# Patient Record
Sex: MALE | Race: WHITE | NOT HISPANIC OR LATINO | Employment: OTHER | URBAN - METROPOLITAN AREA
[De-identification: names, ages, dates, MRNs, and addresses within clinical notes are randomized per-mention and may not be internally consistent; named-entity substitution may affect disease eponyms.]

---

## 2021-08-30 ENCOUNTER — APPOINTMENT (EMERGENCY)
Dept: RADIOLOGY | Facility: HOSPITAL | Age: 86
DRG: 072 | End: 2021-08-30
Payer: COMMERCIAL

## 2021-08-30 ENCOUNTER — APPOINTMENT (EMERGENCY)
Dept: CT IMAGING | Facility: HOSPITAL | Age: 86
DRG: 072 | End: 2021-08-30
Payer: COMMERCIAL

## 2021-08-30 ENCOUNTER — HOSPITAL ENCOUNTER (INPATIENT)
Facility: HOSPITAL | Age: 86
LOS: 2 days | Discharge: HOME | DRG: 072 | End: 2021-09-01
Attending: EMERGENCY MEDICINE | Admitting: INTERNAL MEDICINE
Payer: COMMERCIAL

## 2021-08-30 DIAGNOSIS — J98.8 RESPIRATORY INFECTION: ICD-10-CM

## 2021-08-30 DIAGNOSIS — A41.9 SEPSIS (HCC): Primary | ICD-10-CM

## 2021-08-30 PROBLEM — Z85.46 HISTORY OF PROSTATE CANCER: Status: ACTIVE | Noted: 2021-08-30

## 2021-08-30 PROBLEM — N18.30 CKD (CHRONIC KIDNEY DISEASE) STAGE 3, GFR 30-59 ML/MIN (HCC): Status: ACTIVE | Noted: 2021-08-30

## 2021-08-30 PROBLEM — I10 ESSENTIAL HYPERTENSION: Status: ACTIVE | Noted: 2021-08-30

## 2021-08-30 LAB
ALBUMIN SERPL BCP-MCNC: 3.9 G/DL (ref 3.5–5)
ALP SERPL-CCNC: 83 U/L (ref 46–116)
ALT SERPL W P-5'-P-CCNC: 40 U/L (ref 12–78)
ANION GAP SERPL CALCULATED.3IONS-SCNC: 9 MMOL/L (ref 4–13)
APTT PPP: 33 SECONDS (ref 23–37)
AST SERPL W P-5'-P-CCNC: 28 U/L (ref 5–45)
BACTERIA UR QL AUTO: NORMAL /HPF
BASOPHILS # BLD AUTO: 0.02 THOUSANDS/ΜL (ref 0–0.1)
BASOPHILS NFR BLD AUTO: 0 % (ref 0–1)
BILIRUB SERPL-MCNC: 0.6 MG/DL (ref 0.2–1)
BILIRUB UR QL STRIP: NEGATIVE
BUN SERPL-MCNC: 21 MG/DL (ref 5–25)
CALCIUM SERPL-MCNC: 9.1 MG/DL (ref 8.3–10.1)
CHLORIDE SERPL-SCNC: 97 MMOL/L (ref 100–108)
CLARITY UR: CLEAR
CO2 SERPL-SCNC: 26 MMOL/L (ref 21–32)
COLOR UR: YELLOW
CREAT SERPL-MCNC: 1.64 MG/DL (ref 0.6–1.3)
EOSINOPHIL # BLD AUTO: 0.01 THOUSAND/ΜL (ref 0–0.61)
EOSINOPHIL NFR BLD AUTO: 0 % (ref 0–6)
ERYTHROCYTE [DISTWIDTH] IN BLOOD BY AUTOMATED COUNT: 13.9 % (ref 11.6–15.1)
GFR SERPL CREATININE-BSD FRML MDRD: 37 ML/MIN/1.73SQ M
GLUCOSE SERPL-MCNC: 114 MG/DL (ref 65–140)
GLUCOSE UR STRIP-MCNC: NEGATIVE MG/DL
HCT VFR BLD AUTO: 45.4 % (ref 36.5–49.3)
HGB BLD-MCNC: 15 G/DL (ref 12–17)
HGB UR QL STRIP.AUTO: ABNORMAL
IMM GRANULOCYTES # BLD AUTO: 0.08 THOUSAND/UL (ref 0–0.2)
IMM GRANULOCYTES NFR BLD AUTO: 1 % (ref 0–2)
INR PPP: 1.08 (ref 0.84–1.19)
KETONES UR STRIP-MCNC: NEGATIVE MG/DL
LACTATE SERPL-SCNC: 1.3 MMOL/L (ref 0.5–2)
LEUKOCYTE ESTERASE UR QL STRIP: NEGATIVE
LYMPHOCYTES # BLD AUTO: 1.06 THOUSANDS/ΜL (ref 0.6–4.47)
LYMPHOCYTES NFR BLD AUTO: 8 % (ref 14–44)
MCH RBC QN AUTO: 29.7 PG (ref 26.8–34.3)
MCHC RBC AUTO-ENTMCNC: 33 G/DL (ref 31.4–37.4)
MCV RBC AUTO: 90 FL (ref 82–98)
MONOCYTES # BLD AUTO: 1.11 THOUSAND/ΜL (ref 0.17–1.22)
MONOCYTES NFR BLD AUTO: 8 % (ref 4–12)
NEUTROPHILS # BLD AUTO: 11.29 THOUSANDS/ΜL (ref 1.85–7.62)
NEUTS SEG NFR BLD AUTO: 83 % (ref 43–75)
NITRITE UR QL STRIP: NEGATIVE
NON-SQ EPI CELLS URNS QL MICRO: NORMAL /HPF
NRBC BLD AUTO-RTO: 0 /100 WBCS
PH UR STRIP.AUTO: 6 [PH] (ref 4.5–8)
PLATELET # BLD AUTO: 226 THOUSANDS/UL (ref 149–390)
PLATELET # BLD AUTO: 285 THOUSANDS/UL (ref 149–390)
PMV BLD AUTO: 10.5 FL (ref 8.9–12.7)
PMV BLD AUTO: 9.7 FL (ref 8.9–12.7)
POTASSIUM SERPL-SCNC: 4.8 MMOL/L (ref 3.5–5.3)
PROCALCITONIN SERPL-MCNC: 0.15 NG/ML
PROT SERPL-MCNC: 8.1 G/DL (ref 6.4–8.2)
PROT UR STRIP-MCNC: ABNORMAL MG/DL
PROTHROMBIN TIME: 14.1 SECONDS (ref 11.6–14.5)
RBC # BLD AUTO: 5.05 MILLION/UL (ref 3.88–5.62)
RBC #/AREA URNS AUTO: NORMAL /HPF
SARS-COV-2 RNA RESP QL NAA+PROBE: NEGATIVE
SODIUM SERPL-SCNC: 132 MMOL/L (ref 136–145)
SP GR UR STRIP.AUTO: 1.02 (ref 1–1.03)
UROBILINOGEN UR QL STRIP.AUTO: 0.2 E.U./DL
WBC # BLD AUTO: 13.57 THOUSAND/UL (ref 4.31–10.16)
WBC #/AREA URNS AUTO: NORMAL /HPF

## 2021-08-30 PROCEDURE — U0003 INFECTIOUS AGENT DETECTION BY NUCLEIC ACID (DNA OR RNA); SEVERE ACUTE RESPIRATORY SYNDROME CORONAVIRUS 2 (SARS-COV-2) (CORONAVIRUS DISEASE [COVID-19]), AMPLIFIED PROBE TECHNIQUE, MAKING USE OF HIGH THROUGHPUT TECHNOLOGIES AS DESCRIBED BY CMS-2020-01-R: HCPCS | Performed by: EMERGENCY MEDICINE

## 2021-08-30 PROCEDURE — G1004 CDSM NDSC: HCPCS

## 2021-08-30 PROCEDURE — 85610 PROTHROMBIN TIME: CPT | Performed by: EMERGENCY MEDICINE

## 2021-08-30 PROCEDURE — 85730 THROMBOPLASTIN TIME PARTIAL: CPT | Performed by: EMERGENCY MEDICINE

## 2021-08-30 PROCEDURE — 87040 BLOOD CULTURE FOR BACTERIA: CPT | Performed by: EMERGENCY MEDICINE

## 2021-08-30 PROCEDURE — 99285 EMERGENCY DEPT VISIT HI MDM: CPT | Performed by: EMERGENCY MEDICINE

## 2021-08-30 PROCEDURE — 99223 1ST HOSP IP/OBS HIGH 75: CPT | Performed by: PHYSICIAN ASSISTANT

## 2021-08-30 PROCEDURE — 81001 URINALYSIS AUTO W/SCOPE: CPT

## 2021-08-30 PROCEDURE — 80053 COMPREHEN METABOLIC PANEL: CPT | Performed by: EMERGENCY MEDICINE

## 2021-08-30 PROCEDURE — 93005 ELECTROCARDIOGRAM TRACING: CPT

## 2021-08-30 PROCEDURE — 71045 X-RAY EXAM CHEST 1 VIEW: CPT

## 2021-08-30 PROCEDURE — 84145 PROCALCITONIN (PCT): CPT | Performed by: EMERGENCY MEDICINE

## 2021-08-30 PROCEDURE — 85049 AUTOMATED PLATELET COUNT: CPT | Performed by: PHYSICIAN ASSISTANT

## 2021-08-30 PROCEDURE — U0005 INFEC AGEN DETEC AMPLI PROBE: HCPCS | Performed by: EMERGENCY MEDICINE

## 2021-08-30 PROCEDURE — 36415 COLL VENOUS BLD VENIPUNCTURE: CPT | Performed by: EMERGENCY MEDICINE

## 2021-08-30 PROCEDURE — 85025 COMPLETE CBC W/AUTO DIFF WBC: CPT | Performed by: EMERGENCY MEDICINE

## 2021-08-30 PROCEDURE — 99285 EMERGENCY DEPT VISIT HI MDM: CPT

## 2021-08-30 PROCEDURE — 70450 CT HEAD/BRAIN W/O DYE: CPT

## 2021-08-30 PROCEDURE — 83605 ASSAY OF LACTIC ACID: CPT | Performed by: EMERGENCY MEDICINE

## 2021-08-30 RX ORDER — ACETAMINOPHEN 325 MG/1
650 TABLET ORAL ONCE
Status: COMPLETED | OUTPATIENT
Start: 2021-08-30 | End: 2021-08-30

## 2021-08-30 RX ORDER — DOCUSATE SODIUM 100 MG/1
100 CAPSULE, LIQUID FILLED ORAL 2 TIMES DAILY
Status: DISCONTINUED | OUTPATIENT
Start: 2021-08-31 | End: 2021-09-01 | Stop reason: HOSPADM

## 2021-08-30 RX ORDER — CARVEDILOL 6.25 MG/1
6.25 TABLET ORAL 2 TIMES DAILY WITH MEALS
Status: DISCONTINUED | OUTPATIENT
Start: 2021-08-31 | End: 2021-09-01 | Stop reason: HOSPADM

## 2021-08-30 RX ORDER — HEPARIN SODIUM 5000 [USP'U]/ML
5000 INJECTION, SOLUTION INTRAVENOUS; SUBCUTANEOUS EVERY 8 HOURS SCHEDULED
Status: DISCONTINUED | OUTPATIENT
Start: 2021-08-30 | End: 2021-09-01 | Stop reason: HOSPADM

## 2021-08-30 RX ORDER — LISINOPRIL 10 MG/1
10 TABLET ORAL DAILY
COMMUNITY

## 2021-08-30 RX ORDER — ACETAMINOPHEN 325 MG/1
650 TABLET ORAL EVERY 6 HOURS PRN
Status: DISCONTINUED | OUTPATIENT
Start: 2021-08-30 | End: 2021-09-01 | Stop reason: HOSPADM

## 2021-08-30 RX ORDER — ACETAMINOPHEN 325 MG/1
650 TABLET ORAL EVERY 6 HOURS PRN
COMMUNITY

## 2021-08-30 RX ORDER — SODIUM CHLORIDE 9 MG/ML
3 INJECTION INTRAVENOUS
Status: DISCONTINUED | OUTPATIENT
Start: 2021-08-30 | End: 2021-09-01 | Stop reason: HOSPADM

## 2021-08-30 RX ORDER — ONDANSETRON 2 MG/ML
4 INJECTION INTRAMUSCULAR; INTRAVENOUS EVERY 6 HOURS PRN
Status: DISCONTINUED | OUTPATIENT
Start: 2021-08-30 | End: 2021-09-01 | Stop reason: HOSPADM

## 2021-08-30 RX ORDER — SODIUM CHLORIDE, SODIUM LACTATE, POTASSIUM CHLORIDE, CALCIUM CHLORIDE 600; 310; 30; 20 MG/100ML; MG/100ML; MG/100ML; MG/100ML
75 INJECTION, SOLUTION INTRAVENOUS CONTINUOUS
Status: DISCONTINUED | OUTPATIENT
Start: 2021-08-30 | End: 2021-08-31

## 2021-08-30 RX ORDER — CARVEDILOL 6.25 MG/1
6.25 TABLET ORAL 2 TIMES DAILY WITH MEALS
COMMUNITY

## 2021-08-30 RX ORDER — SENNOSIDES 8.6 MG
1 TABLET ORAL DAILY
Status: DISCONTINUED | OUTPATIENT
Start: 2021-08-31 | End: 2021-09-01 | Stop reason: HOSPADM

## 2021-08-30 RX ORDER — CALCIUM CARBONATE 200(500)MG
1000 TABLET,CHEWABLE ORAL DAILY PRN
Status: DISCONTINUED | OUTPATIENT
Start: 2021-08-30 | End: 2021-09-01 | Stop reason: HOSPADM

## 2021-08-30 RX ORDER — LISINOPRIL 10 MG/1
10 TABLET ORAL DAILY
Status: DISCONTINUED | OUTPATIENT
Start: 2021-08-31 | End: 2021-09-01 | Stop reason: HOSPADM

## 2021-08-30 RX ADMIN — HEPARIN SODIUM 5000 UNITS: 5000 INJECTION INTRAVENOUS; SUBCUTANEOUS at 23:50

## 2021-08-30 RX ADMIN — CEFTRIAXONE 1000 MG: 10 INJECTION, POWDER, FOR SOLUTION INTRAVENOUS at 18:11

## 2021-08-30 RX ADMIN — ACETAMINOPHEN 650 MG: 325 TABLET, FILM COATED ORAL at 15:32

## 2021-08-30 RX ADMIN — SODIUM CHLORIDE, SODIUM LACTATE, POTASSIUM CHLORIDE, AND CALCIUM CHLORIDE 75 ML/HR: .6; .31; .03; .02 INJECTION, SOLUTION INTRAVENOUS at 23:51

## 2021-08-30 RX ADMIN — SODIUM CHLORIDE 1000 ML: 0.9 INJECTION, SOLUTION INTRAVENOUS at 18:11

## 2021-08-30 NOTE — SEPSIS NOTE
Sepsis Note   Severo Coach 80 y o  male MRN: [id number]  Unit/Bed#: ED 18 Encounter: [id number]      qSOFA     Row Name 08/30/21 1645 08/30/21 1600 08/30/21 1530 08/30/21 1506 08/30/21 1423    Altered mental status GCS < 15  --  --  --  1  --    Respiratory Rate > / =22  --  0  0  0  0    Systolic BP < / =258  0  0  0  0  0    Q Sofa Score  1  1  1  1  0        Initial Sepsis Screening     Row Name 08/30/21 1702                Is the patient's history suggestive of a new or worsening infection? (!) Yes (Proceed)  -SZ        Suspected source of infection  pneumonia  -SZ        Are two or more of the following signs & symptoms of infection both present and new to the patient? (!) Yes (Proceed)  -SZ        Indicate SIRS criteria  Hyperthemia > 38 3C (100 9F); Tachycardia > 90 bpm;Leukocytosis (WBC > 24534 IJL)  -SZ        If the answer is yes to both questions, suspicion of sepsis is present  --        If severe sepsis is present AND tissue hypoperfusion perists in the hour after fluid resuscitation or lactate > 4, the patient meets criteria for SEPTIC SHOCK  --        Are any of the following organ dysfunction criteria present within 6 hours of suspected infection and SIRS criteria that are NOT considered to be chronic conditions?   No  -SZ        Organ dysfunction  --        Date of presentation of severe sepsis  --        Time of presentation of severe sepsis  --        Tissue hypoperfusion persists in the hour after crystalloid fluid administration, evidenced, by either:  --        Was hypotension present within one hour of the conclusion of crystalloid fluid administration?  --        Date of presentation of septic shock  --        Time of presentation of septic shock  --          User Key  (r) = Recorded By, (t) = Taken By, (c) = Cosigned By    [address] Name Provider Type    SZ Mc Richardson MD Physician

## 2021-08-30 NOTE — H&P
[facility]  H&P- Helen Chan 2/27/1932, 80 y o  male MRN: [id number]  Unit/Bed#: ED 18 Encounter: [id number]  Primary Care Provider: Deangelo Batista MD   Date and time admitted to hospital: 8/30/2021  2:19 PM    * Sepsis [facility])  Assessment & Plan  · POA w/ fever, AMS, tachycardia, WBC >12  · Source: pulmonary bacterial vs  Pulmonary viral; abdomen is somewhat distended but non-tender and no peritoneal signs; UA unremarkable; COVID 19 negative  · S/P Rocephin in th ED-- continue at this time  · Continue IVF  · Follow blood cultures  · Trend labs/vitals-- check PCT again tomorrow AM  · Q4 neuro checks    CKD (chronic kidney disease) stage 3, GFR 30-59 ml/min ([facility])  Assessment & Plan  Lab Results   Component Value Date    EGFR 37 08/30/2021    CREATININE 1 64 (H) 08/30/2021   · Cr is at baseline  · Continue to monitor w/ IVF    History of prostate cancer  Assessment & Plan  · Continue w/ routine OP f/u w/ Urology    Essential hypertension  Assessment & Plan  · BP acceptable at this time  · Continue carvedilol, lisinopril      VTE Pharmacologic Prophylaxis: VTE Score: 5 High Risk (Score >/= 5) - Pharmacological DVT Prophylaxis Ordered: heparin  Sequential Compression Devices Ordered  Code Status: level 1  Discussion with family: family aware of admission and negative COVID 19 status  Anticipated Length of Stay: Patient will be admitted on an inpatient basis with an anticipated length of stay of greater than 2 midnights secondary to tx and evaluation of sepsis  Total Time for Visit, including Counseling / Coordination of Care: 30 minutes Greater than 50% of this total time spent on direct patient counseling and coordination of care  Chief Complaint: altered mental status    History of Present Illness:  Helen Chan is a 80 y o  male with a PMH of HTN, CKD, cognitive dysfunction, prostate CA w/ prior treatment who presents with altered mental status  Hx obtained from ED records   Per records, patient was last known well yesterday  Today, he appear to not be acting himself-- he is usually alert and oriented and able to perform activities of daily living  There were no reported sx at [facility]  Per patient, he was not sure why he was brought to the ED  His only complaints are cough x1 year w/ intermittent 'airplane glue' colored sputum and difficulty w/ urination  He denies any abdominal pain/nausea/vomiting/chest pain/SOB/diarrhea  Review of Systems:  Review of Systems   Constitutional: Negative  HENT: Negative  Eyes: Negative  Respiratory: Positive for cough  Cardiovascular: Negative  Gastrointestinal: Negative  Genitourinary: Positive for difficulty urinating  Musculoskeletal: Negative  Skin: Negative  Neurological: Negative  Hematological: Negative  Psychiatric/Behavioral: Negative  Past Medical and Surgical History:   History reviewed  No pertinent past medical history  History reviewed  No pertinent surgical history  Meds/Allergies:  Prior to Admission medications    Medication Sig Start Date End Date Taking? Authorizing Provider   acetaminophen (TYLENOL) 325 mg tablet Take 650 mg by mouth every 6 (six) hours as needed for mild pain   Yes Historical Provider, MD   carvedilol (COREG) 6 25 mg tablet Take 6 25 mg by mouth 2 (two) times a day with meals   Yes Historical Provider, MD   lisinopril (ZESTRIL) 10 mg tablet Take 10 mg by mouth daily   Yes Historical Provider, MD     I have reveiwed home medications using records provided by [facility]      Allergies: No Known Allergies    Social History:  Marital Status: Unknown   Occupation: retired  Patient Pre-hospital Living Situation: Assisted Living  Patient Pre-hospital Level of Mobility: walks  Patient Pre-hospital Diet Restrictions: none  Substance Use History:   Social History     Substance and Sexual Activity   Alcohol Use None     Social History     Tobacco Use   Smoking Status Never Smoker Smokeless Tobacco Never Used     Social History     Substance and Sexual Activity   Drug Use Not on file       Family History:  History reviewed  No pertinent family history  Physical Exam:     Vitals:   Blood Pressure: 164/89 (08/30/21 1806)  Pulse: 90 (08/30/21 1806)  Temperature: 98 3 °F (36 8 °C) (08/30/21 1806)  Temp Source: Oral (08/30/21 1806)  Respirations: 18 (08/30/21 1806)  Height: 5' 11" (180 3 cm) (08/30/21 1806)  Weight - Scale: 84 kg (185 lb 3 oz) (08/30/21 1806)  SpO2: 93 % (08/30/21 1806)    Physical Exam  Constitutional:       General: He is not in acute distress  Appearance: Normal appearance  He is not ill-appearing  HENT:      Head: Normocephalic  Eyes:      Pupils: Pupils are equal, round, and reactive to light  Cardiovascular:      Rate and Rhythm: Normal rate and regular rhythm  Heart sounds: No murmur heard  No friction rub  No gallop  Pulmonary:      Effort: Pulmonary effort is normal       Breath sounds: Normal breath sounds  No wheezing  Abdominal:      General: Bowel sounds are normal       Palpations: Abdomen is soft  There is no mass  Tenderness: There is no abdominal tenderness  There is no guarding  Musculoskeletal:         General: Normal range of motion  Skin:     General: Skin is warm and dry  Neurological:      General: No focal deficit present  Mental Status: He is alert        Comments: Oriented to self, knows it is 2021, reports being 89   Psychiatric:         Mood and Affect: Mood normal          Additional Data:     Lab Results:  Results from last 7 days   Lab Units 08/30/21  1503   WBC Thousand/uL 13 57*   HEMOGLOBIN g/dL 15 0   HEMATOCRIT % 45 4   PLATELETS Thousands/uL 285   NEUTROS PCT % 83*   LYMPHS PCT % 8*   MONOS PCT % 8   EOS PCT % 0     Results from last 7 days   Lab Units 08/30/21  1503   SODIUM mmol/L 132*   POTASSIUM mmol/L 4 8   CHLORIDE mmol/L 97*   CO2 mmol/L 26   BUN mg/dL 21   CREATININE mg/dL 1 64*   ANION GAP mmol/L 9   CALCIUM mg/dL 9 1   ALBUMIN g/dL 3 9   TOTAL BILIRUBIN mg/dL 0 60   ALK PHOS U/L 83   ALT U/L 40   AST U/L 28   GLUCOSE RANDOM mg/dL 114     Results from last 7 days   Lab Units 08/30/21  1503   INR  1 08             Results from last 7 days   Lab Units 08/30/21  1503   LACTIC ACID mmol/L 1 3   PROCALCITONIN ng/ml 0 15       Imaging: Reviewed radiology reports from this admission including: chest xray and CT head  CT head without contrast   Final Result by Hunter Dawkins MD (08/30 1642)      No acute intracranial hemorrhage, mass effect or edema  Microangiopathic changes  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

## 2021-08-30 NOTE — ED PROVIDER NOTES
History  Chief Complaint   Patient presents with    Altered Mental Status     Per EMS patient sent from SNF due to 'not acting himself "     Patient is an 51-year-old male who presents to the emergency department from his nursing facility  EMS report indicated that patient was sent in for altered mental status  Today he was "not his normal self "  He is usually alert, oriented and able to perform his daily activities of living  Reportedly was okay yesterday  Patient initially minimally vocal though more responsive/conversant with time  He denies having felt poorly  He denies having pain anywhere  He indicates that he is 89  He is aware that it is 2021 and suggests that the month is September  He indicates that he did have sausage & biscuits today for breakfast   He denies having felt nauseous or having experienced any abdominal pain  He endorses having a cough which is productive "airplane glue" colored sputum  This has been ongoing for 2 to 3 years  He denies any chest pain or shortness of breath  Reviewed chart  Patient established care with Dr Ochoa Escobar last year after having moved up from [place]  Family noted some cognitive impairment  He additionally has hypertension, CKD and remote history of treated prostate CA  Pt  Resides at [facility]  Prior to Admission Medications   Prescriptions Last Dose Informant Patient Reported? Taking?   acetaminophen (TYLENOL) 325 mg tablet  Outside Facility (Specify) Yes Yes   Sig: Take 650 mg by mouth every 6 (six) hours as needed for mild pain   carvedilol (COREG) 6 25 mg tablet  Outside Facility (Specify) Yes Yes   Sig: Take 6 25 mg by mouth 2 (two) times a day with meals   lisinopril (ZESTRIL) 10 mg tablet  Outside Facility (Specify) Yes Yes   Sig: Take 10 mg by mouth daily      Facility-Administered Medications: None       History reviewed  No pertinent past medical history  History reviewed  No pertinent surgical history      History reviewed  No pertinent family history  I have reviewed and agree with the history as documented  E-Cigarette/Vaping     E-Cigarette/Vaping Substances     Social History     Tobacco Use    Smoking status: Never Smoker    Smokeless tobacco: Never Used   Substance Use Topics    Alcohol use: Not on file    Drug use: Not on file       Review of Systems   Constitutional: Positive for fever  Cardiovascular: Negative for chest pain  Gastrointestinal: Negative for abdominal pain  All other systems reviewed and are negative  Physical Exam  Physical Exam  Vitals and nursing note reviewed  Constitutional:       Appearance: He is well-developed  He is obese  Comments: Patient is a bit slow to respond  Does not appear distressed  HENT:      Head: Normocephalic  Mouth/Throat:      Mouth: Mucous membranes are moist    Eyes:      Extraocular Movements: Extraocular movements intact  Pupils: Pupils are equal, round, and reactive to light  Pulmonary:      Effort: Pulmonary effort is normal       Comments: Breath sounds diminished diffusely  Abdominal:      General: Bowel sounds are normal       Palpations: Abdomen is soft  Tenderness: There is no abdominal tenderness  Musculoskeletal:         General: Normal range of motion  Skin:     General: Skin is warm and dry  Neurological:      Mental Status: He is alert  GCS: GCS eye subscore is 4  GCS verbal subscore is 4  GCS motor subscore is 6  Comments: Patient aware that it is 2021 and that he is at the hospital   Informed me that he was 89 (as opposed to 89)  Patient does have cognitive impairment-uncertain whether this is baseline  Clear speech  No facial asymmetry/ deficit appreciated  Pupils briskly reactive to light  EOMI  No nystagmus  Strong eye closure & symmetric brow raise  Ability to insufflate cheeks, protrude tongue midline & range this laterally   Symmetric/ intact sensation in the upper, mid & lower portions of the face  5/5 strength on head turn,bicep& tricep movement against resistance b/l   5/5 strength on b/l hand , hip flexion, dorsi & plantar flexion  Symmetric grossly intact sensation in the UE & LE  Steady gait  No dysmetria       Psychiatric:         Mood and Affect: Mood normal          Vital Signs  ED Triage Vitals [08/30/21 1423]   Temperature Pulse Respirations Blood Pressure SpO2   (!) 100 8 °F (38 2 °C) 96 18 (!) 185/89 92 %      Temp Source Heart Rate Source Patient Position - Orthostatic VS BP Location FiO2 (%)   Oral Monitor Lying Left arm --      Pain Score       No Pain           Vitals:    08/30/21 1506 08/30/21 1530 08/30/21 1600 08/30/21 1645   BP: 169/88 157/86 138/89 159/87   Pulse: 96 100 98 98   Patient Position - Orthostatic VS:  Lying Lying          Visual Acuity  Visual Acuity      Most Recent Value   L Pupil Size (mm)  3   R Pupil Size (mm)  3          ED Medications  Medications   sodium chloride (PF) 0 9 % injection 3 mL (has no administration in time range)   sodium chloride 0 9 % bolus 1,000 mL (has no administration in time range)   ceftriaxone (ROCEPHIN) 1 g/50 mL in dextrose IVPB (has no administration in time range)   acetaminophen (TYLENOL) tablet 650 mg (650 mg Oral Given 8/30/21 1532)       Diagnostic Studies  Results Reviewed     Procedure Component Value Units Date/Time    Urine Microscopic [[id number]]  (Normal) Collected: 08/30/21 1655    Lab Status: Final result Specimen: Urine Updated: 08/30/21 1744     RBC, UA None Seen /hpf      WBC, UA 0-1 /hpf      Epithelial Cells None Seen /hpf      Bacteria, UA None Seen /hpf     Procalcitonin with AM Reflex [[id number]]  (Normal) Collected: 08/30/21 1503    Lab Status: Final result Specimen: Blood from Arm, Right Updated: 08/30/21 1743     Procalcitonin 0 15 ng/ml     Procalcitonin Reflex [[id number]]     Lab Status: No result Specimen: Blood     Urine Macroscopic, POC [[id number]]  (Abnormal) Collected: 08/30/21 1655    Lab Status: Final result Specimen: Urine Updated: 08/30/21 1657     Color, UA Yellow     Clarity, UA Clear     pH, UA 6 0     Leukocytes, UA Negative     Nitrite, UA Negative     Protein,  (2+) mg/dl      Glucose, UA Negative mg/dl      Ketones, UA Negative mg/dl      Urobilinogen, UA 0 2 E U /dl      Bilirubin, UA Negative     Blood, UA Trace     Specific [place], UA 1 025    Narrative:      CLINITEK RESULT    Novel Coronavirus (Covid-19),PCR [facility] - 2 Hour Stat [[id number]]  (Normal) Collected: 08/30/21 1502    Lab Status: Final result Specimen: Nares from Nose Updated: 08/30/21 1644     SARS-CoV-2 Negative    Narrative: The specimen collection materials, transport medium, and/or testing methodology utilized in the production of these test results have been proven to be reliable in a limited validation with an abbreviated program under the Emergency Utilization Authorization provided by the FDA  Testing reported as "Presumptive positive" will be confirmed with secondary testing to ensure result accuracy  Clinical caution and judgement should be used with the interpretation of these results with consideration of the clinical impression and other laboratory testing  Testing reported as "Positive" or "Negative" has been proven to be accurate according to standard laboratory validation requirements  All testing is performed with control materials showing appropriate reactivity at standard intervals        Comprehensive metabolic panel [[id number]]  (Abnormal) Collected: 08/30/21 1503    Lab Status: Final result Specimen: Blood from Arm, Right Updated: 08/30/21 1547     Sodium 132 mmol/L      Potassium 4 8 mmol/L      Chloride 97 mmol/L      CO2 26 mmol/L      ANION GAP 9 mmol/L      BUN 21 mg/dL      Creatinine 1 64 mg/dL      Glucose 114 mg/dL      Calcium 9 1 mg/dL      AST 28 U/L      ALT 40 U/L      Alkaline Phosphatase 83 U/L      Total Protein 8 1 g/dL      Albumin 3 9 g/dL      Total Bilirubin 0 60 mg/dL eGFR 37 ml/min/1 73sq m     Narrative:      [place] guidelines for Chronic Kidney Disease (CKD):     Stage 1 with normal or high GFR (GFR > 90 mL/min/1 73 square meters)    Stage 2 Mild CKD (GFR = 60-89 mL/min/1 73 square meters)    Stage 3A Moderate CKD (GFR = 45-59 mL/min/1 73 square meters)    Stage 3B Moderate CKD (GFR = 30-44 mL/min/1 73 square meters)    Stage 4 Severe CKD (GFR = 15-29 mL/min/1 73 square meters)    Stage 5 End Stage CKD (GFR <15 mL/min/1 73 square meters)  Note: GFR calculation is accurate only with a steady state creatinine    Lactic Acid [[id number]]  (Normal) Collected: 08/30/21 1503    Lab Status: Final result Specimen: Blood from Arm, Right Updated: 08/30/21 1540     LACTIC ACID 1 3 mmol/L     Narrative:      Result may be elevated if tourniquet was used during collection      CBC and differential [[id number]]  (Abnormal) Collected: 08/30/21 1503    Lab Status: Final result Specimen: Blood from Arm, Right Updated: 08/30/21 1535     WBC 13 57 Thousand/uL      RBC 5 05 Million/uL      Hemoglobin 15 0 g/dL      Hematocrit 45 4 %      MCV 90 fL      MCH 29 7 pg      MCHC 33 0 g/dL      RDW 13 9 %      MPV 10 5 fL      Platelets 493 Thousands/uL      nRBC 0 /100 WBCs      Neutrophils Relative 83 %      Immat GRANS % 1 %      Lymphocytes Relative 8 %      Monocytes Relative 8 %      Eosinophils Relative 0 %      Basophils Relative 0 %      Neutrophils Absolute 11 29 Thousands/µL      Immature Grans Absolute 0 08 Thousand/uL      Lymphocytes Absolute 1 06 Thousands/µL      Monocytes Absolute 1 11 Thousand/µL      Eosinophils Absolute 0 01 Thousand/µL      Basophils Absolute 0 02 Thousands/µL     Protime-INR [[id number]]  (Normal) Collected: 08/30/21 1503    Lab Status: Final result Specimen: Blood from Arm, Right Updated: 08/30/21 1532     Protime 14 1 seconds      INR 1 08    APTT [[id number]]  (Normal) Collected: 08/30/21 1503    Lab Status: Final result Specimen: Blood from Arm, Right Updated: 08/30/21 1532     PTT 33 seconds     Blood culture #1 [[id number]] Collected: 08/30/21 1504    Lab Status: In process Specimen: Blood from Hand, Left Updated: 08/30/21 1511    Blood culture #2 [[id number]] Collected: 08/30/21 1503    Lab Status: In process Specimen: Blood from Arm, Right Updated: 08/30/21 1511                 CT head without contrast   Final Result by Jeni Phoenix MD (08/30 1642)      No acute intracranial hemorrhage, mass effect or edema  Microangiopathic changes  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                  Workstation performed: [id number]         XR chest 1 view portable   ED Interpretation by Henna Acosta MD (08/30 1643)   Poor inspiration  Slightly prominent lung markings  No appreciated effusion or infiltrate                 Procedures  ECG 12 Lead Documentation Only    Date/Time: 8/30/2021 2:51 PM  Performed by: Henna Acosta MD  Authorized by: Henna Acosta MD     ECG reviewed by me, the ED Provider: yes    Patient location:  ED  Previous ECG:     Previous ECG:  Compared to current  Rate:     ECG rate:  95    ECG rate assessment: normal    Rhythm:     Rhythm: sinus rhythm    Ectopy:     Ectopy: none    QRS:     QRS axis:  Left    QRS intervals:  Normal  Conduction:     Conduction: normal    ST segments:     ST segments:  Normal  T waves:     T waves: normal               ED Course  ED Course as of Aug 30 1801   Mon Aug 30, 2021   1658 CT head is unremarkable  Patient does not have any focal neurological deficits  Uncertain that patient's mentation my initial evaluation is different from his baseline  He does endorse having cough and was febrile on arrival   Suspicion initially for possible COVID infection  His test result was negative  He does have mild leukocytosis, mild renal insufficiency/evidence of dehydration  Creatinine 1 64 today    This was 1 58 on prior testing although with normal sodium and chloride (in contrast today's lower values)  Lung x-ray is limited in breath sounds decreased diffusely  Must consider bronchitis/pneumonia  Sats are borderline 90 to 92% on room air  These were 95 to 96% on PCP evaluations  At this time covering with antibiotic      1709 Patient states "it won't come out" in reference to his "water  "He does describe an urge to void although denies any tenderness upon my palpation of the suprapubic area  He shares that he has had difficulty with voiding at times in the past though denies having required catheter  He relates that usually within of time he is able to go  Uncertain whether he is speaking about this as he had recently been asked to provide a urine specimen  Will perform bladder scan  Reviewed paper-work sent w/ him  He is [facility]  Will review w/ [facility] for admission  Initial Sepsis Screening     Row Name 08/30/21 1702                Is the patient's history suggestive of a new or worsening infection? (!) Yes (Proceed)  -SZ        Suspected source of infection  pneumonia  -SZ        Are two or more of the following signs & symptoms of infection both present and new to the patient? (!) Yes (Proceed)  -SZ        Indicate SIRS criteria  Hyperthemia > 38 3C (100 9F); Tachycardia > 90 bpm;Leukocytosis (WBC > 48[address] IJL)  -SZ        If the answer is yes to both questions, suspicion of sepsis is present  --        If severe sepsis is present AND tissue hypoperfusion perists in the hour after fluid resuscitation or lactate > 4, the patient meets criteria for SEPTIC SHOCK  --        Are any of the following organ dysfunction criteria present within 6 hours of suspected infection and SIRS criteria that are NOT considered to be chronic conditions?   No  -SZ        Organ dysfunction  --        Date of presentation of severe sepsis  --        Time of presentation of severe sepsis  --        Tissue hypoperfusion persists in the hour after crystalloid fluid administration, evidenced, by either:  --        Was hypotension present within one hour of the conclusion of crystalloid fluid administration?  --        Date of presentation of septic shock  --        Time of presentation of septic shock  --          User Key  (r) = Recorded By, (t) = Taken By, (c) = Cosigned By    [address] Name Provider Type    JOSÉ MIGUEL Abel MD Physician          SBIRT 20yo+      Most Recent Value   SBIRT (23 yo +)   In order to provide better care to our patients, we are screening all of our patients for alcohol and drug use  Would it be okay to ask you these screening questions? Yes Filed at: 08/30/2021 1429   Initial Alcohol Screen: US AUDIT-C    1  How often do you have a drink containing alcohol?  0 Filed at: 08/30/2021 1429   2  How many drinks containing alcohol do you have on a typical day you are drinking? 0 Filed at: 08/30/2021 1429   3b  FEMALE Any Age, or MALE 65+: How often do you have 4 or more drinks on one occassion? 0 Filed at: 08/30/2021 1429   Audit-C Score  0 Filed at: 08/30/2021 1429   ENEDINA: How many times in the past year have you    Used an illegal drug or used a prescription medication for non-medical reasons?   Never Filed at: 08/30/2021 1429                    MDM    Disposition  Final diagnoses:   Sepsis ([address] )   Respiratory infection     Time reflects when diagnosis was documented in both MDM as applicable and the Disposition within this note     Time User Action Codes Description Comment    8/30/2021  5:33 PM Ephriam Madhavi A Add [A41 9] Sepsis ([address] )     8/30/2021  5:34 PM Ephriam Madhavi A Add [J98 8] Respiratory infection       ED Disposition     ED Disposition Condition Date/Time Comment    Admit Stable Mon Aug 30, 2021  5:33 PM Case was discussed with Dr Luda Mauro and the patient's admission status was agreed to be Admission Status: inpatient status to the service of Dr Joel Raines   Follow-up Information    None         Patient's Medications   Discharge Prescriptions    No medications on file     No discharge procedures on file      PDMP Review     None          ED Provider  Electronically Signed by           Lisa Dobbs MD  08/30/21 0063

## 2021-08-30 NOTE — ASSESSMENT & PLAN NOTE
· POA w/ fever, AMS, tachycardia, WBC >12  · Source: pulmonary bacterial vs  Pulmonary viral; abdomen is somewhat distended but non-tender and no peritoneal signs; UA unremarkable; COVID 19 negative  · S/P Rocephin in th ED-- continue at this time  · Continue IVF  · Follow blood cultures  · Trend labs/vitals-- check PCT again tomorrow AM  · Q4 neuro checks

## 2021-08-31 LAB
ALBUMIN SERPL BCP-MCNC: 2.9 G/DL (ref 3.5–5)
ALP SERPL-CCNC: 63 U/L (ref 46–116)
ALT SERPL W P-5'-P-CCNC: 31 U/L (ref 12–78)
ANION GAP SERPL CALCULATED.3IONS-SCNC: 9 MMOL/L (ref 4–13)
AST SERPL W P-5'-P-CCNC: 24 U/L (ref 5–45)
ATRIAL RATE: 95 BPM
BILIRUB SERPL-MCNC: 0.33 MG/DL (ref 0.2–1)
BUN SERPL-MCNC: 18 MG/DL (ref 5–25)
CALCIUM ALBUM COR SERPL-MCNC: 9.3 MG/DL (ref 8.3–10.1)
CALCIUM SERPL-MCNC: 8.4 MG/DL (ref 8.3–10.1)
CHLORIDE SERPL-SCNC: 103 MMOL/L (ref 100–108)
CO2 SERPL-SCNC: 25 MMOL/L (ref 21–32)
CREAT SERPL-MCNC: 1.41 MG/DL (ref 0.6–1.3)
ERYTHROCYTE [DISTWIDTH] IN BLOOD BY AUTOMATED COUNT: 14 % (ref 11.6–15.1)
GFR SERPL CREATININE-BSD FRML MDRD: 44 ML/MIN/1.73SQ M
GLUCOSE SERPL-MCNC: 107 MG/DL (ref 65–140)
HCT VFR BLD AUTO: 40.8 % (ref 36.5–49.3)
HGB BLD-MCNC: 13.5 G/DL (ref 12–17)
MAGNESIUM SERPL-MCNC: 1.7 MG/DL (ref 1.6–2.6)
MCH RBC QN AUTO: 29.9 PG (ref 26.8–34.3)
MCHC RBC AUTO-ENTMCNC: 33.1 G/DL (ref 31.4–37.4)
MCV RBC AUTO: 90 FL (ref 82–98)
P AXIS: 54 DEGREES
PLATELET # BLD AUTO: 222 THOUSANDS/UL (ref 149–390)
PMV BLD AUTO: 10.4 FL (ref 8.9–12.7)
POTASSIUM SERPL-SCNC: 4.5 MMOL/L (ref 3.5–5.3)
PR INTERVAL: 166 MS
PROCALCITONIN SERPL-MCNC: 0.11 NG/ML
PROT SERPL-MCNC: 6.1 G/DL (ref 6.4–8.2)
QRS AXIS: -26 DEGREES
QRSD INTERVAL: 88 MS
QT INTERVAL: 320 MS
QTC INTERVAL: 402 MS
RBC # BLD AUTO: 4.52 MILLION/UL (ref 3.88–5.62)
SODIUM SERPL-SCNC: 137 MMOL/L (ref 136–145)
T WAVE AXIS: 37 DEGREES
VENTRICULAR RATE: 95 BPM
WBC # BLD AUTO: 8.92 THOUSAND/UL (ref 4.31–10.16)

## 2021-08-31 PROCEDURE — 84145 PROCALCITONIN (PCT): CPT | Performed by: EMERGENCY MEDICINE

## 2021-08-31 PROCEDURE — 85027 COMPLETE CBC AUTOMATED: CPT | Performed by: PHYSICIAN ASSISTANT

## 2021-08-31 PROCEDURE — 80053 COMPREHEN METABOLIC PANEL: CPT | Performed by: PHYSICIAN ASSISTANT

## 2021-08-31 PROCEDURE — 93010 ELECTROCARDIOGRAM REPORT: CPT | Performed by: INTERNAL MEDICINE

## 2021-08-31 PROCEDURE — 83735 ASSAY OF MAGNESIUM: CPT | Performed by: PHYSICIAN ASSISTANT

## 2021-08-31 PROCEDURE — 99232 SBSQ HOSP IP/OBS MODERATE 35: CPT | Performed by: PHYSICIAN ASSISTANT

## 2021-08-31 RX ORDER — ALBUTEROL SULFATE 90 UG/1
2 AEROSOL, METERED RESPIRATORY (INHALATION) EVERY 4 HOURS PRN
Status: DISCONTINUED | OUTPATIENT
Start: 2021-08-31 | End: 2021-09-01 | Stop reason: HOSPADM

## 2021-08-31 RX ADMIN — HEPARIN SODIUM 5000 UNITS: 5000 INJECTION INTRAVENOUS; SUBCUTANEOUS at 06:34

## 2021-08-31 RX ADMIN — DOCUSATE SODIUM 100 MG: 100 CAPSULE ORAL at 17:11

## 2021-08-31 RX ADMIN — LISINOPRIL 10 MG: 10 TABLET ORAL at 08:42

## 2021-08-31 RX ADMIN — HEPARIN SODIUM 5000 UNITS: 5000 INJECTION INTRAVENOUS; SUBCUTANEOUS at 13:03

## 2021-08-31 RX ADMIN — CARVEDILOL 6.25 MG: 6.25 TABLET, FILM COATED ORAL at 08:41

## 2021-08-31 RX ADMIN — HEPARIN SODIUM 5000 UNITS: 5000 INJECTION INTRAVENOUS; SUBCUTANEOUS at 22:05

## 2021-08-31 RX ADMIN — CARVEDILOL 6.25 MG: 6.25 TABLET, FILM COATED ORAL at 17:11

## 2021-08-31 NOTE — ASSESSMENT & PLAN NOTE
· POA w/ fever, AMS, tachycardia, WBC >12  · Now resolving  · Source: pulmonary bacterial vs  Pulmonary viral; abdomen is somewhat distended but non-tender and no peritoneal signs; UA unremarkable; COVID 19 negative  · Suspicious for viral bronchitis  · D/C rocephin due to negative PCT x2  · Discontinue IVF  · Follow blood cultures  · Q4 neuro checks

## 2021-08-31 NOTE — PLAN OF CARE
Problem: Prexisting or High Potential for Compromised Skin Integrity  Goal: Skin integrity is maintained or improved  Description: INTERVENTIONS:  - Identify patients at risk for skin breakdown  - Assess and monitor skin integrity  - Assess and monitor nutrition and hydration status  - Monitor labs   - Assess for incontinence   - Turn and reposition patient  - Assist with mobility/ambulation  - Relieve pressure over bony prominences  - Avoid friction and shearing  - Provide appropriate hygiene as needed including keeping skin clean and dry  - Evaluate need for skin moisturizer/barrier cream  - Collaborate with interdisciplinary team   - Patient/family teaching  - Consider wound care consult   Outcome: Progressing     Problem: Potential for Falls  Goal: Patient will remain free of falls  Description: INTERVENTIONS:  - Educate patient/family on patient safety including physical limitations  - Instruct patient to call for assistance with activity   - Consult OT/PT to assist with strengthening/mobility   - Keep Call bell within reach  - Keep bed low and locked with side rails adjusted as appropriate  - Keep care items and personal belongings within reach  - Initiate and maintain comfort rounds  - Make Fall Risk Sign visible to staff  - Offer Toileting every  Hours, in advance of need  - Initiate/Maintain alarm  - Obtain necessary fall risk management equipment:   - Apply yellow socks and bracelet for high fall risk patients  - Consider moving patient to room near nurses station  Outcome: Progressing     Problem: NEUROSENSORY - ADULT  Goal: Achieves stable or improved neurological status  Description: INTERVENTIONS  - Monitor and report changes in neurological status  - Monitor vital signs such as temperature, blood pressure, glucose, and any other labs ordered   - Initiate measures to prevent increased intracranial pressure  - Monitor for seizure activity and implement precautions if appropriate      Outcome: Progressing  Goal: Achieves maximal functionality and self care  Description: INTERVENTIONS  - Monitor swallowing and airway patency with patient fatigue and changes in neurological status  - Encourage and assist patient to increase activity and self care     - Encourage visually impaired, hearing impaired and aphasic patients to use assistive/communication devices  Outcome: Progressing     Problem: PAIN - ADULT  Goal: Verbalizes/displays adequate comfort level or baseline comfort level  Description: Interventions:  - Encourage patient to monitor pain and request assistance  - Assess pain using appropriate pain scale  - Administer analgesics based on type and severity of pain and evaluate response  - Implement non-pharmacological measures as appropriate and evaluate response  - Consider cultural and social influences on pain and pain management  - Notify physician/advanced practitioner if interventions unsuccessful or patient reports new pain  Outcome: Progressing     Problem: INFECTION - ADULT  Goal: Absence or prevention of progression during hospitalization  Description: INTERVENTIONS:  - Assess and monitor for signs and symptoms of infection  - Monitor lab/diagnostic results  - Monitor all insertion sites, i e  indwelling lines, tubes, and drains  - Monitor endotracheal if appropriate and nasal secretions for changes in amount and color  - [place] appropriate cooling/warming therapies per order  - Administer medications as ordered  - Instruct and encourage patient and family to use good hand hygiene technique  - Identify and instruct in appropriate isolation precautions for identified infection/condition  Outcome: Progressing     Problem: SAFETY ADULT  Goal: Patient will remain free of falls  Description: INTERVENTIONS:  - Educate patient/family on patient safety including physical limitations  - Instruct patient to call for assistance with activity   - Consult OT/PT to assist with strengthening/mobility   - Keep Call bell within reach  - Keep bed low and locked with side rails adjusted as appropriate  - Keep care items and personal belongings within reach  - Initiate and maintain comfort rounds  - Make Fall Risk Sign visible to staff  - Offer Toileting every  Hours, in advance of need  - Initiate/Maintain alarm  - Obtain necessary fall risk management equipment:   - Apply yellow socks and bracelet for high fall risk patients  - Consider moving patient to room near nurses station  Outcome: Progressing  Goal: Maintain or return to baseline ADL function  Description: INTERVENTIONS:  -  Assess patient's ability to carry out ADLs; assess patient's baseline for ADL function and identify physical deficits which impact ability to perform ADLs (bathing, care of mouth/teeth, toileting, grooming, dressing, etc )  - Assess/evaluate cause of self-care deficits   - Assess range of motion  - Assess patient's mobility; develop plan if impaired  - Assess patient's need for assistive devices and provide as appropriate  - Encourage maximum independence but intervene and supervise when necessary  - Involve family in performance of ADLs  - Assess for home care needs following discharge   - Consider OT consult to assist with ADL evaluation and planning for discharge  - Provide patient education as appropriate  Outcome: Progressing  Goal: Maintains/Returns to pre admission functional level  Description: INTERVENTIONS:  - Perform BMAT or MOVE assessment daily    - Set and communicate daily mobility goal to care team and patient/family/caregiver  - Collaborate with rehabilitation services on mobility goals if consulted  - Perform Range of Motion  times a day  - Reposition patient every  hours    - Dangle patient  times a day  - Stand patient  times a day  - Ambulate patient  times a day  - Out of bed to chair  times a day   - Out of bed for meal times a day  - Out of bed for toileting  - Record patient progress and toleration of activity level   Outcome: Progressing     Problem: DISCHARGE PLANNING  Goal: Discharge to home or other facility with appropriate resources  Description: INTERVENTIONS:  - Identify barriers to discharge w/patient and caregiver  - Arrange for needed discharge resources and transportation as appropriate  - Identify discharge learning needs (meds, wound care, etc )  - Arrange for interpretive services to assist at discharge as needed  - Refer to Case Management Department for coordinating discharge planning if the patient needs post-hospital services based on physician/advanced practitioner order or complex needs related to functional status, cognitive ability, or social support system  Outcome: Progressing     Problem: Knowledge Deficit  Goal: Patient/family/caregiver demonstrates understanding of disease process, treatment plan, medications, and discharge instructions  Description: Complete learning assessment and assess knowledge base    Interventions:  - Provide teaching at level of understanding  - Provide teaching via preferred learning methods  Outcome: Progressing     Problem: MOBILITY - ADULT  Goal: Maintain or return to baseline ADL function  Description: INTERVENTIONS:  -  Assess patient's ability to carry out ADLs; assess patient's baseline for ADL function and identify physical deficits which impact ability to perform ADLs (bathing, care of mouth/teeth, toileting, grooming, dressing, etc )  - Assess/evaluate cause of self-care deficits   - Assess range of motion  - Assess patient's mobility; develop plan if impaired  - Assess patient's need for assistive devices and provide as appropriate  - Encourage maximum independence but intervene and supervise when necessary  - Involve family in performance of ADLs  - Assess for home care needs following discharge   - Consider OT consult to assist with ADL evaluation and planning for discharge  - Provide patient education as appropriate  Outcome: Progressing  Goal: Maintains/Returns to pre admission functional level  Description: INTERVENTIONS:  - Perform BMAT or MOVE assessment daily    - Set and communicate daily mobility goal to care team and patient/family/caregiver  - Collaborate with rehabilitation services on mobility goals if consulted  - Perform Range of Motion  times a day  - Reposition patient every  hours    - Dangle patient  times a day  - Stand patient  times a day  - Ambulate patient  times a day  - Out of bed to chair  times a day   - Out of bed for meals  times a day  - Out of bed for toileting  - Record patient progress and toleration of activity level   Outcome: Progressing

## 2021-08-31 NOTE — ASSESSMENT & PLAN NOTE
Lab Results   Component Value Date    EGFR 44 08/31/2021    EGFR 37 08/30/2021    CREATININE 1 41 (H) 08/31/2021    CREATININE 1 64 (H) 08/30/2021   · Cr is at baseline  · Continue to monitor w/ IVF

## 2021-08-31 NOTE — PLAN OF CARE
Problem: Prexisting or High Potential for Compromised Skin Integrity  Goal: Skin integrity is maintained or improved  Description: INTERVENTIONS:  - Identify patients at risk for skin breakdown  - Assess and monitor skin integrity  - Assess and monitor nutrition and hydration status  - Monitor labs   - Assess for incontinence   - Turn and reposition patient  - Assist with mobility/ambulation  - Relieve pressure over bony prominences  - Avoid friction and shearing  - Provide appropriate hygiene as needed including keeping skin clean and dry  - Evaluate need for skin moisturizer/barrier cream  - Collaborate with interdisciplinary team   - Patient/family teaching  - Consider wound care consult   Outcome: Progressing     Problem: Potential for Falls  Goal: Patient will remain free of falls  Description: INTERVENTIONS:  - Educate patient/family on patient safety including physical limitations  - Instruct patient to call for assistance with activity   - Consult OT/PT to assist with strengthening/mobility   - Keep Call bell within reach  - Keep bed low and locked with side rails adjusted as appropriate  - Keep care items and personal belongings within reach  - Initiate and maintain comfort rounds  - Make Fall Risk Sign visible to staff  - Offer Toileting every  Hours, in advance of need  - Initiate/Maintain alarm  - Obtain necessary fall risk management equipment:   - Apply yellow socks and bracelet for high fall risk patients  - Consider moving patient to room near nurses station  Outcome: Progressing     Problem: NEUROSENSORY - ADULT  Goal: Achieves stable or improved neurological status  Description: INTERVENTIONS  - Monitor and report changes in neurological status  - Monitor vital signs such as temperature, blood pressure, glucose, and any other labs ordered   - Initiate measures to prevent increased intracranial pressure  - Monitor for seizure activity and implement precautions if appropriate      Outcome: Progressing  Goal: Achieves maximal functionality and self care  Description: INTERVENTIONS  - Monitor swallowing and airway patency with patient fatigue and changes in neurological status  - Encourage and assist patient to increase activity and self care     - Encourage visually impaired, hearing impaired and aphasic patients to use assistive/communication devices  Outcome: Progressing     Problem: PAIN - ADULT  Goal: Verbalizes/displays adequate comfort level or baseline comfort level  Description: Interventions:  - Encourage patient to monitor pain and request assistance  - Assess pain using appropriate pain scale  - Administer analgesics based on type and severity of pain and evaluate response  - Implement non-pharmacological measures as appropriate and evaluate response  - Consider cultural and social influences on pain and pain management  - Notify physician/advanced practitioner if interventions unsuccessful or patient reports new pain  Outcome: Progressing     Problem: INFECTION - ADULT  Goal: Absence or prevention of progression during hospitalization  Description: INTERVENTIONS:  - Assess and monitor for signs and symptoms of infection  - Monitor lab/diagnostic results  - Monitor all insertion sites, i e  indwelling lines, tubes, and drains  - Monitor endotracheal if appropriate and nasal secretions for changes in amount and color  - [place] appropriate cooling/warming therapies per order  - Administer medications as ordered  - Instruct and encourage patient and family to use good hand hygiene technique  - Identify and instruct in appropriate isolation precautions for identified infection/condition  Outcome: Progressing     Problem: SAFETY ADULT  Goal: Patient will remain free of falls  Description: INTERVENTIONS:  - Educate patient/family on patient safety including physical limitations  - Instruct patient to call for assistance with activity   - Consult OT/PT to assist with strengthening/mobility   - Keep Call bell within reach  - Keep bed low and locked with side rails adjusted as appropriate  - Keep care items and personal belongings within reach  - Initiate and maintain comfort rounds  - Make Fall Risk Sign visible to staff  - Offer Toileting every  Hours, in advance of need  - Initiate/Maintain alarm  - Obtain necessary fall risk management equipment:   - Apply yellow socks and bracelet for high fall risk patients  - Consider moving patient to room near nurses station  Outcome: Progressing  Goal: Maintain or return to baseline ADL function  Description: INTERVENTIONS:  -  Assess patient's ability to carry out ADLs; assess patient's baseline for ADL function and identify physical deficits which impact ability to perform ADLs (bathing, care of mouth/teeth, toileting, grooming, dressing, etc )  - Assess/evaluate cause of self-care deficits   - Assess range of motion  - Assess patient's mobility; develop plan if impaired  - Assess patient's need for assistive devices and provide as appropriate  - Encourage maximum independence but intervene and supervise when necessary  - Involve family in performance of ADLs  - Assess for home care needs following discharge   - Consider OT consult to assist with ADL evaluation and planning for discharge  - Provide patient education as appropriate  Outcome: Progressing  Goal: Maintains/Returns to pre admission functional level  Description: INTERVENTIONS:  - Perform BMAT or MOVE assessment daily    - Set and communicate daily mobility goal to care team and patient/family/caregiver  - Collaborate with rehabilitation services on mobility goals if consulted  - Perform Range of Motion  times a day  - Reposition patient every  hours    - Dangle patient  times a day  - Stand patient  times a day  - Ambulate patient  times a day  - Out of bed to chair  times a day   - Out of bed for meals times a day  - Out of bed for toileting  - Record patient progress and toleration of activity level   Outcome: Progressing     Problem: DISCHARGE PLANNING  Goal: Discharge to home or other facility with appropriate resources  Description: INTERVENTIONS:  - Identify barriers to discharge w/patient and caregiver  - Arrange for needed discharge resources and transportation as appropriate  - Identify discharge learning needs (meds, wound care, etc )  - Arrange for interpretive services to assist at discharge as needed  - Refer to Case Management Department for coordinating discharge planning if the patient needs post-hospital services based on physician/advanced practitioner order or complex needs related to functional status, cognitive ability, or social support system  Outcome: Progressing     Problem: Knowledge Deficit  Goal: Patient/family/caregiver demonstrates understanding of disease process, treatment plan, medications, and discharge instructions  Description: Complete learning assessment and assess knowledge base    Interventions:  - Provide teaching at level of understanding  - Provide teaching via preferred learning methods  Outcome: Progressing

## 2021-08-31 NOTE — PROGRESS NOTES
[facility]  Progress Note Treva Jordan 2/27/1932, 80 y o  male MRN: [id number]  Unit/Bed#: W -01 Encounter: [id number]  Primary Care Provider: Anton Funez MD   Date and time admitted to hospital: 8/30/2021  2:19 PM    * Sepsis [facility])  Assessment & Plan  · POA w/ fever, AMS, tachycardia, WBC >12  · Now resolving  · Source: pulmonary bacterial vs  Pulmonary viral; abdomen is somewhat distended but non-tender and no peritoneal signs; UA unremarkable; COVID 19 negative  · Suspicious for viral bronchitis  · D/C rocephin due to negative PCT x2  · Discontinue IVF  · Follow blood cultures  · Q4 neuro checks    CKD (chronic kidney disease) stage 3, GFR 30-59 ml/min [facility])  Assessment & Plan  Lab Results   Component Value Date    EGFR 44 08/31/2021    EGFR 37 08/30/2021    CREATININE 1 41 (H) 08/31/2021    CREATININE 1 64 (H) 08/30/2021   · Cr is at baseline  · Continue to monitor w/ IVF    History of prostate cancer  Assessment & Plan  · Continue w/ routine OP f/u w/ Urology    Essential hypertension  Assessment & Plan  · BP acceptable at this time  · Continue carvedilol, lisinopril        VTE Pharmacologic Prophylaxis: VTE Score: 5 High Risk (Score >/= 5) - Pharmacological DVT Prophylaxis Ordered: heparin  Sequential Compression Devices Ordered  Patient Centered Rounds: I performed bedside rounds with nursing staff today  Discussions with Specialists or Other Care Team Provider: Sae, RN    Education and Discussions with Family / Patient: Updated  (son) via phone  Time Spent for Care: 30 minutes  More than 50% of total time spent on counseling and coordination of care as described above      Current Length of Stay: 1 day(s)  Current Patient Status: Inpatient   Certification Statement: The patient will continue to require additional inpatient hospital stay due to ongoing tx and eval of sepsis  Discharge Plan: Anticipate discharge tomorrow to prior assisted or independent living facility  Code Status: Level 1 - Full Code    Subjective:   Patient doing well  When asked how he is feeling, he said he 'hadnt decided yet '   Per RN, no overnight events or issues  Objective:     Vitals:   Temp (24hrs), Av 6 °F (37 6 °C), Min:98 3 °F (36 8 °C), Max:100 8 °F (38 2 °C)    Temp:  [98 3 °F (36 8 °C)-100 8 °F (38 2 °C)] 99 6 °F (37 6 °C)  HR:  [] 77  Resp:  [16-20] 18  BP: (122-185)/(65-89) 143/76  SpO2:  [90 %-93 %] 91 %  Body mass index is 25 83 kg/m²  Input and Output Summary (last 24 hours): Intake/Output Summary (Last 24 hours) at 2021 1345  Last data filed at 2021 0600  Gross per 24 hour   Intake 1511 25 ml   Output 200 ml   Net 1311 25 ml       Physical Exam:   Physical Exam  Constitutional:       Appearance: Normal appearance  He is not ill-appearing  HENT:      Head: Normocephalic  Eyes:      Pupils: Pupils are equal, round, and reactive to light  Cardiovascular:      Rate and Rhythm: Normal rate and regular rhythm  Heart sounds: No murmur heard  No friction rub  No gallop  Pulmonary:      Effort: Pulmonary effort is normal       Breath sounds: Rhonchi (scattered) present  Abdominal:      General: Abdomen is flat  Bowel sounds are normal       Palpations: Abdomen is soft  Tenderness: There is no abdominal tenderness  Musculoskeletal:         General: Normal range of motion  Skin:     General: Skin is warm and dry  Neurological:      General: No focal deficit present  Mental Status: He is alert  Mental status is at baseline  He is disoriented     Psychiatric:         Mood and Affect: Mood normal          Additional Data:     Labs:  Results from last 7 days   Lab Units 21  0559 21  1503   WBC Thousand/uL 8 92 13 57*   HEMOGLOBIN g/dL 13 5 15 0   HEMATOCRIT % 40 8 45 4   PLATELETS Thousands/uL 222 285   NEUTROS PCT %  --  83*   LYMPHS PCT %  --  8*   MONOS PCT %  --  8   EOS PCT %  --  0     Results from last 7 days   Lab Units 08/31/21  0559   SODIUM mmol/L 137   POTASSIUM mmol/L 4 5   CHLORIDE mmol/L 103   CO2 mmol/L 25   BUN mg/dL 18   CREATININE mg/dL 1 41*   ANION GAP mmol/L 9   CALCIUM mg/dL 8 4   ALBUMIN g/dL 2 9*   TOTAL BILIRUBIN mg/dL 0 33   ALK PHOS U/L 63   ALT U/L 31   AST U/L 24   GLUCOSE RANDOM mg/dL 107     Results from last 7 days   Lab Units 08/30/21  1503   INR  1 08             Results from last 7 days   Lab Units 08/31/21  0602 08/30/21  1503   LACTIC ACID mmol/L  --  1 3   PROCALCITONIN ng/ml 0 11 0 15       Lines/Drains:  Invasive Devices     Peripheral Intravenous Line            Peripheral IV 08/30/21 Left Hand <1 day    Peripheral IV 08/30/21 Right Antecubital <1 day                      Imaging: Reviewed radiology reports from this admission including: chest xray    Recent Cultures (last 7 days):   Results from last 7 days   Lab Units 08/30/21  1504 08/30/21  1503   BLOOD CULTURE  Received in Microbiology Lab  Culture in Progress  Received in Microbiology Lab  Culture in Progress         Last 24 Hours Medication List:   Current Facility-Administered Medications   Medication Dose Route Frequency Provider Last Rate    acetaminophen  650 mg Oral Q6H PRN Lili Jorgensen PA-C      albuterol  2 puff Inhalation Q4H PRN Lili Jorgensen PA-C      calcium carbonate  1,000 mg Oral Daily PRN Lili Jorgensen PA-C      carvedilol  6 25 mg Oral BID With Meals Lili Jorgensen PA-C      docusate sodium  100 mg Oral BID Lili Jorgensen PA-C      heparin (porcine)  5,000 Units Subcutaneous Q8H [address] Lili Jorgensen PA-C      lisinopril  10 mg Oral Daily Lili Jorgensen PA-C      ondansetron  4 mg Intravenous Q6H PRN Lili Jorgensen PA-C      senna  1 tablet Oral Daily Lili Jorgensen PA-C      sodium chloride (PF)  3 mL Intravenous Q1H PRN Hannah Abel MD          Today, Patient Was Seen By: Val Ramírez PA-C    **Please Note: This note may have been constructed using a voice recognition system  **

## 2021-08-31 NOTE — UTILIZATION REVIEW
Initial Clinical Review    Admission: Date/Time/Statement:   Admission Orders (From admission, onward)     Ordered        08/30/21 1735  INPATIENT ADMISSION  Once                   Orders Placed This Encounter   Procedures    INPATIENT ADMISSION     Standing Status:   Standing     Number of Occurrences:   1     Order Specific Question:   Level of Care     Answer:   Med Surg [16]     Order Specific Question:   Estimated length of stay     Answer:   More than 2 Midnights     Order Specific Question:   Certification     Answer:   I certify that inpatient services are medically necessary for this patient for a duration of greater than two midnights  See H&P and MD Progress Notes for additional information about the patient's course of treatment  ED Arrival Information     Expected Arrival Acuity    - 8/30/2021 14:18 Emergent         Means of arrival Escorted by Service Admission type    Ambulance [facility] Emergency         Arrival complaint    Weaknes        Chief Complaint   Patient presents with    Altered Mental Status     Per EMS patient sent from SNF due to 'not acting himself "       Initial Presentation: 80 y o  male with a PMH of HTN, CKD, cognitive dysfunction, prostate CA w/ prior treatment who presents to ED from [facility] facility via EMS with altered mental status  Last known well was yesterday  Pt reports cough x1 year,intermittent 'airplane glue' colored sputum and difficulty w/ urination  On exam, temp 100 8 F, BP elevated, pt tacycardic,pt oriented to self, knows year, normal lung sounds  Labs-WBC >12  UA unremarkable  Creat 1 64 , is at baseline  Imaging shows nothing acute  Pt received IV abx , IVF, Tylenol in ED  Pt admitted as Inpatient with sepsis   Plan- continue IVF, continue IV abx- rocephin, trend labs and vitals, f/u blood cultures, check repeat procal, neuro checks,Continue carvedilol, lisinopril  Date: 8/31  Day 2:   Sepsis resolving- suspect viral bronchitis   IV rocephin d/c'ed due to neg procal x2  IVF d/c'ed  Lungs with scattered rhonchi  Pt  Disoriented and responds he 'hadnt decided yet ' when asked how he is feeling  Per nursing , pt is dyspneic with exertion with coarse  expiratory wheezes  ED Triage Vitals [08/30/21 1423]   Temperature Pulse Respirations Blood Pressure SpO2   (!) 100 8 °F (38 2 °C) 96 18 (!) 185/89 92 %      Temp Source Heart Rate Source Patient Position - Orthostatic VS BP Location FiO2 (%)   Oral Monitor Lying Left arm --      Pain Score       No Pain          Wt Readings from Last 1 Encounters:   08/30/21 84 kg (185 lb 3 oz)     Additional Vital Signs:   Date/Time  Temp  Pulse  Resp  BP  MAP (mmHg)  SpO2    08/31/21 15:42:31  98 2 °F (36 8 °C)  65  --  136/70  92  90 %    08/31/21 07:39:30  99 6 °F (37 6 °C)  77  18  143/76  98  91 %    08/30/21 23:07:34  99 9 °F (37 7 °C)  88  16  127/65  86  91 %    08/30/21 2200  --  96  18  122/70  91  91 %    08/30/21 2100  98 5 °F (36 9 °C)  94  18  126/69  92  90 %    08/30/21 2000  --  92  18  154/83  112  93 %    08/30/21 1900  --  86  18  172/87Abnormal   124  92 %    08/30/21 1806  98 3 °F (36 8 °C)  90  18  164/89  116  93 %    08/30/21 1701  100 6 °F (38 1 °C)Abnormal   --  --  --  --  --    08/30/21 1645  --  98  --  159/87  116  92 %    08/30/21 1600  --  98  20  138/89  110  91 %    08/30/21 1530  --  100  18  157/86  116  91 %      Date and Time Eye Opening Best Verbal Response Best Motor Response Tony Coma Scale Score   08/31/21 1200 4 4 6 14   08/31/21 0800 4 4 6 14   08/31/21 0000 4 4 6 14   08/30/21 1506 4 4 6 14         Pertinent Labs/Diagnostic Test Results:   8/30   ECG-Normal sinus rhythm  Minimal voltage criteria for LVH, may be normal variant  CXR-No acute cardiopulmonary disease    CT head-No acute intracranial hemorrhage, mass effect or edema  Microangiopathic changes      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         Results from last 7 days   Lab Units 08/30/21  1502   SARS-COV-2  Negative     Results from last 7 days   Lab Units 08/31/21  0559 08/30/21  2313 08/30/21  1503   WBC Thousand/uL 8 92  --  13 57*   HEMOGLOBIN g/dL 13 5  --  15 0   HEMATOCRIT % 40 8  --  45 4   PLATELETS Thousands/uL 222 226 285   NEUTROS ABS Thousands/µL  --   --  11 29*         Results from last 7 days   Lab Units 08/31/21  0559 08/30/21  1503   SODIUM mmol/L 137 132*   POTASSIUM mmol/L 4 5 4 8   CHLORIDE mmol/L 103 97*   CO2 mmol/L 25 26   ANION GAP mmol/L 9 9   BUN mg/dL 18 21   CREATININE mg/dL 1 41* 1 64*   EGFR ml/min/1 73sq m 44 37   CALCIUM mg/dL 8 4 9 1   MAGNESIUM mg/dL 1 7  --      Results from last 7 days   Lab Units 08/31/21  0559 08/30/21  1503   AST U/L 24 28   ALT U/L 31 40   ALK PHOS U/L 63 83   TOTAL PROTEIN g/dL 6 1* 8 1   ALBUMIN g/dL 2 9* 3 9   TOTAL BILIRUBIN mg/dL 0 33 0 60         Results from last 7 days   Lab Units 08/31/21  0559 08/30/21  1503   GLUCOSE RANDOM mg/dL 107 114           Results from last 7 days   Lab Units 08/30/21  1503   PROTIME seconds 14 1   INR  1 08   PTT seconds 33         Results from last 7 days   Lab Units 08/31/21  0602 08/30/21  1503   PROCALCITONIN ng/ml 0 11 0 15     Results from last 7 days   Lab Units 08/30/21  1503   LACTIC ACID mmol/L 1 3           Results from last 7 days   Lab Units 08/30/21  1655   CLARITY UA  Clear   COLOR UA  Yellow   SPEC GRAV UA  1 025   PH UA  6 0   GLUCOSE UA mg/dl Negative   KETONES UA mg/dl Negative   BLOOD UA  Trace*   PROTEIN UA mg/dl 100 (2+)*   NITRITE UA  Negative   BILIRUBIN UA  Negative   UROBILINOGEN UA E U /dl 0 2   LEUKOCYTES UA  Negative   WBC UA /hpf 0-1   RBC UA /hpf None Seen   BACTERIA UA /hpf None Seen   EPITHELIAL CELLS WET PREP /hpf None Seen           Results from last 7 days   Lab Units 08/30/21  1504 08/30/21  1503   BLOOD CULTURE  Received in Microbiology Lab  Culture in Progress  Received in Microbiology Lab  Culture in Progress                 ED Treatment:   Medication Administration from 08/30/2021 1418 to 08/30/2021 2240       Date/Time Order Dose Route Action     08/30/2021 1532 acetaminophen (TYLENOL) tablet 650 mg 650 mg Oral Given     08/30/2021 1811 sodium chloride 0 9 % bolus 1,000 mL 1,000 mL Intravenous New Bag     08/30/2021 1811 ceftriaxone (ROCEPHIN) 1 g/50 mL in dextrose IVPB 1,000 mg Intravenous New Bag        History reviewed  No pertinent past medical history  Present on Admission:  **None**      Admitting Diagnosis: Respiratory infection [J98 8]  Altered mental status [R41 82]  Sepsis ([facility] [address] ) [A41 9]  Age/Sex: 80 y o  male  Admission Orders:  Scheduled Medications:  carvedilol, 6 25 mg, Oral, BID With Meals  docusate sodium, 100 mg, Oral, BID  heparin (porcine), 5,000 Units, Subcutaneous, Q8H [address]  lisinopril, 10 mg, Oral, Daily  senna, 1 tablet, Oral, Daily      Continuous IV Infusions:lactated ringers infusion   Rate: 75 mL/hr Dose: 75 mL/hr  Freq: Continuous Route: IV  Last Dose: Stopped (08/31/21 1347)     PRN Meds:  acetaminophen, 650 mg, Oral, Q6H PRN  albuterol, 2 puff, Inhalation, Q4H PRN  calcium carbonate, 1,000 mg, Oral, Daily PRN  ondansetron, 4 mg, Intravenous, Q6H PRN  sodium chloride (PF), 3 mL, Intravenous, Q1H PRN      SCD's      Network Utilization Review Department  ATTENTION: Please call with any questions or concerns to [phone] and carefully listen to the prompts so that you are directed to the right person  All voicemails are confidential   Edilberto Rodriguez all requests for admission clinical reviews, approved or denied determinations and any other requests to dedicated fax number below belonging to the campus where the patient is receiving treatment   List of dedicated fax numbers for the Facilities:  [address] DENIALS (Administrative/Medical Necessity) [phone]   [address] (Maternity/NICU/Pediatrics) [phone]   [id number] [place] [address] [address] [place], [place] [address] [phone]

## 2021-08-31 NOTE — NURSING NOTE
Patient awake in bed  Patient has states that he has no complaints  There are no visible signs or symptoms of distress noted at this time  Bed low and locked  Call bell within reach  Will continue to monitor    Marleny Cazares RN

## 2021-09-01 VITALS
BODY MASS INDEX: 25.93 KG/M2 | TEMPERATURE: 98.3 F | HEIGHT: 71 IN | RESPIRATION RATE: 17 BRPM | HEART RATE: 68 BPM | OXYGEN SATURATION: 92 % | SYSTOLIC BLOOD PRESSURE: 157 MMHG | DIASTOLIC BLOOD PRESSURE: 88 MMHG | WEIGHT: 185.19 LBS

## 2021-09-01 PROBLEM — G93.41 ACUTE METABOLIC ENCEPHALOPATHY: Status: ACTIVE | Noted: 2021-09-01

## 2021-09-01 LAB
ANION GAP SERPL CALCULATED.3IONS-SCNC: 9 MMOL/L (ref 4–13)
BUN SERPL-MCNC: 21 MG/DL (ref 5–25)
CALCIUM SERPL-MCNC: 8.7 MG/DL (ref 8.3–10.1)
CHLORIDE SERPL-SCNC: 101 MMOL/L (ref 100–108)
CO2 SERPL-SCNC: 27 MMOL/L (ref 21–32)
CREAT SERPL-MCNC: 1.42 MG/DL (ref 0.6–1.3)
ERYTHROCYTE [DISTWIDTH] IN BLOOD BY AUTOMATED COUNT: 13.7 % (ref 11.6–15.1)
GFR SERPL CREATININE-BSD FRML MDRD: 43 ML/MIN/1.73SQ M
GLUCOSE SERPL-MCNC: 106 MG/DL (ref 65–140)
HCT VFR BLD AUTO: 40 % (ref 36.5–49.3)
HGB BLD-MCNC: 13.2 G/DL (ref 12–17)
MCH RBC QN AUTO: 29.5 PG (ref 26.8–34.3)
MCHC RBC AUTO-ENTMCNC: 33 G/DL (ref 31.4–37.4)
MCV RBC AUTO: 89 FL (ref 82–98)
PLATELET # BLD AUTO: 238 THOUSANDS/UL (ref 149–390)
PMV BLD AUTO: 10.1 FL (ref 8.9–12.7)
POTASSIUM SERPL-SCNC: 4.4 MMOL/L (ref 3.5–5.3)
RBC # BLD AUTO: 4.48 MILLION/UL (ref 3.88–5.62)
SODIUM SERPL-SCNC: 137 MMOL/L (ref 136–145)
WBC # BLD AUTO: 7.61 THOUSAND/UL (ref 4.31–10.16)

## 2021-09-01 PROCEDURE — 80048 BASIC METABOLIC PNL TOTAL CA: CPT | Performed by: PHYSICIAN ASSISTANT

## 2021-09-01 PROCEDURE — 97116 GAIT TRAINING THERAPY: CPT

## 2021-09-01 PROCEDURE — 85027 COMPLETE CBC AUTOMATED: CPT | Performed by: PHYSICIAN ASSISTANT

## 2021-09-01 PROCEDURE — 99238 HOSP IP/OBS DSCHRG MGMT 30/<: CPT | Performed by: PHYSICIAN ASSISTANT

## 2021-09-01 PROCEDURE — 97163 PT EVAL HIGH COMPLEX 45 MIN: CPT

## 2021-09-01 RX ORDER — GUAIFENESIN 600 MG
600 TABLET, EXTENDED RELEASE 12 HR ORAL EVERY 12 HOURS SCHEDULED
Qty: 14 TABLET | Refills: 0 | Status: CANCELLED
Start: 2021-09-01 | End: 2021-09-08

## 2021-09-01 RX ORDER — GUAIFENESIN 600 MG
600 TABLET, EXTENDED RELEASE 12 HR ORAL EVERY 12 HOURS SCHEDULED
Qty: 14 TABLET | Refills: 0
Start: 2021-09-01 | End: 2021-09-08

## 2021-09-01 RX ORDER — GUAIFENESIN 600 MG
600 TABLET, EXTENDED RELEASE 12 HR ORAL EVERY 12 HOURS SCHEDULED
Status: DISCONTINUED | OUTPATIENT
Start: 2021-09-01 | End: 2021-09-01 | Stop reason: HOSPADM

## 2021-09-01 RX ORDER — ALBUTEROL SULFATE 90 UG/1
2 AEROSOL, METERED RESPIRATORY (INHALATION) EVERY 4 HOURS PRN
Refills: 0
Start: 2021-09-01

## 2021-09-01 RX ADMIN — HEPARIN SODIUM 5000 UNITS: 5000 INJECTION INTRAVENOUS; SUBCUTANEOUS at 05:14

## 2021-09-01 RX ADMIN — CARVEDILOL 6.25 MG: 6.25 TABLET, FILM COATED ORAL at 07:43

## 2021-09-01 RX ADMIN — GUAIFENESIN 600 MG: 600 TABLET, EXTENDED RELEASE ORAL at 11:17

## 2021-09-01 RX ADMIN — DOCUSATE SODIUM 100 MG: 100 CAPSULE ORAL at 10:00

## 2021-09-01 RX ADMIN — LISINOPRIL 10 MG: 10 TABLET ORAL at 10:00

## 2021-09-01 RX ADMIN — STANDARDIZED SENNA CONCENTRATE 8.6 MG: 8.6 TABLET ORAL at 10:00

## 2021-09-01 RX ADMIN — HEPARIN SODIUM 5000 UNITS: 5000 INJECTION INTRAVENOUS; SUBCUTANEOUS at 13:38

## 2021-09-01 NOTE — PHYSICAL THERAPY NOTE
PHYSICAL THERAPY EVALUATION NOTE          Patient Name: Juan C Dobbs  [id number] Date: 2021     AGE:   80 y o   Mrn:   [id number]  ADMIT DX:  Respiratory infection [J98 8]  Altered mental status [R41 82]  Sepsis ([facility] [address] ) [A41 9]    History reviewed  No pertinent past medical history  Length Of Stay: 2  PHYSICAL THERAPY EVALUATION :   Patient's identity confirmed via 2 patient identifiers (full name and ) at start of session       21 1357   PT Last Visit   PT Visit Date 21   Note Type   Note type Evaluation   Pain Assessment   Pain Assessment Tool Pain Assessment not indicated - pt denies pain   Pain Score No Pain   Home Living   Type of Home Assisted living   Home Layout One level   Bathroom Shower/Tub Walk-in shower   Bathroom Equipment Grab bars in shower;Built-in shower seat;Grab bars around toilet   [address] Other (Comment)  (pt reports none; RW?)   Additional Comments Pt reports living in "assisted living" and cannot state the name  Pt reports ambulating independently w/o AD, being independent w/ ADLs, and going to [employer] for meals  Pt denies recent falls  Prior Function   Level of [place] Independent with ADLs and functional mobility   Lives With Alone; Facility staff   ADL Assistance Independent   IADLs Needs assistance  (goes to [employer] for means)   Falls in the last 6 months 0  (pt declines recent falls)   Vocational Retired   Comments Spoke to Barak Warner who confirmed pt resides at [address] [facility] and ambulate w/ RW at baseline   Restrictions/Precautions   [place] Bearing Precautions Per Order No   Other Precautions Cognitive; Chair Alarm; Bed Alarm; Fall Risk;Hard of hearing   General   Family/Caregiver Present No   Cognition   Overall Cognitive Status Impaired   Arousal/Participation Cooperative   Orientation Level Oriented to person;Disoriented to place; Disoriented to time;Disoriented to situation   Memory Decreased short term memory;Decreased recall of recent events;Decreased recall of precautions   Following Commands Follows one step commands with increased time or repetition   Comments pt ID via name and ; pt agreeable to PT eval   RLE Assessment   RLE Assessment WFL   Strength RLE   RLE Overall Strength 3+/5  (grossly assessed w/ functional mobility)   LLE Assessment   LLE Assessment WFL   Strength LLE   LLE Overall Strength 3+/5  (grossly assessed w/ functional mobility)   Coordination   Movements are Fluid and Coordinated 1   Sensation WFL   Light Touch   RLE Light Touch Grossly intact  (pt confirming light touch sensation)   LLE Light Touch Grossly intact  (pt confirmed light touch sensation)   Bed Mobility   Supine to Sit 6  Modified independent   Additional items HOB elevated; Increased time required;Verbal cues   Sit to Supine Unable to assess   Additional Comments pt OOB in recliner chair at end of session   Transfers   Sit to Stand 5  Supervision   Additional items Verbal cues   Stand to Sit 5  Supervision   Additional items Verbal cues  (VC for hand placement)   Additional Comments Pt performed multiple STS trials w/ supervision progressing to Mod I  Pt performed 30-sec chair stand objective measure  Pt performed 10 STS in 30 seconds: <8 in 30 seconds indicates pt is at an increased risk of falls for pt's sex/age (men, 85-89)   Ambulation/Elevation   Gait pattern Narrow JHONNY; Decreased foot clearance; Short stride   Gait Assistance 5  Supervision   Additional items Verbal cues   Assistive Device Rolling walker   Distance 40'   Curbs Pt's gait speed: 0 703 meters/sec (0 4-0 8 meters/sec indicates pt is a limited community ambulator)    Balance   Static Sitting Good   Dynamic Sitting Fair   Static Standing Fair -   Dynamic Standing Fair -  (w/ RW)   Ambulatory Fair -  (w/ RW)   Activity Tolerance   Activity Tolerance Patient tolerated treatment well Medical Staff Made Aware CM Movel   Nurse Made Aware MIKE Kong confirmed pt appropriate for PT eval; post session pt OOB in recliner chair in no apparent distress   Assessment   Prognosis Good   Problem List Impaired balance;Decreased mobility; Decreased cognition;Decreased safety awareness   Assessment Arianna Reaves is a 80 y o  Male who [facility] from home (pt lives at [facility]) due to altered mental status and diagnosis of sepsis  Orders for PT eval and treat received, w/ activity orders of up w/ A and fall risk precautions  Pt presents w/ comorbidities of CKD, hard of hearing, HTN, cognitive dysfunction and personal factors including: advanced age, mobilizing w/ assistive device, decreased cognition, hearing impairments and inability to perform IADLs  At baseline, according to CM, pt mobilizes independently w/ RW  Pt reports 0 falls in the last 6 months  Upon evaluation, pt presents w/ the following deficits: impaired balance, decreased safety awareness, and gait deviations  Pt requires Mod I for bed mobility, supervision for transfers, and supervision for gait  Pt's clinical presentation is unstable/unpredictable due to tolerance to need for input for mobility technique/safety, impaired cognition, decreased safety awareness, and recent drastic decline in mobility compared to baseline  Pt is at an increased risk of falls due to impaired cognition and decreased safety awareness  Given the above findings, discharge recommendation is for Return to facility w/ skilled PT needs  During this admission, pt would benefit from skilled acute inpatient PT in order to address the abovementioned deficits to maximize function and mobility before DC from acute care      Barriers to Discharge Decreased caregiver support   Goals   Patient Goals pt indicated he wanted to watch TV   STG Expiration Date 09/11/21   Short Term Goal #1 Pt will: perform bed mobility from a flat surface independently to decrease caregiver burden; perform transfers independently to increase OOB mobility; ambulate at least 350' w/ LRAD and Mod I to increase pt's ambulatory endurance; increase balance ratings by 1 grade to decrease pt's risk of falls    PT Treatment Day 1  (PT tx note below)   Plan   Treatment/Interventions Functional transfer training;LE strengthening/ROM; Therapeutic exercise;Patient/family training;Equipment eval/education; Bed mobility;Gait training; Endurance training   PT Frequency 1-2x/wk   Recommendation   PT Discharge Recommendation Return to facility with rehabilitation services   Equipment Recommended [facility] Recommended Wheeled walker   Change/add to [place]? No   AM-PAC Basic Mobility Inpatient   Turning in Bed Without Bedrails 4   Lying on Back to Sitting on Edge of Flat Bed 4   Moving Bed to Chair 3   Standing Up From Chair 3   Walk in Room 3   Climb 3-5 Stairs 3   Basic Mobility Inpatient Raw Score 20   Basic Mobility Standardized Score 43 99       The patient's AM-PAC Basic Mobility Inpatient Short Form Raw Score is 20, Standardized Score is 43 99  A standardized score greater than 42 9 suggests the patient may benefit from discharge to home  Please also refer to the recommendation of the Physical Therapist for safe discharge planning  PHYSICAL THERAPY TREATMENT NOTE    Name: Eden Washington  : 1932  Time in: 1418  Time out: 1429  Total treat time: 11 min    S: At end of eval, pt OOB in recliner chair in no apparent distress  Pt agreeable to further PT intervention consisting of gait training and transfer training  O: Pt educated on hand placement during STS trials w/ visual dmonstration provided, pt confirmed he understood  Pt performed STS transfer from recliner chair w/ Mod I  Pt ambulated 150' w/ RW and Mod I w/o evidence of LOB seen  VC for RW management while negotiating turns provided       A: Pt able to perform STS transfers w/ Mod I  Pt able to ambulate an increased distance w/ RW and Mod I w/o evidence of LOB  Pt would continue to benefit from skilled PT intervention to increase pt's ambulatory endurance and improve pt's balance to decrease pt's risk of falls  DC rec: return to previous facility w/ skilled PT intervention  Pt may benefit from increased supervision due to cognitive deficits and decreased safety awareness  At end of session, pt OOB in recliner chair w/ LE elevated, chair alarm on, in no apparent distress, w/ all needs w/in reach  RN and CM updated post     P: Continue per evaluation above    Skilled inpatient PT is recommended during this admission in order to progress patient toward goals so patient is able to maximize independence  DC Rec: return to facility w/ therapy services           Vinny Casarez, PT, DPT  09/01/21

## 2021-09-01 NOTE — ASSESSMENT & PLAN NOTE
· Presented with confusion/disorientation possible 2/2 fever   · Now resolved, patient is oriented x 3 on exam today

## 2021-09-01 NOTE — PLAN OF CARE
Problem: PHYSICAL THERAPY ADULT  Goal: Performs mobility at highest level of function for planned discharge setting  See evaluation for individualized goals  Description: Treatment/Interventions: Functional transfer training, LE strengthening/ROM, Therapeutic exercise, Patient/family training, Equipment eval/education, Bed mobility, Gait training, Endurance training  Equipment Recommended: Bety Meyer       See flowsheet documentation for full assessment, interventions and recommendations  Note: Prognosis: Good  Problem List: Impaired balance, Decreased mobility, Decreased cognition, Decreased safety awareness  Assessment: Silvia John is a 80 y o  Male who [facility] on 8/30/2021 from home (pt lives at [facility]) due to altered mental status and diagnosis of sepsis  Orders for PT eval and treat received, w/ activity orders of up w/ A and fall risk precautions  Pt presents w/ comorbidities of CKD, hard of hearing, HTN, cognitive dysfunction and personal factors including: advanced age, mobilizing w/ assistive device, decreased cognition, hearing impairments and inability to perform IADLs  At baseline, according to CM, pt mobilizes independently w/ RW  Pt reports 0 falls in the last 6 months  Upon evaluation, pt presents w/ the following deficits: impaired balance, decreased safety awareness, and gait deviations  Pt requires Mod I for bed mobility, supervision for transfers, and supervision for gait  Pt's clinical presentation is unstable/unpredictable due to tolerance to need for input for mobility technique/safety, impaired cognition, decreased safety awareness, and recent drastic decline in mobility compared to baseline  Pt is at an increased risk of falls due to impaired cognition and decreased safety awareness  Given the above findings, discharge recommendation is for Return to facility w/ skilled PT needs   During this admission, pt would benefit from skilled acute inpatient PT in order to address the abovementioned deficits to maximize function and mobility before DC from acute care  Barriers to Discharge: Decreased caregiver support        PT Discharge Recommendation: Return to facility with rehabilitation services          See flowsheet documentation for full assessment

## 2021-09-01 NOTE — CASE MANAGEMENT
LYNETTE made a follow up call to [facility], to make her aware the Pt had his therapy evaluation and it was determine the Pt was at his baseline walked a 150 ft  Abena Craven gave approval for the Pt's return today  LYNETTE was in contact with Pt's daughter-n-law Ruchi Mcqueen regarding the Pt's readiness for d/c back to [facility] today  Ruchi Mcqueen reported her and Pt's son Sindy Alanis will transport the Pt back upon d/c today  Pt for d/c back to [facility] today, to be transported by family  LYNETTE completed facility transfer form  LYNETTE notified Pt's family, RN Lawyer Jean Baptiste and facility Abena Craven of d/c arrangements

## 2021-09-01 NOTE — DISCHARGE SUMMARY
[facility]  Discharge- Severo  2/27/1932, 80 y o  male MRN: [id number]  Unit/Bed#:  W -01 Encounter: [id number]  Primary Care Provider: Allen Holland MD   Date and time admitted to hospital: 8/30/2021  2:19 PM    * Sepsis [facility])  Assessment & Plan  · POA w/ low grade fever, tachycardia, WBC >12  · Suspected viral bronchitis given reports of cough and course breath sounds on exam   · Abdomen is somewhat distended but non-tender and no peritoneal signs; UA unremarkable; COVID 19 negative; CXR without acute cardiopulmonary disease   · PCT negative x2 and ceftriaxone was discontinued, patient remains stable and afebrile off of antibiotics    · Blood cultures negative x 24 hours   · Sepsis resolved     Acute metabolic encephalopathy  Assessment & Plan  · Presented with confusion/disorientation possible 2/2 fever   · Now resolved, patient is oriented x 3 on exam today     CKD (chronic kidney disease) stage 3, GFR 30-59 ml/min [facility])  Assessment & Plan  Lab Results   Component Value Date    EGFR 43 09/01/2021    EGFR 44 08/31/2021    EGFR 37 08/30/2021    CREATININE 1 42 (H) 09/01/2021    CREATININE 1 41 (H) 08/31/2021    CREATININE 1 64 (H) 08/30/2021   · Cr is at baseline and remains stable off of IV fluids     Essential hypertension  Assessment & Plan  · BP acceptable at this time  · Continue carvedilol, lisinopril    History of prostate cancer  Assessment & Plan  · Continue w/ routine OP f/u w/ Urology    Medical Problems     Resolved Problems  Date Reviewed: 9/1/2021    None              Discharging Physician / Practitioner: Ignacio Pitts PA-C  PCP: Allen Holland MD  Admission Date:   Admission Orders (From admission, onward)     Ordered        08/30/21 1735  INPATIENT ADMISSION  Once                   Discharge Date: 09/01/21    Consultations During Hospital Stay:  · none    Procedures Performed:   · none    Significant Findings / Test Results:   · Outlined above     Incidental Findings:   · none     Test Results Pending at Discharge (will require follow up):   · none     Outpatient Tests Requested:  · none    Complications:  none    Reason for Admission: sepsis     Hospital Course:   Riana Estes is a 80 y o  male patient who originally presented to the hospital on 8/30/2021 due to altered mental status/confusion  Upon presentation, patient met sepsis criteria with temp of 100 6, tachycardia and WBC >12  Concern for bacteria vs viral pneumonia  Patient was initially started on IV ceftriaxone however this was discontinued since procalcitonin was negative  Patient remained afebrile and hemodynamically stable off of antibiotics  Leukocytosis resolved  Recommend continued symptomatic treatment after discharge  Patient returned to prior nursing facility at time of discharge  Please see above list of diagnoses and related plan for additional information  Condition at Discharge: good    Discharge Day Visit / Exam:   Subjective:  Patient offers no complaints today  Sates he feels well  Specifically denies chest pain, cough, SOB  Vitals: Blood Pressure: 157/88 (09/01/21 0723)  Pulse: 68 (09/01/21 0723)  Temperature: 98 3 °F (36 8 °C) (09/01/21 0723)  Temp Source: Oral (08/30/21 2100)  Respirations: 17 (09/01/21 0723)  Height: 5' 11" (180 3 cm) (08/30/21 1806)  Weight - Scale: 84 kg (185 lb 3 oz) (08/30/21 1806)  SpO2: 92 % (09/01/21 0723)  Exam:   Physical Exam  Vitals and nursing note reviewed  Constitutional:       General: He is not in acute distress  Cardiovascular:      Rate and Rhythm: Normal rate and regular rhythm  Pulses: Normal pulses  Heart sounds: No murmur heard  Pulmonary:      Effort: Pulmonary effort is normal  No respiratory distress  Breath sounds: Rhonchi (scattered upper lobes) present  No wheezing or rales  Abdominal:      General: Abdomen is flat  There is distension (mild)  Palpations: Abdomen is soft  Tenderness:  There is no abdominal tenderness  Musculoskeletal:      Right lower leg: No edema  Left lower leg: No edema  Skin:     General: Skin is warm and dry  Coloration: Skin is not pale  Findings: No erythema  Neurological:      General: No focal deficit present  Mental Status: He is alert and oriented to person, place, and time  Discussion with Family: Updated  (son) via phone  Discharge instructions/Information to patient and family:   See after visit summary for information provided to patient and family  Provisions for Follow-Up Care:  See after visit summary for information related to follow-up care and any pertinent home health orders  Disposition:   Other [facility] 40 Readmission: no     Discharge Statement:  I spent 25 minutes discharging the patient  This time was spent on the day of discharge  I had direct contact with the patient on the day of discharge  Greater than 50% of the total time was spent examining patient, answering all patient questions, arranging and discussing plan of care with patient as well as directly providing post-discharge instructions  Additional time then spent on discharge activities  Discharge Medications:  See after visit summary for reconciled discharge medications provided to patient and/or family        **Please Note: This note may have been constructed using a voice recognition system**

## 2021-09-01 NOTE — ASSESSMENT & PLAN NOTE
· POA w/ low grade fever, tachycardia, WBC >12  · Suspected viral bronchitis given reports of cough and course breath sounds on exam   · Abdomen is somewhat distended but non-tender and no peritoneal signs; UA unremarkable; COVID 19 negative; CXR without acute cardiopulmonary disease   · PCT negative x2 and ceftriaxone was discontinued, patient remains stable and afebrile off of antibiotics    · Blood cultures negative x 24 hours   · Sepsis resolved

## 2021-09-01 NOTE — ASSESSMENT & PLAN NOTE
Lab Results   Component Value Date    EGFR 43 09/01/2021    EGFR 44 08/31/2021    EGFR 37 08/30/2021    CREATININE 1 42 (H) 09/01/2021    CREATININE 1 41 (H) 08/31/2021    CREATININE 1 64 (H) 08/30/2021   · Cr is at baseline and remains stable off of IV fluids

## 2021-09-01 NOTE — CASE MANAGEMENT
LYNETTE was in contact with [facility] [place] staff Maia Hi regarding the Pt's readiness for d/c back to their facility today  Maia Hi requested for the Pt to be evaluated by PT to determined if he remains at his based line, prior to they can approved his return   CM made Pt and PRETTY Sebastian aware of the above request

## 2021-09-01 NOTE — PLAN OF CARE
Problem: Prexisting or High Potential for Compromised Skin Integrity  Goal: Skin integrity is maintained or improved  Description: INTERVENTIONS:  - Identify patients at risk for skin breakdown  - Assess and monitor skin integrity  - Assess and monitor nutrition and hydration status  - Monitor labs   - Assess for incontinence   - Turn and reposition patient  - Assist with mobility/ambulation  - Relieve pressure over bony prominences  - Avoid friction and shearing  - Provide appropriate hygiene as needed including keeping skin clean and dry  - Evaluate need for skin moisturizer/barrier cream  - Collaborate with interdisciplinary team   - Patient/family teaching  - Consider wound care consult   Outcome: Adequate for Discharge     Problem: Potential for Falls  Goal: Patient will remain free of falls  Description: INTERVENTIONS:  - Educate patient/family on patient safety including physical limitations  - Instruct patient to call for assistance with activity   - Consult OT/PT to assist with strengthening/mobility   - Keep Call bell within reach  - Keep bed low and locked with side rails adjusted as appropriate  - Keep care items and personal belongings within reach  - Apply yellow socks and bracelet for high fall risk patients  - Consider moving patient to room near nurses station  Outcome: Adequate for Discharge     Problem: NEUROSENSORY - ADULT  Goal: Achieves stable or improved neurological status  Description: INTERVENTIONS  - Monitor and report changes in neurological status  - Monitor vital signs such as temperature, blood pressure, glucose, and any other labs ordered   - Initiate measures to prevent increased intracranial pressure  - Monitor for seizure activity and implement precautions if appropriate      Outcome: Adequate for Discharge  Goal: Achieves maximal functionality and self care  Description: INTERVENTIONS  - Monitor swallowing and airway patency with patient fatigue and changes in neurological status  - Encourage and assist patient to increase activity and self care     - Encourage visually impaired, hearing impaired and aphasic patients to use assistive/communication devices  Outcome: Adequate for Discharge     Problem: PAIN - ADULT  Goal: Verbalizes/displays adequate comfort level or baseline comfort level  Description: Interventions:  - Encourage patient to monitor pain and request assistance  - Assess pain using appropriate pain scale  - Administer analgesics based on type and severity of pain and evaluate response  - Implement non-pharmacological measures as appropriate and evaluate response  - Consider cultural and social influences on pain and pain management  - Notify physician/advanced practitioner if interventions unsuccessful or patient reports new pain  Outcome: Adequate for Discharge     Problem: INFECTION - ADULT  Goal: Absence or prevention of progression during hospitalization  Description: INTERVENTIONS:  - Assess and monitor for signs and symptoms of infection  - Monitor lab/diagnostic results  - Monitor all insertion sites, i e  indwelling lines, tubes, and drains  - Monitor endotracheal if appropriate and nasal secretions for changes in amount and color  - [place] appropriate cooling/warming therapies per order  - Administer medications as ordered  - Instruct and encourage patient and family to use good hand hygiene technique  - Identify and instruct in appropriate isolation precautions for identified infection/condition  Outcome: Adequate for Discharge     Problem: SAFETY ADULT  Goal: Patient will remain free of falls  Description: INTERVENTIONS:  - Educate patient/family on patient safety including physical limitations  - Instruct patient to call for assistance with activity   - Consult OT/PT to assist with strengthening/mobility   - Keep Call bell within reach  - Keep bed low and locked with side rails adjusted as appropriate  - Keep care items and personal belongings within reach  - Initiate and maintain comfort rounds  - Apply yellow socks and bracelet for high fall risk patients  - Consider moving patient to room near nurses station  Outcome: Adequate for Discharge  Goal: Maintain or return to baseline ADL function  Description: INTERVENTIONS:  -  Assess patient's ability to carry out ADLs; assess patient's baseline for ADL function and identify physical deficits which impact ability to perform ADLs (bathing, care of mouth/teeth, toileting, grooming, dressing, etc )  - Assess/evaluate cause of self-care deficits   - Assess range of motion  - Assess patient's mobility; develop plan if impaired  - Assess patient's need for assistive devices and provide as appropriate  - Encourage maximum independence but intervene and supervise when necessary  - Involve family in performance of ADLs  - Assess for home care needs following discharge   - Consider OT consult to assist with ADL evaluation and planning for discharge  - Provide patient education as appropriate  Outcome: Adequate for Discharge  Goal: Maintains/Returns to pre admission functional level  Description: INTERVENTIONS:  - Perform BMAT or MOVE assessment daily    - Set and communicate daily mobility goal to care team and patient/family/caregiver     - Out of bed for toileting  - Record patient progress and toleration of activity level   Outcome: Adequate for Discharge     Problem: DISCHARGE PLANNING  Goal: Discharge to home or other facility with appropriate resources  Description: INTERVENTIONS:  - Identify barriers to discharge w/patient and caregiver  - Arrange for needed discharge resources and transportation as appropriate  - Identify discharge learning needs (meds, wound care, etc )  - Arrange for interpretive services to assist at discharge as needed  - Refer to Case Management Department for coordinating discharge planning if the patient needs post-hospital services based on physician/advanced practitioner order or complex needs related to functional status, cognitive ability, or social support system  Outcome: Adequate for Discharge     Problem: Knowledge Deficit  Goal: Patient/family/caregiver demonstrates understanding of disease process, treatment plan, medications, and discharge instructions  Description: Complete learning assessment and assess knowledge base  Interventions:  - Provide teaching at level of understanding  - Provide teaching via preferred learning methods  Outcome: Adequate for Discharge     Problem: MOBILITY - ADULT  Goal: Maintain or return to baseline ADL function  Description: INTERVENTIONS:  -  Assess patient's ability to carry out ADLs; assess patient's baseline for ADL function and identify physical deficits which impact ability to perform ADLs (bathing, care of mouth/teeth, toileting, grooming, dressing, etc )  - Assess/evaluate cause of self-care deficits   - Assess range of motion  - Assess patient's mobility; develop plan if impaired  - Assess patient's need for assistive devices and provide as appropriate  - Encourage maximum independence but intervene and supervise when necessary  - Involve family in performance of ADLs  - Assess for home care needs following discharge   - Consider OT consult to assist with ADL evaluation and planning for discharge  - Provide patient education as appropriate  Outcome: Adequate for Discharge  Goal: Maintains/Returns to pre admission functional level  Description: INTERVENTIONS:  - Perform BMAT or MOVE assessment daily    - Set and communicate daily mobility goal to care team and patient/family/caregiver     - Collaborate with rehabilitation services on mobility goals if consulted  - Out of bed for toileting  - Record patient progress and toleration of activity level   Outcome: Adequate for Discharge

## 2021-09-02 NOTE — UTILIZATION REVIEW
Notification of Discharge   This is a Notification of Discharge from our facility [address]  Please be advised that this patient has been discharge from our facility  Below you will find the admission and discharge date and time including the patients disposition  UTILIZATION REVIEW CONTACT:  Jayshree Guerrier  Utilization   Network Utilization Review Department  Phone: [phone] x carefully listen to the prompts  All voicemails are confidential   Email: [email or web address]     PHYSICIAN ADVISORY SERVICES:  FOR [id number] REVIEW - MEDICAL NECESSITY DENIAL  Phone: [phone]  Fax: [phone]  Email: [email or web address]     PRESENTATION DATE: 8/30/2021  2:19 PM  OBERVATION ADMISSION DATE:   INPATIENT ADMISSION DATE: 8/30/21  5:35 PM   DISCHARGE DATE: 9/1/2021  3:32 PM  DISPOSITION: Non [facility] SN[facility]/SNU Non [facility] SN[facility]/TC[facility]/SNU      IMPORTANT INFORMATION:  Send all requests for admission clinical reviews, approved or denied determinations and any other requests to dedicated fax number below belonging to the campus where the patient is receiving treatment   List of dedicated fax numbers:  [address] DENIALS (Administrative/Medical Necessity) [phone]   [address]  (Maternity/NICU/Pediatrics) [phone]   [place] [phone]   [facility] [phone]   [place] [phone]   [place] [facility] [address] [facility] [phone]   [facility]  [phone]   [address] [place] [address], [facility]  [address]01 [facility] 1000 [facility] [phone]

## 2021-09-04 LAB
BACTERIA BLD CULT: NORMAL
BACTERIA BLD CULT: NORMAL

## 2021-09-12 NOTE — UTILIZATION REVIEW
Notification of Discharge   This is a Notification of Discharge from our facility [address]  Please be advised that this patient has been discharge from our facility  Below you will find the admission and discharge date and time including the patients disposition  UTILIZATION REVIEW CONTACT:  Magali Lemus  Utilization   Network Utilization Review Department  Phone: [phone] x carefully listen to the prompts  All voicemails are confidential   Email: [email or web address]     PHYSICIAN ADVISORY SERVICES:  FOR [id number] REVIEW - MEDICAL NECESSITY DENIAL  Phone: [phone]  Fax: [phone]  Email: [email or web address]     PRESENTATION DATE: 8/30/2021  2:19 PM    INPATIENT ADMISSION DATE: 8/30/21  5:35 PM   DISCHARGE DATE: 9/1/2021  3:32 PM  DISPOSITION: Home/Self Care Home/Self Care      IMPORTANT INFORMATION:  Send all requests for admission clinical reviews, approved or denied determinations and any other requests to dedicated fax number below belonging to the campus where the patient is receiving treatment   List of dedicated fax numbers:  [address] DENIALS (Administrative/Medical Necessity) [phone]   [address] (Maternity/NICU/Pediatrics) [phone]   [place] [phone]   [place] [phone]   [place] [phone]   [facility] [address] [place] [address] [phone]   [facility]  [phone]   [address] [facility]  2401 [facility] 1000 [facility] [phone]

## 2021-11-08 ENCOUNTER — APPOINTMENT (EMERGENCY)
Dept: RADIOLOGY | Facility: HOSPITAL | Age: 86
DRG: 071 | End: 2021-11-08
Payer: COMMERCIAL

## 2021-11-08 ENCOUNTER — HOSPITAL ENCOUNTER (INPATIENT)
Facility: HOSPITAL | Age: 86
LOS: 4 days | Discharge: HOME HEALTH | DRG: 071 | End: 2021-11-12
Attending: EMERGENCY MEDICINE | Admitting: INTERNAL MEDICINE
Payer: COMMERCIAL

## 2021-11-08 DIAGNOSIS — I10 HYPERTENSION, UNSPECIFIED TYPE: Chronic | ICD-10-CM

## 2021-11-08 DIAGNOSIS — G93.40 ENCEPHALOPATHY ACUTE: ICD-10-CM

## 2021-11-08 DIAGNOSIS — K59.00 CONSTIPATION: ICD-10-CM
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
## 2021-11-08 PROBLEM — N17.9 ACUTE KIDNEY INJURY (HCC): Status: ACTIVE | Noted: 2021-11-08

## 2021-11-08 PROBLEM — T07.XXXA ABRASIONS OF MULTIPLE SITES: Status: ACTIVE | Noted: 2021-11-08

## 2021-11-08 PROBLEM — N18.9 CKD (CHRONIC KIDNEY DISEASE): Chronic | Status: ACTIVE | Noted: 2021-11-08

## 2021-11-08 LAB
ANION GAP SERPL CALCULATED.3IONS-SCNC: 16 MMOL/L (ref 4–13)
BACTERIA UR QL AUTO: NORMAL /HPF
BASE EXCESS BLDA CALC-SCNC: 0 MMOL/L (ref -2–3)
BASOPHILS # BLD AUTO: 0.04 THOUSANDS/ΜL (ref 0–0.1)
BASOPHILS NFR BLD AUTO: 0 % (ref 0–1)
BILIRUB UR QL STRIP: NEGATIVE
BUN SERPL-MCNC: 18 MG/DL (ref 5–25)
CALCIUM SERPL-MCNC: 9.1 MG/DL (ref 8.3–10.1)
CARDIAC TROPONIN I PNL SERPL HS: 6 NG/L (ref 8–18)
CHLORIDE SERPL-SCNC: 97 MMOL/L (ref 100–108)
CLARITY UR: CLEAR
CO2 SERPL-SCNC: 22 MMOL/L (ref 21–32)
COLOR UR: YELLOW
CREAT SERPL-MCNC: 1.53 MG/DL (ref 0.6–1.3)
EOSINOPHIL # BLD AUTO: 0.09 THOUSAND/ΜL (ref 0–0.61)
EOSINOPHIL NFR BLD AUTO: 1 % (ref 0–6)
ERYTHROCYTE [DISTWIDTH] IN BLOOD BY AUTOMATED COUNT: 13.7 % (ref 11.6–15.1)
FLUAV RNA RESP QL NAA+PROBE: NEGATIVE
FLUBV RNA RESP QL NAA+PROBE: NEGATIVE
GFR SERPL CREATININE-BSD FRML MDRD: 40 ML/MIN/1.73SQ M
GLUCOSE SERPL-MCNC: 103 MG/DL (ref 65–140)
GLUCOSE SERPL-MCNC: 88 MG/DL (ref 65–140)
GLUCOSE UR STRIP-MCNC: NEGATIVE MG/DL
HCO3 BLDA-SCNC: 25.3 MMOL/L (ref 24–30)
HCT VFR BLD AUTO: 45.6 % (ref 36.5–49.3)
HCT VFR BLD CALC: 45 % (ref 36.5–49.3)
HGB BLD-MCNC: 14.6 G/DL (ref 12–17)
HGB BLDA-MCNC: 15.3 G/DL (ref 12–17)
HGB UR QL STRIP.AUTO: ABNORMAL
IMM GRANULOCYTES # BLD AUTO: 0.11 THOUSAND/UL (ref 0–0.2)
IMM GRANULOCYTES NFR BLD AUTO: 1 % (ref 0–2)
KETONES UR STRIP-MCNC: ABNORMAL MG/DL
LEUKOCYTE ESTERASE UR QL STRIP: NEGATIVE
LYMPHOCYTES # BLD AUTO: 1.97 THOUSANDS/ΜL (ref 0.6–4.47)
LYMPHOCYTES NFR BLD AUTO: 13 % (ref 14–44)
MCH RBC QN AUTO: 28.6 PG (ref 26.8–34.3)
MCHC RBC AUTO-ENTMCNC: 32 G/DL (ref 31.4–37.4)
MCV RBC AUTO: 89 FL (ref 82–98)
MONOCYTES # BLD AUTO: 1.44 THOUSAND/ΜL (ref 0.17–1.22)
MONOCYTES NFR BLD AUTO: 9 % (ref 4–12)
NEUTROPHILS # BLD AUTO: 11.79 THOUSANDS/ΜL (ref 1.85–7.62)
NEUTS SEG NFR BLD AUTO: 76 % (ref 43–75)
NITRITE UR QL STRIP: NEGATIVE
NON-SQ EPI CELLS URNS QL MICRO: NORMAL /HPF
NRBC BLD AUTO-RTO: 0 /100 WBCS
PCO2 BLD: 27 MMOL/L (ref 21–32)
PCO2 BLD: 40.5 MM HG (ref 42–50)
PH BLD: 7.4 [PH] (ref 7.3–7.4)
PH UR STRIP.AUTO: 6.5 [PH]
PLATELET # BLD AUTO: 314 THOUSANDS/UL (ref 149–390)
PMV BLD AUTO: 10.9 FL (ref 8.9–12.7)
PO2 BLD: 30 MM HG (ref 35–45)
POTASSIUM BLD-SCNC: 4.9 MMOL/L (ref 3.5–5.3)
POTASSIUM SERPL-SCNC: 4.8 MMOL/L (ref 3.5–5.3)
PROT UR STRIP-MCNC: ABNORMAL MG/DL
RBC # BLD AUTO: 5.11 MILLION/UL (ref 3.88–5.62)
RBC #/AREA URNS AUTO: NORMAL /HPF
RSV RNA RESP QL NAA+PROBE: NEGATIVE
SAO2 % BLD FROM PO2: 58 % (ref 60–85)
SARS-COV-2 RNA RESP QL NAA+PROBE: NEGATIVE
SODIUM BLD-SCNC: 134 MMOL/L (ref 136–145)
SODIUM SERPL-SCNC: 135 MMOL/L (ref 136–145)
SP GR UR STRIP.AUTO: 1.02 (ref 1–1.03)
SPECIMEN SOURCE: ABNORMAL
UROBILINOGEN UR QL STRIP.AUTO: 0.2 E.U./DL
WBC # BLD AUTO: 15.44 THOUSAND/UL (ref 4.31–10.16)
WBC #/AREA URNS AUTO: NORMAL /HPF

## 2021-11-08 PROCEDURE — 76705 ECHO EXAM OF ABDOMEN: CPT | Performed by: PHYSICIAN ASSISTANT

## 2021-11-08 PROCEDURE — 85014 HEMATOCRIT: CPT

## 2021-11-08 PROCEDURE — 99223 1ST HOSP IP/OBS HIGH 75: CPT | Performed by: NURSE PRACTITIONER

## 2021-11-08 PROCEDURE — 93005 ELECTROCARDIOGRAM TRACING: CPT

## 2021-11-08 PROCEDURE — 99204 OFFICE O/P NEW MOD 45 MIN: CPT | Performed by: EMERGENCY MEDICINE

## 2021-11-08 PROCEDURE — 99285 EMERGENCY DEPT VISIT HI MDM: CPT

## 2021-11-08 PROCEDURE — 76604 US EXAM CHEST: CPT | Performed by: PHYSICIAN ASSISTANT

## 2021-11-08 PROCEDURE — 99223 1ST HOSP IP/OBS HIGH 75: CPT | Performed by: HOSPITALIST

## 2021-11-08 PROCEDURE — 80048 BASIC METABOLIC PNL TOTAL CA: CPT | Performed by: PHYSICIAN ASSISTANT

## 2021-11-08 PROCEDURE — 85025 COMPLETE CBC W/AUTO DIFF WBC: CPT | Performed by: PHYSICIAN ASSISTANT

## 2021-11-08 PROCEDURE — 1123F ACP DISCUSS/DSCN MKR DOCD: CPT | Performed by: INTERNAL MEDICINE

## 2021-11-08 PROCEDURE — 84132 ASSAY OF SERUM POTASSIUM: CPT

## 2021-11-08 PROCEDURE — 71045 X-RAY EXAM CHEST 1 VIEW: CPT

## 2021-11-08 PROCEDURE — 84295 ASSAY OF SERUM SODIUM: CPT

## 2021-11-08 PROCEDURE — 93308 TTE F-UP OR LMTD: CPT | Performed by: PHYSICIAN ASSISTANT

## 2021-11-08 PROCEDURE — 82803 BLOOD GASES ANY COMBINATION: CPT

## 2021-11-08 PROCEDURE — 82947 ASSAY GLUCOSE BLOOD QUANT: CPT

## 2021-11-08 PROCEDURE — NC001 PR NO CHARGE: Performed by: EMERGENCY MEDICINE

## 2021-11-08 PROCEDURE — 90471 IMMUNIZATION ADMIN: CPT

## 2021-11-08 PROCEDURE — 81001 URINALYSIS AUTO W/SCOPE: CPT | Performed by: PHYSICIAN ASSISTANT

## 2021-11-08 PROCEDURE — 87040 BLOOD CULTURE FOR BACTERIA: CPT | Performed by: HOSPITALIST

## 2021-11-08 PROCEDURE — 84484 ASSAY OF TROPONIN QUANT: CPT | Performed by: PHYSICIAN ASSISTANT

## 2021-11-08 PROCEDURE — 0241U HB NFCT DS VIR RESP RNA 4 TRGT: CPT | Performed by: PHYSICIAN ASSISTANT

## 2021-11-08 PROCEDURE — 90715 TDAP VACCINE 7 YRS/> IM: CPT | Performed by: PHYSICIAN ASSISTANT

## 2021-11-08 PROCEDURE — 36415 COLL VENOUS BLD VENIPUNCTURE: CPT | Performed by: PHYSICIAN ASSISTANT

## 2021-11-08 RX ORDER — ACETAMINOPHEN 325 MG/1
650 TABLET ORAL EVERY 6 HOURS PRN
Status: DISCONTINUED | OUTPATIENT
Start: 2021-11-08 | End: 2021-11-09

## 2021-11-08 RX ORDER — CARVEDILOL 6.25 MG/1
6.25 TABLET ORAL 2 TIMES DAILY WITH MEALS
Status: DISCONTINUED | OUTPATIENT
Start: 2021-11-09 | End: 2021-11-12 | Stop reason: HOSPADM

## 2021-11-08 RX ORDER — CARVEDILOL 6.25 MG/1
6.25 TABLET ORAL 2 TIMES DAILY WITH MEALS
COMMUNITY

## 2021-11-08 RX ORDER — ACETAMINOPHEN 325 MG/1
650 TABLET ORAL EVERY 6 HOURS PRN
COMMUNITY

## 2021-11-08 RX ORDER — HEPARIN SODIUM 5000 [USP'U]/ML
5000 INJECTION, SOLUTION INTRAVENOUS; SUBCUTANEOUS EVERY 8 HOURS SCHEDULED
Status: DISCONTINUED | OUTPATIENT
Start: 2021-11-08 | End: 2021-11-12 | Stop reason: HOSPADM

## 2021-11-08 RX ORDER — SODIUM CHLORIDE 9 MG/ML
75 INJECTION, SOLUTION INTRAVENOUS CONTINUOUS
Status: DISCONTINUED | OUTPATIENT
Start: 2021-11-08 | End: 2021-11-10

## 2021-11-08 RX ORDER — LISINOPRIL 10 MG/1
10 TABLET ORAL DAILY
COMMUNITY

## 2021-11-08 RX ADMIN — CEFTRIAXONE SODIUM 1000 MG: 10 INJECTION, POWDER, FOR SOLUTION INTRAVENOUS at 19:15

## 2021-11-08 RX ADMIN — HEPARIN SODIUM 5000 UNITS: 5000 INJECTION INTRAVENOUS; SUBCUTANEOUS at 21:38

## 2021-11-08 RX ADMIN — TETANUS TOXOID, REDUCED DIPHTHERIA TOXOID AND ACELLULAR PERTUSSIS VACCINE, ADSORBED 0.5 ML: 5; 2.5; 8; 8; 2.5 SUSPENSION INTRAMUSCULAR at 18:14

## 2021-11-08 RX ADMIN — SODIUM CHLORIDE 100 ML/HR: 0.9 INJECTION, SOLUTION INTRAVENOUS at 19:48

## 2021-11-08 NOTE — ASSESSMENT & PLAN NOTE
- Status post fall with the below noted injuries.  - Fall precautions.  - Geriatric Medicine consultation for evaluation, medication review and recommendations.  - PT and OT evaluation and treatment as indicated.  - Case Management consultation for disposition planning.

## 2021-11-08 NOTE — ASSESSMENT & PLAN NOTE
- Acute encephalopathy of unclear etiology.  Likely multifactorial, but concern for infection in setting of leukocytosis.  - EKG obtained and demonstrated normal sinus rhythm with no evidence of acute concerning pathology requiring further workup at this time.  - Chest x-ray obtained and reviewed without evidence of acute traumatic or infectious pathology.  - Additional workup to include urinalysis, troponin, COVID/flu/RSV swab.  - Delirium precautions.  - Geriatric Medicine consult.  - Admit to [facility] for further workup and management.

## 2021-11-08 NOTE — H&P
[facility]  H&P- lAf Carmona 2/27/1932, 89 y.o. male MRN: [id number]  Unit/Bed#: ED 14 Encounter: [id number]  Primary Care Provider: Noemí Gonzales MD   Date and time admitted to hospital: 11/8/2021  2:01 PM    Fall  Assessment & Plan  - Status post fall with the below noted injuries.  - Fall precautions.  - Geriatric Medicine consultation for evaluation, medication review and recommendations.  - PT and OT evaluation and treatment as indicated.  - Case Management consultation for disposition planning.      * Encephalopathy acute  Assessment & Plan  - Acute encephalopathy of unclear etiology.  Likely multifactorial, but concern for infection in setting of leukocytosis.  - EKG obtained and demonstrated normal sinus rhythm with no evidence of acute concerning pathology requiring further workup at this time.  - Chest x-ray obtained and reviewed without evidence of acute traumatic or infectious pathology.  - Additional workup to include urinalysis, troponin, COVID/flu/RSV swab.  - Delirium precautions.  - Geriatric Medicine consult.  - Admit to [facility] for further workup and management.    Abrasions of multiple sites  Assessment & Plan  - Multiple superficial abrasions not requiring further workup or intervention.  - Provide local wound care as indicated.  - Analgesia as needed.  - Update tetanus vaccination status.    HTN (hypertension)  Assessment & Plan  - Chronic history of hypertension.  - Continue current medication regimen.  - Outpatient follow-up with PCP.      CKD (chronic kidney disease)  Assessment & Plan  No results found for: EGFR, CREATININE     - Chronic kidney disease, present on presentation.  - Awaiting BMP to determine if patient has any component of acute kidney injury.  - Avoid nephrotoxic medications and hypotension.  - Monitor volume status with accurate I/Os.  - SLIM to admit and can manage going forward.        Disposition:  Admit to [facility] for evaluation of acute  encephalopathy and infection workup.  Trauma service to complete tertiary evaluation on 11/09/2021 and will be available for any further evaluation if indicated.    H&P Exam - Trauma   Alf Carmona 89 y.o. male MRN: [id number]  Unit/Bed#: ED 14 Encounter: [id number]    Assessment/Plan   Trauma Alert: Level B  Model of Arrival: Ambulance  Trauma Team: Attending Dr. Coker and AP Sarkis Fernandes PA-C  Consultants: SLIM contacted via [employer]. Geriatric Medicine.    Trauma Active Problems and Trauma Plan: See above.    Chief Complaint:  “I fell, yeah.”    History of Present Illness   HPI:  Alf Carmona is a 89 y.o. male who presents reportedly acute confusion and altered mental status.  He resides at [facility] and was found on the ground lying on the sidewalk.  He reportedly fell, but this was unwitnessed.  He was last seen approximately 10 minutes prior to being found down.  Pre-hospital, he reported that he fell onto his right side.  He was unable to provide any meaningful history on presentation and repeated the words “I fell” and “yeah.”  He denied having any pain.  Mechanism:Fall    Review of Systems   Unable to perform ROS: Mental status change       12-point, complete review of systems was reviewed and negative except as stated above.       Historical Information   Efforts to obtain history included the following sources: family member, other medical personnel, obtained from other records    Past Medical History:   Diagnosis Date   • CKD (chronic kidney disease)    • Hypertension    • Prostate CA (HCC)      History reviewed. No pertinent surgical history.  Social History   Social History     Substance and Sexual Activity   Alcohol Use Never     Social History     Substance and Sexual Activity   Drug Use Never     Social History     Tobacco Use   Smoking Status Never Smoker   Smokeless Tobacco Not on file     E-Cigarette/Vaping   • E-Cigarette Use Never User      E-Cigarette/Vaping Substances     There is  no immunization history for the selected administration types on file for this patient.  Last Tetanus:  Unknown  Family History: Non-contributory  Unable to obtain/limited by N/A      Meds/Allergies   all current active meds have been reviewed, current meds:   Current Facility-Administered Medications   Medication Dose Route Frequency   • tetanus-diphtheria-acellular pertussis (BOOSTRIX) IM injection 0.5 mL  0.5 mL Intramuscular Once    and PTA meds:   Prior to Admission Medications   Prescriptions Last Dose Informant Patient Reported? Taking?   acetaminophen (TYLENOL) 325 mg tablet Past Month at Unknown time  Yes Yes   Sig: Take 650 mg by mouth every 6 (six) hours as needed for mild pain or fever   carvedilol (COREG) 6.25 mg tablet 11/8/2021 at 0700  Yes Yes   Sig: Take 6.25 mg by mouth 2 (two) times a day with meals   lisinopril (ZESTRIL) 10 mg tablet 11/8/2021 at 0700  Yes Yes   Sig: Take 10 mg by mouth daily      Facility-Administered Medications: None       No Known Allergies      PHYSICAL EXAM    PE limited by: N/A    Objective   Vitals:   First set: Temperature: 98.9 °F (37.2 °C) (11/08/21 1403)  Pulse: 89 (11/08/21 1403)  Respirations: 16 (11/08/21 1403)  Blood Pressure: (!) 178/78 (11/08/21 1403)    Primary Survey:   (A) Airway:  Patent and intact  (B) Breathing:  Clear and equal bilaterally  (C) Circulation: Pulses:   normal, carotid  2/4, pedal  2/4, radial  2/4 and femoral  2/4  (D) Disabliity:  GCS Total:  13, Eye Opening:   Spontaneous = 4, Motor Response: Obeys commands = 6 and Verbal Response:  Inappropriate words = 3  (E) Expose:  Completed    Secondary Survey: (Click on Physical Exam tab above)  Physical Exam  Vitals and nursing note reviewed. Exam conducted with a chaperone present.   Constitutional:       General: He is awake. He is not in acute distress.     Appearance: He is well-developed. He is ill-appearing. He is not toxic-appearing or diaphoretic.      Interventions: He is not  intubated.Cervical collar in place.   HENT:      Head: Normocephalic. No abrasion, contusion or laceration.      Jaw: There is normal jaw occlusion. No tenderness.      Right Ear: External ear normal. Decreased hearing noted. No swelling or tenderness.      Left Ear: External ear normal. Decreased hearing noted. No swelling or tenderness.      Nose: Nose normal. No nasal deformity, septal deviation, signs of injury, laceration or nasal tenderness.      Mouth/Throat:      Mouth: Mucous membranes are moist.      Pharynx: Oropharynx is clear.   Eyes:      General: Lids are normal. Vision grossly intact.      Extraocular Movements: Extraocular movements intact.      Conjunctiva/sclera: Conjunctivae normal.      Pupils: Pupils are equal, round, and reactive to light.      Comments: Pupils were 2 mm, equal, round and reactive bilaterally   Neck:      Comments: Cervical collar in place during trauma evaluation  Cardiovascular:      Rate and Rhythm: Normal rate and regular rhythm.      Pulses: Normal pulses.           Carotid pulses are 2+ on the right side and 2+ on the left side.       Radial pulses are 2+ on the right side and 2+ on the left side.        Femoral pulses are 2+ on the right side and 2+ on the left side.       Dorsalis pedis pulses are 2+ on the right side and 2+ on the left side.      Heart sounds: Normal heart sounds. No murmur heard.  No friction rub. No gallop.    Pulmonary:      Effort: Pulmonary effort is normal. No tachypnea, bradypnea, accessory muscle usage, prolonged expiration, respiratory distress or retractions. He is not intubated.      Breath sounds: Normal breath sounds. No stridor or decreased air movement. No decreased breath sounds, wheezing, rhonchi or rales.   Chest:      Chest wall: No lacerations, deformity, swelling, tenderness or crepitus.   Abdominal:      General: Abdomen is flat. Bowel sounds are normal. There is no distension.      Palpations: Abdomen is soft.      Tenderness:  There is no abdominal tenderness. There is no guarding or rebound.   Musculoskeletal:         General: No swelling, tenderness, deformity or signs of injury. Normal range of motion.      Cervical back: Neck supple. No swelling, deformity, signs of trauma, lacerations or tenderness. No spinous process tenderness or muscular tenderness.      Thoracic back: No swelling, deformity, signs of trauma, lacerations or tenderness.      Lumbar back: No swelling, deformity, signs of trauma, lacerations or tenderness.      Right lower leg: No edema.      Left lower leg: No edema.      Comments: No midline cervical, thoracic or lumbar spine tenderness, step-offs or deformities.  No paraspinal muscular tenderness in the neck or back.    Normal range of motion in all 4 extremities without pain, tenderness or deformity.     Skin:     General: Skin is warm and dry.      Capillary Refill: Capillary refill takes less than 2 seconds.      Coloration: Skin is not jaundiced or pale.      Findings: Abrasion (Abrasions noted to posterior scalp and left elbow) present. No bruising, erythema, laceration, lesion or rash.          Neurological:      General: No focal deficit present.      Mental Status: He is alert and oriented to person, place, and time. Mental status is at baseline.      GCS: GCS eye subscore is 4. GCS verbal subscore is 3. GCS motor subscore is 6.      Sensory: Sensation is intact. No sensory deficit.      Motor: Motor function is intact. No weakness.   Psychiatric:         Mood and Affect: Mood normal.         Invasive Devices  Report    Peripheral Intravenous Line            Peripheral IV 11/08/21 Left Antecubital <1 day                Lab Results:   Results: I have personally reviewed pertinent reports.  , BMP/CMP:   Lab Results   Component Value Date    CO2 27 11/08/2021    GLUCOSE 103 11/08/2021   , CBC:   Lab Results   Component Value Date    WBC 15.44 (H) 11/08/2021    HGB 14.6 11/08/2021    HCT 45.6 11/08/2021    MCV  89 11/08/2021     11/08/2021    MCH 28.6 11/08/2021    MCHC 32.0 11/08/2021    RDW 13.7 11/08/2021    MPV 10.9 11/08/2021    NRBC 0 11/08/2021   , Coagulation: No results found for: PT, INR, APTT, Urinalysis: No results found for: COLORU, CLARITYU, SPECGRAV, PHUR, LEUKOCYTESUR, NITRITE, PROTEINUA, GLUCOSEU, KETONESU, BILIRUBINUR, BLOODU, Troponin: No results found for: TROPONINI and ISTAT: No components found for: VBG  Imaging/EKG Studies: Results: I have personally reviewed pertinent reports.   and I have personally reviewed pertinent films in PACS  Other Studies: N/A    Code Status: No Order  Advance Directive and Living Will:      Power of :    POLST:

## 2021-11-08 NOTE — ASSESSMENT & PLAN NOTE
Initially was going to be admitted to the trauma service.    I does not actually need anything for the fall.  No significant injury.    Fall was likely caused by they metabolic encephalopathy

## 2021-11-08 NOTE — ASSESSMENT & PLAN NOTE
- Multiple superficial abrasions not requiring further workup or intervention.  - Provide local wound care as indicated.  - Analgesia as needed.  - Update tetanus vaccination status.

## 2021-11-08 NOTE — ASSESSMENT & PLAN NOTE
Acute metabolic encephalopathy.  Daughter states this is similar to an episode he had this summer when he had a UTI and bronchitis.    I am suspicious of a UTI, but were still waiting for a UA    Will place him on IV Rocephin.    Give him gentle IV fluids    I will also have Neurology see him because he is having these recurrent episodes now of falls and confusion

## 2021-11-08 NOTE — ASSESSMENT & PLAN NOTE
No results found for: EGFR, CREATININE     - Chronic kidney disease, present on presentation.  - Awaiting BMP to determine if patient has any component of acute kidney injury.  - Avoid nephrotoxic medications and hypotension.  - Monitor volume status with accurate I/Os.  - SLIM to admit and can manage going forward.

## 2021-11-08 NOTE — QUICK NOTE
Cervical Collar Clearance:    The patient had a CT scan of the cervical spine demonstrating no acute injury. On exam, the patient had no midline point tenderness or paresthesias/numbness/weakness in the extremities. The patient had full range of motion (was then able to flex, extend, and rotate head laterally) without pain. There were no distracting injuries and the patient was not intoxicated.      The patient's cervical spine was cleared radiologically and clinically. Cervical collar removed at this time.     Sarkis Fernandes PA-C  11/8/2021 03:21 PM

## 2021-11-08 NOTE — ASSESSMENT & PLAN NOTE
- Chronic history of hypertension.  - Continue current medication regimen.  - Outpatient follow-up with PCP.

## 2021-11-08 NOTE — PROCEDURES
POC FAST US    Date/Time: 11/8/2021 2:09 PM  Performed by: Sarkis Fernandes PA-C  Authorized by: Sarkis Fernandes PA-C     Patient location:  Trauma  Other Assisting Provider: No    Procedure details:     Exam Type:  Diagnostic    Indications comment:  Fall    Assess for:  Intra-abdominal fluid, pericardial effusion and pneumothorax    Technique: extended FAST      Views obtained:  Heart - Pericardial sac, Left thorax, Right thorax, RUQ - Britt's Pouch, Suprapubic - Pouch of Mark and LUQ - Splenorenal space    Image quality: diagnostic      Image availability:  Images available in PACS and video obtained  FAST Findings:     RUQ (Hepatorenal) free fluid: absent      LUQ (Splenorenal) free fluid: absent      Suprapubic free fluid: absent      Cardiac wall motion: identified      Pericardial effusion: absent    extended FAST (Pulmonary) findings:     Left lung sliding: Present      Right lung sliding: Present    Interpretation:     Impressions: negative

## 2021-11-08 NOTE — CASE MANAGEMENT
CM responded to trauma alert. [facility] transported patient to trauma bay. Patient is alert but unable to follow commands from medical team. Patient resides at [facility].  No current identified CM needs. CM will follow and update screening, assessment, and discharge planning as appropriate.

## 2021-11-08 NOTE — CONSULTS
Consultation - Geriatrics   Alf Carmona 89 y.o. male MRN: [id number]  Unit/Bed#: ED 14 Encounter: [id number]      Assessment/Plan  1. Encephalopathy  Alert, disoriented x 4, restless  Limited vocabulary at present  Baseline, alert and oriented x 3, talkative  Rule out underlying infectious etiology, UA pending  Provide frequent redirection, reorientation, distraction techniques  Avoid deliriogenic medications such as tramadol, benzodiazepines, anticholinergics,  Benadryl  Treat pain, See geriatric pain protocol  Monitor for constipation and urinary retention  Encourage early and frequent moblization, OOB  Encourage Hydration/ Nutrition  Implement sleep hygiene, limit night time interuptions, group activities    2. Hx of prostate CA  Recommend urinary retention protocol  UA pending  Monitor for constipation    3. Ambulatory dysfunction  S/p fall  Ambulates independently at baseline, some gait instability  Uses steps at facility  PT/OT  Rehab post hospitalization    4. Frailty  Moderate  Risk factors: needs assistance with IADLS  Independent with ADLs, ambulate independently  maintain protein in diet  Maintain caregiver support  PT/OT  Rehab post hospitalization    5. Cognitive decline  Cannot be ruled out  recommend check TSH and vitamin B12  PT/OT   Rehab post hospitalization for optimization    6. Hearing impairment  Nephew getting hearing aids  Hearing impairment strongly correlated with depression, cognitive impairment, delirium and falls in the older adult  Use hearing aids or sound amplifier  Speaking face to face  Use clear dictation, enunciation of words    7. Home medication review  Per facility MAR  lisinopril 10 mg po daily  Coreg 3.215 mg po BID                History of Present Illness   Physician Requesting Consult: Charly Coker MD  Reason for Consult / Principal Problem: fall, acute encephalopathy  Hx and PE limited by: encephalopathy  HPI: Alf Carmona is a 89 y.o. year old male who presents with  fall and change in mental status. Per facility pt was not himself for the 2 days. This am he was not following directions well, limit use of vocabulary, getting confused as to which room was his. He was then found outside on the ground after falling    He has HTN, CKD hx of prostate CA.    Prior to arrival pt lives at  [facility]. He needs assistance with IADLs. He needs assistance with ADLs. He ambulates independently, he will even use the steps. He knows his room. He wears glasses. He is hard of hearing and wears bilateral hearing aids. He has no issues with sleep, bowel or bladder at baseline.     Upon exam pt is lying in bed. He is restless. His speech is limited. He follows limited commands. Asked to stick out his tongue, he does repeatedly  and then does not follow consecutive commands. He is asking to go to the bathroom. Offered urinal unable to void. No distention noted with palpation.    Spoke with nursing at [facility] to review HPI    Spoke with Sarkis Fernandes trauma, nephew left to get hearing aids will be back later    Spoke with nursing regarding pt requesting to urinate    Inpatient consult to Gerontology  Consult performed by: ELLIE Poe  Consult ordered by: Sarkis Fernandes PA-C          Review of Systems   Unable to perform ROS: Mental status change       Historical Information   Past Medical History:   Diagnosis Date   • CKD (chronic kidney disease)    • Hypertension    • Prostate CA (HCC)      No past surgical history on file. unable to obtain due to change in mental status  Social History   Social History     Substance and Sexual Activity   Alcohol Use Not on file     Social History     Substance and Sexual Activity   Drug Use Not on file     Social History     Tobacco Use   Smoking Status Not on file   Smokeless Tobacco Not on file         Family History: No family history on file. unable to obtain due to change in mental status    Meds/Allergies   Current meds:   No current  facility-administered medications for this encounter.        No Known Allergies    Objective   Vitals: Blood pressure 155/91, pulse 92, temperature 98.9 °F (37.2 °C), temperature source Temporal, resp. rate 16, weight 77.4 kg (170 lb 10.2 oz), SpO2 96 %.,There is no height or weight on file to calculate BMI.      Physical Exam  Vitals and nursing note reviewed.   Constitutional:       Comments: Thin and frail   HENT:      Head: Normocephalic.      Nose: No congestion.      Mouth/Throat:      Mouth: Mucous membranes are dry.   Eyes:      General:         Right eye: No discharge.         Left eye: No discharge.   Cardiovascular:      Rate and Rhythm: Normal rate and regular rhythm.      Pulses: Normal pulses.      Heart sounds: Normal heart sounds.   Pulmonary:      Effort: Pulmonary effort is normal.      Breath sounds: Normal breath sounds.   Abdominal:      General: Bowel sounds are normal.      Palpations: Abdomen is soft.   Musculoskeletal:         General: Normal range of motion.      Cervical back: Normal range of motion.   Skin:     General: Skin is warm and dry.   Neurological:      Mental Status: He is alert. He is disoriented.   Psychiatric:      Comments: restless         Lab Results:   Results from last 7 days   Lab Units 11/08/21  1410   I STAT HEMOGLOBIN g/dl 15.3   HEMATOCRIT, ISTAT % 45        Results from last 7 days   Lab Units 11/08/21  1410   CO2, I-STAT mmol/L 27   GLUCOSE, ISTAT mg/dl 103       Imaging Studies: I have personally reviewed pertinent reports.    EKG, Pathology, and Other Studies: I have personally reviewed pertinent reports.    VTE Prophylaxis: Sequential compression device (Venodyne)     Code Status: No Order

## 2021-11-08 NOTE — H&P
[facility]  H&P- Alf Carmona 2/27/1932, 89 y.o. male MRN: [id number]  Unit/Bed#: ED 14 Encounter: [id number]  Primary Care Provider: Noemí Gonzales MD   Date and time admitted to hospital: 11/8/2021  2:01 PM    * Encephalopathy acute  Assessment & Plan  Acute metabolic encephalopathy.  Daughter states this is similar to an episode he had this summer when he had a UTI and bronchitis.    I am suspicious of a UTI, but were still waiting for a UA    Will place him on IV Rocephin.    Give him gentle IV fluids    I will also have Neurology see him because he is having these recurrent episodes now of falls and confusion    Acute kidney injury (HCC)  Assessment & Plan  Likely due to UTI    Give gentle IV fluids    Hold ACE-inhibitor    HTN (hypertension)  Assessment & Plan  He has some acute kidney injury, so will hold his lisinopril    Continue Coreg    Fall  Assessment & Plan  Initially was going to be admitted to the trauma service.    I does not actually need anything for the fall.  No significant injury.    Fall was likely caused by they metabolic encephalopathy        Chief Complaint:   Confusion of fall      History of Present Illness:    Alf Carmona is a 89 y.o. male who presents with confusion and fall.  Patient lives in assisted living.  He was in normal self 2 days ago when he went to dinner with his daughter.  Today he got up and was walking the assisted living and was confused.  He fell.  He did have some mild trauma to his head, just some abrasions.  But no significant injury.  Was seen by Trauma surgery.  They stated there was nothing to do surgical wise.  But he is very confused.  He does not recognize his children.  He can not really answer my questions.  He is agitated.  Trying to get out of bed.  Says he has to pee.  He can not really answer my questions about any complaints.  Daughter states that this is similar to an episode he had this summer where he had a UTI and bronchitis  and was very confused for a few days..      Review of Systems:    Review of Systems   Constitutional: Negative for chills and fever.   HENT: Negative for ear pain and sore throat.    Eyes: Negative for pain and visual disturbance.   Respiratory: Negative for cough and shortness of breath.    Cardiovascular: Negative for chest pain and palpitations.   Gastrointestinal: Negative for abdominal pain and vomiting.   Genitourinary: Positive for frequency. Negative for dysuria and hematuria.   Musculoskeletal: Negative for arthralgias and back pain.   Skin: Negative for color change and rash.   Neurological: Negative for seizures and syncope.   Psychiatric/Behavioral: Positive for agitation and confusion.   All other systems reviewed and are negative.        Past Medical and Surgical History:     Past Medical History:   Diagnosis Date   • CKD (chronic kidney disease)    • Hypertension    • Prostate CA (HCC)        History reviewed. No pertinent surgical history.      Home Medications:    Prior to Admission medications    Medication Sig Start Date End Date Taking? Authorizing Provider   acetaminophen (TYLENOL) 325 mg tablet Take 650 mg by mouth every 6 (six) hours as needed for mild pain or fever   Yes Historical Provider, MD   carvedilol (COREG) 6.25 mg tablet Take 6.25 mg by mouth 2 (two) times a day with meals   Yes Historical Provider, MD   lisinopril (ZESTRIL) 10 mg tablet Take 10 mg by mouth daily   Yes Historical Provider, MD     I have reviewed home medications with patient personally.      Allergies: No Known Allergies      Social History:    Substance Use History:   Social History     Substance and Sexual Activity   Alcohol Use Never     Social History     Tobacco Use   Smoking Status Never Smoker   Smokeless Tobacco Never Used     Social History     Substance and Sexual Activity   Drug Use Never         Family History:    non-contributory      Physical Exam:     Vitals:   Blood Pressure: (!) 177/96 (11/08/21  1815)  Pulse: 100 (11/08/21 1815)  Temperature: 98.9 °F (37.2 °C) (11/08/21 1403)  Temp Source: Temporal (11/08/21 1403)  Respirations: 16 (11/08/21 1815)  Weight - Scale: 77.4 kg (170 lb 10.2 oz) (11/08/21 1403)  SpO2: 95 % (11/08/21 1800)    Physical Exam  Vitals and nursing note reviewed.   HENT:      Head: Normocephalic and atraumatic.   Eyes:      Pupils: Pupils are equal, round, and reactive to light.   Cardiovascular:      Rate and Rhythm: Normal rate and regular rhythm.      Heart sounds: No murmur heard.  No friction rub. No gallop.    Pulmonary:      Effort: Pulmonary effort is normal.      Breath sounds: Normal breath sounds. No wheezing or rales.   Abdominal:      General: Bowel sounds are normal.      Palpations: Abdomen is soft.      Tenderness: There is no abdominal tenderness.   Musculoskeletal:      Right lower leg: No edema.      Left lower leg: No edema.   Skin:     Comments: Mild abrasion to the head.  Not actively bleeding.   Neurological:      General: No focal deficit present.      Cranial Nerves: No cranial nerve deficit.      Motor: No weakness.         .    Additional Data:     Lab Results: I have personally reviewed pertinent reports.      Results from last 7 days   Lab Units 11/08/21  1642   WBC Thousand/uL 15.44*   HEMOGLOBIN g/dL 14.6   HEMATOCRIT % 45.6   PLATELETS Thousands/uL 314   NEUTROS PCT % 76*   LYMPHS PCT % 13*   MONOS PCT % 9   EOS PCT % 1     Results from last 7 days   Lab Units 11/08/21  1642 11/08/21  1410   POTASSIUM mmol/L 4.8  --    CHLORIDE mmol/L 97*  --    CO2 mmol/L 22  --    CO2, I-STAT mmol/L  --  27   BUN mg/dL 18  --    CREATININE mg/dL 1.53*  --    CALCIUM mg/dL 9.1  --    GLUCOSE, ISTAT mg/dl  --  103                     Imaging: I have personally reviewed pertinent reports.   and I have personally reviewed pertinent films in PACS    TRAUMA - CT head wo contrast   Final Result by Serena aRbago MD (11/08 1443)      No acute intracranial abnormality.       Posterior soft tissue swelling.               I personally discussed this study with Charly John Paul on 11/8/2021 at 2:42 PM.      Workstation performed: [id number]         TRAUMA - CT spine cervical wo contrast   Final Result by Serena Rabago MD (11/08 1443)      No cervical spine fracture or traumatic malalignment.                I personally discussed this study with Charly John Paul on 11/8/2021 at 2:42 PM.               Workstation performed: [id number]         XR Trauma multiple (SLB/SLRA trauma bay ONLY)   Final Result by Murray Camarena MD (11/08 4092)      No acute cardiopulmonary disease within limitations of supine imaging.               Workstation performed: [id number]         XR chest 1 view    (Results Pending)         ·       VTE Prophylaxis: Heparin        Anticipated Length of Stay:  Patient will be admitted on an Inpatient basis with an anticipated length of stay of  greater than 2 midnights.   Justification for Hospital Stay:  Patient needs workup for metabolic encephalopathy and UTI.  He needs IV antibiotics.  Anticipate length of stay will be greater than 2 midnights.      Total Time for Visit, including Counseling / Coordination of Care: 45 minutes.  Greater than 50% of this total time spent on direct patient counseling and coordination of care.      ** Please Note: This note has been constructed using a voice recognition system. **

## 2021-11-08 NOTE — ED PROVIDER NOTES
Emergency Department Airway Evaluation and Management Form    History  Obtained from: Patient, EMS  Patient has no allergy information on record.  Chief Complaint   Patient presents with   • Fall     HPI     Patient presents as a pre-hospital level B trauma activation due to unwitnessed fall, unclear loss of consciousness, GCS 13.    EMS reports patient is altered over his baseline.  No report of anticoagulation.       No past medical history on file.  No past surgical history on file.  No family history on file.  Social History     Tobacco Use   • Smoking status: Not on file   • Smokeless tobacco: Not on file   Substance Use Topics   • Alcohol use: Not on file   • Drug use: Not on file     I have reviewed and agree with the history as documented.    Review of Systems     Per trauma    Physical Exam  BP (!) 178/78   Pulse 89   Temp 98.9 °F (37.2 °C) (Temporal)   Resp 16   Wt 77.4 kg (170 lb 10.2 oz)   SpO2 96%     Physical Exam     Per trauma    ED Medications  Medications - No data to display    Intubation  Procedures    Notes  Airway intact.  Additional workup, imaging, disposition per trauma team who was at bedside in Trauma [place] for trauma level B activation.    Final Diagnosis  Final diagnoses:   None       ED Provider  Electronically Signed by     Mode King MD  11/08/21 2575

## 2021-11-09 LAB
ANION GAP SERPL CALCULATED.3IONS-SCNC: 10 MMOL/L (ref 4–13)
ATRIAL RATE: 93 BPM
BASOPHILS # BLD AUTO: 0.02 THOUSANDS/ΜL (ref 0–0.1)
BASOPHILS NFR BLD AUTO: 0 % (ref 0–1)
BUN SERPL-MCNC: 18 MG/DL (ref 5–25)
CALCIUM SERPL-MCNC: 9.2 MG/DL (ref 8.3–10.1)
CHLORIDE SERPL-SCNC: 97 MMOL/L (ref 100–108)
CO2 SERPL-SCNC: 26 MMOL/L (ref 21–32)
CREAT SERPL-MCNC: 1.4 MG/DL (ref 0.6–1.3)
EOSINOPHIL # BLD AUTO: 0.16 THOUSAND/ΜL (ref 0–0.61)
EOSINOPHIL NFR BLD AUTO: 1 % (ref 0–6)
ERYTHROCYTE [DISTWIDTH] IN BLOOD BY AUTOMATED COUNT: 13.7 % (ref 11.6–15.1)
FOLATE SERPL-MCNC: >20 NG/ML (ref 3.1–17.5)
GFR SERPL CREATININE-BSD FRML MDRD: 44 ML/MIN/1.73SQ M
GLUCOSE SERPL-MCNC: 123 MG/DL (ref 65–140)
HCT VFR BLD AUTO: 46.6 % (ref 36.5–49.3)
HGB BLD-MCNC: 15.6 G/DL (ref 12–17)
IMM GRANULOCYTES # BLD AUTO: 0.08 THOUSAND/UL (ref 0–0.2)
IMM GRANULOCYTES NFR BLD AUTO: 1 % (ref 0–2)
LYMPHOCYTES # BLD AUTO: 1.3 THOUSANDS/ΜL (ref 0.6–4.47)
LYMPHOCYTES NFR BLD AUTO: 10 % (ref 14–44)
MCH RBC QN AUTO: 28.9 PG (ref 26.8–34.3)
MCHC RBC AUTO-ENTMCNC: 33.5 G/DL (ref 31.4–37.4)
MCV RBC AUTO: 86 FL (ref 82–98)
MONOCYTES # BLD AUTO: 0.96 THOUSAND/ΜL (ref 0.17–1.22)
MONOCYTES NFR BLD AUTO: 7 % (ref 4–12)
NEUTROPHILS # BLD AUTO: 10.65 THOUSANDS/ΜL (ref 1.85–7.62)
NEUTS SEG NFR BLD AUTO: 81 % (ref 43–75)
NRBC BLD AUTO-RTO: 0 /100 WBCS
P AXIS: 62 DEGREES
PLATELET # BLD AUTO: 295 THOUSANDS/UL (ref 149–390)
PMV BLD AUTO: 10.6 FL (ref 8.9–12.7)
POTASSIUM SERPL-SCNC: 4.4 MMOL/L (ref 3.5–5.3)
PR INTERVAL: 168 MS
QRS AXIS: -34 DEGREES
QRSD INTERVAL: 88 MS
QT INTERVAL: 334 MS
QTC INTERVAL: 415 MS
RBC # BLD AUTO: 5.4 MILLION/UL (ref 3.88–5.62)
SODIUM SERPL-SCNC: 133 MMOL/L (ref 136–145)
T WAVE AXIS: 60 DEGREES
T4 FREE SERPL-MCNC: 1.46 NG/DL (ref 0.76–1.46)
TSH SERPL DL<=0.05 MIU/L-ACNC: 0.31 UIU/ML (ref 0.36–3.74)
VENTRICULAR RATE: 93 BPM
VIT B12 SERPL-MCNC: 368 PG/ML (ref 100–900)
WBC # BLD AUTO: 13.17 THOUSAND/UL (ref 4.31–10.16)

## 2021-11-09 PROCEDURE — 99232 SBSQ HOSP IP/OBS MODERATE 35: CPT | Performed by: INTERNAL MEDICINE

## 2021-11-09 PROCEDURE — 99232 SBSQ HOSP IP/OBS MODERATE 35: CPT | Performed by: EMERGENCY MEDICINE

## 2021-11-09 PROCEDURE — 82746 ASSAY OF FOLIC ACID SERUM: CPT | Performed by: INTERNAL MEDICINE

## 2021-11-09 PROCEDURE — 80048 BASIC METABOLIC PNL TOTAL CA: CPT | Performed by: PHYSICIAN ASSISTANT

## 2021-11-09 PROCEDURE — 84439 ASSAY OF FREE THYROXINE: CPT | Performed by: INTERNAL MEDICINE

## 2021-11-09 PROCEDURE — 84443 ASSAY THYROID STIM HORMONE: CPT | Performed by: INTERNAL MEDICINE

## 2021-11-09 PROCEDURE — 93010 ELECTROCARDIOGRAM REPORT: CPT | Performed by: INTERNAL MEDICINE

## 2021-11-09 PROCEDURE — NC001 PR NO CHARGE: Performed by: NURSE PRACTITIONER

## 2021-11-09 PROCEDURE — 99232 SBSQ HOSP IP/OBS MODERATE 35: CPT | Performed by: NURSE PRACTITIONER

## 2021-11-09 PROCEDURE — 36415 COLL VENOUS BLD VENIPUNCTURE: CPT | Performed by: PHYSICIAN ASSISTANT

## 2021-11-09 PROCEDURE — 85025 COMPLETE CBC W/AUTO DIFF WBC: CPT | Performed by: PHYSICIAN ASSISTANT

## 2021-11-09 PROCEDURE — 82607 VITAMIN B-12: CPT | Performed by: INTERNAL MEDICINE

## 2021-11-09 RX ORDER — ACETAMINOPHEN 325 MG/1
650 TABLET ORAL EVERY 6 HOURS PRN
Status: DISCONTINUED | OUTPATIENT
Start: 2021-11-09 | End: 2021-11-12 | Stop reason: HOSPADM

## 2021-11-09 RX ORDER — LISINOPRIL 10 MG/1
10 TABLET ORAL DAILY
Status: DISCONTINUED | OUTPATIENT
Start: 2021-11-09 | End: 2021-11-12 | Stop reason: HOSPADM

## 2021-11-09 RX ADMIN — HEPARIN SODIUM 5000 UNITS: 5000 INJECTION INTRAVENOUS; SUBCUTANEOUS at 05:19

## 2021-11-09 RX ADMIN — HEPARIN SODIUM 5000 UNITS: 5000 INJECTION INTRAVENOUS; SUBCUTANEOUS at 14:18

## 2021-11-09 RX ADMIN — ACETAMINOPHEN 650 MG: 325 TABLET, FILM COATED ORAL at 17:50

## 2021-11-09 RX ADMIN — CARVEDILOL 6.25 MG: 6.25 TABLET, FILM COATED ORAL at 17:50

## 2021-11-09 RX ADMIN — CARVEDILOL 6.25 MG: 6.25 TABLET, FILM COATED ORAL at 07:37

## 2021-11-09 RX ADMIN — HEPARIN SODIUM 5000 UNITS: 5000 INJECTION INTRAVENOUS; SUBCUTANEOUS at 22:53

## 2021-11-09 NOTE — PROGRESS NOTES
"Progress Note - Alf Carmona 89 y.o. male MRN: [id number]    Unit/Bed#: ED 14 Encounter: [id number]      Assessment/Plan:  1. Encephalopathy  Alert, disoriented x 4  Limited vocabulary  Baseline, alert and oriented x 3, talkative  UA elevated protein and ketones, CT head no acute abnormality  Cr 1.40 11/9/21 (1.53 on 11/8/21)  Respiratory panel negative  Continue IVF  Provide frequent redirection, reorientation, distraction techniques  Avoid deliriogenic medications such as tramadol, benzodiazepines, anticholinergics,  Benadryl  Treat pain, See geriatric pain protocol  Monitor for constipation and urinary retention  Encourage early and frequent moblization, OOB  Encourage Hydration/ Nutrition  Implement sleep hygiene, limit night time interuptions, group activities  MRI pending     2. Hx of prostate CA  Recommend urinary retention protocol  UA elevated protein and ketones, continue IVF  Monitor for constipation     3. Ambulatory dysfunction  S/p fall  Ambulates independently at baseline, some gait instability  Uses steps at facility  PT/OT  Rehab post hospitalization     4. Frailty  Moderate  Risk factors: needs assistance with IADLS  Independent with ADLs, ambulate independently  maintain protein in diet  Maintain caregiver support  PT/OT  Rehab post hospitalization     5. Cognitive decline  Cannot be ruled out  TSH 0.309 on 11/9/21   vitamin B12 pending  PT/OT   Rehab post hospitalization for optimization     6. Hearing impairment  Nephew getting hearing aids  Hearing impairment strongly correlated with depression, cognitive impairment, delirium and falls in the older adult  Use hearing aids or sound amplifier  Speaking face to face  Use clear dictation, enunciation of words       Subjective:   Upon exam pt is lying in bed. He is alert, disoriented x 4. He states \"No\" when asked a question. He does not follow commands. He is in posey vest, calm at present.     Objective:     Vitals: Blood pressure 151/88, pulse 88, " temperature 99.7 °F (37.6 °C), temperature source Oral, resp. rate 20, weight 77.4 kg (170 lb 10.2 oz), SpO2 94 %.,There is no height or weight on file to calculate BMI.    No intake or output data in the 24 hours ending 11/09/21 1553    Physical Exam:   General : NAD  HEENT : mouth dry, bilateral sclera reddened  Heart : Normal rate, no murmur rub or gallop  Lungs : fine expiratory wheezing to right upper chest  Abdomen : slightly distended, hyperactive bowel sounds   Ext :  no edema  Skin : Pink, warm, dry, age appropriate turgor and mobility  Neuro : Nonfocal  Psych : Alert, disoriented x 3      Invasive Devices  Report    Peripheral Intravenous Line            Peripheral IV 11/09/21 Right Antecubital <1 day                Lab, Imaging and other studies: I have personally reviewed pertinent reports.    VTE Pharmacologic Prophylaxis: Sequential compression device (Venodyne)   VTE Mechanical Prophylaxis: sequential compression device

## 2021-11-09 NOTE — ASSESSMENT & PLAN NOTE
- Acute encephalopathy of unclear etiology.  Likely multifactorial, but concern for infection in setting of leukocytosis  - EKG obtained and demonstrated normal sinus rhythm with no evidence of acute concerning pathology requiring further workup at this time.  - Chest x-ray obtained and reviewed without evidence of acute traumatic or infectious pathology.  - urinalysis + for ketones, protein, and trace blood, troponin neg, COVID/flu/RSV swab neg.  - Delirium precautions.  - Geriatric Medicine consult.  - continue per Primary team

## 2021-11-09 NOTE — ASSESSMENT & PLAN NOTE
· Unclear baseline.  Possibly pre renal in the setting of dehydration. Presenting creatinine was 1.53, improving to 1.40 with IV fluids.  · Continue IV fluids

## 2021-11-09 NOTE — ASSESSMENT & PLAN NOTE
Lab Results   Component Value Date    EGFR 44 11/09/2021    EGFR 40 11/08/2021    CREATININE 1.40 (H) 11/09/2021    CREATININE 1.53 (H) 11/08/2021        - Chronic kidney disease, present on presentation.  - Avoid nephrotoxic medications and hypotension.  - Monitor volume status with accurate I/Os.  - SLIM to admit and can manage going forward.

## 2021-11-09 NOTE — UTILIZATION REVIEW
Initial Clinical Review    Admission: Date/Time/Statement:   Admission Orders (From admission, onward)     Ordered        11/08/21 1835  Inpatient Admission  Once                      Orders Placed This Encounter   Procedures   • Inpatient Admission     Standing Status:   Standing     Number of Occurrences:   1     Order Specific Question:   Level of Care     Answer:   Med Surg [16]     Order Specific Question:   Estimated length of stay     Answer:   More than 2 Midnights     Order Specific Question:   Certification     Answer:   I certify that inpatient services are medically necessary for this patient for a duration of greater than two midnights. See H&P and MD Progress Notes for additional information about the patient's course of treatment.     ED Arrival Information     Expected Arrival Acuity    - 11/8/2021 14:01 Emergent         Means of arrival Escorted by Service Admission type    Ambulance [facility] Hospitalist Emergency         Arrival complaint            Chief Complaint   Patient presents with   • Fall     Initial Presentation:   89y male to ED fro Assisted living presents with S/p Fall and confusion. 2 days ago pt was his normal self, has dinner with his daughter. Today found confused while ambulating. Pt has fall with mild trauma to his head and some abrasions. Cleared by trauma but persistent confusion, unable to recognize his children and answer questions. Note pt agitated and trying to get OOB. Per pt's daughter, he had similar episode in the summer when he had a UTI and bronchitis. PMH for HTN and CKD.  Admit Inpatient level of care for Acute encephalopathy, TARAH and Fall. Suspicious for UTI, UA pending. Start Iv antibiotics and IVFs. Neurology consult. Hold ACEI.     Date: 11/9   Day 2:   Progress notes; Creat down to 1.40 from 1.53 on admit. Continue IVFs. CXR. UA   Multiple superficial abrasions not requiring further workup or intervention. UA + for ketones, protein, and  trace blood, troponin neg, COVID/flu/RSV swab neg. Continue Iv antibiotics.  Neurology consult d/c.     ED Triage Vitals   Temperature Pulse Respirations Blood Pressure SpO2   11/08/21 1403 11/08/21 1403 11/08/21 1403 11/08/21 1403 11/08/21 1403   98.9 °F (37.2 °C) 89 16 (!) 178/78 96 %      Temp Source Heart Rate Source Patient Position - Orthostatic VS BP Location FiO2 (%)   11/08/21 1403 11/08/21 1403 11/08/21 2239 11/09/21 0715 --   Temporal Monitor Lying Right arm       Pain Score       11/08/21 2239       No Pain          Wt Readings from Last 1 Encounters:   11/08/21 77.4 kg (170 lb 10.2 oz)     Additional Vital Signs:   11/09/21 0715 99.7 °F (37.6 °C) 88 18 186/100  Abnormal  132 94 % None (Room air) Sitting   11/09/21 0645 -- 84 18 187/99  Abnormal  138 93 % None (Room air) --   11/09/21 0501 -- 75 18 175/97  Abnormal  -- 95 % None (Room air) --   11/09/21 0445 -- 80 -- -- -- 91 % -- --   11/09/21 0415 -- 80 18 165/81 116 95 % None (Room air)      11/08/21 1630 -- 108  Abnormal  16 163/94 121 94 % -- --   11/08/21 1615 -- 98 16 166/89 119 96 % -- --   11/08/21 1600 -- 100 16 173/9   Abnormal  127 95 % -- --   11/08/21 1545 -- 98 16 169/103  Abnormal  126 94 % -- --   11/08/21 1530 -- 92 16 155/91 114 96 % -- --   11/08/21 1500 -- 94 16 193/105  Abnormal  131 94 % -- --   11/08/21 1445 -- 98 16 176/8   Abnormal          Pertinent Labs/Diagnostic Test Results:   11/8  CT Head - No acute intracranial abnormality.  Posterior soft tissue swelling.    CT Cervical Spine - No cervical spine fracture or traumatic malalignment.    EKG - NSR    11/9  MRI Brain - pending     Results from last 7 days   Lab Units 11/08/21  1816   SARS-COV-2  Negative     Results from last 7 days   Lab Units 11/09/21  0439 11/08/21  1642 11/08/21  1410   WBC Thousand/uL 13.17* 15.44*  --    HEMOGLOBIN g/dL 15.6 14.6  --    I STAT HEMOGLOBIN g/dl  --   --  15.3   HEMATOCRIT % 46.6 45.6  --    HEMATOCRIT, ISTAT %  --   --  45   PLATELETS  Thousands/uL 295 314  --    NEUTROS ABS Thousands/µL 10.65* 11.79*  --          Results from last 7 days   Lab Units 11/09/21  0439 11/08/21  1642 11/08/21  1410   SODIUM mmol/L 133* 135*  --    POTASSIUM mmol/L 4.4 4.8  --    CHLORIDE mmol/L 97* 97*  --    CO2 mmol/L 26 22  --    CO2, I-STAT mmol/L  --   --  27   ANION GAP mmol/L 10 16*  --    BUN mg/dL 18 18  --    CREATININE mg/dL 1.40* 1.53*  --    EGFR ml/min/1.73sq m 44 40  --    CALCIUM mg/dL 9.2 9.1  --              Results from last 7 days   Lab Units 11/09/21  0439 11/08/21  1642   GLUCOSE RANDOM mg/dL 123 88       Results from last 7 days   Lab Units 11/08/21  1410   PH, MARK I-STAT  7.404*   PCO2, MARK ISTAT mm HG 40.5*   PO2, MARK ISTAT mm HG 30.0*   HCO3, MARK ISTAT mmol/L 25.3   I STAT BASE EXC mmol/L 0   I STAT O2 SAT % 58*       Results from last 7 days   Lab Units 11/08/21  1808   CLARITY UA  Clear   COLOR UA  Yellow   SPEC GRAV UA  1.020   PH UA  6.5   GLUCOSE UA mg/dl Negative   KETONES UA mg/dl 15 (1+)*   BLOOD UA  Trace-Intact*   PROTEIN UA mg/dl 30 (1+)*   NITRITE UA  Negative   BILIRUBIN UA  Negative   UROBILINOGEN UA E.U./dl 0.2   LEUKOCYTES UA  Negative   WBC UA /hpf 0-1   RBC UA /hpf None Seen   BACTERIA UA /hpf None Seen   EPITHELIAL CELLS WET PREP /hpf Occasional     Results from last 7 days   Lab Units 11/08/21  1816   INFLUENZA A PCR  Negative   INFLUENZA B PCR  Negative   RSV PCR  Negative         Results from last 7 days   Lab Units 11/08/21  1855   BLOOD CULTURE  Received in Microbiology Lab. Culture in Progress.  Received in Microbiology Lab. Culture in Progress.       ED Treatment:   Medication Administration from 11/08/2021 1350 to 11/09/2021 0849       Date/Time Order Dose Route Action     11/08/2021 1814 tetanus-diphtheria-acellular pertussis (BOOSTRIX) IM injection 0.5 mL 0.5 mL Intramuscular Given     11/09/2021 0737 carvedilol (COREG) tablet 6.25 mg 6.25 mg Oral Given     11/08/2021 1948 sodium chloride 0.9 % infusion 100 mL/hr  Intravenous New Bag     11/09/2021 0519 heparin (porcine) subcutaneous injection 5,000 Units 5,000 Units Subcutaneous Given     11/08/2021 2138 heparin (porcine) subcutaneous injection 5,000 Units 5,000 Units Subcutaneous Given     11/08/2021 1915 ceftriaxone (ROCEPHIN) 1 g/50 mL in dextrose IVPB 1,000 mg Intravenous New Bag        Past Medical History:   Diagnosis Date   • CKD (chronic kidney disease)    • Hypertension    • Prostate CA (HCC)      Present on Admission:  • Fall  • Encephalopathy acute  • Abrasions of multiple sites  • HTN (hypertension)  • CKD (chronic kidney disease)      Admitting Diagnosis: Head injury [S09.90XA]  Age/Sex: 89 y.o. male     Admission Orders:  Scheduled Medications:  carvedilol, 6.25 mg, Oral, BID With Meals  cefTRIAXone, 1,000 mg, Intravenous, Q24H  heparin (porcine), 5,000 Units, Subcutaneous, Q8H AZAEL      Continuous IV Infusions:  sodium chloride, 100 mL/hr, Intravenous, Continuous      PRN Meds:  acetaminophen, 650 mg, Oral, Q6H PRN      Bld culture x2  IP CONSULT TO GERONTOLOGY  IP CONSULT TO NEUROLOGY    Network Utilization Review Department  ATTENTION: Please call with any questions or concerns to [phone] and carefully listen to the prompts so that you are directed to the right person. All voicemails are confidential.  Send all requests for admission clinical reviews, approved or denied determinations and any other requests to dedicated fax number below belonging to the campus where the patient is receiving treatment. List of dedicated fax numbers for the Facilities:  FACILITY NAME UR FAX NUMBER   ADMISSION DENIALS (Administrative/Medical Necessity) [phone]   PARENT CHILD HEALTH (Maternity/NICU/Pediatrics) [phone]   [facility] [phone]   [facility] [phone]   [facility] [phone]   [facility] [phone]   [facility]  [place] [phone]   [facility] [phone]   [facility] [phone]   [facility] [phone]   [facility] [phone]   [facility] [phone]   General acute hospital [phone]

## 2021-11-09 NOTE — ED NOTES
Allevyn Life Heel protectors applied to BL heels; heels elevated     Paula Vera RN  11/09/21 7267

## 2021-11-09 NOTE — ASSESSMENT & PLAN NOTE
"· Unclear etiology.  Patient is a resident at [facility].  Per my discussions with patient's son at the bedside as well as another relative named Jillian who happens to be patient's POA, patient is normally alert and oriented x3 and have a conversation.  She recently had dinner with him few days ago which time he was fine.  · Suspect toxic metabolic encephalopathy though unclear etiology.  No signs of any infection.  UA is within normal limits.  Afebrile with slight leukocytosis.  No abdominal complaints.  No cough.  Neurologic exam is limited as patient is not able to follow commands.  He is not lethargic.  He is alert though oriented x 0. He answers \"yes\" only to all questions.  Does not recognize his family members at bedside.  CT head obtained in the emergency department showed posterior soft tissue swelling.  In addition he was evaluated by Trauma though was cleared from any traumatic injuries.  UA did show significant amount of ketones suggesting element of dehydration.  Family member does report a similar presentation with patient was profoundly dehydrated  · Will check ammonia levels, B12 and folate.  TSH level.  Will follow-up blood cultures.  Continue to monitor fever curve and WBC count.  Hold further antibiotics.  · Aggressive IV hydration and promote p.o. intake as able  · Check MRI brain  · Appreciate Geriatrics consult - frequent reorientation/delirium precautions  "

## 2021-11-09 NOTE — ASSESSMENT & PLAN NOTE
- Status post fall with the below noted injuries.  - Fall precautions.  - Geriatric Medicine consultation for evaluation, medication review and recommendations.  - PT and OT evaluation and treatment as indicated.  - Case Management consultation for disposition planning.  - No additional injuries noted on tertiary exam - trauma team will sign off, please reconsult as needed

## 2021-11-09 NOTE — PROGRESS NOTES
"[facility]  Progress Note - Alf Carmona 2/27/1932, 89 y.o. male MRN: [id number]  Unit/Bed#: ED 14 Encounter: [id number]  Primary Care Provider: Noemí Gonzales MD   Date and time admitted to hospital: 11/8/2021  2:01 PM    * Encephalopathy acute  Assessment & Plan  · Unclear etiology.  Patient is a resident at [facility].  Per my discussions with patient's son at the bedside as well as another relative named Jillian who happens to be patient's POA, patient is normally alert and oriented x3 and have a conversation.  She recently had dinner with him few days ago which time he was fine.  · Suspect toxic metabolic encephalopathy though unclear etiology.  No signs of any infection.  UA is within normal limits.  Afebrile with slight leukocytosis.  No abdominal complaints.  No cough.  Neurologic exam is limited as patient is not able to follow commands.  He is not lethargic.  He is alert though oriented x 0. He answers \"yes\" only to all questions.  Does not recognize his family members at bedside.  CT head obtained in the emergency department showed posterior soft tissue swelling.  In addition he was evaluated by Trauma though was cleared from any traumatic injuries.  UA did show significant amount of ketones suggesting element of dehydration.  Family member does report a similar presentation with patient was profoundly dehydrated  · Will check ammonia levels, B12 and folate.  TSH level.  Will follow-up blood cultures.  Continue to monitor fever curve and WBC count.  Hold further antibiotics.  · Aggressive IV hydration and promote p.o. intake as able  · Check MRI brain  · Appreciate Geriatrics consult - frequent reorientation/delirium precautions    Acute kidney injury (HCC)  Assessment & Plan  · Unclear baseline.  Possibly pre renal in the setting of dehydration. Presenting creatinine was 1.53, improving to 1.40 with IV fluids.  · Continue IV fluids    HTN (hypertension)  Assessment & " "Plan  · Continue home Coreg.  Continue lisinopril    Fall  Assessment & Plan  · Thought to be mechanical in the setting of metabolic encephalopathy.  · Evaluated by Trauma, cleared from any traumatic injuries.  CT head did note some soft tissue swelling and patient does have some abrasions  · Fall precautions  · PT/OT evaluations        VTE Pharmacologic Prophylaxis:   Moderate Risk (Score 3-4) - Pharmacological DVT Prophylaxis Ordered: heparin.    Patient Centered Rounds: I performed bedside rounds with nursing staff today.  Discussions with Specialists or Other Care Team Provider:  Reviewed geriatric consultation note    Education and Discussions with Family / Patient: Updated  (son and Relative Jillian (POA)) at bedside.    Time Spent for Care: 30 minutes. More than 50% of total time spent on counseling and coordination of care as described above.    Current Length of Stay: 1 day(s)  Current Patient Status: Inpatient   Certification Statement: The patient will continue to require additional inpatient hospital stay due to MRI brain, IV fluids, continued workup for persistent acute metabolic encephalopathy  Discharge Plan: Anticipate discharge in 48-72 hrs to To be determined pending PT/OT evaluations.  Patient is resident of [facility]    Code Status: Level 1 - Full Code    Subjective:   Patient is awake, not lethargic.  He is unable to follow commands and oriented x0.  Able to answer \"yes\" to all questions though otherwise nonverbal.  Afebrile    Objective:     Vitals:   Temp (24hrs), Av.7 °F (37.6 °C), Min:99.7 °F (37.6 °C), Max:99.7 °F (37.6 °C)    Temp:  [99.7 °F (37.6 °C)] 99.7 °F (37.6 °C)  HR:  [] 88  Resp:  [16-20] 20  BP: (151-189)/() 151/88  SpO2:  [91 %-96 %] 94 %  There is no height or weight on file to calculate BMI.     Input and Output Summary (last 24 hours):   No intake or output data in the 24 hours ending 21 1507    Physical Exam:   Physical Exam  Vitals " "reviewed.   Constitutional:       General: He is not in acute distress.     Appearance: He is not ill-appearing or toxic-appearing.   Cardiovascular:      Rate and Rhythm: Normal rate and regular rhythm.   Pulmonary:      Effort: No respiratory distress.      Breath sounds: No wheezing or rhonchi.   Abdominal:      General: There is no distension.      Tenderness: There is no abdominal tenderness.   Musculoskeletal:         General: No swelling.   Skin:     General: Skin is warm and dry.   Neurological:      Mental Status: He is alert.      Comments: Alert and oriented x0.  Able to answer \"yes\" though unable to follow commands.  Moving extremities spontaneously though unable to follow commands          Additional Data:     Labs:  Results from last 7 days   Lab Units 11/09/21  0439   WBC Thousand/uL 13.17*   HEMOGLOBIN g/dL 15.6   HEMATOCRIT % 46.6   PLATELETS Thousands/uL 295   NEUTROS PCT % 81*   LYMPHS PCT % 10*   MONOS PCT % 7   EOS PCT % 1     Results from last 7 days   Lab Units 11/09/21  0439   SODIUM mmol/L 133*   POTASSIUM mmol/L 4.4   CHLORIDE mmol/L 97*   CO2 mmol/L 26   BUN mg/dL 18   CREATININE mg/dL 1.40*   ANION GAP mmol/L 10   CALCIUM mg/dL 9.2   GLUCOSE RANDOM mg/dL 123                       Lines/Drains:  Invasive Devices  Report    Peripheral Intravenous Line            Peripheral IV 11/09/21 Right Antecubital <1 day                      Imaging: Reviewed radiology reports from this admission including: CT head    Recent Cultures (last 7 days):   Results from last 7 days   Lab Units 11/08/21  1855   BLOOD CULTURE  Received in Microbiology Lab. Culture in Progress.  Received in Microbiology Lab. Culture in Progress.       Last 24 Hours Medication List:   Current Facility-Administered Medications   Medication Dose Route Frequency Provider Last Rate   • acetaminophen  650 mg Oral Q6H PRN Jorge Wells MD     • carvedilol  6.25 mg Oral BID With Meals Mark Parekh, DO     • heparin (porcine)  " 5,000 Units Subcutaneous Q8H AZAEL Mark Parekh, DO     • lisinopril  10 mg Oral Daily Jorge Wells MD     • sodium chloride  125 mL/hr Intravenous Continuous Jorge Wells  mL/hr (11/09/21 1418)        Today, Patient Was Seen By: Jorge Wells MD    **Please Note: This note may have been constructed using a voice recognition system.**

## 2021-11-09 NOTE — ED NOTES
Fed patient breakfast which included a banana and half of a muffin. Patient tolerated well. Attempted to feed patient scrambled eggs but seemed to have some issues swallowing. Patient was able to drink water.      Misty Bright  11/09/21 0834

## 2021-11-09 NOTE — PROGRESS NOTES
[facility]  Progress Note - Alf Carmona 2/27/1932, 89 y.o. male MRN: [id number]  Unit/Bed#: ED 14 Encounter: [id number]  Primary Care Provider: Noemí Gonzales MD   Date and time admitted to hospital: 11/8/2021  2:01 PM    Fall  Assessment & Plan  - Status post fall with the below noted injuries.  - Fall precautions.  - Geriatric Medicine consultation for evaluation, medication review and recommendations.  - PT and OT evaluation and treatment as indicated.  - Case Management consultation for disposition planning.  - No additional injuries noted on tertiary exam - trauma team will sign off, please reconsult as needed      CKD (chronic kidney disease)  Assessment & Plan  Lab Results   Component Value Date    EGFR 44 11/09/2021    EGFR 40 11/08/2021    CREATININE 1.40 (H) 11/09/2021    CREATININE 1.53 (H) 11/08/2021        - Chronic kidney disease, present on presentation.  - Avoid nephrotoxic medications and hypotension.  - Monitor volume status with accurate I/Os.  - SLIM to admit and can manage going forward.    HTN (hypertension)  Assessment & Plan  - Chronic history of hypertension.  - Continue current medication regimen per primary team  - Outpatient follow-up with PCP.      Abrasions of multiple sites  Assessment & Plan  - Multiple superficial abrasions not requiring further workup or intervention.  - Provide local wound care as indicated.  - Analgesia as needed.  - Update tetanus vaccination status.    * Encephalopathy acute  Assessment & Plan  - Acute encephalopathy of unclear etiology.  Likely multifactorial, but concern for infection in setting of leukocytosis  - EKG obtained and demonstrated normal sinus rhythm with no evidence of acute concerning pathology requiring further workup at this time.  - Chest x-ray obtained and reviewed without evidence of acute traumatic or infectious pathology.  - urinalysis + for ketones, protein, and trace blood, troponin neg, COVID/flu/RSV swab  neg.  - Delirium precautions.  - Geriatric Medicine consult.  - continue per Primary team        TERTIARY TRAUMA SURVEY NOTE    Prophylaxis: Heparin    Disposition: Trauma team will sign off    Code status:  Level 1 - Full Code    Consultants: Trauma, Gerontology      SUBJECTIVE:     [facility]  Mechanism of Injury:Fall    Chief Complaint: Encephalopathy    HPI/Last 24 hour events: Patient does not provide meaningful HPI    Active medications:           Current Facility-Administered Medications:   •  acetaminophen (TYLENOL) tablet 650 mg, 650 mg, Oral, Q6H PRN  •  carvedilol (COREG) tablet 6.25 mg, 6.25 mg, Oral, BID With Meals  •  ceftriaxone (ROCEPHIN) 1 g/50 mL in dextrose IVPB, 1,000 mg, Intravenous, Q24H, Stopped at 11/08/21 1947  •  heparin (porcine) subcutaneous injection 5,000 Units, 5,000 Units, Subcutaneous, Q8H AZAEL, 5,000 Units at 11/09/21 0519 **AND** Platelet count, , , Once  •  sodium chloride 0.9 % infusion, 100 mL/hr, Intravenous, Continuous, 100 mL/hr at 11/08/21 1948    Current Outpatient Medications:   •  acetaminophen (TYLENOL) 325 mg tablet  •  carvedilol (COREG) 6.25 mg tablet  •  lisinopril (ZESTRIL) 10 mg tablet      OBJECTIVE:     Vitals:   Vitals:    11/09/21 0501   BP: (!) 175/97   Pulse: 75   Resp: 18   Temp:    SpO2: 95%       Physical Exam:   GENERAL APPEARANCE: No acute distress, resting in bed supine with [place] in place  NEURO: Awake, GCS 12 (E4,V3,M5)  HEENT: PERRL, Mucus membranes moist  CV: RRR S1 S2 without murmur, rub, or gallop  LUNGS: Clear to ausc bilaterally without wheezes, crackles, or rhonchi  GI: Soft, non-tender, non-distended  : voiding independently  MSK: non-tender without deformity  SKIN: posterior scalp abrasion without active bleed.       1. Before the illness or injury that brought you to the Emergency, did you need someone to help you on a regular basis? 1=Yes   2. Since the illness or injury that brought you to the Emergency, have you  needed more help than usual to take care of yourself? 0=No   3. Have you been hospitalized for one or more nights during the past 6 months (excluding a stay in the Emergency Department)? 0=No   4. In general, do you see well? 0=Yes   5. In general, do you have serious problems with your memory? 1=Yes   6. Do you take more than three different medications everyday? 1=Yes   TOTAL   3     Did you order a geriatric consult if the score was 2 or greater?: yes                I/O:   I/O     None          Invasive Devices:   Invasive Devices  Report    Peripheral Intravenous Line            Peripheral IV 11/09/21 Right Antecubital <1 day                  Imaging:   TRAUMA - CT head wo contrast    Result Date: 11/8/2021  Impression: No acute intracranial abnormality. Posterior soft tissue swelling. I personally discussed this study with Charly Coker on 11/8/2021 at 2:42 PM. Workstation performed: [id number]     TRAUMA - CT spine cervical wo contrast    Result Date: 11/8/2021  Impression: No cervical spine fracture or traumatic malalignment.  I personally discussed this study with Charly Coker on 11/8/2021 at 2:42 PM. Workstation performed: [id number]     XR Trauma multiple (B/RA trauma bay ONLY)    Result Date: 11/8/2021  Impression: No acute cardiopulmonary disease within limitations of supine imaging. Workstation performed: [id number]       Labs:   CBC:   Lab Results   Component Value Date    WBC 13.17 (H) 11/09/2021    HGB 15.6 11/09/2021    HCT 46.6 11/09/2021    MCV 86 11/09/2021     11/09/2021    MCH 28.9 11/09/2021    MCHC 33.5 11/09/2021    RDW 13.7 11/09/2021    MPV 10.6 11/09/2021    NRBC 0 11/09/2021     CMP:   Lab Results   Component Value Date    CL 97 (L) 11/09/2021    CO2 26 11/09/2021    CO2 27 11/08/2021    BUN 18 11/09/2021    CREATININE 1.40 (H) 11/09/2021    GLUCOSE 103 11/08/2021    CALCIUM 9.2 11/09/2021    EGFR 44 11/09/2021

## 2021-11-09 NOTE — ED NOTES
MD at bedside. Aware of call by Jillian (ROD) [phone] and request for call back for update. MD reports that he will call to update - number given.     Genna Oshea RN  11/09/21 2710

## 2021-11-09 NOTE — ASSESSMENT & PLAN NOTE
· Thought to be mechanical in the setting of metabolic encephalopathy.  · Evaluated by Trauma, cleared from any traumatic injuries.  CT head did note some soft tissue swelling and patient does have some abrasions  · Fall precautions  · PT/OT evaluations

## 2021-11-09 NOTE — ASSESSMENT & PLAN NOTE
- Chronic history of hypertension.  - Continue current medication regimen per primary team  - Outpatient follow-up with PCP.

## 2021-11-10 ENCOUNTER — APPOINTMENT (INPATIENT)
Dept: CT IMAGING | Facility: HOSPITAL | Age: 86
DRG: 071 | End: 2021-11-10
Payer: COMMERCIAL

## 2021-11-10 PROBLEM — N18.9 CKD (CHRONIC KIDNEY DISEASE): Chronic | Status: RESOLVED | Noted: 2021-11-08 | Resolved: 2021-11-10

## 2021-11-10 PROBLEM — N18.9 CKD (CHRONIC KIDNEY DISEASE): Status: ACTIVE | Noted: 2021-11-08

## 2021-11-10 PROBLEM — R05.9 COUGH: Status: ACTIVE | Noted: 2021-11-10

## 2021-11-10 LAB
AMMONIA PLAS-SCNC: 12 UMOL/L (ref 11–35)
ANION GAP SERPL CALCULATED.3IONS-SCNC: 9 MMOL/L (ref 4–13)
BASOPHILS # BLD AUTO: 0.05 THOUSANDS/ΜL (ref 0–0.1)
BASOPHILS NFR BLD AUTO: 0 % (ref 0–1)
BUN SERPL-MCNC: 25 MG/DL (ref 5–25)
CALCIUM SERPL-MCNC: 8.8 MG/DL (ref 8.3–10.1)
CHLORIDE SERPL-SCNC: 101 MMOL/L (ref 100–108)
CO2 SERPL-SCNC: 24 MMOL/L (ref 21–32)
CREAT SERPL-MCNC: 1.6 MG/DL (ref 0.6–1.3)
EOSINOPHIL # BLD AUTO: 0.57 THOUSAND/ΜL (ref 0–0.61)
EOSINOPHIL NFR BLD AUTO: 5 % (ref 0–6)
ERYTHROCYTE [DISTWIDTH] IN BLOOD BY AUTOMATED COUNT: 14.3 % (ref 11.6–15.1)
GFR SERPL CREATININE-BSD FRML MDRD: 38 ML/MIN/1.73SQ M
GLUCOSE SERPL-MCNC: 103 MG/DL (ref 65–140)
HCT VFR BLD AUTO: 44.3 % (ref 36.5–49.3)
HGB BLD-MCNC: 14.3 G/DL (ref 12–17)
IMM GRANULOCYTES # BLD AUTO: 0.06 THOUSAND/UL (ref 0–0.2)
IMM GRANULOCYTES NFR BLD AUTO: 1 % (ref 0–2)
LYMPHOCYTES # BLD AUTO: 1.79 THOUSANDS/ΜL (ref 0.6–4.47)
LYMPHOCYTES NFR BLD AUTO: 16 % (ref 14–44)
MCH RBC QN AUTO: 28.7 PG (ref 26.8–34.3)
MCHC RBC AUTO-ENTMCNC: 32.3 G/DL (ref 31.4–37.4)
MCV RBC AUTO: 89 FL (ref 82–98)
MONOCYTES # BLD AUTO: 1.25 THOUSAND/ΜL (ref 0.17–1.22)
MONOCYTES NFR BLD AUTO: 11 % (ref 4–12)
NEUTROPHILS # BLD AUTO: 7.56 THOUSANDS/ΜL (ref 1.85–7.62)
NEUTS SEG NFR BLD AUTO: 67 % (ref 43–75)
NRBC BLD AUTO-RTO: 0 /100 WBCS
PLATELET # BLD AUTO: 252 THOUSANDS/UL (ref 149–390)
PMV BLD AUTO: 10.6 FL (ref 8.9–12.7)
POTASSIUM SERPL-SCNC: 4 MMOL/L (ref 3.5–5.3)
PROCALCITONIN SERPL-MCNC: 0.07 NG/ML
RBC # BLD AUTO: 4.98 MILLION/UL (ref 3.88–5.62)
SODIUM SERPL-SCNC: 134 MMOL/L (ref 136–145)
WBC # BLD AUTO: 11.28 THOUSAND/UL (ref 4.31–10.16)

## 2021-11-10 PROCEDURE — 99232 SBSQ HOSP IP/OBS MODERATE 35: CPT | Performed by: INTERNAL MEDICINE

## 2021-11-10 PROCEDURE — 80048 BASIC METABOLIC PNL TOTAL CA: CPT | Performed by: INTERNAL MEDICINE

## 2021-11-10 PROCEDURE — 97163 PT EVAL HIGH COMPLEX 45 MIN: CPT

## 2021-11-10 PROCEDURE — 71250 CT THORAX DX C-: CPT

## 2021-11-10 PROCEDURE — 87081 CULTURE SCREEN ONLY: CPT | Performed by: INTERNAL MEDICINE

## 2021-11-10 PROCEDURE — 84145 PROCALCITONIN (PCT): CPT | Performed by: INTERNAL MEDICINE

## 2021-11-10 PROCEDURE — 82140 ASSAY OF AMMONIA: CPT | Performed by: INTERNAL MEDICINE

## 2021-11-10 PROCEDURE — 74176 CT ABD & PELVIS W/O CONTRAST: CPT

## 2021-11-10 PROCEDURE — 85025 COMPLETE CBC W/AUTO DIFF WBC: CPT | Performed by: INTERNAL MEDICINE

## 2021-11-10 PROCEDURE — G1004 CDSM NDSC: HCPCS

## 2021-11-10 RX ORDER — AMOXICILLIN 250 MG
1 CAPSULE ORAL 2 TIMES DAILY
Status: DISCONTINUED | OUTPATIENT
Start: 2021-11-10 | End: 2021-11-12 | Stop reason: HOSPADM

## 2021-11-10 RX ADMIN — CARVEDILOL 6.25 MG: 6.25 TABLET, FILM COATED ORAL at 08:39

## 2021-11-10 RX ADMIN — SODIUM CHLORIDE 125 ML/HR: 0.9 INJECTION, SOLUTION INTRAVENOUS at 07:27

## 2021-11-10 RX ADMIN — DOCUSATE SODIUM AND SENNOSIDES 1 TABLET: 8.6; 5 TABLET, FILM COATED ORAL at 17:37

## 2021-11-10 RX ADMIN — HEPARIN SODIUM 5000 UNITS: 5000 INJECTION INTRAVENOUS; SUBCUTANEOUS at 22:24

## 2021-11-10 RX ADMIN — HEPARIN SODIUM 5000 UNITS: 5000 INJECTION INTRAVENOUS; SUBCUTANEOUS at 13:52

## 2021-11-10 RX ADMIN — HEPARIN SODIUM 5000 UNITS: 5000 INJECTION INTRAVENOUS; SUBCUTANEOUS at 06:40

## 2021-11-10 RX ADMIN — LISINOPRIL 10 MG: 10 TABLET ORAL at 08:39

## 2021-11-10 RX ADMIN — DOCUSATE SODIUM AND SENNOSIDES 1 TABLET: 8.6; 5 TABLET, FILM COATED ORAL at 10:14

## 2021-11-10 RX ADMIN — CARVEDILOL 6.25 MG: 6.25 TABLET, FILM COATED ORAL at 17:37

## 2021-11-10 NOTE — PHYSICAL THERAPY NOTE
"   Physical Therapy Evaluation    Patient's Name: Alf Carmona    Admitting Diagnosis  Head injury [S09.90XA]  Encephalopathy acute [G93.40]    Problem List  Patient Active Problem List   Diagnosis   • Fall   • Encephalopathy acute   • Abrasions of multiple sites   • HTN (hypertension)   • CKD (chronic kidney disease)   • Acute kidney injury (HCC)       Past Medical History  Past Medical History:   Diagnosis Date   • CKD (chronic kidney disease)    • Hypertension    • Prostate CA (HCC)        Past Surgical History  History reviewed. No pertinent surgical history.     11/10/21 0915   PT Last Visit   PT Visit Date 11/10/21   Note Type   Note type Evaluation   Pain Assessment   Pain Assessment Tool Vargas-Baker FACES   Pain Score No Pain   Vargas-Baker FACES Pain Rating 0   Home Living   Type of Home SNF  ([facility])   Additional Comments pt non verbal throughout session, says \"yes\" to skilled PT   Prior Function   Level of [place]   (unknown at this time)   Lives With Facility staff   Falls in the last 6 months 1 to 4  (reported fall at [facility] in chart review)   Restrictions/Precautions   Weight Bearing Precautions Per Order No   Other Precautions Fall Risk;Restraints;Cognitive;Chair Alarm;Bed Alarm   General   Additional Pertinent History pt with recent fall at [facility], per chart review pt not currently at baseline, patient non verbal throughout session   Family/Caregiver Present No   Cognition   Orientation Level Disoriented X4   Following Commands Follows one step commands inconsistently   Comments pt ID by wristband   RLE Assessment   RLE Assessment WFL  (grossly 3/5)   LLE Assessment   LLE Assessment WFL  (grossly 3/5)   Bed Mobility   Supine to Sit 1  Dependent   Additional items Assist x 1;Increased time required;LE management   Additional Comments unable to assess, sit to supine patient up in chair per RN request   Transfers   Sit to Stand 4  Minimal assistance   Additional items Assist x " "1;Increased time required;Verbal cues  (cues for encouragement and hand placement)   Stand to Sit 4  Minimal assistance   Additional items Assist x 1;Armrests;Increased time required   Additional Comments when standing pt states \"i need to pee\"   Ambulation/Elevation   Gait pattern Foward flexed;Short stride;Ataxia;Excessively slow   Gait Assistance 3  Moderate assist   Additional items Assist x 1;Verbal cues;Tactile cues  (RW management)   Assistive Device Rolling walker   Distance 25 ft x 2  (to toilet)   Stair Management Assistance Not tested   Balance   Static Sitting Fair -   Static Standing Poor +   Ambulatory Poor  (RW)   Activity Tolerance   Activity Tolerance Other (Comment)  (unable to asses pt response nonverbal)   Medical Staff Made Aware MD Cardio present during evaluation   Nurse Made Aware MIKE hartman pt for particiaption with skilled PT   Assessment   Prognosis Fair   Problem List Decreased strength;Decreased range of motion;Decreased endurance;Impaired balance;Decreased mobility;Decreased coordination;Decreased cognition;Impaired judgement;Decreased safety awareness;Impaired vision;Decreased skin integrity   Assessment Pt is a 89 y.o. male seen for PT evaluation s/p [facility] to [facility]/8[facility]. Pt was admitted with a primary dx of: head injury, encephalopathy.  PT now consulted for assessment of mobility and d/c needs. Pt with PT eval and treat  orders.  Pts current comorbidities effecting treatment include: recent falls, HTN, CKD, encephalopathy, HTN, history of prostate CA. Pts current clinical presentation is Unstable/ Unpredictable (high complexity) due to Ongoing medical management for primary dx, Increased reliance on more restrictive AD compared to baseline, Decreased activity tolerance compared to baseline, Fall risk, Increased assistance needed from caregiver at current time, Ongoing telemetry monitoring, Cog status, Diagnostic imaging pending. Prior to admission, pt was " independent without AD. Upon evaluation, pt currently is requiring max assist/dependnet for bed mobility; min assist x 1 for transfers and moderate assist x 1 for ambulation 25 ft w/ RW. Pt presents at PT eval functioning below baseline and currently w/ overall mobility deficits 2* to: BLE weakness, decreased ROM, impaired balance, decreased endurance, impaired coordination, gait deviations, decreased activity tolerance compared to baseline, decreased functional mobility tolerance compared to baseline, altered sensation, decreased safety awareness, impaired judgement, fall risk, decreased skin integrity, decreased cognition. Pt currently at a fall risk 2* to impairments listed above.  Pt will continue to benefit from skilled acute PT interventions to address stated impairments; to maximize functional mobility; for ongoing pt/ family training; and DME needs. At conclusion of PT session chair alarm engaged, all needs in reach, RN notified of session findings/recommendations and pt returned back in recliner chair with phone and call bell within reach. Pt denies any further questions at this time. Recommend return to facility with skilled PT services upon hospital D/C.   Goals   Patient Goals none stated   [facility] Expiration Date 11/20/21   Short Term Goal #1 In 10 days pt will be able to: 1. Demonstrate ability to perform all aspects of bed mobility with indpendently to increase functional independence.  2. Perform functional transfers with independnetly to facilitate safe return to previous living environment.  3.  Ambulate 120 ft with LRAD and supervision with stable vitals to improve safety with household distances and reduce fall risk.  4. Improve LE strength grades by 1 to increase ease of functional mobility with transfers and gait. 5. Pt will demonstrate improved balance by one grade in order to decrease risk of falls. 6. Pt will improve barthel score to at least 60 for improved functional independence and decreased  caregiver burden   PT Treatment Day 0   Plan   Treatment/Interventions Functional transfer training;LE strengthening/ROM;Therapeutic exercise;Cognitive reorientation;Endurance training;Patient/family training;Equipment eval/education;Bed mobility;Gait training;Spoke to MD;Spoke to nursing;OT   PT Frequency Other (Comment)  (3-5x/wk)   Recommendation   PT Discharge Recommendation Return to facility with rehabilitation services   Equipment Recommended Walker   Walker Package Recommended Wheeled walker   Change/add to Walker Package? No   AM-PAC Basic Mobility Inpatient   Turning in Bed Without Bedrails 1   Lying on Back to Sitting on Edge of Flat Bed 1   Moving Bed to Chair 3   Standing Up From Chair 3   Walk in Room 3   Climb 3-5 Stairs 1   Basic Mobility Inpatient Raw Score 12   Basic Mobility Standardized Score 32.23   Barthel Index   Feeding 5   Bathing 0   Grooming Score 0   Dressing Score 5   Bladder Score 5   Bowels Score 5   Toilet Use Score 5   Transfers (Bed/Chair) Score 10   Mobility (Level Surface) Score 0   Stairs Score 0   Barthel Index Score 35   The patient's AM-PAC Basic Mobility Inpatient Short Form Raw Score is 12, Standardized Score is 32.23. A standardized score less than 42.9 suggests the patient may benefit from discharge to post-acute rehabilitation services. Please also refer to the recommendation of the Physical Therapist for safe discharge planning.            Clifton Lopez, PT, DPT

## 2021-11-10 NOTE — PROGRESS NOTES
"[facility]  Progress Note - Alf Carmona 2/27/1932, 89 y.o. male MRN: [id number]  Unit/Bed#: S -01 Encounter: [id number]  Primary Care Provider: Noemí Gonzales MD   Date and time admitted to hospital: 11/8/2021  2:01 PM    * Encephalopathy acute  Assessment & Plan  · Unclear etiology.  Patient is a resident at [facility].  Per my discussions with patient's son at the bedside as well as another relative named Jillian who happens to be patient's POA, patient is normally alert and oriented x3 and have a conversation.  She recently had dinner with him few days ago which time he was fine.  · Suspect toxic metabolic encephalopathy though unclear etiology.  No signs of any infection.  UA is within normal limits.  Afebrile with slight leukocytosis.  No abdominal complaints.  Has developed a slight cough and some wheezes.  Neurologic exam is limited as patient is not able to follow commands.  He is not lethargic.  He is alert though oriented x 0. He answers \"yes\" only to all questions.  Does not recognize his family members at bedside.  CT head obtained in the emergency department showed posterior soft tissue swelling.  In addition he was evaluated by Trauma though was cleared from any traumatic injuries.  UA did show significant amount of ketones suggesting element of dehydration.  Family member does report a similar presentation with patient was profoundly dehydrated  · Ammonia, B12 a folate levels are all within acceptable limits.  TSH level is within normal limits.  Blood cultures are negative at 24 hours.  Continue to monitor fever curve and WBC count.  Leukocytosis is improving.  T-max of a 100.7° yesterday evening.  Procalcitonin is negative.  Hold further antibiotics.  · Not much improvement with IV fluids, do not think this is from dehydration, patient appears adequately hydrated now  · Check MRI brain.  Will check for occult infection with CT chest/abdomen/pelvis.  · Appreciate " Geriatrics consult - frequent reorientation/delirium precautions    Cough  Assessment & Plan  · Respiratory protocol.  Patient has some audible bilateral wheezes on exam.  Nonproductive.  COVID-11/8  · Follow-up CT chest    CKD (chronic kidney disease)  Assessment & Plan  · Unclear baseline. Presenting creatinine was 1.53, initially improving to 1.40 with IV fluids though increased today to 1.6.  Baseline based on chart review may be around 1.4 so this may be suggestive of CKD      HTN (hypertension)  Assessment & Plan  · Continue home Coreg.  Continue lisinopril    Fall  Assessment & Plan  · Thought to be mechanical in the setting of metabolic encephalopathy.  · Evaluated by Trauma, cleared from any traumatic injuries.  CT head did note some soft tissue swelling and patient does have some abrasions  · Fall precautions  · PT/OT evaluations        VTE Pharmacologic Prophylaxis:   Moderate Risk (Score 3-4) - Pharmacological DVT Prophylaxis Ordered: heparin.    Patient Centered Rounds: I performed bedside rounds with nursing staff today.  Discussions with Specialists or Other Care Team Provider: None    Education and Discussions with Family / Patient: Attempted to update  (son) via phone. Left voicemail.     Time Spent for Care: 30 minutes. More than 50% of total time spent on counseling and coordination of care as described above.    Current Length of Stay: 2 day(s)  Current Patient Status: Inpatient   Certification Statement: The patient will continue to require additional inpatient hospital stay due to Metabolic workup as noted above.  Pending MRI brain and CT chest abdomen and pelvis  Discharge Plan: Anticipate discharge in 48-72 hrs to discharge location to be determined pending rehab evaluations.    Code Status: Level 1 - Full Code    Subjective:   No acute events overnight.  No significant change in patient's mental status.  T-max of a 100° point 7 overnight without clear source of infection.   After working with physical therapy this morning, patient was sitting up in the chair and did develop a cough.  He has some audible wheezing on exam.    Objective:     Vitals:   Temp (24hrs), Av.5 °F (36.4 °C), Min:96.7 °F (35.9 °C), Max:98 °F (36.7 °C)    Temp:  [96.7 °F (35.9 °C)-98 °F (36.7 °C)] 98 °F (36.7 °C)  HR:  [68-75] 75  Resp:  [16] 16  BP: (128-147)/(71-76) 141/72  SpO2:  [90 %-94 %] 90 %  There is no height or weight on file to calculate BMI.     Input and Output Summary (last 24 hours):     Intake/Output Summary (Last 24 hours) at 11/10/2021 1834  Last data filed at 11/10/2021 0840  Gross per 24 hour   Intake 392.08 ml   Output --   Net 392.08 ml       Physical Exam:   Physical Exam  Vitals reviewed.   Constitutional:       General: He is not in acute distress.     Appearance: He is not ill-appearing or toxic-appearing.   Cardiovascular:      Rate and Rhythm: Normal rate and regular rhythm.   Pulmonary:      Effort: Respiratory distress present.      Breath sounds: Wheezing present. No rhonchi or rales.   Abdominal:      General: There is no distension.      Tenderness: There is no abdominal tenderness.   Musculoskeletal:         General: No swelling.   Skin:     General: Skin is warm and dry.   Neurological:      Comments: Able answer yes or no.  Follow some simple commands.  No focal deficits otherwise          Additional Data:     Labs:  Results from last 7 days   Lab Units 11/10/21  0630   WBC Thousand/uL 11.28*   HEMOGLOBIN g/dL 14.3   HEMATOCRIT % 44.3   PLATELETS Thousands/uL 252   NEUTROS PCT % 67   LYMPHS PCT % 16   MONOS PCT % 11   EOS PCT % 5     Results from last 7 days   Lab Units 11/10/21  0630   SODIUM mmol/L 134*   POTASSIUM mmol/L 4.0   CHLORIDE mmol/L 101   CO2 mmol/L 24   BUN mg/dL 25   CREATININE mg/dL 1.60*   ANION GAP mmol/L 9   CALCIUM mg/dL 8.8   GLUCOSE RANDOM mg/dL 103                 Results from last 7 days   Lab Units 11/10/21  0630   PROCALCITONIN ng/ml 0.07        Lines/Drains:  Invasive Devices  Report    Peripheral Intravenous Line            Peripheral IV 11/09/21 Right Antecubital 1 day                      Imaging: No pertinent imaging reviewed.    Recent Cultures (last 7 days):   Results from last 7 days   Lab Units 11/08/21  1855   BLOOD CULTURE  No Growth at 24 hrs.  No Growth at 24 hrs.       Last 24 Hours Medication List:   Current Facility-Administered Medications   Medication Dose Route Frequency Provider Last Rate   • acetaminophen  650 mg Oral Q6H PRN Jorge Wells MD     • carvedilol  6.25 mg Oral BID With Meals Mark S Prechtel, DO     • heparin (porcine)  5,000 Units Subcutaneous Q8H AZAEL Mark S Prechtel, DO     • lisinopril  10 mg Oral Daily Jorge Wells MD     • senna-docusate sodium  1 tablet Oral BID Jorge Wells MD          Today, Patient Was Seen By: Jorge Wells MD    **Please Note: This note may have been constructed using a voice recognition system.**

## 2021-11-10 NOTE — PLAN OF CARE
Problem: PHYSICAL THERAPY ADULT  Goal: Performs mobility at highest level of function for planned discharge setting.  See evaluation for individualized goals.  Description: Treatment/Interventions: Functional transfer training,LE strengthening/ROM,Therapeutic exercise,Cognitive reorientation,Endurance training,Patient/family training,Equipment eval/education,Bed mobility,Gait training,Spoke to MD,Spoke to nursing,OT  Equipment Recommended: Walker       See flowsheet documentation for full assessment, interventions and recommendations.  Outcome: Progressing  Note: Prognosis: Fair  Problem List: Decreased strength,Decreased range of motion,Decreased endurance,Impaired balance,Decreased mobility,Decreased coordination,Decreased cognition,Impaired judgement,Decreased safety awareness,Impaired vision,Decreased skin integrity  Assessment: Pt is a 89 y.o. male seen for PT evaluation s/p admit to [facility] on 11/8/2021. Pt was admitted with a primary dx of: head injury, encephalopathy.  PT now consulted for assessment of mobility and d/c needs. Pt with PT eval and treat  orders.  Pts current comorbidities effecting treatment include: recent falls, HTN, CKD, encephalopathy, HTN, history of prostate CA. Pts current clinical presentation is Unstable/ Unpredictable (high complexity) due to Ongoing medical management for primary dx, Increased reliance on more restrictive AD compared to baseline, Decreased activity tolerance compared to baseline, Fall risk, Increased assistance needed from caregiver at current time, Ongoing telemetry monitoring, Cog status, Diagnostic imaging pending. Prior to admission, pt was independent without AD. Upon evaluation, pt currently is requiring max assist/dependnet for bed mobility; min assist x 1 for transfers and moderate assist x 1 for ambulation 25 ft w/ RW. Pt presents at PT eval functioning below baseline and currently w/ overall mobility deficits 2* to: BLE weakness, decreased  ROM, impaired balance, decreased endurance, impaired coordination, gait deviations, decreased activity tolerance compared to baseline, decreased functional mobility tolerance compared to baseline, altered sensation, decreased safety awareness, impaired judgement, fall risk, decreased skin integrity, decreased cognition. Pt currently at a fall risk 2* to impairments listed above.  Pt will continue to benefit from skilled acute PT interventions to address stated impairments; to maximize functional mobility; for ongoing pt/ family training; and DME needs. At conclusion of PT session chair alarm engaged, all needs in reach, RN notified of session findings/recommendations and pt returned back in recliner chair with phone and call bell within reach. Pt denies any further questions at this time. Recommend return to facility with skilled PT services upon hospital D/C.           PT Discharge Recommendation: Return to facility with rehabilitation services          See flowsheet documentation for full assessment.

## 2021-11-10 NOTE — ASSESSMENT & PLAN NOTE
"· Unclear etiology.  Patient is a resident at [facility].  Per my discussions with patient's son at the bedside as well as another relative named Jillian who happens to be patient's POA, patient is normally alert and oriented x3 and have a conversation.  She recently had dinner with him few days ago which time he was fine.  · Suspect toxic metabolic encephalopathy though unclear etiology.  No signs of any infection.  UA is within normal limits.  Afebrile with slight leukocytosis.  No abdominal complaints.  Has developed a slight cough and some wheezes.  Neurologic exam is limited as patient is not able to follow commands.  He is not lethargic.  He is alert though oriented x 0. He answers \"yes\" only to all questions.  Does not recognize his family members at bedside.  CT head obtained in the emergency department showed posterior soft tissue swelling.  In addition he was evaluated by Trauma though was cleared from any traumatic injuries.  UA did show significant amount of ketones suggesting element of dehydration.  Family member does report a similar presentation with patient was profoundly dehydrated  · Ammonia, B12 a folate levels are all within acceptable limits.  TSH level is within normal limits.  Blood cultures are negative at 24 hours.  Continue to monitor fever curve and WBC count.  Leukocytosis is improving.  T-max of a 100.7° yesterday evening.  Procalcitonin is negative.  Hold further antibiotics.  · Not much improvement with IV fluids, do not think this is from dehydration, patient appears adequately hydrated now  · Check MRI brain.  Will check for occult infection with CT chest/abdomen/pelvis.  · Appreciate Geriatrics consult - frequent reorientation/delirium precautions  "

## 2021-11-10 NOTE — PROGRESS NOTES
"Progress Note - Geriatric Medicine   Alf Carmona 89 y.o. male MRN: [id number]  Unit/Bed#: S -01 Encounter: [id number]      Assessment/Plan:  1. Metabolic encephalopathy. -patient is speaking more than yesterday according to his family, but not as much as he was prior to admission. MRI of the brain was ordered.    2.Ambulatory dysfunction. -S/p fall. Patient was using a walker prior to admission.- Assess patient frequently for physical needs, encourage use of assistant devices as needed and directed by PT/OT  -  Identify cognitive and physical deficits and behaviors that affect risk of falls  - Consider moving patient closer to nursing station to monitor more closely for impulsive behavior which may increase risk of falls  -  [place] fall precautions   - Educate patient/family on patient safety including physical limitations and importance of using call bell for assistance   - Modify environment to reduce risk of injury including cap IV and disconnect from pole when not in use, ensuring adequate lighting in room and restroom, ensuring that path to restroom is clear and free of trip hazards  - PT/OT consulted to assist with strengthening/mobility and assist with discharge planning to appropriate level of care    3. Hx of prostate CA. -Recommend urinary retention protocol. UA elevated protein and ketones, continue to push PO fluids. Monitor for constipation. Patient is currently taking Senokot 1 tablet PO BID.    4. Chronic kidney disease IIIB. Recent GFR 38. Recent Creatinine 1.60. Push PO fluids.    5. Hearing impairment. -Family will be taking his hearing aids.    6. Dementia. -Patient unable to answer questions. Occassionally says \"yes\". Does not follow commands.    Recommendations;    1. Assisted feedings.    2. Review MRI (scheduled) for acute cranial abnormalities.    3. Monitor kidney function with BMP.    4. Encourage PT/OT.    Subjective:   Patient lives at [facility], but it was shut down Monday " "due to Covid-19. The patient felt like he was in [place] so he tried to escape and fell down outside where the police found him. Once brought to the ER he was surveyed and CT of the head and spine showed no signs of acute trauma. These images were reviews witht he patient's father in law. The father in law was present at today's follow up. An MRI of the brain and CT of the chest abdomen and pelvis was ordered today to look for any additional signs of trauma Patient is unable to recognize his father in law. Patient is very quiet. He is able to say \"yes\" but does not respond appropriately to questions or commands. Patient was asked to raise his arms but did not complete the command. Prior to this fall he was ambulating with a walker and was able to respond to commands and carry out conversation. Patient's comorbidities include chronic renal failure stage IIIB, hypertension, and history of prostate cancer.    Review of Systems   Reason unable to perform ROS: Cognitive decline due to recent fall. Patient does not respond to questions.         Objective:     Vitals: Blood pressure 147/76, pulse 68, temperature (!) 96.7 °F (35.9 °C), temperature source Axillary, resp. rate 16, weight 77.4 kg (170 lb 10.2 oz), SpO2 94 %.,There is no height or weight on file to calculate BMI.      Intake/Output Summary (Last 24 hours) at 11/10/2021 1221  Last data filed at 11/10/2021 0840  Gross per 24 hour   Intake 392.08 ml   Output --   Net 392.08 ml       Current Medications: Reviewed    Physical Exam:   Physical Exam  HENT:      Head:      Comments: Contusion on posterior skull.     Nose: Nose normal.      Mouth/Throat:      Mouth: Mucous membranes are moist.      Pharynx: Oropharynx is clear.   Eyes:      Extraocular Movements: Extraocular movements intact.      Conjunctiva/sclera: Conjunctivae normal.      Pupils: Pupils are equal, round, and reactive to light.   Cardiovascular:      Rate and Rhythm: Normal rate and regular rhythm.      " Pulses: Normal pulses.      Heart sounds: Normal heart sounds.   Pulmonary:      Effort: Pulmonary effort is normal.      Breath sounds: Normal breath sounds.   Abdominal:      General: Bowel sounds are normal.      Palpations: Abdomen is soft.   Musculoskeletal:      Cervical back: Normal range of motion.   Neurological:      Mental Status: He is alert.      Comments: Speaks a few words but is not oriented.          Invasive Devices  Report    Peripheral Intravenous Line            Peripheral IV 11/09/21 Right Antecubital 1 day              Transcribed by Meghann GARNICA for Dr. Simmons.  Lab, Imaging and other studies: I have personally reviewed pertinent reports.

## 2021-11-10 NOTE — PLAN OF CARE
Problem: MOBILITY - ADULT  Goal: Maintain or return to baseline ADL function  Description: INTERVENTIONS:  -  Assess patient's ability to carry out ADLs; assess patient's baseline for ADL function and identify physical deficits which impact ability to perform ADLs (bathing, care of mouth/teeth, toileting, grooming, dressing, etc.)  - Assess/evaluate cause of self-care deficits   - Assess range of motion  - Assess patient's mobility; develop plan if impaired  - Assess patient's need for assistive devices and provide as appropriate  - Encourage maximum independence but intervene and supervise when necessary  - Involve family in performance of ADLs  - Assess for home care needs following discharge   - Consider OT consult to assist with ADL evaluation and planning for discharge  - Provide patient education as appropriate  Outcome: Progressing  Goal: Maintains/Returns to pre admission functional level  Description: INTERVENTIONS:  - Perform BMAT or MOVE assessment daily.   - Set and communicate daily mobility goal to care team and patient/family/caregiver.   - Collaborate with rehabilitation services on mobility goals if consulted  - Out of bed for toileting  - Record patient progress and toleration of activity level   Outcome: Progressing     Problem: Potential for Falls  Goal: Patient will remain free of falls  Description: INTERVENTIONS:  - Educate patient/family on patient safety including physical limitations  - Instruct patient to call for assistance with activity   - Consult OT/PT to assist with strengthening/mobility   - Keep Call bell within reach  - Keep bed low and locked with side rails adjusted as appropriate  - Keep care items and personal belongings within reach  - Initiate and maintain comfort rounds  - Make Fall Risk Sign visible to staff  - Offer Toileting  - Initiate/Maintain bed alarm  - Obtain necessary fall risk management equipment  - Apply yellow socks and bracelet for high fall risk patients  -  Consider moving patient to room near nurses station  Outcome: Progressing     Problem: Prexisting or High Potential for Compromised Skin Integrity  Goal: Skin integrity is maintained or improved  Description: INTERVENTIONS:  - Identify patients at risk for skin breakdown  - Assess and monitor skin integrity  - Assess and monitor nutrition and hydration status  - Monitor labs   - Assess for incontinence   - Turn and reposition patient  - Assist with mobility/ambulation  - Relieve pressure over bony prominences  - Avoid friction and shearing  - Provide appropriate hygiene as needed including keeping skin clean and dry  - Evaluate need for skin moisturizer/barrier cream  - Collaborate with interdisciplinary team   - Patient/family teaching  - Consider wound care consult   Outcome: Progressing

## 2021-11-10 NOTE — ASSESSMENT & PLAN NOTE
· Unclear baseline. Presenting creatinine was 1.53, initially improving to 1.40 with IV fluids though increased today to 1.6.  Baseline based on chart review may be around 1.4 so this may be suggestive of CKD

## 2021-11-10 NOTE — ASSESSMENT & PLAN NOTE
· Respiratory protocol.  Patient has some audible bilateral wheezes on exam.  Nonproductive.  COVID-11/8  · Follow-up CT chest

## 2021-11-11 ENCOUNTER — APPOINTMENT (INPATIENT)
Dept: MRI IMAGING | Facility: HOSPITAL | Age: 86
DRG: 071 | End: 2021-11-11
Payer: COMMERCIAL

## 2021-11-11 LAB
ANION GAP SERPL CALCULATED.3IONS-SCNC: 10 MMOL/L (ref 4–13)
BASOPHILS # BLD AUTO: 0.06 THOUSANDS/ΜL (ref 0–0.1)
BASOPHILS NFR BLD AUTO: 1 % (ref 0–1)
BUN SERPL-MCNC: 27 MG/DL (ref 5–25)
CALCIUM SERPL-MCNC: 8.9 MG/DL (ref 8.3–10.1)
CHLORIDE SERPL-SCNC: 99 MMOL/L (ref 100–108)
CO2 SERPL-SCNC: 23 MMOL/L (ref 21–32)
CREAT SERPL-MCNC: 1.48 MG/DL (ref 0.6–1.3)
EOSINOPHIL # BLD AUTO: 0.54 THOUSAND/ΜL (ref 0–0.61)
EOSINOPHIL NFR BLD AUTO: 5 % (ref 0–6)
ERYTHROCYTE [DISTWIDTH] IN BLOOD BY AUTOMATED COUNT: 13.8 % (ref 11.6–15.1)
GFR SERPL CREATININE-BSD FRML MDRD: 41 ML/MIN/1.73SQ M
GLUCOSE SERPL-MCNC: 104 MG/DL (ref 65–140)
HCT VFR BLD AUTO: 42.9 % (ref 36.5–49.3)
HGB BLD-MCNC: 14.2 G/DL (ref 12–17)
IMM GRANULOCYTES # BLD AUTO: 0.08 THOUSAND/UL (ref 0–0.2)
IMM GRANULOCYTES NFR BLD AUTO: 1 % (ref 0–2)
LYMPHOCYTES # BLD AUTO: 1.87 THOUSANDS/ΜL (ref 0.6–4.47)
LYMPHOCYTES NFR BLD AUTO: 17 % (ref 14–44)
MCH RBC QN AUTO: 29.2 PG (ref 26.8–34.3)
MCHC RBC AUTO-ENTMCNC: 33.1 G/DL (ref 31.4–37.4)
MCV RBC AUTO: 88 FL (ref 82–98)
MONOCYTES # BLD AUTO: 1.22 THOUSAND/ΜL (ref 0.17–1.22)
MONOCYTES NFR BLD AUTO: 11 % (ref 4–12)
MRSA NOSE QL CULT: NORMAL
NEUTROPHILS # BLD AUTO: 7.03 THOUSANDS/ΜL (ref 1.85–7.62)
NEUTS SEG NFR BLD AUTO: 65 % (ref 43–75)
NRBC BLD AUTO-RTO: 0 /100 WBCS
PLATELET # BLD AUTO: 259 THOUSANDS/UL (ref 149–390)
PMV BLD AUTO: 10.9 FL (ref 8.9–12.7)
POTASSIUM SERPL-SCNC: 4.3 MMOL/L (ref 3.5–5.3)
PROCALCITONIN SERPL-MCNC: 0.05 NG/ML
RBC # BLD AUTO: 4.86 MILLION/UL (ref 3.88–5.62)
SODIUM SERPL-SCNC: 132 MMOL/L (ref 136–145)
WBC # BLD AUTO: 10.8 THOUSAND/UL (ref 4.31–10.16)

## 2021-11-11 PROCEDURE — 94762 N-INVAS EAR/PLS OXIMTRY CONT: CPT

## 2021-11-11 PROCEDURE — G1004 CDSM NDSC: HCPCS

## 2021-11-11 PROCEDURE — 99232 SBSQ HOSP IP/OBS MODERATE 35: CPT | Performed by: INTERNAL MEDICINE

## 2021-11-11 PROCEDURE — 97530 THERAPEUTIC ACTIVITIES: CPT

## 2021-11-11 PROCEDURE — 84145 PROCALCITONIN (PCT): CPT | Performed by: INTERNAL MEDICINE

## 2021-11-11 PROCEDURE — 85025 COMPLETE CBC W/AUTO DIFF WBC: CPT | Performed by: INTERNAL MEDICINE

## 2021-11-11 PROCEDURE — 70551 MRI BRAIN STEM W/O DYE: CPT

## 2021-11-11 PROCEDURE — 80048 BASIC METABOLIC PNL TOTAL CA: CPT | Performed by: INTERNAL MEDICINE

## 2021-11-11 RX ORDER — ALBUTEROL SULFATE 90 UG/1
2 AEROSOL, METERED RESPIRATORY (INHALATION) EVERY 4 HOURS PRN
Status: DISCONTINUED | OUTPATIENT
Start: 2021-11-11 | End: 2021-11-12 | Stop reason: HOSPADM

## 2021-11-11 RX ORDER — IPRATROPIUM BROMIDE AND ALBUTEROL SULFATE 2.5; .5 MG/3ML; MG/3ML
3 SOLUTION RESPIRATORY (INHALATION)
Status: DISCONTINUED | OUTPATIENT
Start: 2021-11-11 | End: 2021-11-11

## 2021-11-11 RX ADMIN — HEPARIN SODIUM 5000 UNITS: 5000 INJECTION INTRAVENOUS; SUBCUTANEOUS at 13:09

## 2021-11-11 RX ADMIN — LISINOPRIL 10 MG: 10 TABLET ORAL at 09:37

## 2021-11-11 RX ADMIN — CARVEDILOL 6.25 MG: 6.25 TABLET, FILM COATED ORAL at 15:43

## 2021-11-11 RX ADMIN — DOCUSATE SODIUM AND SENNOSIDES 1 TABLET: 8.6; 5 TABLET, FILM COATED ORAL at 17:03

## 2021-11-11 RX ADMIN — CARVEDILOL 6.25 MG: 6.25 TABLET, FILM COATED ORAL at 09:37

## 2021-11-11 RX ADMIN — DOCUSATE SODIUM AND SENNOSIDES 1 TABLET: 8.6; 5 TABLET, FILM COATED ORAL at 09:37

## 2021-11-11 RX ADMIN — HEPARIN SODIUM 5000 UNITS: 5000 INJECTION INTRAVENOUS; SUBCUTANEOUS at 05:28

## 2021-11-11 RX ADMIN — HEPARIN SODIUM 5000 UNITS: 5000 INJECTION INTRAVENOUS; SUBCUTANEOUS at 21:10

## 2021-11-11 NOTE — PHYSICAL THERAPY NOTE
PHYSICAL THERAPY Note     11/11/21 1117   PT Last Visit   PT Visit Date 11/11/21   Note Type   Note Type Treatment   Pain Assessment   Pain Assessment Tool Vargas-Baker FACES   Vargas-Baker FACES Pain Rating 0   Restrictions/Precautions   Weight Bearing Precautions Per Order No   Other Precautions Chair Alarm;Cognitive;Bed Alarm;Restraints   General   Chart Reviewed Yes   Cognition   Overall Cognitive Status Impaired   Orientation Level Disoriented X4   Comments pt does not verbalize much this session, acknowledges PT and cooperative   Subjective   Subjective pt states he is looking for his dentures;  pt perpendicular in bed, BLE over bedrails   Bed Mobility   Supine to Sit 3  Moderate assistance   Additional items Assist x 1;Increased time required;Verbal cues   Transfers   Sit to Stand 4  Minimal assistance   Additional items Assist x 1   Stand to Sit 4  Minimal assistance   Additional items Assist x 1   Stand pivot 4  Minimal assistance   Additional items Assist x 1   Ambulation/Elevation   Gait pattern Decreased foot clearance;Decreased R stance;Decreased L stance   Gait Assistance 4  Minimal assist   Additional items Assist x 1;Verbal cues   Assistive Device None   Distance 3ft bed to chair, unable to educate or coax pt into increased ambulation this session   Balance   Static Sitting Fair -   Dynamic Sitting Fair -   Static Standing Poor +   Dynamic Standing Poor +   Ambulatory Poor +   Activity Tolerance   Activity Tolerance   (limited by cognition)   Exercises   Ankle Pumps 10 reps;AAROM;Bilateral  (reclined in chair)   Assessment   Prognosis Guarded   Problem List Decreased range of motion;Decreased endurance;Impaired balance;Decreased coordination;Decreased cognition;Decreased mobility;Decreased safety awareness;Impaired judgement   Assessment Pt continues to be minimally verbal, able to state what he is thinking at the moment  but unable to answer any questions.  Pt cooperative, attempted to have pt don socks but he is unable to motor plan to don.  D for donning socks.  Pt able to perform bed mobility and stand pivot transfers with less assistance this session, unable to coax into distance ambulation;  Pt remains reclined in chair at bedside, needs in reach, pad alarm intact and nurse aware.  The patient's AM-PAC Basic Mobility Inpatient Short Form Raw Score is 16, Standardized Score is 38.32. A standardized score less than 42.9 suggests the patient may benefit from discharge to post-acute rehabilitation services. Please also refer to the recommendation of the Physical Therapist for safe discharge planning.   Goals   Patient Goals find dentures and get food   STG Expiration Date 11/20/21   Plan   Treatment/Interventions ADL retraining;LE strengthening/ROM;Functional transfer training;Elevations;Therapeutic exercise;Endurance training;Patient/family training;Cognitive reorientation;Equipment eval/education;Bed mobility;Gait training;Compensatory technique education;Continued evaluation;Spoke to nursing   PT Frequency Other (Comment)  (3-5x/wk)   Recommendation   PT Discharge Recommendation Return to facility with rehabilitation services   PT - OK to Discharge Yes  (When medically ready and bed available)   AM-PAC Basic Mobility Inpatient   Turning in Bed Without Bedrails 3   Lying on Back to Sitting on Edge of Flat Bed 2   Moving Bed to Chair 3   Standing Up From Chair 3   Walk in Room 3   Climb 3-5 Stairs 2   Basic Mobility Inpatient Raw Score 16   Basic Mobility Standardized Score 38.32       Yenifer Woodard, PT      Patient Name: Alf Carmona  Today's Date: 11/11/2021

## 2021-11-11 NOTE — PLAN OF CARE
Problem: PHYSICAL THERAPY ADULT  Goal: Performs mobility at highest level of function for planned discharge setting.  See evaluation for individualized goals.  Description: Treatment/Interventions: ADL retraining,LE strengthening/ROM,Functional transfer training,Elevations,Therapeutic exercise,Endurance training,Patient/family training,Cognitive reorientation,Equipment eval/education,Bed mobility,Gait training,Compensatory technique education,Continued evaluation,Spoke to nursing  Equipment Recommended: Walker       See flowsheet documentation for full assessment, interventions and recommendations.  Note: Prognosis: Guarded  Problem List: Decreased range of motion,Decreased endurance,Impaired balance,Decreased coordination,Decreased cognition,Decreased mobility,Decreased safety awareness,Impaired judgement  Assessment: Pt continues to be minimally verbal, able to state what he is thinking at the moment but unable to answer any questions.  Pt cooperative, attempted to have pt don socks but he is unable to motor plan to don.  D for donning socks.  Pt able to perform bed mobility and stand pivot transfers with less assistance this session, unable to coax into distance ambulation;  Pt remains reclined in chair at bedside, needs in reach, pad alarm intact and nurse aware.  The patient's AM-PAC Basic Mobility Inpatient Short Form Raw Score is 16, Standardized Score is 38.32. A standardized score less than 42.9 suggests the patient may benefit from discharge to post-acute rehabilitation services. Please also refer to the recommendation of the Physical Therapist for safe discharge planning.           PT Discharge Recommendation: Return to facility with rehabilitation services     PT - OK to Discharge: Yes (When medically ready and bed available)    See flowsheet documentation for full assessment.

## 2021-11-11 NOTE — PROGRESS NOTES
Patient sleeping, bed alarm in place. Call bell in reach. Continue to monitor. MRI scheduled for 1615.

## 2021-11-11 NOTE — PROGRESS NOTES
"Formerly [facility]  Progress Note - Alf Carmona 2/27/1932, 89 y.o. male MRN: [id number]  Unit/Bed#: S -01 Encounter: [id number]  Primary Care Provider: Noemí Gonzales MD   Date and time admitted to hospital: 11/8/2021  2:01 PM    * Encephalopathy acute  Assessment & Plan  · Unclear etiology.  Patient is a resident at [facility].  Per my discussions with patient's son at the bedside as well as another relative named Jillian who happens to be patient's POA, patient is normally alert and oriented x3 and have a conversation.  She recently had dinner with him few days ago prior to presentation to the hospital at which time she states which time he was fine.  · Suspect toxic metabolic encephalopathy though unclear etiology.  No signs of any infection.  He does have a slight wheeze though CT of his chest/abdomen and pelvis does not reveal any acute abnormalities.  Procalcitonin is negative  UA is within normal limits.  Afebrile with slight leukocytosis which is improving.  No abdominal complaints.  CT head obtained in the emergency department showed posterior soft tissue swelling.  In addition he was evaluated by Trauma though was cleared from any traumatic injuries.  UA did show significant amount of ketones suggesting element of dehydration.  Family member does report a similar presentation when patient was profoundly dehydrated.  Ammonia, B12 a folate levels are all within acceptable limits.  TSH level is within normal limits.  Blood cultures no growth at 48 hours Hold further antibiotics.  · Mentation is slightly improved today though still not at baseline.  He is able to say short sentences other than yes or no though still appears disoriented.  When asked where he is he states he is in a “mental hospital\".    · Check MRI brain.   · Appreciate Geriatrics consult - frequent reorientation/delirium precautions    CKD (chronic kidney disease)  Assessment & Plan  · Unclear baseline.  Baseline " appears to be around 1.4-1.5 Presenting creatinine was 1.53, initially improving to 1.40 with IV fluids though increased yesterday to 1.6.  · Creatinine is currently 1.48, continue to monitor off IV fluids    HTN (hypertension)  Assessment & Plan  · Continue home Coreg.  Continue lisinopril.    Fall  Assessment & Plan  · Thought to be mechanical in the setting of metabolic encephalopathy.  · Evaluated by Trauma, cleared from any traumatic injuries.  CT head did note some soft tissue swelling and patient does have some abrasions  · Fall precautions  · PT/OT evaluations - recommend return to facility with rehabilitation services          VTE Pharmacologic Prophylaxis:   Moderate Risk (Score 3-4) - Pharmacological DVT Prophylaxis Ordered: heparin.    Patient Centered Rounds: I performed bedside rounds with nursing staff today.  Discussions with Specialists or Other Care Team Provider:  Geriatrics    Education and Discussions with Family / Patient: Updated  (son) via phone.    Time Spent for Care: 30 minutes. More than 50% of total time spent on counseling and coordination of care as described above.    Current Length of Stay: 3 day(s)  Current Patient Status: Inpatient   Certification Statement: The patient will continue to require additional inpatient hospital stay due to MRI brain  Discharge Plan: Anticipate discharge in 24-48 hrs to prior assisted or independent living facility. with rehabilitation services    Code Status: Level 1 - Full Code    Subjective:   No acute events overnight.  Patient denies any complaints.  Afebrile.  Urinating and defecating without issue.    Objective:     Vitals:   Temp (24hrs), Av.7 °F (36.5 °C), Min:97.1 °F (36.2 °C), Max:98 °F (36.7 °C)    Temp:  [97.1 °F (36.2 °C)-98 °F (36.7 °C)] 97.9 °F (36.6 °C)  HR:  [68-75] 68  Resp:  [16-18] 18  BP: (135-141)/(72-75) 135/75  SpO2:  [90 %-93 %] 93 %  There is no height or weight on file to calculate BMI.     Input and Output  Summary (last 24 hours):   No intake or output data in the 24 hours ending 11/11/21 1405    Physical Exam:   Physical Exam  Vitals reviewed.   Constitutional:       General: He is not in acute distress.     Appearance: He is not ill-appearing or toxic-appearing.   Cardiovascular:      Rate and Rhythm: Normal rate and regular rhythm.   Pulmonary:      Effort: No respiratory distress.      Breath sounds: No wheezing.   Abdominal:      General: There is no distension.      Tenderness: There is no abdominal tenderness. There is no guarding.   Musculoskeletal:         General: No swelling.   Skin:     General: Skin is warm and dry.   Neurological:      Mental Status: He is alert. He is disoriented.      Comments: Oriented to self.  Able to answer simple yes or no questions.  Able to speak in short sentences          Additional Data:     Labs:  Results from last 7 days   Lab Units 11/11/21  0555   WBC Thousand/uL 10.80*   HEMOGLOBIN g/dL 14.2   HEMATOCRIT % 42.9   PLATELETS Thousands/uL 259   NEUTROS PCT % 65   LYMPHS PCT % 17   MONOS PCT % 11   EOS PCT % 5     Results from last 7 days   Lab Units 11/11/21  0555   SODIUM mmol/L 132*   POTASSIUM mmol/L 4.3   CHLORIDE mmol/L 99*   CO2 mmol/L 23   BUN mg/dL 27*   CREATININE mg/dL 1.48*   ANION GAP mmol/L 10   CALCIUM mg/dL 8.9   GLUCOSE RANDOM mg/dL 104                 Results from last 7 days   Lab Units 11/11/21  0555 11/10/21  0630   PROCALCITONIN ng/ml 0.05 0.07       Lines/Drains:  Invasive Devices  Report    Peripheral Intravenous Line            Peripheral IV 11/09/21 Right Antecubital 2 days                      Imaging: Reviewed radiology reports from this admission including: chest CT scan and abdominal/pelvic CT    Recent Cultures (last 7 days):   Results from last 7 days   Lab Units 11/08/21  1855   BLOOD CULTURE  No Growth at 48 hrs.  No Growth at 48 hrs.       Last 24 Hours Medication List:   Current Facility-Administered Medications   Medication Dose Route  Frequency Provider Last Rate   • acetaminophen  650 mg Oral Q6H PRN Jorge Wells MD     • albuterol  2 puff Inhalation Q4H PRN Jorge Wells MD     • carvedilol  6.25 mg Oral BID With Meals Mark S Prechtel, DO     • heparin (porcine)  5,000 Units Subcutaneous Q8H AZAEL Mark S Prechtel, DO     • lisinopril  10 mg Oral Daily Jorge Welsl MD     • senna-docusate sodium  1 tablet Oral BID Jorge Wells MD          Today, Patient Was Seen By: Jorge Wells MD    **Please Note: This note may have been constructed using a voice recognition system.**

## 2021-11-11 NOTE — ASSESSMENT & PLAN NOTE
"· Unclear etiology.  Patient is a resident at [facility].  Per my discussions with patient's son at the bedside as well as another relative named Jillian who happens to be patient's POA, patient is normally alert and oriented x3 and have a conversation.  She recently had dinner with him few days ago prior to presentation to the hospital at which time she states which time he was fine.  · Suspect toxic metabolic encephalopathy though unclear etiology.  No signs of any infection.  He does have a slight wheeze though CT of his chest/abdomen and pelvis does not reveal any acute abnormalities.  Procalcitonin is negative  UA is within normal limits.  Afebrile with slight leukocytosis which is improving.  No abdominal complaints.  CT head obtained in the emergency department showed posterior soft tissue swelling.  In addition he was evaluated by Trauma though was cleared from any traumatic injuries.  UA did show significant amount of ketones suggesting element of dehydration.  Family member does report a similar presentation when patient was profoundly dehydrated.  Ammonia, B12 a folate levels are all within acceptable limits.  TSH level is within normal limits.  Blood cultures no growth at 48 hours Hold further antibiotics.  · Mentation is slightly improved today though still not at baseline.  He is able to say short sentences other than yes or no though still appears disoriented.  When asked where he is he states he is in a “mental hospital\".    · Check MRI brain.   · Appreciate Geriatrics consult - frequent reorientation/delirium precautions  "

## 2021-11-11 NOTE — PLAN OF CARE
Problem: MOBILITY - ADULT  Goal: Maintain or return to baseline ADL function  Description: INTERVENTIONS:  -  Assess patient's ability to carry out ADLs; assess patient's baseline for ADL function and identify physical deficits which impact ability to perform ADLs (bathing, care of mouth/teeth, toileting, grooming, dressing, etc.)  - Assess/evaluate cause of self-care deficits   - Assess range of motion  - Assess patient's mobility; develop plan if impaired  - Assess patient's need for assistive devices and provide as appropriate  - Encourage maximum independence but intervene and supervise when necessary  - Involve family in performance of ADLs  - Assess for home care needs following discharge   - Consider OT consult to assist with ADL evaluation and planning for discharge  - Provide patient education as appropriate  Outcome: Progressing  Goal: Maintains/Returns to pre admission functional level  Description: INTERVENTIONS:  - Perform BMAT or MOVE assessment daily.   - Set and communicate daily mobility goal to care team and patient/family/caregiver.   - Collaborate with rehabilitation services on mobility goals if consulted  - Out of bed for toileting  - Record patient progress and toleration of activity level   Outcome: Progressing     Problem: Potential for Falls  Goal: Patient will remain free of falls  Description: INTERVENTIONS:  - Educate patient/family on patient safety including physical limitations  - Instruct patient to call for assistance with activity   - Consult OT/PT to assist with strengthening/mobility   - Keep Call bell within reach  - Keep bed low and locked with side rails adjusted as appropriate  - Keep care items and personal belongings within reach  - Initiate and maintain comfort rounds  - Make Fall Risk Sign visible to staff  - Apply yellow socks and bracelet for high fall risk patients  - Consider moving patient to room near nurses station  Outcome: Progressing     Problem: Prexisting or  High Potential for Compromised Skin Integrity  Goal: Skin integrity is maintained or improved  Description: INTERVENTIONS:  - Identify patients at risk for skin breakdown  - Assess and monitor skin integrity  - Assess and monitor nutrition and hydration status  - Monitor labs   - Assess for incontinence   - Turn and reposition patient  - Assist with mobility/ambulation  - Relieve pressure over bony prominences  - Avoid friction and shearing  - Provide appropriate hygiene as needed including keeping skin clean and dry  - Evaluate need for skin moisturizer/barrier cream  - Collaborate with interdisciplinary team   - Patient/family teaching  - Consider wound care consult   Outcome: Progressing     Problem: Nutrition/Hydration-ADULT  Goal: Nutrient/Hydration intake appropriate for improving, restoring or maintaining nutritional needs  Description: Monitor and assess patient's nutrition/hydration status for malnutrition. Collaborate with interdisciplinary team and initiate plan and interventions as ordered.  Monitor patient's weight and dietary intake as ordered or per policy. Utilize nutrition screening tool and intervene as necessary. Determine patient's food preferences and provide high-protein, high-caloric foods as appropriate.     INTERVENTIONS:  - Monitor oral intake, urinary output, labs, and treatment plans  - Assess nutrition and hydration status and recommend course of action  - Evaluate amount of meals eaten  - Assist patient with eating if necessary   - Allow adequate time for meals  - Recommend/ encourage appropriate diets, oral nutritional supplements, and vitamin/mineral supplements  - Order, calculate, and assess calorie counts as needed  - Recommend, monitor, and adjust tube feedings and TPN/PPN based on assessed needs  - Assess need for intravenous fluids  - Provide specific nutrition/hydration education as appropriate  - Include patient/family/caregiver in decisions related to nutrition  Outcome:  Progressing

## 2021-11-11 NOTE — PROGRESS NOTES
Progress Note - Geriatric Medicine   Alf Carmona 89 y.o. male MRN: [id number]  Unit/Bed#: S -01 Encounter: [id number]      Asses sment/Plan:  1. Metabolic encephalopathy. -Patient is more alert today. He is now able to answer questions. MRI of the brain was ordered. Spoke with step-son and daughter in law regarding improvement.     2.Ambulatory dysfunction. -S/p fall. Patient was using a walker prior to admission.- Assess patient frequently for physical needs, encourage use of assistant devices as needed and directed by PT/OT  -  Identify cognitive and physical deficits and behaviors that affect risk of falls  - Consider moving patient closer to nursing station to monitor more closely for impulsive behavior which may increase risk of falls  -  [place] fall precautions   - Educate patient/family on patient safety including physical limitations and importance of using call bell for assistance   - Modify environment to reduce risk of injury including cap IV and disconnect from pole when not in use, ensuring adequate lighting in room and restroom, ensuring that path to restroom is clear and free of trip hazards  - PT/OT consulted to assist with strengthening/mobility and assist with discharge planning to appropriate level of care     3. Hx of prostate CA. -Recommend urinary retention protocol. UA on 11/8/21 elevated protein and ketones, continue to push PO fluids. Monitor for constipation. Patient is currently taking Senokot 1 tablet PO BID.     4. Chronic kidney disease IIIB. Recent GFR improved to 41. Recent Creatinine improved from 1.60 yesterday to 1.48 today. Continue to push PO fluids.     5. Hearing impairment. -Family will be bringing his hearing aids back today.     6. Dementia. -Patient is answering more questions and appears more alert.     Recommendations:  1. Return to facility with rehabilitation services.    2. Use question board for better communication.    3. Continue to push PO fluids.    4.  Assisted feedings.    Subjective:   Patient states he is feeling better today. He is more awake and alert. He is using longer phrases and answering questions more appropriately. Patient drank his milk for breakfast. He does not complain of any pain. Patient states he recently had a bowel movement     Review of Systems   Constitutional: Negative for chills and fever.   HENT: Negative for ear pain and sore throat.    Eyes: Negative for pain and visual disturbance.   Respiratory: Negative for cough and shortness of breath.    Cardiovascular: Negative for chest pain and palpitations.   Gastrointestinal: Negative for abdominal pain and vomiting.   Genitourinary: Negative for dysuria and hematuria.   Musculoskeletal: Negative for arthralgias and back pain.   Skin: Negative for color change and rash.   Neurological: Negative for seizures and syncope.   All other systems reviewed and are negative.        Objective:     Vitals: Blood pressure 135/75, pulse 68, temperature 97.9 °F (36.6 °C), temperature source Oral, resp. rate 18, weight 77.4 kg (170 lb 10.2 oz), SpO2 91 %.,There is no height or weight on file to calculate BMI.    No intake or output data in the 24 hours ending 11/11/21 1010    Current Medications: Reviewed    Physical Exam:   Physical Exam  Vitals and nursing note reviewed.   Constitutional:       General: He is not in acute distress.     Appearance: He is well-developed. He is not ill-appearing, toxic-appearing or diaphoretic.   HENT:      Head: Normocephalic and atraumatic.      Right Ear: Ear canal and external ear normal.      Left Ear: Ear canal and external ear normal.      Nose: Nose normal.   Eyes:      Extraocular Movements: Extraocular movements intact.      Conjunctiva/sclera: Conjunctivae normal.      Pupils: Pupils are equal, round, and reactive to light.   Cardiovascular:      Rate and Rhythm: Normal rate and regular rhythm.      Heart sounds: No murmur heard.      Pulmonary:      Effort:  Pulmonary effort is normal. No respiratory distress.      Breath sounds: Normal breath sounds.   Abdominal:      General: Bowel sounds are normal.      Palpations: Abdomen is soft.      Tenderness: There is no abdominal tenderness.   Musculoskeletal:      Cervical back: Neck supple.      Right lower leg: No edema.      Left lower leg: No edema.   Skin:     General: Skin is warm and dry.   Neurological:      Mental Status: He is alert. Mental status is at baseline.      Comments: Patient's cognition improved. Answers more questions today than yesterday.   Psychiatric:         Mood and Affect: Mood normal.         Behavior: Behavior normal.          Invasive Devices  Report    Peripheral Intravenous Line            Peripheral IV 11/09/21 Right Antecubital 2 days                Lab, Imaging and other studies: I have personally reviewed pertinent reports.       Transcribed by Meghann GARNICA for Dr. Simmons.

## 2021-11-11 NOTE — ASSESSMENT & PLAN NOTE
· Thought to be mechanical in the setting of metabolic encephalopathy.  · Evaluated by Trauma, cleared from any traumatic injuries.  CT head did note some soft tissue swelling and patient does have some abrasions  · Fall precautions  · PT/OT evaluations - recommend return to facility with rehabilitation services

## 2021-11-11 NOTE — ASSESSMENT & PLAN NOTE
· Unclear baseline.  Baseline appears to be around 1.4-1.5 Presenting creatinine was 1.53, initially improving to 1.40 with IV fluids though increased yesterday to 1.6.  · Creatinine is currently 1.48, continue to monitor off IV fluids

## 2021-11-12 VITALS
OXYGEN SATURATION: 93 % | SYSTOLIC BLOOD PRESSURE: 168 MMHG | DIASTOLIC BLOOD PRESSURE: 80 MMHG | TEMPERATURE: 98.9 F | RESPIRATION RATE: 16 BRPM | HEART RATE: 89 BPM | WEIGHT: 170.64 LBS

## 2021-11-12 PROBLEM — K59.00 CONSTIPATION: Status: ACTIVE | Noted: 2021-11-12

## 2021-11-12 PROBLEM — I63.81 LACUNAR INFARCTION (HCC): Status: ACTIVE | Noted: 2021-11-12

## 2021-11-12 LAB
ANION GAP SERPL CALCULATED.3IONS-SCNC: 9 MMOL/L (ref 4–13)
BASOPHILS # BLD AUTO: 0.04 THOUSANDS/ΜL (ref 0–0.1)
BASOPHILS NFR BLD AUTO: 0 % (ref 0–1)
BUN SERPL-MCNC: 24 MG/DL (ref 5–25)
CALCIUM SERPL-MCNC: 9.1 MG/DL (ref 8.3–10.1)
CHLORIDE SERPL-SCNC: 100 MMOL/L (ref 100–108)
CO2 SERPL-SCNC: 24 MMOL/L (ref 21–32)
CREAT SERPL-MCNC: 1.36 MG/DL (ref 0.6–1.3)
EOSINOPHIL # BLD AUTO: 0.46 THOUSAND/ΜL (ref 0–0.61)
EOSINOPHIL NFR BLD AUTO: 5 % (ref 0–6)
ERYTHROCYTE [DISTWIDTH] IN BLOOD BY AUTOMATED COUNT: 13.7 % (ref 11.6–15.1)
FLUAV RNA RESP QL NAA+PROBE: NEGATIVE
FLUBV RNA RESP QL NAA+PROBE: NEGATIVE
GFR SERPL CREATININE-BSD FRML MDRD: 46 ML/MIN/1.73SQ M
GLUCOSE SERPL-MCNC: 103 MG/DL (ref 65–140)
HCT VFR BLD AUTO: 44.2 % (ref 36.5–49.3)
HGB BLD-MCNC: 14.7 G/DL (ref 12–17)
IMM GRANULOCYTES # BLD AUTO: 0.04 THOUSAND/UL (ref 0–0.2)
IMM GRANULOCYTES NFR BLD AUTO: 0 % (ref 0–2)
LYMPHOCYTES # BLD AUTO: 1.82 THOUSANDS/ΜL (ref 0.6–4.47)
LYMPHOCYTES NFR BLD AUTO: 20 % (ref 14–44)
MCH RBC QN AUTO: 29.1 PG (ref 26.8–34.3)
MCHC RBC AUTO-ENTMCNC: 33.3 G/DL (ref 31.4–37.4)
MCV RBC AUTO: 87 FL (ref 82–98)
MONOCYTES # BLD AUTO: 1.01 THOUSAND/ΜL (ref 0.17–1.22)
MONOCYTES NFR BLD AUTO: 11 % (ref 4–12)
NEUTROPHILS # BLD AUTO: 5.54 THOUSANDS/ΜL (ref 1.85–7.62)
NEUTS SEG NFR BLD AUTO: 64 % (ref 43–75)
NRBC BLD AUTO-RTO: 0 /100 WBCS
PLATELET # BLD AUTO: 270 THOUSANDS/UL (ref 149–390)
PMV BLD AUTO: 10.5 FL (ref 8.9–12.7)
POTASSIUM SERPL-SCNC: 4.5 MMOL/L (ref 3.5–5.3)
RBC # BLD AUTO: 5.06 MILLION/UL (ref 3.88–5.62)
RSV RNA RESP QL NAA+PROBE: NEGATIVE
SARS-COV-2 RNA RESP QL NAA+PROBE: NEGATIVE
SODIUM SERPL-SCNC: 133 MMOL/L (ref 136–145)
WBC # BLD AUTO: 8.91 THOUSAND/UL (ref 4.31–10.16)

## 2021-11-12 PROCEDURE — 99239 HOSP IP/OBS DSCHRG MGMT >30: CPT | Performed by: INTERNAL MEDICINE

## 2021-11-12 PROCEDURE — 85025 COMPLETE CBC W/AUTO DIFF WBC: CPT | Performed by: INTERNAL MEDICINE

## 2021-11-12 PROCEDURE — 99232 SBSQ HOSP IP/OBS MODERATE 35: CPT | Performed by: INTERNAL MEDICINE

## 2021-11-12 PROCEDURE — 80048 BASIC METABOLIC PNL TOTAL CA: CPT | Performed by: INTERNAL MEDICINE

## 2021-11-12 PROCEDURE — 0241U HB NFCT DS VIR RESP RNA 4 TRGT: CPT | Performed by: INTERNAL MEDICINE

## 2021-11-12 RX ORDER — AMLODIPINE BESYLATE 2.5 MG/1
2.5 TABLET ORAL DAILY
Qty: 30 TABLET | Refills: 0 | Status: SHIPPED | OUTPATIENT
Start: 2021-11-13

## 2021-11-12 RX ORDER — AMLODIPINE BESYLATE 2.5 MG/1
2.5 TABLET ORAL DAILY
Status: DISCONTINUED | OUTPATIENT
Start: 2021-11-12 | End: 2021-11-12 | Stop reason: HOSPADM

## 2021-11-12 RX ORDER — AMOXICILLIN 250 MG
1 CAPSULE ORAL 2 TIMES DAILY
Qty: 60 TABLET | Refills: 0 | Status: SHIPPED | OUTPATIENT
Start: 2021-11-13

## 2021-11-12 RX ADMIN — AMLODIPINE BESYLATE 2.5 MG: 2.5 TABLET ORAL at 13:15

## 2021-11-12 RX ADMIN — HEPARIN SODIUM 5000 UNITS: 5000 INJECTION INTRAVENOUS; SUBCUTANEOUS at 13:15

## 2021-11-12 RX ADMIN — HEPARIN SODIUM 5000 UNITS: 5000 INJECTION INTRAVENOUS; SUBCUTANEOUS at 05:44

## 2021-11-12 RX ADMIN — CARVEDILOL 6.25 MG: 6.25 TABLET, FILM COATED ORAL at 09:32

## 2021-11-12 RX ADMIN — DOCUSATE SODIUM AND SENNOSIDES 1 TABLET: 8.6; 5 TABLET, FILM COATED ORAL at 09:33

## 2021-11-12 RX ADMIN — LISINOPRIL 10 MG: 10 TABLET ORAL at 09:33

## 2021-11-12 NOTE — DISCHARGE INSTRUCTIONS
Encephalopathy   WHAT YOU NEED TO KNOW:   Encephalopathy is a term used to describe brain disease or brain damage. It usually develops because of a health condition such as cirrhosis, or a brain injury. Symptoms may be mild or severe, and may be short-term or permanent.  DISCHARGE INSTRUCTIONS:   Call 911 or have someone else call for any of the following:   · You cannot be woken.     · You had a seizure.    Seek care immediately if:   · You had a seizure.    · You feel confused, dizzy, or lightheaded.    · Your heart is beating faster than is normal for you.     · You have sudden shortness of breath.    Contact your healthcare provider if:   · You have a fever.     · You are sleeping more than usual.    · You have questions or concerns about your condition or care.    Medicines:  You may need any of the following:  · Blood pressure medicine  may be given to raise or lower your blood pressure.    · Antibiotics  help treat an infection caused by bacteria.    · A vitamin B supplement  may be needed if the level in your blood is too low.    · Take your medicine as directed.  Contact your healthcare provider if you think your medicine is not helping or if you have side effects. Tell him or her if you are allergic to any medicine. Keep a list of the medicines, vitamins, and herbs you take. Include the amounts, and when and why you take them. Bring the list or the pill bottles to follow-up visits. Carry your medicine list with you in case of an emergency.    Manage encephalopathy:   · Limit or do not drink alcohol.  Alcohol can worsen your condition. Alcohol can also cause new or worsening damage to your liver and brain. Ask your healthcare provider if it is safe for you to drink alcohol. Limit alcohol to 1 drink a day if you are a woman, or 2 drinks a day if you are a man. A drink of alcohol is 12 ounces of beer, 5 ounces of wine, or 1½ ounces of liquor.    · Eat low-protein and low-sodium foods, if directed.  If  your symptoms are caused by a liver disease, you may be asked to eat less protein and sodium (salt). Some foods high in protein include beef, pork, poultry (chicken, turkey), beans, and nuts. Some foods high in sodium include salty snacks, canned foods, condiments, deli meats, and cured meat such as igancio. Ask your dietitian for more information about foods to limit or avoid.    Follow up with your healthcare provider as directed:  Write down your questions so you remember to ask them during your visits.  © Copyright [employer] 2021 Information is for End User's use only and may not be sold, redistributed or otherwise used for commercial purposes. All illustrations and images included in CareNotes® are the copyrighted property of [employer] or [employer]  The above information is an  only. It is not intended as medical advice for individual conditions or treatments. Talk to your doctor, nurse or pharmacist before following any medical regimen to see if it is safe and effective for you.

## 2021-11-12 NOTE — ASSESSMENT & PLAN NOTE
· Thought to be mechanical in the setting of metabolic encephalopathy.  · Evaluated by Trauma, cleared from any traumatic injuries.  CT head did note some soft tissue swelling and patient does have some abrasions  · Fall precautions  · PT/OT evaluations - recommend return to facility with rehabilitation services.  Patient is medically stable for discharge today.

## 2021-11-12 NOTE — CASE MANAGEMENT
Case Management Assessment & Discharge Planning Note    Patient name Alf Carmona  Location S /S -01 MRN [id number]  : 1932 Date 2021       Current Admission Date: 2021  Current Admission Diagnosis:Encephalopathy acute   Patient Active Problem List    Diagnosis Date Noted   • Constipation 2021   • Cough 11/10/2021   • Fall 2021   • Encephalopathy acute 2021   • Abrasions of multiple sites 2021   • HTN (hypertension) 2021   • CKD (chronic kidney disease) 2021      LOS (days): 4  Geometric Mean LOS (GMLOS) (days): 2.30  Days to GMLOS:-1.6     OBJECTIVE:    Risk of Unplanned Readmission Score: 10         Current admission status: Inpatient  Referral Reason: VNA    Preferred Pharmacy:   [address]  [address] [place] [id number]  Phone: [phone] Fax: [phone]    Primary Care Provider: Noemí Gonzales MD    Primary Insurance: AARP MC REP  Secondary Insurance:     ASSESSMENT:  Active Health Care Agents     [facility] Representative - Son   Primary Phone: [phone] (Mobile)  Home Phone: [phone]               Advance Directives  Does patient have a Health Care POA?: No  Was patient offered paperwork?: Yes  Does patient currently have a Health Care decision maker?: Yes, please see Health Care Proxy section  Does patient have Advance Directives?: No  Was patient offered paperwork?: Yes         Readmission Root Cause  30 Day Readmission: No    Patient Information  Admitted from:: Facility  Mental Status: Confused  During Assessment patient was accompanied by: Son  Assessment information provided by:: Other - please comment (facility)  Primary Caregiver: Other (Comment)  Caregiver's Name:: Maira Melendez  Caregiver's Relationship to Patient:: Other (Specify) (facility)  Support Systems: Children,Family members  County of Residence: [place]  What city do you live in?:  [place]  Home entry access options. Select all that apply.: Ramp  Type of Current Residence: Facility  Upon entering residence, is there a bedroom on the main floor (no further steps)?: Yes  Upon entering residence, is there a bathroom on the main floor (no further steps)?: Yes  In the last 12 months, was there a time when you were not able to pay the mortgage or rent on time?: No  In the last 12 months, was there a time when you did not have a steady place to sleep or slept in a shelter (including now)?: No  Homeless/housing insecurity resource given?: N/A  Living Arrangements: Other (Comment) (Maira RO)  Is patient a ?: No    Activities of Daily Living Prior to Admission  Functional Status: Assistance  Completes ADLs independently?: No  Level of ADL dependence: Assistance  Ambulates independently?: Yes  Does patient use assisted devices?: No  Does patient currently own DME?: No  Does patient have a history of Outpatient Therapy (PT/OT)?: No  Does the patient have a history of Short-Term Rehab?: No  Does patient have a history of HHC?: Yes  Does patient currently have HHC?: No         Patient Information Continued  Income Source: Pension/residential  Does patient have prescription coverage?: Yes  Food insecurity resource given?: N/A  Does patient receive dialysis treatments?: No  Does patient have a history of substance abuse?: No  Does patient have a history of Mental Health Diagnosis?: No         Means of Transportation  Means of Transport to Appts:: Family transport  In the past 12 months, has lack of transportation kept you from medical appointments or from getting medications?: No  In the past 12 months, has lack of transportation kept you from meetings, work, or from getting things needed for daily living?: No  Was application for public transport provided?: N/A        DISCHARGE DETAILS:    Discharge planning discussed with:: facility and son  Freedom of Choice: Yes  Comments -  Freedom of Choice: Son would like for refarral be made to [facility] as the recommendations of the care team is for the pt to return to [facility] with [facility] services.  [facility] contract with Mairajay Melendez.  CM contacted family/caregiver?: Yes  Were Treatment Team discharge recommendations reviewed with patient/caregiver?: Yes  Did patient/caregiver verbalize understanding of patient care needs?: Yes  Were patient/caregiver advised of the risks associated with not following Treatment Team discharge recommendations?: Yes    Contacts  Patient Contacts: Alex cruz  Relationship to Patient:: Family  Contact Method: Phone  Reason/Outcome: Continuity of Care,Discharge Planning,Emergency Contact    Requested Home Health Care         Is the patient interested in HHC at discharge?: Yes  Home Health Discipline requested:: Nursing,Occupational Therapy,Physical Therapy  Home Health Agency Name:: Other ([facility])  Home Health Follow-Up Provider:: PCP  Home Health Services Needed:: Other (comment),Strengthening/Theraputic Exercises to Improve Function,Gait/ADL Training,Evaluate Functional Status and Safety  Homebound Criteria Met:: Uses an Assist Device (i.e. cane, walker, etc)  Supporting Clincal Findings:: Limited Endurance,Cognitive Deficit Requiring the Assistance of Others    DME Referral Provided  Referral made for DME?: No    Other Referral/Resources/Interventions Provided:  Interventions: HHC,Assisted Living  Referral Comments: Referral has been made to [facility] as son requested.  This agency is contracted with [facility]ctor Barahon[facility] and the son would like them to provide services.    Would you like to participate in our [facility] service program?  : No - Declined    Treatment Team Recommendation: Assisted Living,Home with Home Health Care  Discharge Destination Plan:: Home with Home Health Care,Assisted Living  Transport at Discharge : Auto with designated             ETA of Transport (Date): 11/12/21  ETA of Transport (Time): 1530     Transfer Mode: Wheelchair  Accompanied by: Family member     IMM Given (Date):: 11/12/21  IMM Given to:: Family  Family notified:: son  Additional Comments: IMM has been reviewed with pt's son over the phone and he is in agreement with pt dischrging today back to [facility] with [facility].    Accepting Facility Name, City & State : [facility]  Receiving Facility/Agency Phone Number: [phone]  Facility/Agency Fax Number: [phone]

## 2021-11-12 NOTE — ASSESSMENT & PLAN NOTE
· This was likely secondary to dehydration.  Patient had an extensive metabolic workup done during this admission which was negative.  Mentation is much improved and appears to be close to baseline.  · Encourage oral hydration  · Appreciate Geriatrics consult - frequent reorientation/delirium precautions

## 2021-11-12 NOTE — ASSESSMENT & PLAN NOTE
· Continue home Coreg.  Continue lisinopril.  Started on low-dose amlodipine 2.5 mg for better BP control.  Adjust as needed

## 2021-11-12 NOTE — PROGRESS NOTES
Progress Note - Geriatric Medicine   Alf Carmona 89 y.o. male MRN: [id number]  Unit/Bed#: S -01 Encounter: [id number]      Assessment/Plan:  1. Metabolic encephalopathy. -Patient is more alert today. He is now able to answer questions with yes or no. 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      2.Ambulatory dysfunction. -S/p fall. Patient was using a walker prior to admission.- Assess patient frequently for physical needs, encourage use of assistant devices as needed and directed by PT/OT  -  Identify cognitive and physical deficits and behaviors that affect risk of falls  - Consider moving patient closer to nursing station to monitor more closely for impulsive behavior which may increase risk of falls  -  [place] fall precautions   - Educate patient/family on patient safety including physical limitations and importance of using call bell for assistance   - Modify environment to reduce risk of injury including cap IV and disconnect from pole when not in use, ensuring adequate lighting in room and restroom, ensuring that path to restroom is clear and free of trip hazards  - PT/OT consulted to assist with strengthening/mobility and assist with discharge planning to appropriate level of care     3. Hx of prostate CA. -Recommend urinary retention protocol. UA on 11/8/21 elevated protein and ketones, continue to push PO fluids. Monitor for constipation. Patient is currently taking Senokot 1 tablet PO BID.     4. Chronic kidney disease IIIA. Recent GFR improved to 46. Recent Creatinine improved from 1.48 yesterday to 1.36 today. Continue to push PO fluids.     5. Hearing impairment. -Patient is currently wearing his hearing aids.     6. 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 Patient is answering more questions with yes or no. He appears more alert, but is not oriented.     7. Hypertension.  "-/90. Patient is already taking Lisinopril 10mg and Carvedilol 6.25mg. Recommend starting Norvasc 5mg to obtain better BP control.     Recommendations:  1. Return to facility with rehabilitation services.     2. Use question board for better communication.     3. Continue to push PO fluids.     4. Assist with feedings.    5. Recommend starting Norvasc 5mg to obtain better BP control. Goal is systolic around 120.    6. Continue to monitor kidney function with BMPs.      Subjective:   Patient states he is doing okay today. He is awake and alert but not oriented to time or place. He denies abdominal pain, headaches, or muscular pain. He is able to answer \"yes\" and \"no\" to questions but answers may not be appropriate. Patient does not remember eating breakfast. Hearing impairment also makes ROS difficult.     Review of Systems   All other systems reviewed and are negative.        Objective:     Vitals: Blood pressure 170/92, pulse 95, temperature 97.7 °F (36.5 °C), temperature source Axillary, resp. rate 16, weight 77.4 kg (170 lb 10.2 oz), SpO2 92 %.,There is no height or weight on file to calculate BMI.      Intake/Output Summary (Last 24 hours) at 11/12/2021 1020  Last data filed at 11/12/2021 0547  Gross per 24 hour   Intake --   Output 1100 ml   Net -1100 ml       Current Medications: Reviewed    Physical Exam:   Physical Exam  Vitals and nursing note reviewed.   Constitutional:       Appearance: He is well-developed.   HENT:      Head: Normocephalic and atraumatic.   Eyes:      Extraocular Movements: Extraocular movements intact.      Conjunctiva/sclera: Conjunctivae normal.      Pupils: Pupils are equal, round, and reactive to light.   Cardiovascular:      Rate and Rhythm: Normal rate and regular rhythm.      Heart sounds: No murmur heard.      Pulmonary:      Effort: Pulmonary effort is normal. No respiratory distress.      Breath sounds: Normal breath sounds.   Abdominal:      General: Bowel sounds are " normal.      Palpations: Abdomen is soft.      Tenderness: There is no abdominal tenderness.   Musculoskeletal:         General: No swelling.      Cervical back: Neck supple.      Right lower leg: No edema.      Left lower leg: No edema.   Skin:     General: Skin is warm and dry.      Capillary Refill: Capillary refill takes less than 2 seconds.   Neurological:      Mental Status: He is alert. Mental status is at baseline. He is disoriented.          Invasive Devices  Report    Peripheral Intravenous Line            Peripheral IV 11/09/21 Right Antecubital 3 days                Lab, Imaging and other studies: I have personally reviewed pertinent reports.       Transcribed by Meghann GARNICA for Dr. Simmons.

## 2021-11-12 NOTE — ASSESSMENT & PLAN NOTE
· Unclear baseline.  Baseline appears to be around 1.4-1.5.  Creatinine is 1.36 which is within his baseline  · Continue to monitor

## 2021-11-12 NOTE — NURSING NOTE
Patient was discharged and family picked up to take him back to [facility]. I was unable to reach the facility after attempting to call several times. AVS and all papers were faxed over.

## 2021-11-12 NOTE — DISCHARGE SUMMARY
[facility]  Discharge- Alf Carmona 2/27/1932, 89 y.o. male MRN: [id number]  Unit/Bed#: S -01 Encounter: [id number]  Primary Care Provider: Noemí Gonzales MD   Date and time admitted to hospital: 11/8/2021  2:01 PM    * Encephalopathy acute  Assessment & Plan  · This was likely secondary to dehydration.  Patient had an extensive metabolic workup done during this admission which was negative.  Mentation is much improved and appears to be close to baseline.  · Encourage oral hydration  · Appreciate Geriatrics consult - frequent reorientation/delirium precautions    Constipation  Assessment & Plan  · Bowel regimen with senna-Colace 1 tablet b.i.d.    CKD (chronic kidney disease)  Assessment & Plan  · Unclear baseline.  Baseline appears to be around 1.4-1.5.  Creatinine is 1.36 which is within his baseline  · Continue to monitor    HTN (hypertension)  Assessment & Plan  · Continue home Coreg.  Continue lisinopril.  Started on low-dose amlodipine 2.5 mg for better BP control.  Adjust as needed    Fall  Assessment & Plan  · Thought to be mechanical in the setting of metabolic encephalopathy.  · Evaluated by Trauma, cleared from any traumatic injuries.  CT head did note some soft tissue swelling and patient does have some abrasions  · Fall precautions  · PT/OT evaluations - recommend return to facility with rehabilitation services.  Patient is medically stable for discharge today.    Medical Problems             Resolved Problems  Date Reviewed: 11/12/2021    None              Discharging Physician / Practitioner: Jorge Wells MD  PCP: Noemí Gonzales MD  Admission Date:   Admission Orders (From admission, onward)     Ordered        11/08/21 1835  Inpatient Admission  Once                      Discharge Date: 11/12/21    Consultations During Hospital Stay:  · Geriatrics    Procedures Performed:   · NA    Significant Findings / Test Results:   CT chest abdomen pelvis wo contrast    Result  Date: 11/11/2021  Impression: No acute abnormality identified in the chest, abdomen or pelvis. Workstation performed: [id number]     TRAUMA - CT head wo contrast    Result Date: 11/8/2021  Impression: No acute intracranial abnormality. Posterior soft tissue swelling. I personally discussed this study with Charly Coker on 11/8/2021 at 2:42 PM. Workstation performed: [id number]     TRAUMA - CT spine cervical wo contrast    Result Date: 11/8/2021  Impression: No cervical spine fracture or traumatic malalignment.  I personally discussed this study with Charly Coker on 11/8/2021 at 2:42 PM. Workstation performed: [id number]     MRI brain wo contrast    Result Date: 11/12/2021  Impression: No acute intracranial abnormality. Old lacunar infarct in right anterior basal ganglia with moderate chronic microangiopathy. Workstation performed: [id number]     XR chest 1 view    Result Date: 11/9/2021  Impression: No acute cardiopulmonary disease within limitations of supine imaging. Workstation performed: [id number]     XR Trauma multiple (SLB/SLRA trauma bay ONLY)    Result Date: 11/8/2021  Impression: No acute cardiopulmonary disease within limitations of supine imaging. Workstation performed: [id number]     Incidental Findings:   · Old lacunar infarct    Test Results Pending at Discharge (will require follow up):   · NA     Outpatient Tests Requested:  · NA    Complications:  NA    Reason for Admission:  Encephalopathy    Hospital Course:   Alf Carmona is a 89 y.o. male patient who originally presented to the hospital on 11/8/2021 due to encephalopathy and fall  at his assisted living facility.  He was evaluated by Trauma and cleared from traumatic injuries.  Patient had an extensive metabolic workup during this admission including an infectious workup.  He was also pan scanned.  Results of metabolic workup were negative.  He appeared dehydrated and received hydration with improvement in his mental status.  He underwent an  MRI of his brain which showed an old lacunar infarct.  His blood pressure medication regimen was modified in amlodipine 2.5 mg daily was added for better blood pressure control.  Geriatrics was consulted delirium precautions were instituted.  Patient was deemed medically stable for discharge back to his assisted living facility.    Please see above list of diagnoses and related plan for additional information.     Condition at Discharge: good    Discharge Day Visit / Exam:   Subjective:  No acute events overnight.  Patient seen sitting up in the chair eating breakfast.  Patient is alert and oriented to self.  He is able to ambulate and answers simple questions.  Afebrile  Vitals: Blood Pressure: 168/80 (11/12/21 1511)  Pulse: 89 (11/12/21 1511)  Temperature: 98.9 °F (37.2 °C) (11/12/21 1511)  Temp Source: Oral (11/12/21 1511)  Respirations: 16 (11/12/21 1511)  Weight - Scale: 77.4 kg (170 lb 10.2 oz) (11/08/21 1403)  SpO2: 93 % (11/12/21 1511)  Exam:   Physical Exam  Vitals reviewed.   Constitutional:       General: He is not in acute distress.     Appearance: He is not ill-appearing or toxic-appearing.   Cardiovascular:      Rate and Rhythm: Normal rate and regular rhythm.      Heart sounds: No murmur heard.      Pulmonary:      Effort: No respiratory distress.      Breath sounds: No wheezing or rhonchi.   Abdominal:      General: There is no distension.      Tenderness: There is no abdominal tenderness.   Musculoskeletal:         General: No swelling.   Skin:     General: Skin is warm and dry.   Neurological:      General: No focal deficit present.      Mental Status: He is alert. Mental status is at baseline.      Comments: Alert and oriented to self          Discussion with Family: Updated  (son) via phone.    Discharge instructions/Information to patient and family:   See after visit summary for information provided to patient and family.      Provisions for Follow-Up Care:  See after visit  summary for information related to follow-up care and any pertinent home health orders.       Disposition:   Other: Return to assisted living facility    Planned Readmission:  No     Discharge Statement:  I spent 35 minutes discharging the patient. This time was spent on the day of discharge. I had direct contact with the patient on the day of discharge. Greater than 50% of the total time was spent examining patient, answering all patient questions, arranging and discussing plan of care with patient as well as directly providing post-discharge instructions.  Additional time then spent on discharge activities.    Discharge Medications:  See after visit summary for reconciled discharge medications provided to patient and/or family.      **Please Note: This note may have been constructed using a voice recognition system**

## 2021-11-12 NOTE — DISCHARGE INSTR - AVS FIRST PAGE
Dear Alf Carmona,     It was our pleasure to care for you here at [facility].  It is our hope that we were always able to exceed the expected standards for your care during your stay.  You were hospitalized due to Acute encephalopathy.  You were cared for on the MS4 floor under the service of Jorge Wells MD with the [facility]ist [facility] who covers for your primary care physician (PCP), Noemí Gonzales MD, while you were hospitalized.  If you have any questions or concerns related to this hospitalization, you may contact us at .  For follow up as well as medication refills, we recommend that you follow up with your primary care physician.  A registered nurse will reach out to you by phone within a few days after your discharge to answer any additional questions that you may have after going home.  However, at this time we provide for you here, the most important instructions / recommendations at discharge:     Notable Medication Adjustments -   Amlodipine 2.5mg daily  Testing Required after Discharge -   None  Important follow up information -   F/u with PCP in 2 weeks  Other Instructions -   Return to facility with PT/OT services  Encourage oral hydration  Please review this entire after visit summary as additional general instructions including medication list, appointments, activity, diet, any pertinent wound care, and other additional recommendations from your care team that may be provided for you.      Sincerely,     Jorge Wells MD

## 2021-11-13 LAB
BACTERIA BLD CULT: NORMAL
BACTERIA BLD CULT: NORMAL

## 2021-11-15 NOTE — UTILIZATION REVIEW
Notification of Discharge   This is a Notification of Discharge from our facility [facility]. Please be advised that this patient has been discharge from our facility. Below you will find the admission and discharge date and time including the patient’s disposition.   UTILIZATION REVIEW CONTACT:  Chong Hampton  Utilization   Network Utilization Review Department  Phone: [phone] x carefully listen to the prompts. All voicemails are confidential.  Email: [email or web address]     PHYSICIAN ADVISORY SERVICES:  FOR TMMB-RB-EBSN REVIEW - MEDICAL NECESSITY DENIAL  Phone: [phone]  Fax: [phone]  Email: [email or web address]     PRESENTATION DATE: 11/8/2021  2:01 PM  OBERVATION ADMISSION DATE:   INPATIENT ADMISSION DATE: 11/8/21  6:34 PM   DISCHARGE DATE: 11/12/2021  3:45 PM  DISPOSITION: Home with Home Health Care Home with Home Health Care      IMPORTANT INFORMATION:  Send all requests for admission clinical reviews, approved or denied determinations and any other requests to dedicated fax number below belonging to the campus where the patient is receiving treatment. List of dedicated fax numbers:  FACILITY NAME UR FAX NUMBER   ADMISSION DENIALS (Administrative/Medical Necessity) [phone]   PARENT CHILD HEALTH (Maternity/NICU/Pediatrics) [phone]   [facility] [phone]   [facility] [phone]   [facility] [phone]   [facility] [phone]   [facility] [phone]   [facility] [phone]   [facility] [phone]   [facility] [phone]   [facility] [phone]   [facility] [phone]

## 2022-10-12 PROBLEM — R05.9 COUGH: Status: RESOLVED | Noted: 2021-11-10 | Resolved: 2022-10-12

## 2022-10-12 PROBLEM — A41.9 SEPSIS (HCC): Status: RESOLVED | Noted: 2021-08-30 | Resolved: 2022-10-12

## 2023-06-13 ENCOUNTER — APPOINTMENT (EMERGENCY)
Dept: RADIOLOGY | Facility: HOSPITAL | Age: 88
DRG: 062 | End: 2023-06-13
Payer: COMMERCIAL

## 2023-06-13 ENCOUNTER — APPOINTMENT (EMERGENCY)
Dept: CT IMAGING | Facility: HOSPITAL | Age: 88
DRG: 062 | End: 2023-06-13
Payer: COMMERCIAL

## 2023-06-13 ENCOUNTER — HOSPITAL ENCOUNTER (INPATIENT)
Facility: HOSPITAL | Age: 88
LOS: 6 days | Discharge: NURSING, REHAB OR LONG-TERM CARE | DRG: 062 | End: 2023-06-19
Attending: SURGERY | Admitting: INTERNAL MEDICINE
Payer: COMMERCIAL

## 2023-06-13 ENCOUNTER — APPOINTMENT (INPATIENT)
Dept: NON INVASIVE DIAGNOSTICS | Facility: HOSPITAL | Age: 88
DRG: 062 | End: 2023-06-13
Payer: COMMERCIAL

## 2023-06-13 DIAGNOSIS — I63.9 STROKE (HCC): Primary | ICD-10-CM

## 2023-06-13 PROBLEM — D72.829 LEUKOCYTOSIS: Status: ACTIVE | Noted: 2023-06-13

## 2023-06-13 PROBLEM — K59.00 CONSTIPATION: Status: ACTIVE | Noted: 2023-06-13

## 2023-06-13 PROBLEM — I10 ESSENTIAL HYPERTENSION: Status: ACTIVE | Noted: 2023-06-13

## 2023-06-13 PROBLEM — E87.1 HYPONATREMIA: Status: ACTIVE | Noted: 2023-06-13
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
## 2023-06-13 PROBLEM — N18.9 CKD (CHRONIC KIDNEY DISEASE): Status: ACTIVE | Noted: 2023-06-13

## 2023-06-13 LAB
ANION GAP SERPL CALCULATED.3IONS-SCNC: 11 MMOL/L (ref 4–13)
ANION GAP SERPL CALCULATED.3IONS-SCNC: 11 MMOL/L (ref 4–13)
AORTIC ROOT: 3.6 CM
AORTIC ROOT: 3.6 CM
APICAL FOUR CHAMBER EJECTION FRACTION: 72 %
APICAL FOUR CHAMBER EJECTION FRACTION: 72 %
APTT PPP: 26 SECONDS (ref 23–37)
APTT PPP: 26 SECONDS (ref 23–37)
ASCENDING AORTA: 3.9 CM
ASCENDING AORTA: 3.9 CM
BACTERIA UR QL AUTO: ABNORMAL /HPF
BACTERIA UR QL AUTO: ABNORMAL /HPF
BILIRUB UR QL STRIP: NEGATIVE
BILIRUB UR QL STRIP: NEGATIVE
BUN SERPL-MCNC: 21 MG/DL (ref 5–25)
BUN SERPL-MCNC: 21 MG/DL (ref 5–25)
CALCIUM SERPL-MCNC: 10.2 MG/DL (ref 8.4–10.2)
CALCIUM SERPL-MCNC: 10.2 MG/DL (ref 8.4–10.2)
CARDIAC TROPONIN I PNL SERPL HS: 13 NG/L
CARDIAC TROPONIN I PNL SERPL HS: 13 NG/L
CHLORIDE SERPL-SCNC: 96 MMOL/L (ref 96–108)
CHLORIDE SERPL-SCNC: 96 MMOL/L (ref 96–108)
CLARITY UR: CLEAR
CLARITY UR: CLEAR
CO2 SERPL-SCNC: 26 MMOL/L (ref 21–32)
CO2 SERPL-SCNC: 26 MMOL/L (ref 21–32)
COLOR UR: COLORLESS
COLOR UR: COLORLESS
CREAT SERPL-MCNC: 1.43 MG/DL (ref 0.6–1.3)
CREAT SERPL-MCNC: 1.43 MG/DL (ref 0.6–1.3)
E WAVE DECELERATION TIME: 187 MS
E WAVE DECELERATION TIME: 187 MS
ERYTHROCYTE [DISTWIDTH] IN BLOOD BY AUTOMATED COUNT: 13.9 % (ref 11.6–15.1)
ERYTHROCYTE [DISTWIDTH] IN BLOOD BY AUTOMATED COUNT: 13.9 % (ref 11.6–15.1)
FRACTIONAL SHORTENING: 30 (ref 28–44)
FRACTIONAL SHORTENING: 30 (ref 28–44)
GFR SERPL CREATININE-BSD FRML MDRD: 32 ML/MIN/1.73SQ M
GFR SERPL CREATININE-BSD FRML MDRD: 32 ML/MIN/1.73SQ M
GLUCOSE SERPL-MCNC: 121 MG/DL (ref 65–140)
GLUCOSE SERPL-MCNC: 121 MG/DL (ref 65–140)
GLUCOSE SERPL-MCNC: 123 MG/DL (ref 65–140)
GLUCOSE SERPL-MCNC: 123 MG/DL (ref 65–140)
GLUCOSE UR STRIP-MCNC: NEGATIVE MG/DL
GLUCOSE UR STRIP-MCNC: NEGATIVE MG/DL
HCT VFR BLD AUTO: 53.5 % (ref 34.8–46.1)
HCT VFR BLD AUTO: 53.5 % (ref 34.8–46.1)
HGB BLD-MCNC: 17.7 G/DL (ref 11.5–15.4)
HGB BLD-MCNC: 17.7 G/DL (ref 11.5–15.4)
HGB UR QL STRIP.AUTO: ABNORMAL
HGB UR QL STRIP.AUTO: ABNORMAL
INR PPP: 1 (ref 0.84–1.19)
INR PPP: 1 (ref 0.84–1.19)
INTERVENTRICULAR SEPTUM IN DIASTOLE (PARASTERNAL SHORT AXIS VIEW): 1.2 CM
INTERVENTRICULAR SEPTUM IN DIASTOLE (PARASTERNAL SHORT AXIS VIEW): 1.2 CM
INTERVENTRICULAR SEPTUM: 1.2 CM (ref 0.6–1.1)
INTERVENTRICULAR SEPTUM: 1.2 CM (ref 0.6–1.1)
KETONES UR STRIP-MCNC: NEGATIVE MG/DL
KETONES UR STRIP-MCNC: NEGATIVE MG/DL
LAAS-AP2: 15.7 CM2
LAAS-AP2: 15.7 CM2
LAAS-AP4: 13.8 CM2
LAAS-AP4: 13.8 CM2
LEFT ATRIUM SIZE: 3.9 CM
LEFT ATRIUM SIZE: 3.9 CM
LEFT INTERNAL DIMENSION IN SYSTOLE: 2.6 CM (ref 2.1–4)
LEFT INTERNAL DIMENSION IN SYSTOLE: 2.6 CM (ref 2.1–4)
LEFT VENTRICULAR INTERNAL DIMENSION IN DIASTOLE: 3.7 CM (ref 3.5–6)
LEFT VENTRICULAR INTERNAL DIMENSION IN DIASTOLE: 3.7 CM (ref 3.5–6)
LEFT VENTRICULAR POSTERIOR WALL IN END DIASTOLE: 1.2 CM
LEFT VENTRICULAR POSTERIOR WALL IN END DIASTOLE: 1.2 CM
LEFT VENTRICULAR STROKE VOLUME: 32 ML
LEFT VENTRICULAR STROKE VOLUME: 32 ML
LEUKOCYTE ESTERASE UR QL STRIP: NEGATIVE
LEUKOCYTE ESTERASE UR QL STRIP: NEGATIVE
LVSV (TEICH): 32 ML
LVSV (TEICH): 32 ML
MCH RBC QN AUTO: 28.7 PG (ref 26.8–34.3)
MCH RBC QN AUTO: 28.7 PG (ref 26.8–34.3)
MCHC RBC AUTO-ENTMCNC: 33.1 G/DL (ref 31.4–37.4)
MCHC RBC AUTO-ENTMCNC: 33.1 G/DL (ref 31.4–37.4)
MCV RBC AUTO: 87 FL (ref 82–98)
MCV RBC AUTO: 87 FL (ref 82–98)
MV E'TISSUE VEL-SEP: 8 CM/S
MV E'TISSUE VEL-SEP: 8 CM/S
MV PEAK A VEL: 0.96 M/S
MV PEAK A VEL: 0.96 M/S
MV PEAK E VEL: 45 CM/S
MV PEAK E VEL: 45 CM/S
MV STENOSIS PRESSURE HALF TIME: 54 MS
MV STENOSIS PRESSURE HALF TIME: 54 MS
MV VALVE AREA P 1/2 METHOD: 4.07
MV VALVE AREA P 1/2 METHOD: 4.07
NITRITE UR QL STRIP: NEGATIVE
NITRITE UR QL STRIP: NEGATIVE
NON-SQ EPI CELLS URNS QL MICRO: ABNORMAL /HPF
NON-SQ EPI CELLS URNS QL MICRO: ABNORMAL /HPF
PH UR STRIP.AUTO: 7 [PH]
PH UR STRIP.AUTO: 7 [PH]
PLATELET # BLD AUTO: 357 THOUSANDS/UL (ref 149–390)
PLATELET # BLD AUTO: 357 THOUSANDS/UL (ref 149–390)
PMV BLD AUTO: 9.5 FL (ref 8.9–12.7)
PMV BLD AUTO: 9.5 FL (ref 8.9–12.7)
POTASSIUM SERPL-SCNC: 5.2 MMOL/L (ref 3.5–5.3)
POTASSIUM SERPL-SCNC: 5.2 MMOL/L (ref 3.5–5.3)
PROT UR STRIP-MCNC: ABNORMAL MG/DL
PROT UR STRIP-MCNC: ABNORMAL MG/DL
PROTHROMBIN TIME: 13.4 SECONDS (ref 11.6–14.5)
PROTHROMBIN TIME: 13.4 SECONDS (ref 11.6–14.5)
RBC # BLD AUTO: 6.17 MILLION/UL (ref 3.81–5.12)
RBC # BLD AUTO: 6.17 MILLION/UL (ref 3.81–5.12)
RBC #/AREA URNS AUTO: ABNORMAL /HPF
RBC #/AREA URNS AUTO: ABNORMAL /HPF
RIGHT ATRIUM AREA SYSTOLE A4C: 12.6 CM2
RIGHT ATRIUM AREA SYSTOLE A4C: 12.6 CM2
RIGHT VENTRICLE ID DIMENSION: 3.8 CM
RIGHT VENTRICLE ID DIMENSION: 3.8 CM
SINOTUBULAR JUNCTION: 2.8 CM
SINOTUBULAR JUNCTION: 2.8 CM
SL CV LEFT ATRIUM LENGTH A2C: 5.1 CM
SL CV LEFT ATRIUM LENGTH A2C: 5.1 CM
SL CV LV EF: 65
SL CV LV EF: 65
SL CV PED ECHO LEFT VENTRICLE DIASTOLIC VOLUME (MOD BIPLANE) 2D: 57 ML
SL CV PED ECHO LEFT VENTRICLE DIASTOLIC VOLUME (MOD BIPLANE) 2D: 57 ML
SL CV PED ECHO LEFT VENTRICLE SYSTOLIC VOLUME (MOD BIPLANE) 2D: 25 ML
SL CV PED ECHO LEFT VENTRICLE SYSTOLIC VOLUME (MOD BIPLANE) 2D: 25 ML
SL CV SINUS OF VALSALVA 2D: 3.8 CM
SL CV SINUS OF VALSALVA 2D: 3.8 CM
SODIUM SERPL-SCNC: 133 MMOL/L (ref 135–147)
SODIUM SERPL-SCNC: 133 MMOL/L (ref 135–147)
SP GR UR STRIP.AUTO: 1.05 (ref 1–1.03)
SP GR UR STRIP.AUTO: 1.05 (ref 1–1.03)
STJ: 2.8 CM
STJ: 2.8 CM
TRICUSPID ANNULAR PLANE SYSTOLIC EXCURSION: 1.5 CM
TRICUSPID ANNULAR PLANE SYSTOLIC EXCURSION: 1.5 CM
UROBILINOGEN UR STRIP-ACNC: <2 MG/DL
UROBILINOGEN UR STRIP-ACNC: <2 MG/DL
WBC # BLD AUTO: 18.27 THOUSAND/UL (ref 4.31–10.16)
WBC # BLD AUTO: 18.27 THOUSAND/UL (ref 4.31–10.16)
WBC #/AREA URNS AUTO: ABNORMAL /HPF
WBC #/AREA URNS AUTO: ABNORMAL /HPF

## 2023-06-13 PROCEDURE — 36415 COLL VENOUS BLD VENIPUNCTURE: CPT | Performed by: SURGERY

## 2023-06-13 PROCEDURE — 84132 ASSAY OF SERUM POTASSIUM: CPT

## 2023-06-13 PROCEDURE — NC001 PR NO CHARGE: Performed by: SURGERY

## 2023-06-13 PROCEDURE — 81001 URINALYSIS AUTO W/SCOPE: CPT

## 2023-06-13 PROCEDURE — 93306 TTE W/DOPPLER COMPLETE: CPT

## 2023-06-13 PROCEDURE — NC001 PR NO CHARGE: Performed by: EMERGENCY MEDICINE

## 2023-06-13 PROCEDURE — 99223 1ST HOSP IP/OBS HIGH 75: CPT | Performed by: PSYCHIATRY & NEUROLOGY

## 2023-06-13 PROCEDURE — 85610 PROTHROMBIN TIME: CPT | Performed by: SURGERY

## 2023-06-13 PROCEDURE — 96375 TX/PRO/DX INJ NEW DRUG ADDON: CPT

## 2023-06-13 PROCEDURE — 99291 CRITICAL CARE FIRST HOUR: CPT

## 2023-06-13 PROCEDURE — 99205 OFFICE O/P NEW HI 60 MIN: CPT | Performed by: SURGERY

## 2023-06-13 PROCEDURE — 93306 TTE W/DOPPLER COMPLETE: CPT | Performed by: INTERNAL MEDICINE

## 2023-06-13 PROCEDURE — 87081 CULTURE SCREEN ONLY: CPT | Performed by: INTERNAL MEDICINE

## 2023-06-13 PROCEDURE — 85027 COMPLETE CBC AUTOMATED: CPT | Performed by: SURGERY

## 2023-06-13 PROCEDURE — 80048 BASIC METABOLIC PNL TOTAL CA: CPT | Performed by: SURGERY

## 2023-06-13 PROCEDURE — 82948 REAGENT STRIP/BLOOD GLUCOSE: CPT

## 2023-06-13 PROCEDURE — 71045 X-RAY EXAM CHEST 1 VIEW: CPT

## 2023-06-13 PROCEDURE — 85014 HEMATOCRIT: CPT

## 2023-06-13 PROCEDURE — 84484 ASSAY OF TROPONIN QUANT: CPT | Performed by: SURGERY

## 2023-06-13 PROCEDURE — 82330 ASSAY OF CALCIUM: CPT

## 2023-06-13 PROCEDURE — 85730 THROMBOPLASTIN TIME PARTIAL: CPT | Performed by: SURGERY

## 2023-06-13 PROCEDURE — 93005 ELECTROCARDIOGRAM TRACING: CPT

## 2023-06-13 PROCEDURE — 3E03317 INTRODUCTION OF OTHER THROMBOLYTIC INTO PERIPHERAL VEIN, PERCUTANEOUS APPROACH: ICD-10-PCS | Performed by: PSYCHIATRY & NEUROLOGY

## 2023-06-13 PROCEDURE — 82947 ASSAY GLUCOSE BLOOD QUANT: CPT

## 2023-06-13 PROCEDURE — 82803 BLOOD GASES ANY COMBINATION: CPT

## 2023-06-13 PROCEDURE — 96374 THER/PROPH/DIAG INJ IV PUSH: CPT

## 2023-06-13 PROCEDURE — 70498 CT ANGIOGRAPHY NECK: CPT

## 2023-06-13 PROCEDURE — 70496 CT ANGIOGRAPHY HEAD: CPT

## 2023-06-13 PROCEDURE — 84295 ASSAY OF SERUM SODIUM: CPT

## 2023-06-13 PROCEDURE — 99285 EMERGENCY DEPT VISIT HI MDM: CPT

## 2023-06-13 RX ORDER — CHLORHEXIDINE GLUCONATE 0.12 MG/ML
15 RINSE ORAL EVERY 12 HOURS SCHEDULED
Status: DISCONTINUED | OUTPATIENT
Start: 2023-06-13 | End: 2023-06-19 | Stop reason: HOSPADM

## 2023-06-13 RX ORDER — SODIUM CHLORIDE, SODIUM GLUCONATE, SODIUM ACETATE, POTASSIUM CHLORIDE, MAGNESIUM CHLORIDE, SODIUM PHOSPHATE, DIBASIC, AND POTASSIUM PHOSPHATE .53; .5; .37; .037; .03; .012; .00082 G/100ML; G/100ML; G/100ML; G/100ML; G/100ML; G/100ML; G/100ML
50 INJECTION, SOLUTION INTRAVENOUS CONTINUOUS
Status: DISCONTINUED | OUTPATIENT
Start: 2023-06-13 | End: 2023-06-16

## 2023-06-13 RX ORDER — LABETALOL HYDROCHLORIDE 5 MG/ML
10 INJECTION, SOLUTION INTRAVENOUS EVERY 6 HOURS PRN
Status: DISCONTINUED | OUTPATIENT
Start: 2023-06-13 | End: 2023-06-19 | Stop reason: HOSPADM

## 2023-06-13 RX ORDER — ATORVASTATIN CALCIUM 40 MG/1
40 TABLET, FILM COATED ORAL EVERY EVENING
Status: DISCONTINUED | OUTPATIENT
Start: 2023-06-14 | End: 2023-06-19 | Stop reason: HOSPADM

## 2023-06-13 RX ORDER — LABETALOL HYDROCHLORIDE 5 MG/ML
10 INJECTION, SOLUTION INTRAVENOUS ONCE
Status: COMPLETED | OUTPATIENT
Start: 2023-06-13 | End: 2023-06-13

## 2023-06-13 RX ADMIN — Medication 21 MG: at 09:46

## 2023-06-13 RX ADMIN — IOHEXOL 85 ML: 350 INJECTION, SOLUTION INTRAVENOUS at 09:36

## 2023-06-13 RX ADMIN — SODIUM CHLORIDE, SODIUM GLUCONATE, SODIUM ACETATE, POTASSIUM CHLORIDE, MAGNESIUM CHLORIDE, SODIUM PHOSPHATE, DIBASIC, AND POTASSIUM PHOSPHATE 100 ML/HR: .53; .5; .37; .037; .03; .012; .00082 INJECTION, SOLUTION INTRAVENOUS at 14:36

## 2023-06-13 RX ADMIN — LABETALOL HYDROCHLORIDE 10 MG: 5 INJECTION, SOLUTION INTRAVENOUS at 09:29

## 2023-06-13 RX ADMIN — CHLORHEXIDINE GLUCONATE 15 ML: 1.2 SOLUTION ORAL at 21:00

## 2023-06-13 NOTE — ASSESSMENT & PLAN NOTE
Lab Results   Component Value Date    CREATININE 1 43 (H) 06/13/2023    EGFR 32 06/13/2023     · sCr on admission - 1 43  · Baseline sCr - 1 36 to 1 6 as of 2021, pt at baseline  · Start IVF Isolyte 100cc/hr, examines dry and hem concentrated    · Monitor I&O   · Avoid nephrotoxins and hypotension  · BMP daily

## 2023-06-13 NOTE — PLAN OF CARE
Problem: SAFETY ADULT  Goal: Maintain or return to baseline ADL function  Description: INTERVENTIONS:  -  Assess patient's ability to carry out ADLs; assess patient's baseline for ADL function and identify physical deficits which impact ability to perform ADLs (bathing, care of mouth/teeth, toileting, grooming, dressing, etc )  - Assess/evaluate cause of self-care deficits   - Assess range of motion  - Assess patient's mobility; develop plan if impaired  - Assess patient's need for assistive devices and provide as appropriate  - Encourage maximum independence but intervene and supervise when necessary  - Involve family in performance of ADLs  - Assess for home care needs following discharge   - Consider OT consult to assist with ADL evaluation and planning for discharge  - Provide patient education as appropriate  Outcome: Progressing  Goal: Maintains/Returns to pre admission functional level  Description: INTERVENTIONS:  - Perform BMAT or MOVE assessment daily    - Set and communicate daily mobility goal to care team and patient/family/caregiver     - Collaborate with rehabilitation services on mobility goals if consulted  - Out of bed for toileting  - Record patient progress and toleration of activity level   Outcome: Progressing  Goal: Patient will remain free of falls  Description: INTERVENTIONS:  - Educate patient/family on patient safety including physical limitations  - Instruct patient to call for assistance with activity   - Consult OT/PT to assist with strengthening/mobility   - Keep Call bell within reach  - Keep bed low and locked with side rails adjusted as appropriate  - Keep care items and personal belongings within reach  - Initiate and maintain comfort rounds  - Make Fall Risk Sign visible to staff  - Apply yellow socks and bracelet for high fall risk patients  - Consider moving patient to room near nurses station  Outcome: Progressing     Problem: Knowledge Deficit  Goal: Patient/family/caregiver demonstrates understanding of disease process, treatment plan, medications, and discharge instructions  Description: Complete learning assessment and assess knowledge base    Interventions:  - Provide teaching at level of understanding  - Provide teaching via preferred learning methods  Outcome: Progressing

## 2023-06-13 NOTE — ASSESSMENT & PLAN NOTE
- Status post fall secondary to acute CVA  - Fall precautions   - PT and OT evaluation and treatment as indicated    - Case Management consultation for disposition planning   - No acute traumatic injuries, trauma team will sign off

## 2023-06-13 NOTE — ASSESSMENT & PLAN NOTE
· WBC - 18 27 on admission  · Pt remains afebrile  · Send UA with reflex  · MRSA pending  · VS per protocol  · Hold ABX and BCx for now

## 2023-06-13 NOTE — CONSULTS
[facility]  Consult  Name: Paula Andino 80 y o  male I MRN: [id number]  Unit/Bed#: ICU 07 I Date of Admission: 6/13/2023   Date of Service: 6/13/2023 I Hospital Day: 0      Assessment/Plan   Stroke [facility])  Assessment & Plan  · Pt presented as a trauma, s/p witness fall at his nursing facility  · In trauma bay pt was noted to have unequal pupils, was not verbally responsive and noted to have right sided weakness  · Initial NIHSS: 12  · Initial GCS 10  · Last known well time 0730 this morning  · Pt does not take any AC or AP  · CTA - No intracranial large vessel occlusion  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  Focal severe stenosis at proximal P2 and P3 segment of right PCA  No significant stenosis or dissection of cervical carotid and vertebral arteries  · Pt received TNK at 0946    Plan:  · Repeat CT head in 24h (6/14 @ 0946)  · Obtain STAT CT head if GCS drop by 2 or pt c/o severe headache  · Obtain MRI brain  · Obtain TTE  · Continue neuro-checks per TNK pathway  · Continue VS per post TNK protocol  · BP goal; SBP < 180, DBP < 105 for 24 hours  · Hold all AC/AP for now, plan to restart once 24h follow CT head done  · Start Atorvastatin 40mg PO daily, pending swallow eval   · Follow up lipid profile, HbA1c  · POCT glucose with goal glucose 140-180  · Tele monitoring  · PT/OR eval and treat  · PMR consult when appropriate  · CM consult for dispo planning    CKD (chronic kidney disease)  Assessment & Plan  Lab Results   Component Value Date    CREATININE 1 43 (H) 06/13/2023    EGFR 32 06/13/2023     · sCr on admission - 1 43  · Baseline sCr - 1 36 to 1 6 as of 2021, pt at baseline  · Start IVF Isolyte 100cc/hr, examines dry and hem concentrated    · Monitor I&O   · Avoid nephrotoxins and hypotension  · BMP daily    Essential hypertension  Assessment & Plan  · Home regimen: Norvasc 2 5mg PO daily, Coreg 6 25mg PO BID, Lisinopril 10mg PO "daily  · Hold home regimen for now allowing permissive HTN in setting of CVA  · Restart 24h post TNK after speech eval   · Will utilize PRN antihypertensives if SBP > 180    Leukocytosis  Assessment & Plan  · WBC - 18 27 on admission  · Pt remains afebrile  · Send UA with reflex  · MRSA pending  · VS per protocol  · Hold ABX and BCx for now    Hyponatremia  Assessment & Plan  · Sodium - 133  · Baseline 132 to 135, at baseline  · No intervention or workup at this time  · Repeat BMP in AM    Constipation  Assessment & Plan  · Hold home regimen until seen and cleared by speech    Fall  Assessment & Plan  · Pt presented as witness fall, no reported headstrike  · Pt does not take any AC/AP at home  · Fall likely d/t weakness secondary to CVA  · Imaging negative for traumatic injuries  · PT/OT eval  · Fall precations      History of Present Illness     HPI: Luiz Munoz is a 80 y o  with a PMHx: HTN, CKD, hyponatremia  Pt presented to ED as a trauma alert after a witness fall at his nursing facility  Upon arrival to the trauma bay pt was noted to have unequal pupils R>L, right sided weakness and was not verbally responsive  Initial GCS 10  Trauma workup negative for any acute traumatic injuries  Stroke alert was initiated and neurology evaluated pt in ED, last known well time 0730 this morning  CTH negative for an bleeding, small left parietal hematoma, CTA no LVO, severe stenosis at proximal dorsal branch of right MCA M2 and severe stenosis proximal P2 and P3 of right PCA  He was deemed a candidate for TNK and received TNK at 0946 on 6/13/2023  Labs noted for hyponatremia 133 and leukocytosis 18  Initial BP noted to be 210/104 which improved with Labetalol 10mg IV x 1 dose  Pt unable to participate in exam at this time, will only respond to questions by saying \" yes\"  History obtained from chart review   Pt aphasic    Review of Systems   Unable to perform ROS: Mental status change     Historical Information   No past " medical history on file  No past surgical history on file  No current outpatient medications Not on File     No family history on file  Objective                            Vitals I/O      Most Recent Min/Max in 24hrs   Temp 99 °F (37 2 °C) Temp  Min: 98 5 °F (36 9 °C)  Max: 99 2 °F (37 3 °C)   Pulse 85 Pulse  Min: 84  Max: 111   Resp (!) 27 Resp  Min: 15  Max: 46   /80 BP  Min: 143/84  Max: 210/104   O2 Sat 93 % SpO2  Min: 90 %  Max: 94 %      Intake/Output Summary (Last 24 hours) at 6/13/2023 1419  Last data filed at 6/13/2023 1300  Gross per 24 hour   Intake --   Output 100 ml   Net -100 ml         Diet NPO     Invasive Monitoring Physical exam    Physical Exam  Vitals and nursing note reviewed  Constitutional:       General: She is not in acute distress  Appearance: She is overweight  She is not ill-appearing or toxic-appearing  HENT:      Head: Normocephalic and atraumatic  Mouth/Throat:      Mouth: Mucous membranes are moist       Pharynx: Oropharynx is clear  Eyes:      Comments: R pupil 3  L pupil 2    Cardiovascular:      Rate and Rhythm: Normal rate and regular rhythm  Pulses: Normal pulses  Radial pulses are 2+ on the right side and 2+ on the left side  Dorsalis pedis pulses are 2+ on the right side and 2+ on the left side  Heart sounds: Normal heart sounds  Pulmonary:      Effort: Pulmonary effort is normal       Breath sounds: Normal breath sounds  Abdominal:      General: Abdomen is flat  Bowel sounds are normal  There is no distension  Palpations: Abdomen is soft  Tenderness: There is no abdominal tenderness  There is no guarding  Musculoskeletal:         General: Normal range of motion  Cervical back: Full passive range of motion without pain, normal range of motion and neck supple  Right lower leg: No edema  Left lower leg: No edema  Skin:     General: Skin is warm and dry        Capillary Refill: Capillary refill takes less than 2 seconds  Neurological:      Mental Status: She is disoriented  GCS: GCS eye subscore is 4  GCS verbal subscore is 4  GCS motor subscore is 5  Sensory: Sensation is intact  Psychiatric:         Behavior: Behavior is uncooperative  Comments: ALEXANDRA          Diagnostic Studies      EKG: NSR 80's  Imaging: I have personally reviewed pertinent reports  and I have personally reviewed pertinent films in PACS     Medications:  Scheduled PRN   [START ON 6/14/2023] atorvastatin, 40 mg, QPM  chlorhexidine, 15 mL, Q12H [address]          Continuous          Labs:    CBC    Recent Labs     06/13/23 0930   HCT 53 5*   HGB 17 7*      WBC 18 27*     BMP    Recent Labs     06/13/23 0930   AGAP 11   BUN 21   CALCIUM 10 2   CL 96   CO2 26   CREATININE 1 43*   K 5 2   SODIUM 133*       Coags    Recent Labs     06/13/23 0930   INR 1 00   PTT 26        Additional Electrolytes  No recent results       Blood Gas    No recent results  No recent results LFTs  No recent results    Infectious  No recent results  Glucose  Recent Labs     06/13/23  0930   GLUC 123        Critical Care Time Delivered: Upon my evaluation, this patient had a high probability of imminent or life-threatening deterioration due to CVA, which required my direct attention, intervention, and personal management  I have personally provided 70 minutes of critical care time, exclusive of procedures, teaching, family meetings, and any prior time recorded by providers other than myself        [address] [place] [address]

## 2023-06-13 NOTE — ASSESSMENT & PLAN NOTE
· Pt presented as witness fall, no reported headstrike  · Pt does not take any AC/AP at home  · Fall likely d/t weakness secondary to CVA  · Imaging negative for traumatic injuries  · PT/OT eval  · Fall precations

## 2023-06-13 NOTE — ASSESSMENT & PLAN NOTE
INFECTIOUS DISEASE CONSULTATION       DATE OF CONSULTATION:  03/14/2017       REQUESTING PHYSICIAN:  Nilay Ann MD      REASON FOR CONSULTATION:  I was asked by Dr. Ann to assess gangrenous left fifth toe.      IMPRESSION:   1.  A 69-year-old male with diabetes mellitus.   2.  Peripheral vascular disease.  Status post left SFA and popliteal balloon angioplasty and stenting 1 week ago.   3.  Admitted on this occasion for development of dry gangrene of the left fifth toe with surrounding erythema.      RECOMMENDATIONS:   1.  Will continue coverage with IV Zosyn.   2.  Will discontinue vancomycin to minimize the chance of nephrotoxicity.   3.  The patient likely to undergo amputation of the left fifth toe.   4.  Will follow up on cultures.      HISTORY OF PRESENT ILLNESS:  Zia Hopkins is a pleasant 69-year-old male with diabetes mellitus who about 1-1/2 months ago began to note some necrosis of the left fifth toe.  He was seen by his primary care physician and referred to Vascular Surgery.  Last week he underwent the vascular procedure and was feeling better postoperatively.  In the last day or two the patient noticed increased redness and swelling of the left toes and left foot with worsening gangrenous changes of the fifth toe and increase in erythema.  He was referred to Dr. Ann in Podiatry and admitted to the hospital for likely amputation of the toe.  He recently was given a shot of what likely was ceftriaxone followed by oral Bactrim.  There have been no accompanying fevers or chills.      PAST MEDICAL HISTORY:   1.  Diabetes mellitus.   2.  Peripheral vascular disease with recent procedures as described above.   3.  Hyperlipidemia.      FAMILY HISTORY:  Noncontributory.      ALLERGIES:  None known.       REVIEW OF SYSTEMS:  Negative other than that described above.      SOCIAL HISTORY:   and lives at home.  Retired.      PHYSICAL EXAMINATION:   VITAL SIGNS:  Temperature 98.4, blood  · Pt presented as a trauma, s/p witness fall at his nursing facility  · In trauma bay pt was noted to have unequal pupils, was not verbally responsive and noted to have right sided weakness  · Initial NIHSS: 12  · Initial GCS 10  · Last known well time 0730 this morning  · Pt does not take any AC or AP  · CTA - No intracranial large vessel occlusion  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  Focal severe stenosis at proximal P2 and P3 segment of right PCA  No significant stenosis or dissection of cervical carotid and vertebral arteries  · Pt received TNK at 0946    Plan:  · Repeat CT head in 24h (6/14 @ 0946)  · Obtain STAT CT head if GCS drop by 2 or pt c/o severe headache    · Obtain MRI brain  · Obtain TTE  · Continue neuro-checks per TNK pathway  · Continue VS per post TNK protocol  · BP goal; SBP < 180, DBP < 105 for 24 hours  · Hold all AC/AP for now, plan to restart once 24h follow CT head done  · Start Atorvastatin 40mg PO daily, pending swallow eval   · Follow up lipid profile, HbA1c  · POCT glucose with goal glucose 140-180  · Tele monitoring  · PT/OR eval and treat  · PMR consult when appropriate  · CM consult for dispo planning pressure 175/97, heart rate 82 and regular.   GENERAL:  Well-developed, well-nourished, alert and oriented, does not look acutely ill.   SKIN:  No rashes or nodules.   HEENT:  Eyes:  No subconjunctival hemorrhages or scleral icterus.  Oropharynx without erythema or exudate.   NECK:  Supple, no thyromegaly.   LYMPH:  No cervical or axillary lymphadenopathy.   LUNGS:  Clear to auscultation, no use of accessory muscles of respiration.   COR:  S1, S2 normal, no S3, S4 or murmur.   ABDOMEN:  Soft, nontender, no mass or hepatosplenomegaly.   EXTREMITIES:  There are dry gangrenous changes in the left fifth toe with surrounding mild to moderate erythema.  There are lesser chronic ulcerations of second and fourth toes with mild erythema, no purulent drainage.      For further details of the patient's history, please refer to the chart.  Thank you very much for this consultation.         DAVID EM MD             D: 2017 15:42   T: 2017 16:20   MT: HARISH      Name:     SKY GAYTAN   MRN:      [id number]        Account:       [id number]   :      1948           Consult Date:  2017      Document: [id number]       cc: Nilay Ann DPM

## 2023-06-13 NOTE — ED NOTES
Patient remains in ED, unable to give report at this time       Claribel Cadet RN  06/13/23 8968 Dutasteride Pregnancy And Lactation Text: This medication is absolutely contraindicated in women, especially during pregnancy and breast feeding. Feminization of male fetuses is possible if taking while pregnant.

## 2023-06-13 NOTE — PROCEDURES
POC FAST US    Date/Time: 6/13/2023 11:13 AM    Performed by: Nessa Velasquez MD  Authorized by: Nessa Velasquez MD    Patient location:  Trauma  Procedure details:     Exam Type:  Diagnostic    Indications: blunt abdominal trauma and blunt chest trauma      Assess for:  Intra-abdominal fluid and pericardial effusion    Technique: FAST      Views obtained:  Heart - Pericardial sac, LUQ - Splenorenal space, Suprapubic - Pouch of Anish and RUQ - Tony's Pouch    Image quality: diagnostic      Image availability:  Images available in PACS and video obtained  FAST Findings:     RUQ (Hepatorenal) free fluid: absent      LUQ (Splenorenal) free fluid: absent      Suprapubic free fluid: absent      Cardiac wall motion: identified      Pericardial effusion: absent    Interpretation:     Impressions: negative

## 2023-06-13 NOTE — ASSESSMENT & PLAN NOTE
"Assessment:  John Stubbs is a 80 y o  male  who presented as stroke alert on 6/13/2023 and LKW 7:30 AM on the day of presentation  Initial presenting deficits were fascia, right upper extremity sensory deficit right upper extremity weakness  CT head and CTA were negative for any acute pathology  Pt was found to be a thrombolytics candidate within the appropriate time window and without obvious contraindication and TNK was therefore was given  Workup: In the ED, Presenting BP Blood Pressure: (!) 210/104  CT Head showed: Acute intracranial pathology  CTA head and neck showed: No intracranial large vessel occlusion  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  Focal severe stenosis at proximal P2 and P3 segment of right PCA  No hemodynamically significant stenosis or dissection of cervical carotid and vertebral arteries  Repeat CTH at 24 hours: Negative for bleed  MRI brain 6/15: Acute small multifocal infarcts in left perirolandic, left posterior frontal, left parietal, left posterior temporal, and bilateral occipital lobes  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 with chronic hemosiderin deposition, likely sequela of hemorrhagic infarct  Chronic small infarcts in left cerebellum  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  Lab Results   Component Value Date    HGBA1C 5 7 (H) 06/14/2023     No results found for: \"TSH\"  Lab Results   Component Value Date    CHOLESTEROL 211 (H) 06/14/2023    HDL 59 06/14/2023    LDLCALC 127 (H) 06/14/2023    TRIG 127 06/14/2023       Risk factors: hypertension and prior stroke    Pertinent Scores:   - NIHSS: 12    Impression: Significant concern for stroke    Plan:  - Normotension  - ASA 81mg daily  - Atorvastatin 40 mg q d   - Patient will need outpatient evaluation with cardiology   Recommend Zio patch placement and if Zio patch is negative then recommend loop recorder placement to evaluate for potential " atrial fibrillation, given the cardioembolic appearance of his strokes    - Rest of care per primary team

## 2023-06-13 NOTE — LETTER
Thank you for allowing us to participate in the care of your patient, Nuno Nunn, who was hospitalized from [unfilled] through 6/16/2023 with the admitting diagnosis of stroke, suspected cardiogenic source  Patient need Zio patch and loop recorder on discharge  If you have any additional questions or would like to discuss further, please feel free to contact me      Kassidy Espinal MD  [place] [address] Internal Medicine, Hospitalist  [phone]

## 2023-06-13 NOTE — PROGRESS NOTES
[facility]  Progress Note  Name: Flash De Souza  MRN: [id number]  Unit/Bed#: ICU 07 I Date of Admission: 6/13/2023   Date of Service: 6/14/2023 I Hospital Day: 1    Assessment/Plan   Fall  Assessment & Plan  - Status post fall secondary to acute CVA  - Fall precautions   - PT and OT evaluation and treatment as indicated    - Case Management consultation for disposition planning   - No acute traumatic injuries, trauma team will sign off           TRAUMA TERTIARY SURVEY NOTE    VTE Prophylaxis:Reason for no pharmacologic prophylaxis Acute CVA     Disposition: Continue per primary team - trauma team will sign off    Code status:  Level 1 - Full Code    Consultants: IP CONSULT TO NEUROLOGY  IP CONSULT TO CASE MANAGEMENT  IP CONSULT TO CASE MANAGEMENT  IP CONSULT TO NUTRITION SERVICES    Subjective   Transfer from: Not a transfer    Mechanism of Injury:Fall     Chief Complaint: Patient non-verbal    HPI/Last 24 hour events: No overnight events     Objective   Vitals:   Temp:  [98 °F (36 7 °C)-99 7 °F (37 6 °C)] 98 °F (36 7 °C)  HR:  [] 90  Resp:  [15-46] 21  BP: (137-210)/() 157/88    I/O       06/11 0701  06/12 0700 06/12 0701  06/13 0700 06/13 0701  06/14 0700    I V  (mL/kg)   140 (1 8)    Total Intake(mL/kg)   140 (1 8)    Urine (mL/kg/hr)   260    Total Output   260    Net   -120                  Physical Exam:   GENERAL APPEARANCE: No acute distress resting supine   NEURO: Awake to stimulus, non verbal  HEENT: Mucus membranes moist  CV: RRR S1 S2 without murmur, rub, or gallop  LUNGS: Clear to ausc bilaterally without wheezes, crackles, or rhonchi  GI: Soft, nontender nondistended  : Voiding independently  MSK: LUE non tender with swelling, extremities otherwise non tender without deformity  SKIN: warm, dry, intact    Invasive Devices     Peripheral Intravenous Line  Duration           Peripheral IV 06/14/23 Distal;Right;Upper;Ventral (anterior) Arm <1 day          Drain Duration           External Urinary Catheter <1 day                   1  Before the illness or injury that brought you to the Emergency, did you need someone to help you on a regular basis? 1=Yes   2  Since the illness or injury that brought you to the Emergency, have you needed more help than usual to take care of yourself? 1=Yes   3  Have you been hospitalized for one or more nights during the past 6 months (excluding a stay in the Emergency Department)? 0=No   4  In general, do you see well? 0=Yes   5  In general, do you have serious problems with your memory? 1=Yes   6  Do you take more than three different medications everyday?  1=Yes   TOTAL   4     Did you order a geriatric consult if the score was 2 or greater?: Recommend but defer to primary team         Lab Results:   BMP/CMP:   Lab Results   Component Value Date    BUN 21 06/13/2023    CALCIUM 10 2 06/13/2023    CL 96 06/13/2023    CO2 26 06/13/2023    CREATININE 1 43 (H) 06/13/2023    EGFR 32 06/13/2023    K 5 2 06/13/2023    SODIUM 133 (L) 06/13/2023    and CBC:   Lab Results   Component Value Date    HCT 53 5 (H) 06/13/2023    HGB 17 7 (H) 06/13/2023    MCH 28 7 06/13/2023    MCHC 33 1 06/13/2023    MCV 87 06/13/2023    MPV 9 5 06/13/2023     06/13/2023    RBC 6 17 (H) 06/13/2023    RDW 13 9 06/13/2023    WBC 18 27 (H) 06/13/2023       Imaging Results: I have personally reviewed pertinent films in PACS  Chest Xray(s): negative for acute findings   FAST exam(s): negative for acute findings   CT Scan(s): positive for acute findings: short segment severe intracranial stenosis   Additional Xray(s): N/A     Other Studies: none

## 2023-06-13 NOTE — QUICK NOTE
Patient Name: Jill Kruse  Patient MRN: [id number]   Date: 06/13/23   Time: 5:09 PM   Unit/Bed: ICU 07    Family/Relationship: Luciarico Yessenia (Son)    Location: Bedside    Content of meeting: update on patient status, plan of care    Family understanding of patient's condition: Good    Comments: None    Time spent: 5 minutes    ROXY Toribio Che

## 2023-06-13 NOTE — H&P
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  Focal severe stenosis at proximal P2 and P3 segment of right PCA  Pt was given TNK as he was inside timeframe  Case discussed with critical care; pt accepted to ICU  History of Present Illness     Chief Complaint: fall, headstrike, aphasia  Mechanism:Fall     HPI:    Jaxon Neal is a 80 y o  female who presents as a witnessed fall with headstrike, no thinners or ASA  Pt was walking to breakfast when he had a witnessed fall  After the fall, it was noted pt had unequal pupils and was not verbally responding to staff at nursing home  Pt is normally, alert, oriented, speaks in full sentences, walks with walker at baseline  Last known normal at 0730  Review of Systems   Unable to perform ROS: Patient nonverbal     12-point, complete review of systems was reviewed and negative except as stated above  Historical Information     No past medical history on file  No past surgical history on file  Unable to obtain history due to aphasia         There is no immunization history on file for this patient  Last Tetanus: 2018  Family History: Non-contributory    1  Before the illness or injury that brought you to the Emergency, did you need someone to help you on a regular basis? 0=No   2  Since the illness or injury that brought you to the Emergency, have you needed more help than usual to take care of yourself? 0=No   3   Have you been hospitalized for one or more nights during the past 6 months (excluding a stay in the Emergency Department)? 1=Yes   4  In general, do you see well? 0=Yes   5  In general, do you have serious problems with your memory? 0=No   6  Do you take more than three different medications everyday? 1=Yes   TOTAL   2     Did you order a geriatric consult if the score was 2 or greater?: n/a     Meds/Allergies   current meds:   Current Facility-Administered Medications   Medication Dose Route Frequency   • [START ON 6/14/2023] atorvastatin (LIPITOR) tablet 40 mg  40 mg Oral QPM   • chlorhexidine (PERIDEX) 0 12 % oral rinse 15 mL  15 mL Mouth/Throat Q12H [address]     Allergies have not been reviewed; Not on File    Objective   Initial Vitals:   Temperature: 98 5 °F (36 9 °C) (06/13/23 0920)  Pulse: (!) 111 (06/13/23 0920)  Respirations: 20 (06/13/23 0920)  Blood Pressure: (!) 210/104 (06/13/23 0920)    Primary Survey:   Airway:        Status: patent;        Pre-hospital Interventions: none        Hospital Interventions: none  Breathing:        Pre-hospital Interventions: none       Effort: normal       Right breath sounds: normal       Left breath sounds: normal  Circulation:        Rhythm: regular       Rate: regular   Right Pulses Left Pulses    R radial: 2+  R femoral: 2+  R pedal: 2+     L radial: 2+  L femoral: 2+  L pedal: 2+       Disability:        GCS: Eye: 4; Verbal: 1 Motor: 5 Total: 10       Right Pupil: 5 mm;  round;  reactive         Left Pupil:  3 mm;  round;  reactive      R Motor Strength L Motor Strength    R : 0/5  R dorsiflex: 0/5  R plantarflex: 0/5 L : 0/5  L dorsiflex: 0/5  L plantarflex: 0/5        Sensory:  Sensory deficit (decreased sensation of R face and RUE)  Exposure:       Completed: Yes    NOTE: PT is not following commands, therefore will give 0 for motor strength bilaterally as pt was not following commands    Secondary Survey:  Physical Exam  Constitutional:       General: She is in acute distress  Appearance: She is ill-appearing     HENT:      Head: Normocephalic and atraumatic  Right Ear: Tympanic membrane normal       Left Ear: Tympanic membrane normal       Nose: Nose normal       Mouth/Throat:      Mouth: Mucous membranes are moist       Pharynx: Oropharynx is clear  Eyes:      Comments: Unequal pupils: L pupil 3mm, R pupil: 5mm   Neck:      Comments: C collar in place  Cardiovascular:      Rate and Rhythm: Normal rate and regular rhythm  Pulses: Normal pulses  Radial pulses are 2+ on the right side and 2+ on the left side  Femoral pulses are 2+ on the right side and 2+ on the left side  Dorsalis pedis pulses are 2+ on the right side and 2+ on the left side  Posterior tibial pulses are 2+ on the right side and 2+ on the left side  Heart sounds: Normal heart sounds, S1 normal and S2 normal    Pulmonary:      Effort: Pulmonary effort is normal       Breath sounds: Normal breath sounds and air entry  Abdominal:      General: Abdomen is flat  Palpations: Abdomen is soft  Musculoskeletal:      Right lower leg: No edema  Left lower leg: No edema  Skin:     General: Skin is warm  Capillary Refill: Capillary refill takes less than 2 seconds  Findings: Abrasion and ecchymosis present  Comments: R hip ecchymosis, R elbow abrasion   Neurological:      GCS: GCS eye subscore is 4  GCS verbal subscore is 1  GCS motor subscore is 5  Cranial Nerves: No facial asymmetry  Sensory: Sensory deficit present  Comments: Expressive and receptive aphasia  Decreased sensation of R face and RUE  Delayed blink reflex of R eye  R arm drift, doesn't hit bed  No facial droop  Not following commands            Invasive Devices     Peripheral Intravenous Line  Duration           Peripheral IV 06/13/23 Left Antecubital <1 day              Lab Results:    Results Reviewed     Procedure Component Value Units Date/Time    HS Troponin I 4hr [[id number]]     Lab Status: No result Specimen: Blood     Basic metabolic panel [[id number]]  (Abnormal) Collected: 06/13/23 0930    Lab Status: Final result Specimen: Blood from Arm, Left Updated: 06/13/23 1006     Sodium 133 mmol/L      Potassium 5 2 mmol/L      Chloride 96 mmol/L      CO2 26 mmol/L      ANION GAP 11 mmol/L      BUN 21 mg/dL      Creatinine 1 43 mg/dL      Glucose 123 mg/dL      Calcium 10 2 mg/dL      eGFR 32 ml/min/1 73sq m     Narrative:      [place] guidelines for Chronic Kidney Disease (CKD):   •  Stage 1 with normal or high GFR (GFR > 90 mL/min/1 73 square meters)  •  Stage 2 Mild CKD (GFR = 60-89 mL/min/1 73 square meters)  •  Stage 3A Moderate CKD (GFR = 45-59 mL/min/1 73 square meters)  •  Stage 3B Moderate CKD (GFR = 30-44 mL/min/1 73 square meters)  •  Stage 4 Severe CKD (GFR = 15-29 mL/min/1 73 square meters)  •  Stage 5 End Stage CKD (GFR <15 mL/min/1 73 square meters)  Note: GFR calculation is accurate only with a steady state creatinine    HS Troponin 0hr (reflex protocol) [[id number]]  (Normal) Collected: 06/13/23 0930    Lab Status: Final result Specimen: Blood from Arm, Left Updated: 06/13/23 1006     hs TnI 0hr 13 ng/L     HS Troponin I 2hr [[id number]]     Lab Status: No result Specimen: Blood     Protime-INR [[id number]]  (Normal) Collected: 06/13/23 0930    Lab Status: Final result Specimen: Blood from Arm, Left Updated: 06/13/23 0955     Protime 13 4 seconds      INR 1 00    APTT [[id number]]  (Normal) Collected: 06/13/23 0930    Lab Status: Final result Specimen: Blood from Arm, Left Updated: 06/13/23 0955     PTT 26 seconds     Fingerstick Glucose (POCT) [[id number]]  (Normal) Collected: 06/13/23 0953    Lab Status: Final result Updated: 06/13/23 0953     POC Glucose 121 mg/dl     CBC and Platelet [[id number]]  (Abnormal) Collected: 06/13/23 0930    Lab Status: Final result Specimen: Blood from Arm, Left Updated: 06/13/23 0944     WBC 18 27 Thousand/uL      RBC 6 17 Million/uL      Hemoglobin 17 7 g/dL      Hematocrit 53 5 % MCV 87 fL      MCH 28 7 pg      MCHC 33 1 g/dL      RDW 13 9 %      Platelets 313 Thousands/uL      MPV 9 5 fL             Imaging Results: I have personally reviewed pertinent reports  Chest Xray(s): negative for acute findings   FAST exam(s): negative for acute findings   CT Scan(s): positive for acute findings: 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  Focal severe stenosis at proximal P2 and P3 segment of right PCA  Additional Xray(s): N/A     CTA stroke alert (head/neck)   Final Result by Todd Rueda MD (06/13 1004)      1  No intracranial large vessel occlusion  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  Focal severe stenosis at proximal P2 and P3 segment of right PCA  2   No hemodynamically significant stenosis or dissection of cervical carotid and vertebral arteries  Findings were directly discussed with Anuradha Sanchez at 9:44 a m  Workstation performed: [id number]         CT stroke alert brain   Final Result by Todd Rueda MD (06/13 1007)      1  Exam is somewhat compromised by motion artifact  2   No acute intracranial CT abnormality  Small left parietal scalp hematoma  3   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  4   Chronic microangiopathy  Findings were directly discussed with Anuradha Sanchez at 9:44 a m  Workstation performed: [id number]         XR Trauma multiple ([facility]B/[facility] trauma bay ONLY)   Final Result by Robert Pittman MD (06/13 0818)      No acute pulmonary pathology  No obvious displaced fractures within limitation of supine AP chest technique           Workstation performed: [id number]         XR chest portable    (Results Pending)   MRI Inpatient Order    (Results Pending)   CT head wo contrast    (Results Pending)         Code Status: Level 1 - Full Code  Advance Directive and Living Will: Power of :    POLST:    I have spent 60 minutes with Patient  today in which greater than 50% of this time was spent in counseling/coordination of care regarding Diagnostic results, Prognosis, Risks and benefits of tx options, Patient and family education, Counseling / Coordination of care, Documenting in the medical record, Reviewing / ordering tests, medicine, procedures  , Obtaining or reviewing history   and Communicating with other healthcare professionals

## 2023-06-13 NOTE — CASE MANAGEMENT
CM responded to trauma alert  Patient was transported into trauma bay by [facility] for evaluation  Patient responsive to some of the medical team's questions and instructions  CM located patients emergency contact from [address]- son, Bora Matias; [phone]  Call made to son, who states he was not aware patient is at the hospital  CM provided general update, and notified trauma will be in touch when able  Son confirmed above number is best for updates  Trauma AP aware of above  No current identified CM needs  CM will follow and update screening, assessment, and discharge planning as appropriate

## 2023-06-13 NOTE — CONSULTS
"Consultation - Stroke   Dena Youngblood 80 y o  female MRN: [id number]  Unit/Bed#: ICU 07 Encounter: [id number]      Assessment/Plan     Stroke [facility])  Assessment & Plan  Assessment:  Dena Youngblood is a 80 y o  female  who presented as stroke alert on 6/13/2023 and LKW 7:30 AM on the day of presentation  Initial presenting deficits were fascia, right upper extremity sensory deficit right upper extremity weakness  CT head and CTA were negative for any acute pathology  Pt was found to be a thrombolytics candidate within the appropriate time window and without obvious contraindication and TNK was therefore was given  Workup: In the ED, Presenting BP Blood Pressure: (!) 210/104  CT Head showed: Acute intracranial pathology  CTA head and neck showed: No intracranial large vessel occlusion  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  Focal severe stenosis at proximal P2 and P3 segment of right PCA   No hemodynamically significant stenosis or dissection of cervical carotid and vertebral arteries        No results found for: \"HGBA1C\"  No results found for: \"TSH\"  No results found for: \"CHOLESTEROL\", \"HDL\", \"LDLCALC\", \"TRIG\"    Risk factors: hypertension and prior stroke    Pertinent Scores:   - NIHSS: 12    Impression: Significant concern for stroke    Plan:  - Recommend admit to hospital on acute ischemic stroke pathway  - MRI brain without contrast  - Continue Neuro checks - Every 1 hour x 4 hours, then every 2 hours x 4, then every 4 hours x 72 hours  - Maintain BP < 180/105 for the first 24 hours, administer antihypertensive agents if above this  - Maintain glucose <180, SSI for coverage if indicated  - Repeat CTH at 24 hours after thrombolytic administration prior to starting AP/AC  - Repeat CTH if GCS drops 2pts in 1hr or if given that the pt is post tPA if the pt is reporting severe headache, acute increase in BP, or N/V  - Fasting lipid panel & hemoglobin A1c  - PT/OT/ST/PM&R " Evaluation  - Echocardiogram  - Atorvastatin 40 mg q d   - Continue monitoring on telemetry  - Consider placing a bowel regimen to avoid straining and reflexive hypertension with potential constipation  - Rest of care per primary team        Thrombolytic Decision: After a discussion of risks, benefits and alternatives (including best medical therapy excluding thrombolysis) reviewing inclusion and exclusion criteria the decision was made to proceed with thrombolytic therapy  Verbal consent was obtained from acting surrogate decision maker Roman Erickalan  Consent was obtained by Sola Alexander DO      Recommendations for outpatient neurological follow up have yet to be determined  History of Present Illness     Reason for Consult / Principal Problem: Strokelike symptoms  Hx and PE limited by: Acuity of situation, aphasia  Patient last known well: 0730  Stroke alert called: [facility]  Neurology time of arrival: 0927  HPI: Jaxon Neal is a 80 y o   female who presented from his nursing home after a witnessed fall with head strike  The patient was walking to breakfast when he had a witnessed fall  After the fall it was noted that the patient had unequal pupils and was not responding to staff appropriately  Due to these concerns EMS was summoned and the patient was brought to the hospital for evaluation  The patient is not maintained on blood thinners or aspirin at baseline  Upon arrival to the hospital the patient underwent trauma evaluation and stroke evaluation  It was noted to be aphasic, right CT head was normal   CTA showed short segment severe stenosis at the proximal dorsal branch of the right MCA M2 division, along with focal stenosis in the proximal P2 and P3 segment of the right PCA            Inpatient consult to Neurology     Performed by  Sola Alexander DO   Authorized by Annia Camara DO             Review of Systems   Unable to perform ROS: Acuity of condition       Historical Information   No past medical history on file  No past surgical history on file  Social History   Social History     Substance and Sexual Activity   Alcohol Use Not on file     Social History     Substance and Sexual Activity   Drug Use Not on file     No existing history information found  No existing history information found  Social History     Tobacco Use   Smoking Status Not on file   Smokeless Tobacco Not on file     Family History: No family history on file  Review of previous medical records was completed  Meds/Allergies   all current active meds have been reviewed and current meds:   Current Facility-Administered Medications   Medication Dose Route Frequency   • [START ON 6/14/2023] atorvastatin (LIPITOR) tablet 40 mg  40 mg Oral QPM   • chlorhexidine (PERIDEX) 0 12 % oral rinse 15 mL  15 mL Mouth/Throat Q12H [address]       Not on File    Objective   Vitals:Blood pressure 170/97, pulse 93, temperature 99 °F (37 2 °C), temperature source Oral, resp  rate (!) 28, weight 85 7 kg (188 lb 15 oz), SpO2 94 %  ,There is no height or weight on file to calculate BMI  No intake or output data in the 24 hours ending 06/13/23 1302    Invasive Devices: Invasive Devices     Peripheral Intravenous Line  Duration           Peripheral IV 06/13/23 Left Antecubital <1 day                Physical Exam  Constitutional:       Appearance: She is ill-appearing  HENT:      Head: Normocephalic and atraumatic  Nose: Nose normal    Eyes:      General:         Right eye: No discharge  Left eye: No discharge  Extraocular Movements: Extraocular movements intact  Conjunctiva/sclera: Conjunctivae normal    Pulmonary:      Effort: Pulmonary effort is normal  No respiratory distress  Abdominal:      General: Abdomen is flat  Palpations: Abdomen is soft  Musculoskeletal:         General: No swelling or deformity  Cervical back: Neck supple  Skin:     General: Skin is warm and dry         Neurologic Exam     Mental Status   Level of consciousness: alert  Unable to name object  Unable to read  Unable to repeat  Unable to write  A full mental status examination is unable to be completed due to the patient's current medical status     Cranial Nerves   A full cranial nerve examination is unable to be completed due to the patient's current medical status  See NIH exam      Motor Exam A full motor examination is unable to be completed due to the patient's current medical status  See NIH exam     Sensory Exam   Decreased sensation right upper extremity  A full sensory examination is unable to be completed due to the patient's current medical status  Gait, Coordination, and Reflexes Gait examination is unable to be assessed due to the patient's current medical status  See NIH exam      NIHSS:  1a Level of Consciousness: 0 = Alert   1b  LOC Questions: 2 = Answers neither correctly   1c  LOC Commands: 2 = Obeys neither correctly   2  Best Gaze: 0 = Normal   3  Visual: 1 = Partial hemianopia   4  Facial Palsy: 0=Normal symmetric movement   5a  Motor Right Arm: 2=Some effort against gravity, limb cannot get to or maintain (if cured) 90 (or 45) degrees, drifts down to bed, but has some effort against gravity   5b  Motor Left Arm: 1=Drift, limb holds 90 (or 45) degrees but drifts down before full 10 seconds: does not hit bed   6a  Motor Right Le=Drift, limb holds 90 (or 45) degrees but drifts down before full 10 seconds: does not hit bed   6b  Motor Left Le=No drift, limb holds 90 (or 45) degrees for full 10 seconds   7  Limb Ataxia:  0=Absent   8  Sensory: 1=Mild to moderate sensory loss; patient feels pinprick is less sharp or is dull on the affected side; there is a loss of superficial pain with pinprick but patient is aware He is being touched   9  Best Language:  2=Severe aphasia; fragmentary expression, inference needed, cannot identify materials   10  Dysarthria: 0=Normal articulation   11   Extinction and Inattention (formerly Neglect): 0=No abnormality   Total Score: 12     Time NIHSS was completed: 0940    Modified Dixie Score:  Unable to determine currently, will gather additional data    Lab Results: I have personally reviewed pertinent reports  Imaging Studies: I have personally reviewed pertinent films in PACS  EKG, Pathology, and Other Studies: I have personally reviewed pertinent reports      VTE Prophylaxis: Per primary team    Code Status: Level 1 - Full Code  Advance Directive and Living Will:      Power of :    POLST:

## 2023-06-13 NOTE — ACP (ADVANCE CARE PLANNING)
Cervical Collar Clearance: The patient had a CT scan of the cervical spine demonstrating no acute injury  On exam, the patient had no midline point tenderness or paresthesias/numbness/weakness in the extremities  The patient had full range of motion (was then able to flex, extend, and rotate head laterally) without pain  There were no distracting injuries and the patient was not intoxicated  The patient's cervical spine was cleared radiologically and clinically  Cervical collar removed at this time       Nessa Velasquez MD  6/13/2023 11:11 AM

## 2023-06-13 NOTE — ASSESSMENT & PLAN NOTE
· Sodium - 133  · Baseline 132 to 135, at baseline  · No intervention or workup at this time  · Repeat BMP in AM

## 2023-06-13 NOTE — ASSESSMENT & PLAN NOTE
· Home regimen: Norvasc 2 5mg PO daily, Coreg 6 25mg PO BID, Lisinopril 10mg PO daily  · Hold home regimen for now allowing permissive HTN in setting of CVA  · Restart 24h post TNK after speech eval   · Will utilize PRN antihypertensives if SBP > 180

## 2023-06-13 NOTE — ED PROVIDER NOTES
Emergency Department Airway Evaluation and Management Form    History  Obtained from: EMS  Patient has no allergy information on record  Chief Complaint   Patient presents with   • Trauma     +HS on thinners, Aphasic, Pupils unequal     Evelin Chidlress is a resident of a local nursing home  Patient was found on the floor after returning from breakfast   Patient is newly aphasic with unequal pupils  Patient does take blood thinners  History provided by:  EMS personnel      No past medical history on file  No past surgical history on file  No family history on file  I have reviewed and agree with the history as documented  Review of Systems   Respiratory: Negative for shortness of breath  Neurological: Positive for speech difficulty  Physical Exam  BP (!) 210/104   Pulse (!) 111   Temp 98 5 °F (36 9 °C) (Axillary)   Resp 20   Wt 85 7 kg (188 lb 15 oz)   SpO2 91%     Physical Exam  Constitutional:       General: She is in acute distress  Neurological:      Mental Status: She is alert  GCS: GCS eye subscore is 4  GCS verbal subscore is 2  GCS motor subscore is 6           ED Medications  Medications   labetalol (NORMODYNE) injection 10 mg (has no administration in time range)       Intubation  Procedures    Notes  The patient is not in any need of an urgent airway intervention based on history and exam   The remainder of the patient's care was administered by the Trauma team         Final Diagnosis  Final diagnoses:   None       ED Provider  Electronically Signed by     Elaine Cordova MD  06/13/23 5390

## 2023-06-14 ENCOUNTER — APPOINTMENT (INPATIENT)
Dept: CT IMAGING | Facility: HOSPITAL | Age: 88
DRG: 062 | End: 2023-06-14
Payer: COMMERCIAL

## 2023-06-14 LAB
ANION GAP SERPL CALCULATED.3IONS-SCNC: 11 MMOL/L (ref 4–13)
ANION GAP SERPL CALCULATED.3IONS-SCNC: 11 MMOL/L (ref 4–13)
ATRIAL RATE: 83 BPM
ATRIAL RATE: 83 BPM
BUN SERPL-MCNC: 27 MG/DL (ref 5–25)
BUN SERPL-MCNC: 27 MG/DL (ref 5–25)
CA-I BLD-SCNC: 1.08 MMOL/L (ref 1.12–1.32)
CA-I BLD-SCNC: 1.08 MMOL/L (ref 1.12–1.32)
CALCIUM SERPL-MCNC: 8.9 MG/DL (ref 8.4–10.2)
CALCIUM SERPL-MCNC: 8.9 MG/DL (ref 8.4–10.2)
CHLORIDE SERPL-SCNC: 99 MMOL/L (ref 96–108)
CHLORIDE SERPL-SCNC: 99 MMOL/L (ref 96–108)
CHOLEST SERPL-MCNC: 211 MG/DL
CHOLEST SERPL-MCNC: 211 MG/DL
CO2 SERPL-SCNC: 23 MMOL/L (ref 21–32)
CO2 SERPL-SCNC: 23 MMOL/L (ref 21–32)
CREAT SERPL-MCNC: 1.46 MG/DL (ref 0.6–1.3)
CREAT SERPL-MCNC: 1.46 MG/DL (ref 0.6–1.3)
ERYTHROCYTE [DISTWIDTH] IN BLOOD BY AUTOMATED COUNT: 14.4 % (ref 11.6–15.1)
ERYTHROCYTE [DISTWIDTH] IN BLOOD BY AUTOMATED COUNT: 14.4 % (ref 11.6–15.1)
EST. AVERAGE GLUCOSE BLD GHB EST-MCNC: 117 MG/DL
EST. AVERAGE GLUCOSE BLD GHB EST-MCNC: 117 MG/DL
GFR SERPL CREATININE-BSD FRML MDRD: 41 ML/MIN/1.73SQ M
GFR SERPL CREATININE-BSD FRML MDRD: 41 ML/MIN/1.73SQ M
GLUCOSE SERPL-MCNC: 141 MG/DL (ref 65–140)
GLUCOSE SERPL-MCNC: 141 MG/DL (ref 65–140)
HBA1C MFR BLD: 5.7 %
HBA1C MFR BLD: 5.7 %
HCT VFR BLD AUTO: 47.5 % (ref 36.5–49.3)
HCT VFR BLD AUTO: 47.5 % (ref 36.5–49.3)
HDLC SERPL-MCNC: 59 MG/DL
HDLC SERPL-MCNC: 59 MG/DL
HGB BLD-MCNC: 16.1 G/DL (ref 12–17)
HGB BLD-MCNC: 16.1 G/DL (ref 12–17)
LDLC SERPL CALC-MCNC: 127 MG/DL (ref 0–100)
LDLC SERPL CALC-MCNC: 127 MG/DL (ref 0–100)
MAGNESIUM SERPL-MCNC: 2.1 MG/DL (ref 1.9–2.7)
MAGNESIUM SERPL-MCNC: 2.1 MG/DL (ref 1.9–2.7)
MCH RBC QN AUTO: 29.4 PG (ref 26.8–34.3)
MCH RBC QN AUTO: 29.4 PG (ref 26.8–34.3)
MCHC RBC AUTO-ENTMCNC: 33.9 G/DL (ref 31.4–37.4)
MCHC RBC AUTO-ENTMCNC: 33.9 G/DL (ref 31.4–37.4)
MCV RBC AUTO: 87 FL (ref 82–98)
MCV RBC AUTO: 87 FL (ref 82–98)
MRSA NOSE QL CULT: NORMAL
MRSA NOSE QL CULT: NORMAL
P AXIS: 63 DEGREES
P AXIS: 63 DEGREES
PHOSPHATE SERPL-MCNC: 3.6 MG/DL (ref 2.3–4.1)
PHOSPHATE SERPL-MCNC: 3.6 MG/DL (ref 2.3–4.1)
PLATELET # BLD AUTO: 295 THOUSANDS/UL (ref 149–390)
PLATELET # BLD AUTO: 295 THOUSANDS/UL (ref 149–390)
PMV BLD AUTO: 9.6 FL (ref 8.9–12.7)
PMV BLD AUTO: 9.6 FL (ref 8.9–12.7)
POTASSIUM SERPL-SCNC: 4.3 MMOL/L (ref 3.5–5.3)
POTASSIUM SERPL-SCNC: 4.3 MMOL/L (ref 3.5–5.3)
PR INTERVAL: 178 MS
PR INTERVAL: 178 MS
QRS AXIS: -29 DEGREES
QRS AXIS: -29 DEGREES
QRSD INTERVAL: 84 MS
QRSD INTERVAL: 84 MS
QT INTERVAL: 362 MS
QT INTERVAL: 362 MS
QTC INTERVAL: 425 MS
QTC INTERVAL: 425 MS
RBC # BLD AUTO: 5.48 MILLION/UL (ref 3.88–5.62)
RBC # BLD AUTO: 5.48 MILLION/UL (ref 3.88–5.62)
SODIUM SERPL-SCNC: 133 MMOL/L (ref 135–147)
SODIUM SERPL-SCNC: 133 MMOL/L (ref 135–147)
T WAVE AXIS: 41 DEGREES
T WAVE AXIS: 41 DEGREES
TRIGL SERPL-MCNC: 127 MG/DL
TRIGL SERPL-MCNC: 127 MG/DL
VENTRICULAR RATE: 83 BPM
VENTRICULAR RATE: 83 BPM
WBC # BLD AUTO: 15.8 THOUSAND/UL (ref 4.31–10.16)
WBC # BLD AUTO: 15.8 THOUSAND/UL (ref 4.31–10.16)

## 2023-06-14 PROCEDURE — 80061 LIPID PANEL: CPT

## 2023-06-14 PROCEDURE — 83036 HEMOGLOBIN GLYCOSYLATED A1C: CPT

## 2023-06-14 PROCEDURE — 92526 ORAL FUNCTION THERAPY: CPT

## 2023-06-14 PROCEDURE — 92610 EVALUATE SWALLOWING FUNCTION: CPT

## 2023-06-14 PROCEDURE — 99232 SBSQ HOSP IP/OBS MODERATE 35: CPT | Performed by: SURGERY

## 2023-06-14 PROCEDURE — 99232 SBSQ HOSP IP/OBS MODERATE 35: CPT | Performed by: INTERNAL MEDICINE

## 2023-06-14 PROCEDURE — 97167 OT EVAL HIGH COMPLEX 60 MIN: CPT

## 2023-06-14 PROCEDURE — 97163 PT EVAL HIGH COMPLEX 45 MIN: CPT

## 2023-06-14 PROCEDURE — 85027 COMPLETE CBC AUTOMATED: CPT

## 2023-06-14 PROCEDURE — G1004 CDSM NDSC: HCPCS

## 2023-06-14 PROCEDURE — 82330 ASSAY OF CALCIUM: CPT

## 2023-06-14 PROCEDURE — 99233 SBSQ HOSP IP/OBS HIGH 50: CPT | Performed by: PSYCHIATRY & NEUROLOGY

## 2023-06-14 PROCEDURE — 84100 ASSAY OF PHOSPHORUS: CPT

## 2023-06-14 PROCEDURE — 70450 CT HEAD/BRAIN W/O DYE: CPT

## 2023-06-14 PROCEDURE — NC001 PR NO CHARGE

## 2023-06-14 PROCEDURE — 80048 BASIC METABOLIC PNL TOTAL CA: CPT

## 2023-06-14 PROCEDURE — 83735 ASSAY OF MAGNESIUM: CPT

## 2023-06-14 RX ORDER — SODIUM CHLORIDE, SODIUM GLUCONATE, SODIUM ACETATE, POTASSIUM CHLORIDE, MAGNESIUM CHLORIDE, SODIUM PHOSPHATE, DIBASIC, AND POTASSIUM PHOSPHATE .53; .5; .37; .037; .03; .012; .00082 G/100ML; G/100ML; G/100ML; G/100ML; G/100ML; G/100ML; G/100ML
1000 INJECTION, SOLUTION INTRAVENOUS ONCE
Status: COMPLETED | OUTPATIENT
Start: 2023-06-14 | End: 2023-06-14

## 2023-06-14 RX ORDER — AMLODIPINE BESYLATE 2.5 MG/1
2.5 TABLET ORAL DAILY
Status: DISCONTINUED | OUTPATIENT
Start: 2023-06-14 | End: 2023-06-19 | Stop reason: HOSPADM

## 2023-06-14 RX ORDER — AMOXICILLIN 250 MG
1 CAPSULE ORAL
Status: DISCONTINUED | OUTPATIENT
Start: 2023-06-14 | End: 2023-06-16

## 2023-06-14 RX ORDER — CARVEDILOL 6.25 MG/1
6.25 TABLET ORAL 2 TIMES DAILY WITH MEALS
Status: DISCONTINUED | OUTPATIENT
Start: 2023-06-14 | End: 2023-06-19 | Stop reason: HOSPADM

## 2023-06-14 RX ORDER — ACETAMINOPHEN 325 MG/1
650 TABLET ORAL EVERY 8 HOURS PRN
Status: DISCONTINUED | OUTPATIENT
Start: 2023-06-14 | End: 2023-06-19

## 2023-06-14 RX ORDER — ENOXAPARIN SODIUM 100 MG/ML
30 INJECTION SUBCUTANEOUS
Status: DISCONTINUED | OUTPATIENT
Start: 2023-06-14 | End: 2023-06-19 | Stop reason: HOSPADM

## 2023-06-14 RX ADMIN — CARVEDILOL 6.25 MG: 6.25 TABLET, FILM COATED ORAL at 18:30

## 2023-06-14 RX ADMIN — ATORVASTATIN CALCIUM 40 MG: 40 TABLET, FILM COATED ORAL at 18:30

## 2023-06-14 RX ADMIN — ENOXAPARIN SODIUM 30 MG: 30 INJECTION SUBCUTANEOUS at 15:01

## 2023-06-14 RX ADMIN — SENNOSIDES AND DOCUSATE SODIUM 1 TABLET: 50; 8.6 TABLET ORAL at 23:00

## 2023-06-14 RX ADMIN — SODIUM CHLORIDE, SODIUM GLUCONATE, SODIUM ACETATE, POTASSIUM CHLORIDE, MAGNESIUM CHLORIDE, SODIUM PHOSPHATE, DIBASIC, AND POTASSIUM PHOSPHATE 100 ML/HR: .53; .5; .37; .037; .03; .012; .00082 INJECTION, SOLUTION INTRAVENOUS at 01:10

## 2023-06-14 RX ADMIN — SODIUM CHLORIDE, SODIUM GLUCONATE, SODIUM ACETATE, POTASSIUM CHLORIDE, MAGNESIUM CHLORIDE, SODIUM PHOSPHATE, DIBASIC, AND POTASSIUM PHOSPHATE 1000 ML: .53; .5; .37; .037; .03; .012; .00082 INJECTION, SOLUTION INTRAVENOUS at 01:40

## 2023-06-14 NOTE — UTILIZATION REVIEW
Initial Clinical Review    Admission: Date/Time/Statement:   Admission Orders (From admission, onward)     Ordered        06/13/23 1117  Inpatient Admission  Once                      Orders Placed This Encounter   Procedures   • Inpatient Admission     Standing Status:   Standing     Number of Occurrences:   1     Order Specific Question:   Level of Care     Answer:   Critical Care [15]     Order Specific Question:   Estimated length of stay     Answer:   More than 2 Midnights     Order Specific Question:   Certification     Answer:   I certify that inpatient services are medically necessary for this patient for a duration of greater than two midnights  See H&P and MD Progress Notes for additional information about the patient's course of treatment  ED Arrival Information     Expected   -    Arrival   6/13/2023 09:18    Acuity   Emergent            Means of arrival   Ambulance    Escorted by   [address] [facility] Care/ICU    Admission type   Emergency            Arrival complaint   -           Chief Complaint   Patient presents with   • Trauma     +HS on thinners, Aphasic, Pupils unequal       Initial Presentation: 80 y o  male presents to ED via EMS from SNF s/p witnessed fall, found to have unequal pupils and not verbally responding to staff  LKW 07:30 am   Findings: GCS 12  Unequal pupils: L pupil 3mm, R pupil: 5mm  Expressive and receptive aphasia  CTA with 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  Focal severe stenosis at proximal P2 and P3 segment of right PCA  Na 133, Cr 1 43, WBC's 18 27  Stroke alert called  Neuro @ bedside  Rec'd Labetolol & TNK  Admitted to Inpatient Critical Care with Fall / Stroke like symptoms, s/p TNK, CKD, HTN, Hyponatremia    Continue neuro checks q1h, MRI brain, echo, Neuro consult, bp goal sys < 180, trend kidney fx, PT/OT/Speech, IVF  Cr & Na @ baseline (cr 1 36 -1 6 & na 132-135)  Check urine d/t elevated wbc's  Per Neuro: Significant concern for stroke  Initial presenting deficits were fascia, right upper extremity sensory deficit right upper extremity weakness, significant expressive aphasia  Pt was found to be a thrombolytics candidate within the appropriate time window and without obvious contraindication and TNK was therefore was given  Stroke pathway, MRI, neuro checks, ECHO  Repeat CTH @ 24 hrs or or sooner if GCS drops 2 pts or w/ HA, N/V, acute increase in bp  Date: 6/14   Day 2:   Repeat CTH 24h post TNK - no change  Start ASA qd  Significantly aphasic on exam  Continue neuro checks,  Cleared for diet by speech (dysphagia 1 puree thin liquids)  PT recommending post acute rehab  Rec'd bolus 1 L IVF overnight    MRI pending    ED Triage Vitals   Temperature Pulse Respirations Blood Pressure SpO2   06/13/23 0920 06/13/23 0920 06/13/23 0920 06/13/23 0920 06/13/23 0920   98 5 °F (36 9 °C) (!) 111 20 (!) 210/104 91 %      Temp Source Heart Rate Source Patient Position - Orthostatic VS BP Location FiO2 (%)   06/13/23 0920 06/13/23 0920 06/13/23 2210 06/13/23 2210 --   Axillary Monitor Lying Right arm       Pain Score       --                 Wt Readings from Last 1 Encounters:   06/14/23 81 6 kg (179 lb 14 3 oz)     Additional Vital Signs:    06/14/23 1135 98 1 °F (36 7 °C) 91 24 Abnormal  158/89 109 98 % --   06/14/23 1131 -- -- -- 158/81 109 -- --   06/14/23 1040 -- 83 -- 120/68 88 94 % --   06/14/23 0800 99 2 °F (37 3 °C) 85 20 148/77 106 97 % None (Room air)   06/14/23 0600 -- 90 -- 157/88 114 95 % --   06/14/23 0249 98 °F (36 7 °C) -- 21 137/81 102 -- None (Room air)   06/14/23 0100 -- 87 22 166/79 113 98 % --   06/14/23 0000 -- 87 21 161/90 119 98 % --   06/13/23 2300 -- 98 29 Abnormal  169/85 115 98 % --   06/13/23 2210 98 3 °F (36 8 °C) -- -- -- -- -- None (Room air)   06/13/23 2200 -- 96 27 Abnormal  157/86 115 98 % --   06/13/23 2100 -- 96 27 Abnormal  163/88 119 99 % --   06/13/23 2000 -- 91 25 Abnormal  154/85 114 98 % --   06/13/23 1910 98 4 °F (36 9 °C) 91 23 Abnormal  162/108 Abnormal  131 98 % --   06/13/23 1900 -- 87 24 Abnormal  162/108 Abnormal  131 97 % --   06/13/23 1815 -- 86 22 168/80 115 96 % --   06/13/23 1715 -- 88 26 Abnormal  161/78 108 98 % --   06/13/23 1645 -- 84 27 Abnormal  167/79 114 98 % --   06/13/23 1615 99 7 °F (37 6 °C) 97 27 Abnormal  177/101 Abnormal  132 98 % --   06/13/23 1545 -- 85 26 Abnormal  191/102 Abnormal  140 98 % --   06/13/23 1515 99 °F (37 2 °C) 84 28 Abnormal  159/85 115 98 % --   06/13/23 1445 -- 84 26 Abnormal  177/84 Abnormal  118 94 % --   06/13/23 1415 -- 84 25 Abnormal  162/77 111 94 % --   06/13/23 1345 -- 85 27 Abnormal  161/80 114 93 % --   06/13/23 1315 -- 90 46 Abnormal  143/84 109 92 % None (Room air)   06/13/23 1245 99 °F (37 2 °C) 93 28 Abnormal  170/97 127 94 % --   06/13/23 1230 -- 99 40 Abnormal  153/93 118 94 % --   06/13/23 1215 -- 93 15 151/114 Abnormal  129 94 % --   06/13/23 1200 99 2 °F (37 3 °C) 90 16 160/94 120 92 % Nasal cannula   06/13/23 1154 -- 93 16 -- -- 92 % --   06/13/23 1145 -- 93 16 -- -- 92 % Nasal cannula   06/13/23 1130 -- 84 -- 160/87 -- 93 % Nasal cannula   06/13/23 1115 -- 93 16 161/85 -- 93 % --   06/13/23 1100 -- 88 16 159/93 -- 94 % Nasal cannula   06/13/23 1045 -- 91 16 152/92 -- 93 % Nasal cannula   06/13/23 1030 -- 90 16 154/88 -- 94 % Nasal cannula   06/13/23 1015 -- 88 18 150/83 -- 90 % None (Room air)   06/13/23 1000 -- 87 16 148/81 -- 92 % None (Room air)   06/13/23 0945 -- 84 -- 159/87 -- 92 % --   06/13/23 0930 -- 85 20 171/111 Abnormal  -- 91 % None (Room air)       Pertinent Labs/Diagnostic Test Results:     6/14 Repeat CTH:  1   No acute intracranial abnormality  No significant interval change  2   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  3   Chronic microangiopathic change  CTA stroke alert (head/neck)   Final Result by Martell Najjar, MD (06/13 1008)      1    No intracranial large vessel occlusion  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  Focal severe stenosis at proximal P2 and P3 segment of right PCA  2   No hemodynamically significant stenosis or dissection of cervical carotid and vertebral arteries  Findings were directly discussed with Tom Larkin at 9:44 a m  Workstation performed: [id number]         CT stroke alert brain   Final Result by Danelle Gonzalez MD (06/13 1002)      1  Exam is somewhat compromised by motion artifact  2   No acute intracranial CT abnormality  Small left parietal scalp hematoma  3   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  4   Chronic microangiopathy  Findings were directly discussed with Tom Larkin at 9:44 a m  Workstation performed: [id number]         XR Trauma multiple (SLB/SLRA trauma bay ONLY)   Final Result by Carolina Sevilla MD (06/13 4462)      No acute pulmonary pathology  No obvious displaced fractures within limitation of supine AP chest technique  Workstation performed: [id number]         XR chest portable   Final Result by Carolina Sevilla MD (06/13 2823)      No acute pulmonary pathology  No obvious displaced fractures within limitation of supine AP chest technique           Workstation performed: [id number]         MRI Inpatient Order    (Results Pending)   CT head wo contrast    (Results Pending)     Results from last 7 days   Lab Units 06/13/23  0930   HEMATOCRIT % 53 5*   HEMOGLOBIN g/dL 17 7*   PLATELETS Thousands/uL 357   WBC Thousand/uL 18 27*     Results from last 7 days   Lab Units 06/13/23  0930   ANION GAP mmol/L 11   BUN mg/dL 21   CALCIUM mg/dL 10 2   CHLORIDE mmol/L 96   CO2 mmol/L 26   CREATININE mg/dL 1 43*   EGFR ml/min/1 73sq m 32   POTASSIUM mmol/L 5 2   SODIUM mmol/L 133*     Results from last 7 days   Lab Units 06/13/23  0953   POC GLUCOSE mg/dl 121 Results from last 7 days   Lab Units 06/13/23  0930   GLUCOSE RANDOM mg/dL 123     Results from last 7 days   Lab Units 06/13/23  0930   HS TNI 0HR ng/L 13     Results from last 7 days   Lab Units 06/13/23  0930   INR  1 00   PROTIME seconds 13 4   PTT seconds 26     Results from last 7 days   Lab Units 06/13/23  1310   BACTERIA UA /hpf None Seen   BILIRUBIN UA  Negative   BLOOD UA  Small*   CLARITY UA  Clear   COLOR UA  Colorless   EPITHELIAL CELLS WET PREP /hpf None Seen   GLUCOSE UA mg/dl Negative   KETONES UA mg/dl Negative   LEUKOCYTES UA  Negative   NITRITE UA  Negative   PH UA  7 0   PROTEIN UA mg/dl 50 (1+)*   RBC UA /hpf 2-4*   SPEC GRAV UA  1 049*   UROBILINOGEN UA (BE) mg/dl <2 0   WBC UA /hpf 1-2     6/13 ECHO:  Left Ventricle: Left ventricular cavity size is normal  There is mild concentric hypertrophy  The left ventricular ejection fraction is 65%  Systolic function is normal  Wall motion is normal  Diastolic function is mildly abnormal, consistent with grade I (abnormal) relaxation  •  Right Ventricle: Right ventricular cavity size is normal  Systolic function is normal   •  Tricuspid Valve: There is mild regurgitation  •  Aorta: The aortic root is normal in size  The ascending aorta is mildly dilated    ED Treatment:   Medication Administration from 06/13/2023 0917 to 06/13/2023 1217       Date/Time Order Dose Route Action     06/13/2023 0929 EDT labetalol (NORMODYNE) injection 10 mg 10 mg Intravenous Given     06/13/2023 0946 EDT tenecteplase (TNKase) injection 21 mg 21 mg Intravenous Given        Admitting Diagnosis: Stroke [facility]) [I63 9]  Unspecified multiple injuries, initial encounter [T07  XXXA]  Age/Sex: 80 y o  male  Admission Orders:  Scheduled Medications:  atorvastatin, 40 mg, Oral, QPM  chlorhexidine, 15 mL, Mouth/Throat, Q12H [address]    Continuous IV Infusions:  multi-electrolyte, 100 mL/hr, Intravenous, Continuous    PRN Meds:  labetalol, 10 mg, Intravenous, Q6H PRN    CardioPulm monitoring  Neuro checks q30 min x 6 hrs, q1h x 16 hrs  NPO,  SCDS  IP CONSULT TO NEUROLOGY  IP CONSULT TO CASE MANAGEMENT  IP CONSULT TO CASE MANAGEMENT  IP CONSULT TO NUTRITION SERVICES    Network Utilization Review Department  ATTENTION: Please call with any questions or concerns to [phone] and carefully listen to the prompts so that you are directed to the right person  All voicemails are confidential   Kel Woodruff all requests for admission clinical reviews, approved or denied determinations and any other requests to dedicated fax number below belonging to the campus where the patient is receiving treatment   List of dedicated fax numbers for the Facilities:  [address] DENIALS (Administrative/Medical Necessity) [phone]   [address] (Maternity/NICU/Pediatrics) [phone]   915 Lynsey Rivera [phone]   Giovanni Segovia 77 [phone]   [address] [place] [address] Massiel James 28 [phone]   [address] [facility] [place] [address] 815 [facility] [phone]

## 2023-06-14 NOTE — ASSESSMENT & PLAN NOTE
· Pt presented as a trauma, s/p witness fall at his nursing facility  · In trauma bay pt was noted to have unequal pupils, was not verbally responsive and noted to have right sided weakness  · Initial NIHSS: 12  · Initial GCS 10  · Last known well time 0730 this morning  · Pt does not take any AC or AP  · CTA - No intracranial large vessel occlusion  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  Focal severe stenosis at proximal P2 and P3 segment of right PCA  No significant stenosis or dissection of cervical carotid and vertebral arteries  · Pt received TNK at 0946    Plan:  · Repeat CT head in 24h (6/14 @ 0946)  · Obtain STAT CT head if GCS drop by 2 or pt c/o severe headache  · Obtain MRI brai  · TTE: EF 27%, Diastolic function is mildly abnormal, consistent with grade I (abnormal) relaxation    · Continue neuro-checks per TNK pathway  · Continue VS per post TNK protocol  · BP goal; SBP < 180, DBP < 105 for 24 hours  · Hold all AC/AP for now, plan to restart once 24h follow CT head done  · Start Atorvastatin 40mg PO daily, pending swallow eval   · Follow up lipid profile, HbA1c  · POCT glucose with goal glucose 140-180  · Tele monitoring  · PT/OR eval and treat   · PMR consult when appropriate  · CM consult for dispo planning

## 2023-06-14 NOTE — OCCUPATIONAL THERAPY NOTE
Occupational Therapy Evaluation     Patient Name: Tonie Vines  SYGAV'P Date: 6/14/2023  Problem List  Principal Problem:    Stroke [facility])  Active Problems:    Essential hypertension    CKD (chronic kidney disease)    Constipation    Hyponatremia    Leukocytosis    Fall    Past Medical History  No past medical history on file  Past Surgical History  No past surgical history on file         06/14/23 1232   OT Last Visit   OT Visit Date 06/14/23  (Wednesday)   Note Type   Note type Evaluation   Pain Assessment   Pain Assessment Tool FLACC   Pain Location/Orientation Orientation: Left; Location: Arm  (RN reports IV infiltrated last night)   Effect of Pain on Daily Activities limits pace and active participation in ADLs   Patient's Stated Pain Goal   (pt did not state)   Hospital Pain Intervention(s) Repositioned; Ambulation/increased activity; Emotional support   Multiple Pain Sites Yes   Pain Rating: FLACC (Rest) - Face 0   Pain Rating: FLACC (Rest) - Legs 0   Pain Rating: FLACC (Rest) - Activity 0   Pain Rating: FLACC (Rest) - Cry 0   Pain Rating: FLACC (Rest) - Consolability 0   Score: FLACC (Rest) 0   Pain Rating: FLACC (Activity) - Face 1   Pain Rating: FLACC (Activity) - Legs 0   Pain Rating: FLACC (Activity) - Activity 1   Pain Rating: FLACC (Activity) - Cry 0   Pain Rating: FLACC (Activity) - Consolability 1   Score: FLACC (Activity) 3   Pain 2   Pain Location/Orientation 2 Location: Abdomen   Restrictions/Precautions   Weight Bearing Precautions Per Order No   Other Precautions Cognitive; Bed Alarm;Multiple lines;Telemetry;Aspiration; Fall Risk;Pain  (primarily non-verbal; at baseline answers yes/no)   Home Living   Type of Home SNF; Other (Comment)  [facility])   Home Layout One level   [address] [place];Grab bars   Additional Comments Pt admit from [facility]   Prior Function   Level of [place] Needs assistance with ADLs; Needs assistance with [address] [facility] staff   Blu "Help From Personal care attendant   IADLs Family/Friend/Other provides transportation; Family/Friend/Other provides meals; Family/Friend/Other provides medication management   Falls in the last 6 months 1 to 4   Vocational Retired   Comments Pt not accurate historian upon eval or at baseline  Needs assistance w/ ADL/ IADL  Ambulates to dining room for meals   Lifestyle   Autonomy Pt needs assistance w/ ADL/ IADL at [facility]  Not accurate historian   Reciprocal Relationships Supportive staff assist w/ ADL/ IADL   Service to Others Pt is retired   Intrinsic Gratification Will continue to assess  Pt unable to report upon eval   General   Additional Pertinent History Significant PMH Impacting his occupational performance includes prostate ca hx/tx, CKD, [place], dementia, HTN   Family/Caregiver Present No   Additional General Comments Received [facility] 06/13/23 5382   Subjective   Subjective \"yes\"   ADL   Where Assessed Edge of bed  (vs supine HOB elevated post eval)   Eating Assistance Unable to assess  (ST recommends pureed diet w/ thin liquids)   Eating Deficit Setup;Verbal cueing;Supervision/safety; Increased time to complete;Pureed diet;Scoop assist   Grooming Assistance   (S <> max A  Able to spontaneouly wipe face / nose  Unable to consistently follow directions)   UB Bathing Assistance Unable to assess   LB Bathing Assistance Unable to assess   UB Dressing Assistance 2  Maximal Assistance   LB Dressing Assistance 1  Total Assistance  (seated at EOB)   LB Dressing Deficit Don/doff R sock; Don/doff L sock; Setup;Verbal cueing;Supervision/safety; Increased time to complete   Toileting Assistance  Unable to assess  (condom catheter in place)   Bed Mobility   Supine to Sit 2  Maximal assistance   Additional items Assist x 2; Increased time required;Verbal cues;LE management;HOB elevated; Bedrails  (to pt's R)   Sit to Supine 2  Maximal assistance   Additional items Assist x 2; Increased time " "required;Verbal cues;LE management   Additional Comments Pt required A x2 to reposition towards HOB  Pt supine HOB elevated post eval w/ needs met, call bell in reach and bed alarm activated   Transfers   Sit to Stand 2  Maximal assistance  (max HHA x2)   Additional items Assist x 2; Increased time required;Verbal cues   Stand to Sit 2  Maximal assistance  (max HHA x2)   Additional items Assist x 2; Increased time required;Verbal cues   Stand pivot Unable to assess   Additional Comments Pt initiated sit <> stand 2 X from EOB w/ max HHA x2  Unable to maintain   Functional Mobility   Additional Comments NT  Will continue to assess as appropriate   Balance   Static Sitting Fair -   Static Standing Zero  (Ax2)   Ambulatory   (NT)   Activity Tolerance   Activity Tolerance Patient limited by fatigue; Other (Comment)  (impaired cognition, primarily non-verbal (premorbid)  Difficulty following directions)   Medical Staff Made Aware care coordination w/ PT, Nataly Luz due to decreased activity tolerance, regression from baseline, physical assistance required  Spoke w/ CM, Zeyad Almeida   Nurse Made Aware per RNEliana appropriate to see pt   Cognition   Overall Cognitive Status (S)  Impaired   Arousal/Participation Alert   Attention Difficulty attending to directions   Orientation Level Oriented to person;Disoriented to time;Disoriented to situation;Disoriented to place  (responded to his name)   Memory Unable to assess   Following Commands Unable to follow one step commands  (answered \"yes\" but not consistently)   Comments Identified pt by full name and wristband  Not accurate historian upon eval or at baseline  At baseline, pt able to answer yes / now but is primarily non-verbal  Pt only stated \"yes\" upon eval    Assessment   Limitation Decreased ADL status; Decreased UE strength;Decreased Safe judgement during ADL;Decreased cognition;Decreased endurance;Decreased fine motor control;Decreased self-care trans;Decreased high-level ADLs " Assessment Pt is a 81yo male [facility] as a trauma following a witnessed fall walking to breakfast at [facility]  Stroke alert called in ED and no traumatic injuries identified  At baseline, pt needs assistance w/ ADL/ IADL, answers yes / no at [facility]  Upon eval, pt alert but unable to follow directions  Pt required max A x2 to complete bed mobility and sit <> stand; total A LBD, max A UBD  Pt completing ADLs below baseline level of I and would benefit from OT to max fxal I  Will continue to follow 2-3X / week  Goals   Patient Goals Pt did not state  Will continue to assess   Plan   Treatment Interventions ADL retraining;Functional transfer training;UE strengthening/ROM; Endurance training;Cognitive reorientation;Patient/family training;Equipment evaluation/education; Compensatory technique education; Fine motor coordination activities; Neuromuscular reeducation;Continued evaluation; Energy conservation; Activityengagement   Goal Expiration Date 06/26/23   OT Frequency 2-3x/wk  (will continue to assess pend act elena, active participation)   Recommendation   OT Discharge Recommendation Post acute rehabilitation services   Equipment Recommended   (Will continue to assess)   AM-PAC Daily Activity Inpatient   Lower Body Dressing 1   Bathing 2   Toileting 2   Upper Body Dressing 2   Grooming 2   Eating 3   Daily Activity Raw Score 12   Daily Activity Standardized Score (Calc for Raw Score >=11) 30 6   AM-PAC Applied Cognition Inpatient   Following a Speech/Presentation 1   Understanding Ordinary Conversation 2   Taking Medications 1   Remembering Where Things Are Placed or Put Away 1   Remembering List of 4-5 Errands 1   Taking Care of Complicated Tasks 1   Applied Cognition Raw Score 7   Applied Cognition Standardized Score 15 17   Barthel Index   Feeding 5   Bathing 0   Grooming Score 0   Dressing Score 5   Bladder Score 0   Bowels Score 0   Toilet Use Score 0   Transfers (Bed/Chair) Score 0   Mobility (Level Surface) Score 0   Stairs Score 0   Barthel Index Score 10   Modified [place] Scale   Modified [place] Scale 5   End of Consult   Patient Position at End of Consult Bed/Chair alarm activated; All needs within reach   Nurse Communication Nurse aware of consult        The patient's raw score on the AM-PAC Daily Activity Inpatient Short Form is 12  A raw score of less than 19 suggests the patient may benefit from discharge to post-acute rehabilitation services  Please refer to the recommendation of the Occupational Therapist for safe discharge planning      Pt goals to be met by 6/26/23 to max I w/ ADLs and improve engagement to return to PLOF w/ staff assist includes:    -Pt will consistently follow 1 step directions during self care ADLs w/ no more than 1 cue / prompt to max I and improve engagement    -Pt will complete feeding w/ mod I after set- up    -Pt will complete UBD w/ min A to max I and minimize burden of care    -Pt will complete functional transfers to bed, chair, and toilet using LRAD, DME as needed w/ mod A to max I w/ ADLs    -Pt will demonstrate good attention and participation in ongoing eval of functional mobility to assist in DC planning    -Pt will complete bed mobility supine <> sit w/ min A x1 to max I and minimize burden of care    -Pt will demonstrate improved activity and sitting tolerance OOB In chair for all meals    -Pt will demonstrate improved activity tolerance to participate in ADLs in supported sitting for at least 10 minutes w/ out sign/ symptoms of fatigue or rest breaks    Akilah Worthington OTR/JO  [id number]  [id number]

## 2023-06-14 NOTE — PHYSICAL THERAPY NOTE
"   Physical Therapy Evaluation    Patient's Name: Carolina Hernandez    Admitting Diagnosis  Stroke [facility]) [I63 9]  Unspecified multiple injuries, initial encounter [T07  XXXA]    Problem List  Patient Active Problem List   Diagnosis    Stroke [facility])    Essential hypertension    CKD (chronic kidney disease)    Constipation    Hyponatremia    Leukocytosis    Fall       Past Medical History  No past medical history on file  Past Surgical History  No past surgical history on file        06/14/23 1231   PT Last Visit   PT Visit Date 06/14/23   Note Type   Note type Evaluation   Pain Assessment   Pain Assessment Tool FLACC   Pain Location/Orientation Orientation: Left; Location: [facility]   Hospital Pain Intervention(s) Ambulation/increased activity;Repositioned   Multiple Pain Sites Yes   Pain Rating: FLACC (Rest) - Face 0   Pain Rating: FLACC (Rest) - Legs 0   Pain Rating: FLACC (Rest) - Activity 0   Pain Rating: FLACC (Rest) - Cry 0   Pain Rating: FLACC (Rest) - Consolability 0   Score: FLACC (Rest) 0   Pain Rating: FLACC (Activity) - Face 1   Pain Rating: FLACC (Activity) - Legs 0   Pain Rating: FLACC (Activity) - Activity 1   Pain Rating: FLACC (Activity) - Cry 0   Pain Rating: FLACC (Activity) - Consolability 1   Score: FLACC (Activity) 3   Pain 2   Pain Location/Orientation 2 Location: Abdomen   Restrictions/Precautions   Weight Bearing Precautions Per Order No   Other Precautions Cognitive; Chair Alarm; Bed Alarm;Multiple lines;Telemetry; Fall Risk;Pain   Home Living   Type of Home SNF  ([facility])   Home Layout One level   Home Equipment Walker;Grab bars   Prior Function   Lives With Facility staff   Receives Help From Personal care attendant   Falls in the last 6 months 1 to 4   Vocational Retired   Comments pt non verbal, only responding \"yes/no\" to questions, however questionable understanding   Cognition   Overall Cognitive Status Impaired   Orientation Level Oriented to person;Disoriented to place; Disoriented to " "time;Disoriented to situation   Following Commands Unable to follow one step commands   Comments pt ID by wristband, name and    Subjective   Subjective pt supine in bed answering only \"yes\" to some question   RLE Assessment   RLE Assessment   (unable to formally assess)   LLE Assessment   LLE Assessment   (unable to formally assess)   Bed Mobility   Supine to Sit 2  Maximal assistance   Additional items Assist x 2; Increased time required;LE management; Bedrails;HOB elevated   Sit to Supine 2  Maximal assistance   Additional items Assist x 2; Increased time required;LE management  (trunk management)   Transfers   Sit to Stand 2  Maximal assistance   Additional items Assist x 2; Increased time required;Verbal cues;Armrests   Stand to Sit 2  Maximal assistance   Additional items Assist x 2; Increased time required;Verbal cues   Stand pivot Unable to assess   Additional Comments pt unable to maintain standing for greater than 15 seconds, difficulty with extending hips, not responding verbal cues   Ambulation/Elevation   Gait pattern Not appropriate; Not tested   Balance   Static Sitting Fair -   Static Standing Zero  (Ax2)   Activity Tolerance   Activity Tolerance Patient limited by fatigue;Patient limited by pain  (cognition)   Medical Staff Made Aware care coordinated with ASHANTI wood CM   Nurse Made Aware MALDONADO alex pre/post   Assessment   Prognosis Fair   Problem List Decreased strength;Decreased endurance; Impaired balance;Decreased mobility; Impaired judgement;Decreased cognition;Decreased safety awareness; Obesity   Assessment Pt is a 80 y o  male seen for PT evaluation s/p admit to [address] [place] [address] on 2023  Pt was admitted with a primary dx of: stroke  PT now consulted for assessment of mobility and d/c needs  Pt with Activity as tolerated orders  Pts current comorbidities and personal factors effecting treatment include: BMI, HTN, CKD   Pts current clinical presentation is Unstable/Unpredictable (high " complexity) due to Ongoing medical management for primary dx, Decreased activity tolerance compared to baseline, Fall risk, Increased assistance needed from caregiver at current time, Ongoing telemetry monitoring, Cog status, Diagnostic imaging pending  Prior to admission, pt was ambulating at facility  Upon evaluation, pt currently is requiring MaxA x2 for bed mobility; MaxA x2 for transfers  Pt presents at PT eval functioning below baseline and currently w/ overall mobility deficits 2* to: BLE weakness, decreased endurance, impaired coordination, pain, decreased activity tolerance compared to baseline, decreased functional mobility tolerance compared to baseline, decreased safety awareness, impaired judgement, fall risk, decreased cognition  Pt currently at a fall risk 2* to impairments listed above  Pt will continue to benefit from skilled acute PT interventions to address stated impairments; to maximize functional mobility; for ongoing pt/ family training; and DME needs  At conclusion of PT session pt returned BTB, bed alarm engaged, all needs in reach and RN notified of session findings/recommendations with phone and call bell within reach  Pt denies any further questions at this time  Recommend IP rehab upon hospital D/C  Goals   Patient Goals none stated   [facility] Expiration Date 06/24/23   Short Term Goal #1 In 10 days pt will be able to: 1  Demonstrate ability to perform all aspects of bed mobility with Trinity to improve functional safety  2  Perform functional transfers with Trinity to facilitate safe return to previous living environment  3   Be appropriate for skilled PT evaluation of gait  4  Improve LE strength grades by 1 to increase ease of functional mobility with transfers and gait  5  Pt will demonstrate improved balance by one grade in order to decrease risk of falls  6  Tolerate 3 hours OOB to promote improved activity tolerance and endurance     PT Treatment Day 0   Plan   Treatment/Interventions Functional transfer training;LE strengthening/ROM; Therapeutic exercise;Cognitive reorientation;Patient/family training;Equipment eval/education; Bed mobility;Spoke to nursing;Spoke to case management;OT  (gait training when appropriate)   PT Frequency 3-5x/wk   Recommendation   PT Discharge Recommendation Post acute rehabilitation services   Equipment Recommended   (TBD)   AM-PAC Basic Mobility Inpatient   Turning in Flat Bed Without Bedrails 1   Lying on Back to Sitting on Edge of Flat Bed Without Bedrails 1   Moving Bed to Chair 1   Standing Up From Chair Using Arms 1   Walk in Room 1   Climb 3-5 Stairs With Railing 1   Basic Mobility Inpatient Raw Score 6   Turning Head Towards Sound 2   Follow Simple Instructions 2   Low Function Basic Mobility Raw Score  10   Low Function Basic Mobility Standardized Score  14 65   Highest Level Of Mobility   JH-HLM Goal 2: Bed activities/Dependent transfer   JH-HLM Achieved 3: Sit at edge of bed   End of Consult   Patient Position at End of Consult Supine;Bed/Chair alarm activated; All needs within reach   The patient's AM-PAC Basic Mobility Inpatient Short Form Raw Score is 6  A Raw score of less than or equal to 16 suggests the patient may benefit from discharge to post-acute rehabilitation services  Please also refer to the recommendation of the Physical Therapist for safe discharge planning      Dave Clark, PT

## 2023-06-14 NOTE — ASSESSMENT & PLAN NOTE
· WBC - 18 27 on admission  · Pt remains afebrile  · UA: negative for leukocytes and nitrates  +protein  No WBCs or bacteria noted     · MRSA pending  · VS per protocol  · Hold ABX and BCx for now

## 2023-06-14 NOTE — PLAN OF CARE
Problem: OCCUPATIONAL THERAPY ADULT  Goal: Performs self-care activities at highest level of function for planned discharge setting  See evaluation for individualized goals  Description: Treatment Interventions: ADL retraining, Functional transfer training, UE strengthening/ROM, Endurance training, Cognitive reorientation, Patient/family training, Equipment evaluation/education, Compensatory technique education, Fine motor coordination activities, Neuromuscular reeducation, Continued evaluation, Energy conservation, Activityengagement  Equipment Recommended:  (Will continue to assess)       See flowsheet documentation for full assessment, interventions and recommendations  Note: Limitation: Decreased ADL status, Decreased UE strength, Decreased Safe judgement during ADL, Decreased cognition, Decreased endurance, Decreased fine motor control, Decreased self-care trans, Decreased high-level ADLs     Assessment: Pt is a 81yo male [facility] On 6/13/23 as a trauma following a witnessed fall walking to breakfast at [facility]  Stroke alert called in ED and no traumatic injuries identified  At baseline, pt needs assistance w/ ADL/ IADL, answers yes / no at [facility]  Upon eval, pt alert but unable to follow directions  Pt required max A x2 to complete bed mobility and sit <> stand; total A LBD, max A UBD  Pt completing ADLs below baseline level of I and would benefit from OT to max fxal I  Will continue to follow 2-3X / week       OT Discharge Recommendation: Post acute rehabilitation services

## 2023-06-14 NOTE — CASE MANAGEMENT
Case Management Assessment & Discharge Planning Note    Patient name Young Silva  Location ICU 07/ICU 07 MRN [id number]  : 1932 Date 2023       Current Admission Date: 2023  Current Admission Diagnosis:Stroke [facility])   Patient Active Problem List    Diagnosis Date Noted   • Stroke ([address] ) 2023   • Essential hypertension 2023   • CKD (chronic kidney disease) 2023   • Constipation 2023   • Hyponatremia 2023   • Leukocytosis 2023   • Fall 2023      LOS (days): 1  Geometric Mean LOS (GMLOS) (days): 3 20  Days to GMLOS:2     OBJECTIVE:    Risk of Unplanned Readmission Score: 8 8         Current admission status: Inpatient       Preferred Pharmacy: No Pharmacies Listed  Primary Care Provider: Lydia Dobbs MD    Primary Insurance: [address] [place] [address]  Secondary Insurance:     ASSESSMENT:  Jose Luis Melvin Proxies    There are no active Health Care Proxies on file  Patient Information  Admitted from[de-identified] Facility [facility])  Mental Status: Confused  During Assessment patient was accompanied by: Not accompanied during assessment  Assessment information provided by[de-identified] Other - please comment Zay Diaz -  at [facility])  Primary Caregiver:  Other (Comment)  Caregiver's Name[de-identified] [address]: [address] [facility] of Residence: [employer] city do you live in?: [place]  Type of Current Residence: Facility ([facility])  Living Arrangements: Other (Comment)    Activities of Daily Living Prior to Admission  Functional Status: Independent  Completes ADLs independently?: Yes (min assist for set up and supervision)  Does patient use assisted devices?: Yes  Assisted Devices (DME) used: Geeta Urban  Does patient currently own DME?: Yes  What DME does the patient currently own?: Geeta Duggan  Does patient have a history of Outpatient Therapy (PT/OT)?: No  Does the patient have a history of Short-Term Rehab?: Yes (In [place])  Does patient have a history of [facility]?: No  Does patient currently have [facility]?: No    Patient Information Continued  Income Source: Pension/correction  Does patient have prescription coverage?: Yes  Does patient receive dialysis treatments?: No  Does patient have a history of substance abuse?: No  Does patient have a history of Mental Health Diagnosis?: No    DISCHARGE DETAILS:    Discharge planning discussed with[de-identified] [facility] and pt son Adilene Coelho and TEGAN Mars  Freedom of Choice: Yes  Comments - Freedom of Choice: [place] [facility]    Contacts  Patient Contacts: Juliana Bateman Officer  Relationship to Patient[de-identified] Family  Contact Method: Phone  Phone Number: Misha Noon[address] Phone [phone]  Reason/Outcome: Continuity of Care, Emergency Contact, Referral, Discharge Planning    Other Referral/Resources/Interventions Provided:  Interventions: Short Term Rehab  Referral Comments: CM spoke with Edith Siddiqi with [facility] in [place]  Eliana Arellano agrees with PT/OT recs for STR and informed CM they do have a good working relationship with [facility]  CM spoke with pt's son, Juliana Mayberry, and Ahsan Regan  Reviewed rehab recommendation and that [facility] would also agree  Juliana Mayberry and Abelardo Officer are agreeable to blanket referrals  They would like to review the list  They are aware that Eliana Arellano wanted them to know they do have a good relationship with S  Referrals in [place]      Treatment Team Recommendation: Short Term Rehab  Discharge Destination Plan[de-identified] Short Term Rehab

## 2023-06-14 NOTE — SPEECH THERAPY NOTE
Speech Language/Pathology    Patient Name: Alessandra Araujo    LZTSX'R Date: 6/14/2023     Problem List  Principal Problem:    Stroke [facility])  Active Problems:    Essential hypertension    CKD (chronic kidney disease)    Constipation    Hyponatremia    Leukocytosis    Fall         Past Medical History  No past medical history on file  Past Surgical History  No past surgical history on file  Subjective:  No response to greeting from patient  Step son and his wife present at bedside  Clinician received TT from RN reporting that family is having difficulty feeding patient and would like assistance from SLP  Objective:  Pt did not attempt to retrieve bolus from spoon without tactile cues  Clinician placed scant amount of puree on lips; pt then slightly stuck out tongue and licked lips  After that, SLP presented spoon and pt did not open mouth when presented with spoon  Pt with slight mouth opening only when clinician provided tactile cue with spoon to bottom lip given intra or fred oral placement  Pt continues to be unable to retreive bolus from straw though was able to take small single sips of thin liquids via cup without s/s of aspiration only when rim of cup touched his lips  Family present at bedside and SLP communicated and demonstrated feeding strategies to family  Family verbalized and demonstrated understanding and was able to independently and appropriately determine when to end meal vs forcing pt to eat  Clearance of oral cavity was confirmed via clear toothette  Assessment:  No overt s/s of aspiration noted given single small sips of thin liquids by cup and 2 5 ml / 1/2 tsp of puree on spoon  Cognitive communication skills negatively impact swallow function/performance/ability to fully assess skills         Plan/Recommendations:  Swallow Strategies: upright with PO, only feed when alert/awake, small bites and sips, slow rate, provide tactile cue to bottom lip, only give liquids in small cups  TT ST w/ concerns/questions  ST will continue to follow

## 2023-06-14 NOTE — PLAN OF CARE
Problem: MOBILITY - ADULT  Goal: Maintain or return to baseline ADL function  Description: INTERVENTIONS:  -  Assess patient's ability to carry out ADLs; assess patient's baseline for ADL function and identify physical deficits which impact ability to perform ADLs (bathing, care of mouth/teeth, toileting, grooming, dressing, etc )  - Assess/evaluate cause of self-care deficits   - Assess range of motion  - Assess patient's mobility; develop plan if impaired  - Assess patient's need for assistive devices and provide as appropriate  - Encourage maximum independence but intervene and supervise when necessary  - Involve family in performance of ADLs  - Assess for home care needs following discharge   - Consider OT consult to assist with ADL evaluation and planning for discharge  - Provide patient education as appropriate  Outcome: Progressing     Problem: PAIN - ADULT  Goal: Verbalizes/displays adequate comfort level or baseline comfort level  Description: Interventions:  - Encourage patient to monitor pain and request assistance  - Assess pain using appropriate pain scale  - Administer analgesics based on type and severity of pain and evaluate response  - Implement non-pharmacological measures as appropriate and evaluate response  - Consider cultural and social influences on pain and pain management  - Notify physician/advanced practitioner if interventions unsuccessful or patient reports new pain  Outcome: Progressing     Problem: INFECTION - ADULT  Goal: Absence or prevention of progression during hospitalization  Description: INTERVENTIONS:  - Assess and monitor for signs and symptoms of infection  - Monitor lab/diagnostic results  - Monitor all insertion sites, i e  indwelling lines, tubes, and drains  - Monitor endotracheal if appropriate and nasal secretions for changes in amount and color  - [place]d appropriate cooling/warming therapies per order  - Administer medications as ordered  - Instruct and encourage patient and family to use good hand hygiene technique  - Identify and instruct in appropriate isolation precautions for identified infection/condition  Outcome: Progressing     Problem: SAFETY ADULT  Goal: Maintain or return to baseline ADL function  Description: INTERVENTIONS:  -  Assess patient's ability to carry out ADLs; assess patient's baseline for ADL function and identify physical deficits which impact ability to perform ADLs (bathing, care of mouth/teeth, toileting, grooming, dressing, etc )  - Assess/evaluate cause of self-care deficits   - Assess range of motion  - Assess patient's mobility; develop plan if impaired  - Assess patient's need for assistive devices and provide as appropriate  - Encourage maximum independence but intervene and supervise when necessary  - Involve family in performance of ADLs  - Assess for home care needs following discharge   - Consider OT consult to assist with ADL evaluation and planning for discharge  - Provide patient education as appropriate  Outcome: Progressing     Problem: DISCHARGE PLANNING  Goal: Discharge to home or other facility with appropriate resources  Description: INTERVENTIONS:  - Identify barriers to discharge w/patient and caregiver  - Arrange for needed discharge resources and transportation as appropriate  - Identify discharge learning needs (meds, wound care, etc )  - Arrange for interpretive services to assist at discharge as needed  - Refer to Case Management Department for coordinating discharge planning if the patient needs post-hospital services based on physician/advanced practitioner order or complex needs related to functional status, cognitive ability, or social support system  Outcome: Progressing     Problem: Knowledge Deficit  Goal: Patient/family/caregiver demonstrates understanding of disease process, treatment plan, medications, and discharge instructions  Description: Complete learning assessment and assess knowledge base   Interventions:  - Provide teaching at level of understanding  - Provide teaching via preferred learning methods  Outcome: Progressing

## 2023-06-14 NOTE — PLAN OF CARE
Problem: PHYSICAL THERAPY ADULT  Goal: Performs mobility at highest level of function for planned discharge setting  See evaluation for individualized goals  Description: Treatment/Interventions: Functional transfer training, LE strengthening/ROM, Therapeutic exercise, Cognitive reorientation, Patient/family training, Equipment eval/education, Bed mobility, Spoke to nursing, Spoke to case management, OT (gait training when appropriate)  Equipment Recommended:  (TBD)       See flowsheet documentation for full assessment, interventions and recommendations  6/14/2023 1452 by Jian Jarrell PT  Outcome: Progressing  Note: Prognosis: Fair  Problem List: Decreased strength, Decreased endurance, Impaired balance, Decreased mobility, Impaired judgement, Decreased cognition, Decreased safety awareness, Obesity  Assessment: Pt is a 80 y o  male seen for PT evaluation s/p admit to [address] [place] [address] on 6/13/2023  Pt was admitted with a primary dx of: stroke  PT now consulted for assessment of mobility and d/c needs  Pt with Activity as tolerated orders  Pts current comorbidities and personal factors effecting treatment include: BMI, HTN, CKD  Pts current clinical presentation is Unstable/Unpredictable (high complexity) due to Ongoing medical management for primary dx, Decreased activity tolerance compared to baseline, Fall risk, Increased assistance needed from caregiver at current time, Ongoing telemetry monitoring, Cog status, Diagnostic imaging pending  Prior to admission, pt was ambulating at facility  Upon evaluation, pt currently is requiring MaxA x2 for bed mobility; MaxA x2 for transfers   Pt presents at PT eval functioning below baseline and currently w/ overall mobility deficits 2* to: BLE weakness, decreased endurance, impaired coordination, pain, decreased activity tolerance compared to baseline, decreased functional mobility tolerance compared to baseline, decreased safety awareness, impaired judgement, fall risk, decreased cognition  Pt currently at a fall risk 2* to impairments listed above  Pt will continue to benefit from skilled acute PT interventions to address stated impairments; to maximize functional mobility; for ongoing pt/ family training; and DME needs  At conclusion of PT session pt returned BTB, bed alarm engaged, all needs in reach and RN notified of session findings/recommendations with phone and call bell within reach  Pt denies any further questions at this time  Recommend IP rehab upon hospital D/C  PT Discharge Recommendation: Post acute rehabilitation services    See flowsheet documentation for full assessment

## 2023-06-14 NOTE — SPEECH THERAPY NOTE
Speech Language/Pathology  Speech-Language Pathology Bedside Swallow Evaluation        Patient Name: Bassam Cox    EXFMH'T Date: 2023      Problem List  Principal Problem:    Stroke [facility])  Active Problems:    Essential hypertension    CKD (chronic kidney disease)    Constipation    Hyponatremia    Leukocytosis    Fall         Past Medical History  No past medical history on file  Past Surgical History  No past surgical history on file  Summary:   Pt unable to assist in feeding self/holding cup  Pt did not attempt to grab utensils/cup or open mouth when presented with a cup or spoon  Clinician placed scant amount of puree on lips; pt then slightly stuck out tongue and licked lips  Pt with slight mouth opening only when clinician provided tactile cue with spoon to bottom lip given intra or fred oral placement  No anterior leakage noted give small controlled sips of thin liquids by cup  ST to follow to ensure safety with PO intake, caregiver/staff education, monitor for s/s of aspiration/dysphagia, and determine if/when VBS is warranted  Recommendations:   Diet: thin liquids and puree  Medications: crushed in puree and non-oral if possible  Oral care: 3x/day with suction toothbrush  Supervision: full feed  Aspiration Precautions/Compensatory Swallowing Strategies: upright position, fully awake, small bites and small sips      Current Medical Status:  Pt is a 80 y o  male who presented to [address] [place] [address] as stroke alert on 23 and [facility] 7:30 AM on the day of presentation  Initial presenting deficits were fascia, right upper extremity sensory deficit right upper extremity weakness  CT head and CTA were negative for any acute pathology  Pt was found to be a thrombolytics candidate within the appropriate time window and without obvious contraindication and TNK was therefore was given        Past Medical History:  Please see H&P for details    Relevant Imagin23: Chest XR IMPRESSION: No acute "pulmonary pathology  No obvious displaced fractures within limitation of supine AP chest technique  6/13/23: CT Stroke Alert Brain IMPRESSION: 1  Exam is somewhat compromised by motion artifact  2   No acute intracranial CT abnormality  Small left parietal scalp hematoma  3   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  4   Chronic microangiopathy  MRI Pending    Social/Education/Vocational Hx:  Pt resides [facility]    Swallow Information:   Current Risks for Dysphagia & Aspiration: AMS and CVA  Current Symptoms/Concerns: pt did not pass nursing swallow screen  Current Diet: NPO  Baseline Diet: thin liquids and regular   Pt consumes medications: whole with liquids    Positioning and Respiratory Status:  Position: upright in bed  Respiratory Status: room air    Oral Integrity:  Mucosa: unable to assess secondary to limited command following  Dentition:edentulous; dentures at [facility]  Dependent on Oral Care: yes    Oral Mechanism Exam:   Face: unable to fully assess secondary to limited command following  Lips: unable to fully assess secondary to limited command following  Tongue: unable to fully assess secondary to limited command following  Hard/Soft Palate:unable to fully assess secondary to limited command following   Jaw: reduced ROM in order to retreive bolus from spoon  Cough: unable to fully assess secondary to limited command following      Speech Characteristics:  Apraxia of Speech: limited verbal output as pt only spoke x3 different words  Dysarthria: limited verbal output as pt only spoke x3 different words  Vocal Quality: WFL though only spoke x3 different words and perseverated on the word \"yeah\"      Textures Assessed:  Puree trials of applesauce were given to the patient during assessment  Clinician observed the following clinical s/s of dysphagia: decreased ROM needed to open mouth and fully retreive bolus  Liquids Assessed:   Thin liquids via cup  " Clinician observed the following clinical s/s of dysphagia: WFL  Pt unable to retreive bolus from straw and did not attempt to retreive bolus from large cup, only small cup  Pt unable to assist in holding cup and bringing in to mouth  Esophageal Concerns  None per chart review    Strategies Trialed  Postural Techniques:none  Maneuvers:none  Behavioral Strategies: none    Results Reviewed with: patient, RN, CRNP and family     Dysphagia Goals: Patient will consume puree with thin liquids via cup without overt s/s of aspiration x1-3 tx sessions

## 2023-06-14 NOTE — PROGRESS NOTES
"    NEUROLOGY RESIDENCY PROGRESS NOTE     Name: Luiz Munoz   Age & Sex: 80 y o  male   MRN: [id number]  Unit/Bed#: ICU 07   Encounter: [id number]    ASSESSMENT & PLAN     Stroke [facility])  Assessment & Plan  Assessment:  Luiz Munoz is a 80 y o  male  who presented as stroke alert on 6/13/2023 and LKW 7:30 AM on the day of presentation  Initial presenting deficits were fascia, right upper extremity sensory deficit right upper extremity weakness  CT head and CTA were negative for any acute pathology  Pt was found to be a thrombolytics candidate within the appropriate time window and without obvious contraindication and TNK was therefore was given  Workup: In the ED, Presenting BP Blood Pressure: (!) 210/104  CT Head showed: Acute intracranial pathology  CTA head and neck showed: No intracranial large vessel occlusion  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  Focal severe stenosis at proximal P2 and P3 segment of right PCA  No hemodynamically significant stenosis or dissection of cervical carotid and vertebral arteries      Repeat CTH at 24 hours: Negative for bleed      Lab Results   Component Value Date    HGBA1C 5 7 (H) 06/14/2023     No results found for: \"TSH\"  Lab Results   Component Value Date    CHOLESTEROL 211 (H) 06/14/2023    HDL 59 06/14/2023    LDLCALC 127 (H) 06/14/2023    TRIG 127 06/14/2023       Risk factors: hypertension and prior stroke    Pertinent Scores:   - NIHSS: 12    Impression: Significant concern for stroke    Plan:  - Recommend admit to hospital on acute ischemic stroke pathway  - MRI brain without contrast  - Continue Neuro checks  - Normotension  - Start ASA 81mg daily  - Maintain glucose <180, SSI for coverage if indicated  - Repeat CTH at 24 hours after thrombolytic administration prior to starting AP/AC  - Repeat CTH if GCS drops 2pts in 1hr or if given that the pt is post tPA if the pt is reporting severe headache, acute increase in BP, or " N/V  - PT/OT/ST/PM&R Evaluation  - Atorvastatin 40 mg q d   - Continue monitoring on telemetry  - Rest of care per primary team      Recommendations for outpatient neurological follow up have yet to be determined  Disposition pending: MRI    SUBJECTIVE     Patient was seen and examined  No acute events overnight  Pt significantly aphasic on exam     Review of Systems   Unable to perform ROS: Other (Aphasia)       OBJECTIVE     Patient ID: Ashley Mccabe is a 80 y o  male  Vitals:    23 0600 23 0800 23 1040 23 1135   BP: 157/88 148/77 120/68 158/89   BP Location:  Right arm     Pulse: 90 85 83 91   Resp:  20  (!) 24   Temp:  99 2 °F (37 3 °C)  98 1 °F (36 7 °C)   TempSrc:  Axillary  Axillary   SpO2: 95% 97% 94% 98%   Weight:       Height:          Temperature:   Temp (24hrs), Av 7 °F (37 1 °C), Min:98 °F (36 7 °C), Max:99 7 °F (37 6 °C)    Temperature: 98 1 °F (36 7 °C)    Physical Exam:  Vitals and nursing note reviewed  Constitutional: Alert  Not in acute distress  Not ill-appearing, toxic-appearing or diaphoretic  HENT: Normocephalic and atraumatic  Nose and Ears normal     Eyes: No scleral icterus  No discharge  Neck: Neck Supple  ROM normal    Cardiovascular: Distal extremities warm without palpable edema or tenderness, no observed significant swelling  Pulmonary:  Pulmonary effort is normal  Not in respiratory distress   Abdominal: Abdomen is flat and not distended   Musculoskeletal: No swelling or deformity  Skin: Warm and dry   Psychiatric:  Aphasic     Neurological Exam  Mental Status  Awake and alert  Global aphasia present  A full mental status examination is unable to be completed due to the patient's current medical status  Cranial Nerves  CN II: Visual fields full to confrontation  CN III, IV, VI: Extraocular movements intact bilaterally  Normal lids and orbits bilaterally  Pupils equal round and reactive to light bilaterally    CN VII: Full and symmetric facial movement  A full cranial nerve examination is unable to be completed due to the patient's current medical status  Motor  Normal muscle bulk throughout  No overt weakness, pt spontaneously moving all extremities and provides some resistance to movement and pulls against examiner  A full motor examination is unable to be completed due to the patient's current medical status  Sensory  Pt withdrawals all extremities to painful stimulation  A full sensory examination is unable to be completed due to the patient's current medical status  Reflexes  Deep tendon reflexes are 2+ and symmetric except as noted  Coordination    Coordination is unable to be assessed due to the patient's current medical status  Gait    Gait examination is unable to be assessed due to the patient's current medical status  LABORATORY DATA     Labs: Additional Pertinent Lab Tests Reviewed: All Labs Within Last 24 Hours Reviewed  Results from last 7 days   Lab Units 06/13/23  0930   HEMATOCRIT % 53 5*   HEMOGLOBIN g/dL 17 7*   PLATELETS Thousands/uL 357   WBC Thousand/uL 18 27*      Results from last 7 days   Lab Units 06/13/23  0930   BUN mg/dL 21   CALCIUM mg/dL 10 2   CHLORIDE mmol/L 96   CO2 mmol/L 26   CREATININE mg/dL 1 43*   POTASSIUM mmol/L 5 2              Results from last 7 days   Lab Units 06/13/23  0930   INR  1 00   PTT seconds 26               IMAGING & DIAGNOSTIC TESTING     Radiology Results: I have personally reviewed pertinent films in PACS    CT head wo contrast   Final Result by Marty Ash MD (06/14 1010)      1  No acute intracranial abnormality  No significant interval change  2   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  3   Chronic microangiopathic change  Workstation performed: [id number]         CTA stroke alert (head/neck)   Final Result by Marty Ash MD (06/13 1008)      1  No intracranial large vessel occlusion   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  Focal severe stenosis at proximal P2 and P3 segment of right PCA  2   No hemodynamically significant stenosis or dissection of cervical carotid and vertebral arteries  Findings were directly discussed with Dingle Bee at 9:44 a m  Workstation performed: [id number]         CT stroke alert brain   Final Result by Jaylene Escamilla MD (06/13 7304)      1  Exam is somewhat compromised by motion artifact  2   No acute intracranial CT abnormality  Small left parietal scalp hematoma  3   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  4   Chronic microangiopathy  Findings were directly discussed with Dingle Bee at 9:44 a m  Workstation performed: [id number]         XR Trauma multiple (SLB/SLRA trauma bay ONLY)   Final Result by Betzaida Reyes MD (06/13 0968)      No acute pulmonary pathology  No obvious displaced fractures within limitation of supine AP chest technique  Workstation performed: [id number]         XR chest portable   Final Result by Betzaida Reyes MD (06/13 3863)      No acute pulmonary pathology  No obvious displaced fractures within limitation of supine AP chest technique  Workstation performed: [id number]         MRI Inpatient Order    (Results Pending)       Other Diagnostic Testing: I have personally reviewed pertinent reports        ACTIVE MEDICATIONS     Current Facility-Administered Medications   Medication Dose Route Frequency   • atorvastatin (LIPITOR) tablet 40 mg  40 mg Oral QPM   • chlorhexidine (PERIDEX) 0 12 % oral rinse 15 mL  15 mL Mouth/Throat Q12H ELISEO   • labetalol (NORMODYNE) injection 10 mg  10 mg Intravenous Q6H PRN   • multi-electrolyte (PLASMALYTE-A/ISOLYTE-S PH 7 4) IV solution  100 mL/hr Intravenous Continuous       Prior to Admission medications    Not on File       VTE Pharmacologic Prophylaxis:  Per primary team  VTE Mechanical Prophylaxis: Per primary team    ==  DO Anne Marie [place] [facility] Neurology Residency, PGY-3

## 2023-06-14 NOTE — ASSESSMENT & PLAN NOTE
Lab Results   Component Value Date    CREATININE 1 43 (H) 06/13/2023    EGFR 32 06/13/2023     · sCr on admission - 1 43  · Baseline sCr - 1 36 to 1 6 as of 2021, pt at baseline  · Start IVF Isolyte 100cc/hr, examines dry and hem concentrated    · Given 1L IVF for hydration   · Monitor I&O   · Avoid nephrotoxins and hypotension  · BMP daily

## 2023-06-14 NOTE — PROGRESS NOTES
[facility]  Progress Note  Name: Gilmar Hicks  MRN: [id number]  Unit/Bed#: ICU 07 I Date of Admission: 6/13/2023   Date of Service: 6/14/2023 I Hospital Day: 1    Assessment/Plan   Stroke [facility])  Assessment & Plan  · Pt presented as a trauma, s/p witness fall at his nursing facility  · In trauma bay pt was noted to have unequal pupils, was not verbally responsive and noted to have right sided weakness  · Initial NIHSS: 12  · Initial GCS 10  · Last known well time 0730 this morning  · Pt does not take any AC or AP  · CTA - No intracranial large vessel occlusion  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  Focal severe stenosis at proximal P2 and P3 segment of right PCA  No significant stenosis or dissection of cervical carotid and vertebral arteries  · Pt received TNK at 0946    Plan:  · Repeat CT head in 24h (6/14 @ 0946)  · Obtain STAT CT head if GCS drop by 2 or pt c/o severe headache  · Obtain MRI brai  · TTE: EF 64%, Diastolic function is mildly abnormal, consistent with grade I (abnormal) relaxation  · Continue neuro-checks per TNK pathway  · Continue VS per post TNK protocol  · BP goal; SBP < 180, DBP < 105 for 24 hours  · Hold all AC/AP for now, plan to restart once 24h follow CT head done  · Start Atorvastatin 40mg PO daily, pending swallow eval   · Follow up lipid profile, HbA1c  · POCT glucose with goal glucose 140-180  · Tele monitoring  · PT/OR eval and treat   · PMR consult when appropriate  · CM consult for dispo planning    CKD (chronic kidney disease)  Assessment & Plan  Lab Results   Component Value Date    CREATININE 1 43 (H) 06/13/2023    EGFR 32 06/13/2023     · sCr on admission - 1 43  · Baseline sCr - 1 36 to 1 6 as of 2021, pt at baseline  · Start IVF Isolyte 100cc/hr, examines dry and hem concentrated    · Given 1L IVF for hydration   · Monitor I&O   · Avoid nephrotoxins and hypotension  · BMP daily    Essential hypertension  Assessment & Plan  · Home regimen: Norvasc 2 5mg PO daily, Coreg 6 25mg PO BID, Lisinopril 10mg PO daily  · Hold home regimen for now allowing permissive HTN in setting of CVA  · Restart 24h post TNK after speech eval   · Will utilize PRN antihypertensives if SBP > 180    Leukocytosis  Assessment & Plan  · WBC - 18 27 on admission  · Pt remains afebrile  · UA: negative for leukocytes and nitrates  +protein  No WBCs or bacteria noted  · MRSA pending  · VS per protocol  · Hold ABX and BCx for now    Hyponatremia  Assessment & Plan  · Sodium - 133  · Baseline 132 to 135, at baseline  · No intervention or workup at this time  · Repeat BMP in AM    Constipation  Assessment & Plan  · Hold home regimen until seen and cleared by speech    [address]  · Pt presented as witness fall, no reported headstrike  · Pt does not take any AC/AP at home  · Fall likely d/t weakness secondary to CVA  · Imaging negative for traumatic injuries  · PT/OT eval  · Fall precations             ICU Core Measures     A: Assess, Prevent, and Manage Pain · Has pain been assessed? Yes  · Need for changes to pain regimen? No   B: Both SAT/SAT  · N/A   C: Choice of Sedation · RASS Goal: 0 Alert and Calm  · Need for changes to sedation or analgesia regimen? No   D: Delirium · CAM-ICU: Unable to perform secondary to Acute cognitive dysfunction   E: Early Mobility  · Plan for early mobility? Yes   F: Family Engagement · Plan for family engagement today? Yes         Prophylaxis:  VTE Contraindicated secondary to: Received TNK    Stress Ulcer  not ordered       Subjective   HPI/24hr events:    80 yr old hospital day 2 s/p TNK on stroke pathway  Neuro exam unchanged overnight  No acute issues  BP permissively elevated   CT ordered for the AM    Review of Systems   Unable to perform ROS: Mental status change        Objective                            Vitals I/O      Most Recent Min/Max in 24hrs   Temp 98 3 °F (36 8 °C) Temp Min: 98 3 °F (36 8 °C)  Max: 99 7 °F (37 6 °C)   Pulse 98 Pulse  Min: 84  Max: 111   Resp (!) 29 Resp  Min: 15  Max: 46   /85 BP  Min: 143/84  Max: 210/104   O2 Sat 98 % SpO2  Min: 90 %  Max: 99 %      Intake/Output Summary (Last 24 hours) at 6/14/2023 0139  Last data filed at 6/13/2023 2200  Gross per 24 hour   Intake 740 ml   Output 320 ml   Net 420 ml         Diet NPO     Invasive Monitoring Physical exam   None Physical Exam  HENT:      Head: Normocephalic  Mouth/Throat:      Mouth: Mucous membranes are moist    Eyes:      Comments: R > L pupil    Cardiovascular:      Rate and Rhythm: Normal rate and regular rhythm  Pulmonary:      Effort: Pulmonary effort is normal       Breath sounds: Normal breath sounds  Comments: Room Air  Abdominal:      General: Abdomen is flat  Bowel sounds are normal       Palpations: Abdomen is soft  Skin:     Capillary Refill: Capillary refill takes less than 2 seconds  Neurological:      Mental Status: He is alert  He is confused  GCS: GCS eye subscore is 4  GCS verbal subscore is 4  GCS motor subscore is 6  Cranial Nerves: No facial asymmetry              Diagnostic Studies      EKG: NSR bmp: 90  Imaging:  I have personally reviewed pertinent films in PACS     Medications:  Scheduled PRN   atorvastatin, 40 mg, QPM  chlorhexidine, 15 mL, Q12H [facility]  multi-electrolyte, 1,000 mL, Once      labetalol, 10 mg, Q6H PRN       Continuous    multi-electrolyte, 100 mL/hr, Last Rate: 100 mL/hr (06/14/23 0110)         Labs:    CBC    Recent Labs     06/13/23  0930   HCT 53 5*   HGB 17 7*      WBC 18 27*     BMP    Recent Labs     06/13/23  0930   AGAP 11   BUN 21   CALCIUM 10 2   CL 96   CO2 26   CREATININE 1 43*   K 5 2   SODIUM 133*       Coags    Recent Labs     06/13/23  0930   INR 1 00   PTT 26        Additional Electrolytes  No recent results       Blood Gas    No recent results  No recent results LFTs  No recent results    Infectious  No recent results Glucose  Recent Labs     06/13/23  0930   GLUC 123               Critical Care Time Delivered: Upon my evaluation, this patient had a high probability of imminent or life-threatening deterioration due to Acute CVA pathway s/p TNK, hyponatermia , which required my direct attention, intervention, and personal management  I have personally provided 15 minutes of critical care time, exclusive of procedures, teaching, family meetings, and any prior time recorded by providers other than myself       ROXY Sharpe

## 2023-06-14 NOTE — PROGRESS NOTES
Critical Care Interval Transfer Note:    Please refer to progress note from earlier today for full details  Changes made day of transfer:   · Repeat CTH 24h post TNK - no change  · Started ASA 81mg daily  · Continue Lipitor 40mg daily  · Restarted home antihypertensive: Coreg, Norvasc  · Lisinopril remains held d/t sj and not wanting to start all home BP meds at once  · Started Lovenox for DVT  · Cleared for diet by speech (dysphagia 1 puree thin liquids)  · Follow up MRI     Consults: IP CONSULT TO NEUROLOGY  IP CONSULT TO CASE MANAGEMENT  IP CONSULT TO CASE MANAGEMENT  IP CONSULT TO NUTRITION SERVICES    Recommended to review admission imaging for incidental findings and document in discharge navigator: Chart reviewed, no known incidental findings noted at this time  Discharge Plan: Anticipate discharge in 48-72 hrs to prior assisted or independent living facility  Patient seen and evaluated by Critical Care today and deemed to be appropriate for transfer to [facility] with Telemetry  Spoke to Dr Amie Feliz from [facility] to accept transfer  Critical care can be contacted via [employer] with any questions or concerns

## 2023-06-14 NOTE — QUICK NOTE
Patient Name: Nuno Nunn  Patient MRN: [id number]   Date: 06/14/23   Time: 1230  Unit/Bed: ICU 07    Family/Relationship: Mitchell Tompkins (Son)    Location: Bedside    Content of meeting: Update on patient statu, plan of care    Family understanding of patient's condition: Good    Comments: None    Time spent: 8 minutes    ROXY Mercedes no

## 2023-06-15 ENCOUNTER — APPOINTMENT (INPATIENT)
Dept: MRI IMAGING | Facility: HOSPITAL | Age: 88
DRG: 062 | End: 2023-06-15
Payer: COMMERCIAL

## 2023-06-15 LAB
ANION GAP SERPL CALCULATED.3IONS-SCNC: 10 MMOL/L (ref 4–13)
ANION GAP SERPL CALCULATED.3IONS-SCNC: 10 MMOL/L (ref 4–13)
BUN SERPL-MCNC: 31 MG/DL (ref 5–25)
BUN SERPL-MCNC: 31 MG/DL (ref 5–25)
CA-I BLD-SCNC: 0.97 MMOL/L (ref 1.12–1.32)
CA-I BLD-SCNC: 0.97 MMOL/L (ref 1.12–1.32)
CALCIUM SERPL-MCNC: 8.7 MG/DL (ref 8.4–10.2)
CALCIUM SERPL-MCNC: 8.7 MG/DL (ref 8.4–10.2)
CHLORIDE SERPL-SCNC: 101 MMOL/L (ref 96–108)
CHLORIDE SERPL-SCNC: 101 MMOL/L (ref 96–108)
CO2 SERPL-SCNC: 23 MMOL/L (ref 21–32)
CO2 SERPL-SCNC: 23 MMOL/L (ref 21–32)
CREAT SERPL-MCNC: 1.49 MG/DL (ref 0.6–1.3)
CREAT SERPL-MCNC: 1.49 MG/DL (ref 0.6–1.3)
ERYTHROCYTE [DISTWIDTH] IN BLOOD BY AUTOMATED COUNT: 14.4 % (ref 11.6–15.1)
ERYTHROCYTE [DISTWIDTH] IN BLOOD BY AUTOMATED COUNT: 14.4 % (ref 11.6–15.1)
FLUAV RNA RESP QL NAA+PROBE: NEGATIVE
FLUAV RNA RESP QL NAA+PROBE: NEGATIVE
FLUBV RNA RESP QL NAA+PROBE: NEGATIVE
FLUBV RNA RESP QL NAA+PROBE: NEGATIVE
GFR SERPL CREATININE-BSD FRML MDRD: 40 ML/MIN/1.73SQ M
GFR SERPL CREATININE-BSD FRML MDRD: 40 ML/MIN/1.73SQ M
GLUCOSE SERPL-MCNC: 113 MG/DL (ref 65–140)
GLUCOSE SERPL-MCNC: 113 MG/DL (ref 65–140)
HCT VFR BLD AUTO: 43.7 % (ref 36.5–49.3)
HCT VFR BLD AUTO: 43.7 % (ref 36.5–49.3)
HGB BLD-MCNC: 14.3 G/DL (ref 12–17)
HGB BLD-MCNC: 14.3 G/DL (ref 12–17)
MAGNESIUM SERPL-MCNC: 2.2 MG/DL (ref 1.9–2.7)
MAGNESIUM SERPL-MCNC: 2.2 MG/DL (ref 1.9–2.7)
MCH RBC QN AUTO: 28.9 PG (ref 26.8–34.3)
MCH RBC QN AUTO: 28.9 PG (ref 26.8–34.3)
MCHC RBC AUTO-ENTMCNC: 32.7 G/DL (ref 31.4–37.4)
MCHC RBC AUTO-ENTMCNC: 32.7 G/DL (ref 31.4–37.4)
MCV RBC AUTO: 89 FL (ref 82–98)
MCV RBC AUTO: 89 FL (ref 82–98)
PHOSPHATE SERPL-MCNC: 3.6 MG/DL (ref 2.3–4.1)
PHOSPHATE SERPL-MCNC: 3.6 MG/DL (ref 2.3–4.1)
PLATELET # BLD AUTO: 278 THOUSANDS/UL (ref 149–390)
PLATELET # BLD AUTO: 278 THOUSANDS/UL (ref 149–390)
PMV BLD AUTO: 9.9 FL (ref 8.9–12.7)
PMV BLD AUTO: 9.9 FL (ref 8.9–12.7)
POTASSIUM SERPL-SCNC: 4.4 MMOL/L (ref 3.5–5.3)
POTASSIUM SERPL-SCNC: 4.4 MMOL/L (ref 3.5–5.3)
RBC # BLD AUTO: 4.94 MILLION/UL (ref 3.88–5.62)
RBC # BLD AUTO: 4.94 MILLION/UL (ref 3.88–5.62)
RSV RNA RESP QL NAA+PROBE: NEGATIVE
RSV RNA RESP QL NAA+PROBE: NEGATIVE
SARS-COV-2 RNA RESP QL NAA+PROBE: NEGATIVE
SARS-COV-2 RNA RESP QL NAA+PROBE: NEGATIVE
SODIUM SERPL-SCNC: 134 MMOL/L (ref 135–147)
SODIUM SERPL-SCNC: 134 MMOL/L (ref 135–147)
WBC # BLD AUTO: 13.42 THOUSAND/UL (ref 4.31–10.16)
WBC # BLD AUTO: 13.42 THOUSAND/UL (ref 4.31–10.16)

## 2023-06-15 PROCEDURE — G1004 CDSM NDSC: HCPCS

## 2023-06-15 PROCEDURE — 82330 ASSAY OF CALCIUM: CPT

## 2023-06-15 PROCEDURE — 99232 SBSQ HOSP IP/OBS MODERATE 35: CPT | Performed by: INTERNAL MEDICINE

## 2023-06-15 PROCEDURE — 70551 MRI BRAIN STEM W/O DYE: CPT

## 2023-06-15 PROCEDURE — 0241U HB NFCT DS VIR RESP RNA 4 TRGT: CPT | Performed by: INTERNAL MEDICINE

## 2023-06-15 PROCEDURE — 85027 COMPLETE CBC AUTOMATED: CPT

## 2023-06-15 PROCEDURE — 80048 BASIC METABOLIC PNL TOTAL CA: CPT

## 2023-06-15 PROCEDURE — 84100 ASSAY OF PHOSPHORUS: CPT

## 2023-06-15 PROCEDURE — 92526 ORAL FUNCTION THERAPY: CPT

## 2023-06-15 PROCEDURE — 83735 ASSAY OF MAGNESIUM: CPT

## 2023-06-15 RX ADMIN — AMLODIPINE BESYLATE 2.5 MG: 2.5 TABLET ORAL at 10:21

## 2023-06-15 RX ADMIN — SODIUM CHLORIDE, SODIUM GLUCONATE, SODIUM ACETATE, POTASSIUM CHLORIDE, MAGNESIUM CHLORIDE, SODIUM PHOSPHATE, DIBASIC, AND POTASSIUM PHOSPHATE 50 ML/HR: .53; .5; .37; .037; .03; .012; .00082 INJECTION, SOLUTION INTRAVENOUS at 14:45

## 2023-06-15 RX ADMIN — ENOXAPARIN SODIUM 30 MG: 30 INJECTION SUBCUTANEOUS at 10:21

## 2023-06-15 RX ADMIN — CHLORHEXIDINE GLUCONATE 15 ML: 1.2 SOLUTION ORAL at 22:24

## 2023-06-15 RX ADMIN — CARVEDILOL 6.25 MG: 6.25 TABLET, FILM COATED ORAL at 10:21

## 2023-06-15 RX ADMIN — ATORVASTATIN CALCIUM 40 MG: 40 TABLET, FILM COATED ORAL at 18:40

## 2023-06-15 RX ADMIN — CARVEDILOL 6.25 MG: 6.25 TABLET, FILM COATED ORAL at 18:40

## 2023-06-15 RX ADMIN — SENNOSIDES AND DOCUSATE SODIUM 1 TABLET: 50; 8.6 TABLET ORAL at 22:24

## 2023-06-15 RX ADMIN — ASPIRIN 81 MG: 81 TABLET, COATED ORAL at 10:21

## 2023-06-15 NOTE — QUICK NOTE
"* Stroke [facility])  Assessment & Plan  Assessment:  Tonie Vines is a 80 y o  male  who presented as stroke alert on 6/13/2023 and LKW 7:30 AM on the day of presentation  Initial presenting deficits were fascia, right upper extremity sensory deficit right upper extremity weakness  CT head and CTA were negative for any acute pathology  Pt was found to be a thrombolytics candidate within the appropriate time window and without obvious contraindication and TNK was therefore was given  Workup: In the ED, Presenting BP Blood Pressure: (!) 210/104  CT Head showed: Acute intracranial pathology  CTA head and neck showed: No intracranial large vessel occlusion  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  Focal severe stenosis at proximal P2 and P3 segment of right PCA  No hemodynamically significant stenosis or dissection of cervical carotid and vertebral arteries  Repeat CTH at 24 hours: Negative for bleed  MRI brain 6/15: Acute small multifocal infarcts in left perirolandic, left posterior frontal, left parietal, left posterior temporal, and bilateral occipital lobes  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 with chronic hemosiderin deposition, likely sequela of hemorrhagic infarct  Chronic small infarcts in left cerebellum  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  Lab Results   Component Value Date    HGBA1C 5 7 (H) 06/14/2023     No results found for: \"TSH\"  Lab Results   Component Value Date    CHOLESTEROL 211 (H) 06/14/2023    HDL 59 06/14/2023    LDLCALC 127 (H) 06/14/2023    TRIG 127 06/14/2023       Risk factors: hypertension and prior stroke    Pertinent Scores:   - NIHSS: 12    Impression: Significant concern for stroke    Plan:  - Normotension  - ASA 81mg daily  - Atorvastatin 40 mg q d   - Patient will need outpatient evaluation with cardiology   Recommend Zio patch placement and if Zio patch is negative then recommend loop recorder " placement to evaluate for potential atrial fibrillation, given the cardioembolic appearance of his strokes  - Rest of care per primary team          Satira Patience will need follow up in in 4 weeks with neurovascular AP or Attending  He will not require outpatient neurological testing

## 2023-06-15 NOTE — PLAN OF CARE
Problem: MOBILITY - ADULT  Goal: Maintain or return to baseline ADL function  Description: INTERVENTIONS:  -  Assess patient's ability to carry out ADLs; assess patient's baseline for ADL function and identify physical deficits which impact ability to perform ADLs (bathing, care of mouth/teeth, toileting, grooming, dressing, etc )  - Assess/evaluate cause of self-care deficits   - Assess range of motion  - Assess patient's mobility; develop plan if impaired  - Assess patient's need for assistive devices and provide as appropriate  - Encourage maximum independence but intervene and supervise when necessary  - Involve family in performance of ADLs  - Assess for home care needs following discharge   - Consider OT consult to assist with ADL evaluation and planning for discharge  - Provide patient education as appropriate  Outcome: Progressing  Goal: Maintains/Returns to pre admission functional level  Description: INTERVENTIONS:  - Perform BMAT or MOVE assessment daily    - Set and communicate daily mobility goal to care team and patient/family/caregiver  - Collaborate with rehabilitation services on mobility goals if consulted  - Perform Range of Motion  times a day  - Reposition patient every  hours    - Dangle patient  times a day  - Stand patient  times a day  - Ambulate patient  times a day  - Out of bed to chair  times a day   - Out of bed for meals  times a day  - Out of bed for toileting  - Record patient progress and toleration of activity level   Outcome: Progressing     Problem: PAIN - ADULT  Goal: Verbalizes/displays adequate comfort level or baseline comfort level  Description: Interventions:  - Encourage patient to monitor pain and request assistance  - Assess pain using appropriate pain scale  - Administer analgesics based on type and severity of pain and evaluate response  - Implement non-pharmacological measures as appropriate and evaluate response  - Consider cultural and social influences on pain and pain management  - Notify physician/advanced practitioner if interventions unsuccessful or patient reports new pain  Outcome: Progressing     Problem: INFECTION - ADULT  Goal: Absence or prevention of progression during hospitalization  Description: INTERVENTIONS:  - Assess and monitor for signs and symptoms of infection  - Monitor lab/diagnostic results  - Monitor all insertion sites, i e  indwelling lines, tubes, and drains  - Monitor endotracheal if appropriate and nasal secretions for changes in amount and color  - [place] appropriate cooling/warming therapies per order  - Administer medications as ordered  - Instruct and encourage patient and family to use good hand hygiene technique  - Identify and instruct in appropriate isolation precautions for identified infection/condition  Outcome: Progressing  Goal: Absence of fever/infection during neutropenic period  Description: INTERVENTIONS:  - Monitor WBC    Outcome: Progressing     Problem: SAFETY ADULT  Goal: Maintain or return to baseline ADL function  Description: INTERVENTIONS:  -  Assess patient's ability to carry out ADLs; assess patient's baseline for ADL function and identify physical deficits which impact ability to perform ADLs (bathing, care of mouth/teeth, toileting, grooming, dressing, etc )  - Assess/evaluate cause of self-care deficits   - Assess range of motion  - Assess patient's mobility; develop plan if impaired  - Assess patient's need for assistive devices and provide as appropriate  - Encourage maximum independence but intervene and supervise when necessary  - Involve family in performance of ADLs  - Assess for home care needs following discharge   - Consider OT consult to assist with ADL evaluation and planning for discharge  - Provide patient education as appropriate  Outcome: Progressing  Goal: Maintains/Returns to pre admission functional level  Description: INTERVENTIONS:  - Perform BMAT or MOVE assessment daily    - Set and communicate daily mobility goal to care team and patient/family/caregiver  - Collaborate with rehabilitation services on mobility goals if consulted  - Perform Range of Motion  times a day  - Reposition patient every  hours    - Dangle patient times a day  - Stand patient  times a day  - Ambulate patient  times a day  - Out of bed to chair  times a day   - Out of bed for meals  times a day  - Out of bed for toileting  - Record patient progress and toleration of activity level   Outcome: Progressing  Goal: Patient will remain free of falls  Description: INTERVENTIONS:  - Educate patient/family on patient safety including physical limitations  - Instruct patient to call for assistance with activity   - Consult OT/PT to assist with strengthening/mobility   - Keep Call bell within reach  - Keep bed low and locked with side rails adjusted as appropriate  - Keep care items and personal belongings within reach  - Initiate and maintain comfort rounds  - Make Fall Risk Sign visible to staff  - Offer Toileting every  Hours, in advance of need  - Initiate/Maintain alarm  - Obtain necessary fall risk management equipment:   - Apply yellow socks and bracelet for high fall risk patients  - Consider moving patient to room near nurses station  Outcome: Progressing     Problem: DISCHARGE PLANNING  Goal: Discharge to home or other facility with appropriate resources  Description: INTERVENTIONS:  - Identify barriers to discharge w/patient and caregiver  - Arrange for needed discharge resources and transportation as appropriate  - Identify discharge learning needs (meds, wound care, etc )  - Arrange for interpretive services to assist at discharge as needed  - Refer to Case Management Department for coordinating discharge planning if the patient needs post-hospital services based on physician/advanced practitioner order or complex needs related to functional status, cognitive ability, or social support system  Outcome: Progressing Problem: Knowledge Deficit  Goal: Patient/family/caregiver demonstrates understanding of disease process, treatment plan, medications, and discharge instructions  Description: Complete learning assessment and assess knowledge base  Interventions:  - Provide teaching at level of understanding  - Provide teaching via preferred learning methods  Outcome: Progressing     Problem: Prexisting or High Potential for Compromised Skin Integrity  Goal: Skin integrity is maintained or improved  Description: INTERVENTIONS:  - Identify patients at risk for skin breakdown  - Assess and monitor skin integrity  - Assess and monitor nutrition and hydration status  - Monitor labs   - Assess for incontinence   - Turn and reposition patient  - Assist with mobility/ambulation  - Relieve pressure over bony prominences  - Avoid friction and shearing  - Provide appropriate hygiene as needed including keeping skin clean and dry  - Evaluate need for skin moisturizer/barrier cream  - Collaborate with interdisciplinary team   - Patient/family teaching  - Consider wound care consult   Outcome: Progressing     Problem: Nutrition/Hydration-ADULT  Goal: Nutrient/Hydration intake appropriate for improving, restoring or maintaining nutritional needs  Description: Monitor and assess patient's nutrition/hydration status for malnutrition  Collaborate with interdisciplinary team and initiate plan and interventions as ordered  Monitor patient's weight and dietary intake as ordered or per policy  Utilize nutrition screening tool and intervene as necessary  Determine patient's food preferences and provide high-protein, high-caloric foods as appropriate       INTERVENTIONS:  - Monitor oral intake, urinary output, labs, and treatment plans  - Assess nutrition and hydration status and recommend course of action  - Evaluate amount of meals eaten  - Assist patient with eating if necessary   - Allow adequate time for meals  - Recommend/ encourage appropriate diets, oral nutritional supplements, and vitamin/mineral supplements  - Order, calculate, and assess calorie counts as needed  - Recommend, monitor, and adjust tube feedings based on assessed needs  - Assess need for intravenous fluids  - Provide nutrition/hydration education as appropriate  - Include patient/family/caregiver in decisions related to nutrition  Outcome: Progressing

## 2023-06-15 NOTE — ASSESSMENT & PLAN NOTE
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  Focal severe stenosis of proximal P2 and P3 segment of right PCA  · Patient given TNK and was monitored initially in the ICU  · Repeat CT head at 24 hours negative for any acute bleed  · Patient was NIH HSS 12  · Still having significant expressive aphasia  · 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 in the left perirolandic, left posterior frontal, left parietal, left posterior temporal and bilateral occipital lobes    · Speech recommends dysphagia 1 diet    Plan:  · Continue Lipitor 40 mg daily  · Aspirin 81 mg daily  · IV fluid hydration 100 mL/h for 12 more hours  · Neurochecks  · Normotension  · Telemetry  · Likely discharge to skilled nursing facility

## 2023-06-15 NOTE — CASE MANAGEMENT
Case Management Discharge Planning Note    Patient name Karen Alcocer  Location S /S -01 MRN [id number]  : 1932 Date 6/15/2023       Current Admission Date: 2023  Current Admission Diagnosis:Stroke [facility])   Patient Active Problem List    Diagnosis Date Noted   • Stroke ([address] ) 2023   • Essential hypertension 2023   • CKD (chronic kidney disease) 2023   • Constipation 2023   • Hyponatremia 2023   • Leukocytosis 2023   • Fall 2023      LOS (days): 2  Geometric Mean LOS (GMLOS) (days): 3 20  Days to GMLOS:1     OBJECTIVE:  Risk of Unplanned Readmission Score: 12 75         Current admission status: Inpatient   Preferred Pharmacy: No Pharmacies Listed  Primary Care Provider: Martinez Hunter MD    Primary Insurance: [address] [place] [address]  Secondary Insurance:     DISCHARGE DETAILS:    Discharge planning discussed with[de-identified] patient's son, Margarita Marquez, and Luis Alfredo Chicho at bedside  Freedom of Choice: Yes (list of available facilities provided)  Comments - Freedom of Choice: family to discuss options tonight; requested call with preference by tomorrow AM  CM contacted family/caregiver?: Yes (family at bedside)  Were Treatment Team discharge recommendations reviewed with patient/caregiver?: Yes  Did patient/caregiver verbalize understanding of patient care needs?: N/A- going to facility  Were patient/caregiver advised of the risks associated with not following Treatment Team discharge recommendations?: Yes    Contacts  Patient Contacts: Margarita Marquez and Wally Ho  Relationship to Patient[de-identified] Family  Contact Method:  In Person  Reason/Outcome: Continuity of Care, Emergency Contact, Referral, Discharge Jane Perez         Is the patient interested in [facility] at discharge?: No    DME Referral Provided  Referral made for DME?: No    Other Referral/Resources/Interventions Provided:  Interventions: SNF, Short Term Rehab  Referral Comments: Met with patient's son, Amparo Renae, and Chetan Still, at bedside to review options for STR  List of available facilities provided for them to review  Family aware that [facility] was not in-network with patient's insurance  Also aware that [facility] had requested application be completed prior to consideration  Current bed offers recieved from [address], [address] and [facility]  Family relays wish to look into facilities and possibly tour prior to relaying preference  Declined offer for application to be given for [facility], stating it's too far from their home  Phone number for CM provided and requested they call in AM with preference as patient will need insurance authorization for rehab prior to any transfer  Family understanding of same and to follow-up with preferences once decision made      Would you like to participate in our [address] service program?  : No - Declined    Treatment Team Recommendation: Short Term Rehab  Discharge Destination Plan[de-identified] Short Term Rehab  Transport at Discharge : BLS Ambulance

## 2023-06-15 NOTE — ASSESSMENT & PLAN NOTE
• Sodium - 133  • Baseline 132 to 135, at baseline  • No intervention or workup at this time  • Under BMPs

## 2023-06-15 NOTE — ASSESSMENT & PLAN NOTE
• WBC - 18 27 on admission  • Pt remains afebrile  • UA: negative for leukocytes and nitrates  +protein  No WBCs or bacteria noted     • MRSA negative  • COVID flu RSV negative  • WBC downtrending, monitor CBCs

## 2023-06-15 NOTE — ASSESSMENT & PLAN NOTE
Lab Results   Component Value Date    CREATININE 1 49 (H) 06/15/2023    CREATININE 1 46 (H) 06/14/2023    CREATININE 1 43 (H) 06/13/2023    EGFR 40 06/15/2023    EGFR 41 06/14/2023    EGFR 32 06/13/2023   Creatinine currently at baseline  Avoid nephrotoxins  Monitor BMPs

## 2023-06-15 NOTE — DISCHARGE INSTR - DIET
Puree w/ Thin Liquids  ST to continue to follow at next level of care and advance diet as clinically appropriate

## 2023-06-15 NOTE — SPEECH THERAPY NOTE
Speech Language/Pathology  Patient Name: aGbrielle Drummond    OQROM'V Date: 6/15/2023     Problem List  Principal Problem:    Stroke [facility])  Active Problems:    Essential hypertension    CKD (chronic kidney disease)    Constipation    Hyponatremia    Leukocytosis    Fall         Past Medical History  No past medical history on file  Past Surgical History  No past surgical history on file  Subjective:  Pt in bed with eyes open and television on as SLP entered the room  No signs of distress  Pt responded to greeting with social smile  Objective:  Pt required tactile cue (spoon to bottom lip/bolus to bottom lip) in order to open mouth given initial trial only  Pt was able to open mouth when presented with spoon without additional tactile cues  If clinician discontinued rhythm of feed, pt required tactile cue again in order to open mouth/accept bolus from spoon  Pt consumed thin liquids this date from straw without difficulty or concern  Pt accepted x5 trials of mixed/soft consistency (rice krispie cereal) without difficulty  Unable to ascertain if pt achieved full oral clearance as pt did not follow commands to open mouth  Liquid wash provided as well as oral care (wet toothbrush) in order to ensure that the oral cavity was fully clear  Assessment:  No overt s/s of aspiration/dysphagia observed given puree and thin liquids via straw         Plan/Recommendations:  Continue puree with thin liquids, medications crushed in puree  Feeding Strategies: Pt benefits from tactile cue (spoon to bottom lip) given initial presentation of PO as well as utilizing a rhythmical pace to feed  Swallowing Strategies: upright position, small bites  ST to follow

## 2023-06-15 NOTE — ASSESSMENT & PLAN NOTE
• Pt presented as witness fall, no reported headstrike  • Pt does not take any AC/AP at home  • Fall likely d/t weakness secondary to CVA  • Imaging negative for traumatic injuries  • PT/OT recommend patient rehab  • Fall precations

## 2023-06-15 NOTE — ASSESSMENT & PLAN NOTE
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  Focal severe stenosis of proximal P2 and P3 segment of right PCA  · Patient given TNK and was monitored initially in the ICU  · Repeat CT head at 24 hours negative for any acute bleed  · Patient was NIH HSS 12  · Still having significant expressive aphasia  · 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 in the left perirolandic, left posterior frontal, left parietal, left posterior temporal and bilateral occipital lobes    · Speech recommends dysphagia 1 diet    Plan:  · Continue Lipitor 40 mg daily  · Aspirin 81 mg daily  · Neurochecks  · Normotension  · Telemetry  · Likely discharge to [facility], pending placement/transport  · Patient will follow-up with cardiology for Zio patch and loop recorder

## 2023-06-15 NOTE — ASSESSMENT & PLAN NOTE
Lab Results   Component Value Date    CREATININE 1 49 (H) 06/15/2023    CREATININE 1 46 (H) 06/14/2023    CREATININE 1 43 (H) 06/13/2023    EGFR 40 06/15/2023    EGFR 41 06/14/2023    EGFR 32 06/13/2023   Creatinine currently at baseline  Avoid nephrotoxins  Monitor BMPs  Isolyte 100 mL/h

## 2023-06-15 NOTE — CASE MANAGEMENT
Case Management Progress Note    Patient name Laura Terry  Location S /S -01 MRN [id number]  : 1932 Date 6/15/2023       LOS (days): 2  Geometric Mean LOS (GMLOS) (days): 3 20  Days to GMLOS:1        OBJECTIVE:        Current admission status: Inpatient  Preferred Pharmacy: No Pharmacies Listed  Primary Care Provider: Joey Brown MD    Primary Insurance: [address] [facility]  Secondary Insurance:     PROGRESS NOTE:    Call made to patient's son, Mirian Dandy, and VM left to follow-up on rehab referrals sent yesterday; callback number provided

## 2023-06-15 NOTE — UTILIZATION REVIEW
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

## 2023-06-15 NOTE — PROGRESS NOTES
[facility]  Progress Note  Name: Miko Agarwal  MRN: [id number]  Unit/Bed#: S -01 I Date of Admission: 6/13/2023   Date of Service: 6/15/2023 I Hospital Day: 2    Assessment/Plan   * Stroke [facility])  Assessment & Plan  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  Focal severe stenosis of proximal P2 and P3 segment of right PCA  · Patient given TNK and was monitored initially in the ICU  · Repeat CT head at 24 hours negative for any acute bleed  · Patient was NIH HSS 12  · Still having significant expressive aphasia  · 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 in the left perirolandic, left posterior frontal, left parietal, left posterior temporal and bilateral occipital lobes  · Speech recommends dysphagia 1 diet    Plan:  · Continue Lipitor 40 mg daily  · Aspirin 81 mg daily  · IV fluid hydration 100 mL/h for 12 more hours  · Neurochecks  · Normotension  · Telemetry  · Likely discharge to skilled nursing facility    [address]  • Pt presented as witness fall, no reported headstrike  • Pt does not take any AC/AP at home  • Fall likely d/t weakness secondary to CVA  • Imaging negative for traumatic injuries  • PT/OT recommend patient rehab  • Fall precations    Leukocytosis  Assessment & Plan  • WBC - 18 27 on admission  • Pt remains afebrile  • UA: negative for leukocytes and nitrates  +protein  No WBCs or bacteria noted     • MRSA negative  • COVID flu RSV negative  • WBC downtrending, monitor CBCs    Hyponatremia  Assessment & Plan  • Sodium - 133  • Baseline 132 to 135, at baseline  • No intervention or workup at this time  • Under BMPs    Constipation  Assessment & Plan  Continue Senokot    CKD (chronic kidney disease)  Assessment & Plan  Lab Results   Component Value Date    CREATININE 1 49 (H) 06/15/2023    CREATININE 1 46 (H) 2023    CREATININE 1 43 (H) 2023    EGFR 40 06/15/2023    EGFR 41 2023    EGFR 32 2023   Creatinine currently at baseline  Avoid nephrotoxins  Monitor BMPs  Isolyte 100 mL/h    Essential hypertension  Assessment & Plan  Goal normotension at this time  Home regimen Norvasc 2 5 mg daily, Coreg 6 25 mg twice daily  We will continue home regimen with labetalol as as needed if SBP over 180          VTE Pharmacologic Prophylaxis:   High Risk (Score >/= 5) - Pharmacological DVT Prophylaxis Ordered: enoxaparin (Lovenox)  Sequential Compression Devices Ordered  Patient Centered Rounds: I performed bedside rounds with nursing staff today  Discussions with Specialists or Other Care Team Provider: Neuroneurology    Education and Discussions with Family / Patient: Attempted to update  (son) via phone  Left voicemail  Marie    Current Length of Stay: 2 day(s)  Current Patient Status: Inpatient   Discharge Plan: Anticipate discharge in 24-48 hrs to prior assisted or independent living facility  Code Status: Level 1 - Full Code    Subjective:   Patient seen and evaluated by me at the bedside  He is displaying expressive aphasia and only answers the word yes to certain questions  He follows no commands but does react to my voice  GCS 13, unable to obtain NIHSS scale  Objective:     Vitals:   Temp (24hrs), Av 4 °F (36 9 °C), Min:98 1 °F (36 7 °C), Max:98 9 °F (37 2 °C)    Temp:  [98 1 °F (36 7 °C)-98 9 °F (37 2 °C)] 98 2 °F (36 8 °C)  HR:  [69-96] 93  Resp:  [21] 21  BP: (118-169)/(59-95) 125/59  SpO2:  [94 %-95 %] 95 %  Body mass index is 25 09 kg/m²       Input and Output Summary (last 24 hours):   No intake or output data in the 24 hours ending 06/15/23 1502    Physical Exam:   Physical Exam  Constitutional:       General: He is not in acute distress  Appearance: Normal appearance  He is ill-appearing  HENT:      Head: Normocephalic and atraumatic  Mouth/Throat:      Mouth: Mucous membranes are dry  Eyes:      General: No scleral icterus  Extraocular Movements: Extraocular movements intact  Pupils: Pupils are equal, round, and reactive to light  Cardiovascular:      Rate and Rhythm: Normal rate and regular rhythm  Pulses: Normal pulses  Heart sounds: Normal heart sounds  No murmur heard  Pulmonary:      Effort: Pulmonary effort is normal  No respiratory distress  Breath sounds: Normal breath sounds  No wheezing or rales  Abdominal:      General: There is no distension  Palpations: Abdomen is soft  Tenderness: There is no abdominal tenderness  There is no guarding  Musculoskeletal:      Right lower leg: No edema  Left lower leg: No edema  Skin:     General: Skin is warm  Capillary Refill: Capillary refill takes less than 2 seconds  Neurological:      Mental Status: He is alert  He is disoriented        Comments: Expressive aphasia  GCS 13  Responds to verbal and physical stimuli as well as pain  Patient cannot cooperate with the rest neuro exam          Additional Data:     Labs:  Results from last 7 days   Lab Units 06/15/23  1032   HEMATOCRIT % 43 7   HEMOGLOBIN g/dL 14 3   PLATELETS Thousands/uL 278   WBC Thousand/uL 13 42*     Results from last 7 days   Lab Units 06/15/23  0506   ANION GAP mmol/L 10   BUN mg/dL 31*   CALCIUM mg/dL 8 7   CHLORIDE mmol/L 101   CO2 mmol/L 23   CREATININE mg/dL 1 49*   GLUCOSE RANDOM mg/dL 113   POTASSIUM mmol/L 4 4   SODIUM mmol/L 134*     Results from last 7 days   Lab Units 06/13/23  0930   INR  1 00     Results from last 7 days   Lab Units 06/13/23  0953   POC GLUCOSE mg/dl 121     Results from last 7 days   Lab Units 06/14/23  0454   HEMOGLOBIN A1C % 5 7*           Lines/Drains:  Invasive Devices     Peripheral Intravenous Line  Duration Peripheral IV 23 Distal;Right;Upper;Ventral (anterior) Arm 1 day    Peripheral IV 06/15/23 Left;Proximal;Upper;Ventral (anterior) Arm <1 day          Drain  Duration           External Urinary Catheter 2 days                  Telemetry:  Telemetry Orders (From admission, onward)             24 Hour Telemetry Monitoring  Continuous x 24 Hours (Telem)           Question:  Reason for 24 Hour Telemetry  Answer:  TIA/Suspected CVA/ Confirmed CVA                 Telemetry Reviewed: Normal Sinus Rhythm  Indication for Continued Telemetry Use: Acute CVA              Imaging: Reviewed radiology reports from this admission including: MRI brain    Recent Cultures (last 7 days):         Last 24 Hours Medication List:   Current Facility-Administered Medications   Medication Dose Route Frequency Provider Last Rate   • acetaminophen  650 mg Oral Q8H PRN Mansoor ROXY Brower     • amLODIPine  2 5 mg Oral Daily Mansoor Filler ARI LandryNP     • aspirin  81 mg Oral Daily Mansoor Filler Frantz CRNP     • atorvastatin  40 mg Oral QPM Mansoor Filler Frantz CRNP     • carvedilol  6 25 mg Oral BID With Meals Mansoor Filler ROXY Landry     • chlorhexidine  15 mL Mouth/Throat Q12H [facility] ROXY Landry     • enoxaparin  30 mg Subcutaneous Q24H [facility] ROXY Landry     • labetalol  10 mg Intravenous Q6H PRN ROXY Stephens     • multi-electrolyte  50 mL/hr Intravenous Continuous Joel Areas, MD 50 mL/hr (06/15/23 1445)   • senna-docusate sodium  1 tablet Oral HS ROXY Stephens          Today, Patient Was Seen By: Joel Beal MD    **Please Note: This note may have been constructed using a voice recognition system  **

## 2023-06-15 NOTE — CASE MANAGEMENT
Case Management Progress Note    Patient name [place]ka Sample  Location S /S -01 MRN [id number]  : 1932 Date 6/15/2023       LOS (days): 2  Geometric Mean LOS (GMLOS) (days): 3 20  Days to GMLOS:1        OBJECTIVE:        Current admission status: Inpatient  Preferred Pharmacy: No Pharmacies Listed  Primary Care Provider: Tosha Manning MD    Primary Insurance: [facility]  Secondary Insurance:     PROGRESS NOTE:    Call received from Nahun Noriega patient's DIL, inquiring about out-of-pocket costs if they were to consider [facility]  Message sent to [facility] in [address] to see if they knew same  Call also made to Ajit Lopez, in admissions  Per Ajit Lopez, he can have business office check into coverage, but relayed that they do not have an available bed until at least Monday/Tuesday at this time   Can consider patient next week if he remains in the hospital

## 2023-06-15 NOTE — ASSESSMENT & PLAN NOTE
Goal normotension at this time  Home regimen Norvasc 2 5 mg daily, Coreg 6 25 mg twice daily  We will continue home regimen with labetalol as as needed if SBP over 180

## 2023-06-16 LAB
ALBUMIN SERPL BCP-MCNC: 3.1 G/DL (ref 3.5–5)
ALBUMIN SERPL BCP-MCNC: 3.1 G/DL (ref 3.5–5)
ANION GAP SERPL CALCULATED.3IONS-SCNC: 11 MMOL/L (ref 4–13)
ANION GAP SERPL CALCULATED.3IONS-SCNC: 11 MMOL/L (ref 4–13)
BUN SERPL-MCNC: 34 MG/DL (ref 5–25)
BUN SERPL-MCNC: 34 MG/DL (ref 5–25)
CALCIUM ALBUM COR SERPL-MCNC: 8.3 MG/DL (ref 8.3–10.1)
CALCIUM ALBUM COR SERPL-MCNC: 8.3 MG/DL (ref 8.3–10.1)
CALCIUM SERPL-MCNC: 7.6 MG/DL (ref 8.4–10.2)
CALCIUM SERPL-MCNC: 7.6 MG/DL (ref 8.4–10.2)
CHLORIDE SERPL-SCNC: 102 MMOL/L (ref 96–108)
CHLORIDE SERPL-SCNC: 102 MMOL/L (ref 96–108)
CO2 SERPL-SCNC: 20 MMOL/L (ref 21–32)
CO2 SERPL-SCNC: 20 MMOL/L (ref 21–32)
CREAT SERPL-MCNC: 1.29 MG/DL (ref 0.6–1.3)
CREAT SERPL-MCNC: 1.29 MG/DL (ref 0.6–1.3)
ERYTHROCYTE [DISTWIDTH] IN BLOOD BY AUTOMATED COUNT: 14.4 % (ref 11.6–15.1)
ERYTHROCYTE [DISTWIDTH] IN BLOOD BY AUTOMATED COUNT: 14.4 % (ref 11.6–15.1)
GFR SERPL CREATININE-BSD FRML MDRD: 48 ML/MIN/1.73SQ M
GFR SERPL CREATININE-BSD FRML MDRD: 48 ML/MIN/1.73SQ M
GLUCOSE SERPL-MCNC: 106 MG/DL (ref 65–140)
GLUCOSE SERPL-MCNC: 106 MG/DL (ref 65–140)
HCT VFR BLD AUTO: 44.4 % (ref 36.5–49.3)
HCT VFR BLD AUTO: 44.4 % (ref 36.5–49.3)
HGB BLD-MCNC: 14.6 G/DL (ref 12–17)
HGB BLD-MCNC: 14.6 G/DL (ref 12–17)
MCH RBC QN AUTO: 29.2 PG (ref 26.8–34.3)
MCH RBC QN AUTO: 29.2 PG (ref 26.8–34.3)
MCHC RBC AUTO-ENTMCNC: 32.9 G/DL (ref 31.4–37.4)
MCHC RBC AUTO-ENTMCNC: 32.9 G/DL (ref 31.4–37.4)
MCV RBC AUTO: 89 FL (ref 82–98)
MCV RBC AUTO: 89 FL (ref 82–98)
PHOSPHATE SERPL-MCNC: 3.4 MG/DL (ref 2.3–4.1)
PHOSPHATE SERPL-MCNC: 3.4 MG/DL (ref 2.3–4.1)
PLATELET # BLD AUTO: 273 THOUSANDS/UL (ref 149–390)
PLATELET # BLD AUTO: 273 THOUSANDS/UL (ref 149–390)
PMV BLD AUTO: 9.8 FL (ref 8.9–12.7)
PMV BLD AUTO: 9.8 FL (ref 8.9–12.7)
POTASSIUM SERPL-SCNC: 5.1 MMOL/L (ref 3.5–5.3)
POTASSIUM SERPL-SCNC: 5.1 MMOL/L (ref 3.5–5.3)
RBC # BLD AUTO: 5 MILLION/UL (ref 3.88–5.62)
RBC # BLD AUTO: 5 MILLION/UL (ref 3.88–5.62)
SODIUM SERPL-SCNC: 133 MMOL/L (ref 135–147)
SODIUM SERPL-SCNC: 133 MMOL/L (ref 135–147)
WBC # BLD AUTO: 11.21 THOUSAND/UL (ref 4.31–10.16)
WBC # BLD AUTO: 11.21 THOUSAND/UL (ref 4.31–10.16)

## 2023-06-16 PROCEDURE — 85027 COMPLETE CBC AUTOMATED: CPT

## 2023-06-16 PROCEDURE — 97535 SELF CARE MNGMENT TRAINING: CPT

## 2023-06-16 PROCEDURE — 80069 RENAL FUNCTION PANEL: CPT

## 2023-06-16 PROCEDURE — 92526 ORAL FUNCTION THERAPY: CPT

## 2023-06-16 PROCEDURE — 99232 SBSQ HOSP IP/OBS MODERATE 35: CPT | Performed by: INTERNAL MEDICINE

## 2023-06-16 RX ORDER — POLYETHYLENE GLYCOL 3350 17 G/17G
17 POWDER, FOR SOLUTION ORAL DAILY
Status: DISCONTINUED | OUTPATIENT
Start: 2023-06-16 | End: 2023-06-19 | Stop reason: HOSPADM

## 2023-06-16 RX ORDER — AMOXICILLIN 250 MG
2 CAPSULE ORAL 2 TIMES DAILY
Status: DISCONTINUED | OUTPATIENT
Start: 2023-06-16 | End: 2023-06-19 | Stop reason: HOSPADM

## 2023-06-16 RX ADMIN — CARVEDILOL 6.25 MG: 6.25 TABLET, FILM COATED ORAL at 08:14

## 2023-06-16 RX ADMIN — ATORVASTATIN CALCIUM 40 MG: 40 TABLET, FILM COATED ORAL at 17:26

## 2023-06-16 RX ADMIN — ENOXAPARIN SODIUM 30 MG: 30 INJECTION SUBCUTANEOUS at 08:14

## 2023-06-16 RX ADMIN — CHLORHEXIDINE GLUCONATE 15 ML: 1.2 SOLUTION ORAL at 22:15

## 2023-06-16 RX ADMIN — AMLODIPINE BESYLATE 2.5 MG: 2.5 TABLET ORAL at 08:14

## 2023-06-16 RX ADMIN — CARVEDILOL 6.25 MG: 6.25 TABLET, FILM COATED ORAL at 17:26

## 2023-06-16 RX ADMIN — SODIUM CHLORIDE, SODIUM GLUCONATE, SODIUM ACETATE, POTASSIUM CHLORIDE, MAGNESIUM CHLORIDE, SODIUM PHOSPHATE, DIBASIC, AND POTASSIUM PHOSPHATE 50 ML/HR: .53; .5; .37; .037; .03; .012; .00082 INJECTION, SOLUTION INTRAVENOUS at 14:17

## 2023-06-16 RX ADMIN — POLYETHYLENE GLYCOL 3350 17 G: 17 POWDER, FOR SOLUTION ORAL at 17:26

## 2023-06-16 RX ADMIN — ASPIRIN 81 MG: 81 TABLET, COATED ORAL at 08:14

## 2023-06-16 RX ADMIN — SENNOSIDES AND DOCUSATE SODIUM 2 TABLET: 50; 8.6 TABLET ORAL at 17:26

## 2023-06-16 NOTE — PLAN OF CARE
Problem: OCCUPATIONAL THERAPY ADULT  Goal: Performs self-care activities at highest level of function for planned discharge setting  See evaluation for individualized goals  Description: Treatment Interventions: ADL retraining, Functional transfer training, UE strengthening/ROM, Endurance training, Cognitive reorientation, Patient/family training, Equipment evaluation/education, Compensatory technique education, Fine motor coordination activities, Neuromuscular reeducation, Continued evaluation, Energy conservation, Activityengagement  Equipment Recommended:  (Will continue to assess)       See flowsheet documentation for full assessment, interventions and recommendations  Outcome: Progressing  Note: Limitation: Decreased ADL status, Decreased UE strength, Decreased Safe judgement during ADL, Decreased cognition, Decreased endurance, Decreased fine motor control, Decreased self-care trans, Decreased high-level ADLs     Assessment: Pt seen on this date for skilled OT treatment session  At start of session pt supine in bed  Pt agreeable to participate  Pt required decreased assistance with bed mobility, demonstrating improved sitting tolerance at EOB, pt with improved command following, noted to improve performance with hand over hand demonstration  Pt able to complete functional mobility to recliner chair, sitting in chair participating in UB dressing, with improved performance and able to don sleeves  Pt with improved overall cognition able to hold increased conversations with therapist  Pt would continue to benefit from skilled OT treatment sessions in order to address remaining deficits and continue to recommend d/c to rehab when medically stable       OT Discharge Recommendation: Post acute rehabilitation services

## 2023-06-16 NOTE — PROGRESS NOTES
[facility]  Progress Note  Name: Marlo Barajas  MRN: [id number]  Unit/Bed#: S -01 I Date of Admission: 6/13/2023   Date of Service: 6/16/2023 I Hospital Day: 3    Assessment/Plan   * Stroke [facility])  Assessment & Plan  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  Focal severe stenosis of proximal P2 and P3 segment of right PCA  · Patient given TNK and was monitored initially in the ICU  · Repeat CT head at 24 hours negative for any acute bleed  · Patient was NIH HSS 12  · Still having significant expressive aphasia  · 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 in the left perirolandic, left posterior frontal, left parietal, left posterior temporal and bilateral occipital lobes  · Speech recommends dysphagia 1 diet    Plan:  · Continue Lipitor 40 mg daily  · Aspirin 81 mg daily  · Neurochecks  · Normotension  · Telemetry  · Likely discharge to [facility], pending placement/transport  · Patient will follow-up with cardiology for Zio patch and loop recorder    [address]  • Pt presented as witness fall, no reported headstrike  • Pt does not take any AC/AP at home  • Fall likely d/t weakness secondary to CVA  • Imaging negative for traumatic injuries  • PT/OT recommend patient rehab  • Fall precations    Leukocytosis  Assessment & Plan  • WBC - 18 27 on admission  • Pt remains afebrile  • UA: negative for leukocytes and nitrates  +protein  No WBCs or bacteria noted     • MRSA negative  • COVID flu RSV negative  • WBC downtrending, monitor CBCs    Hyponatremia  Assessment & Plan  • Sodium - 133  • Baseline 132 to 135, at baseline  • No intervention or workup at this time  • Under BMPs    Constipation  Assessment & Plan  Continue Senokot    CKD (chronic kidney disease)  Assessment & Plan  Lab Results   Component Value Date    CREATININE 1 49 (H) 06/15/2023    CREATININE 1 46 (H) 2023    CREATININE 1 43 (H) 2023    EGFR 40 06/15/2023    EGFR 41 2023    EGFR 32 2023   Creatinine currently at baseline  Avoid nephrotoxins  Monitor BMPs    Essential hypertension  Assessment & Plan  Goal normotension at this time  Home regimen Norvasc 2 5 mg daily, Coreg 6 25 mg twice daily  We will continue home regimen with labetalol as as needed if SBP over 180          VTE Pharmacologic Prophylaxis:   High Risk (Score >/= 5) - Pharmacological DVT Prophylaxis Ordered: enoxaparin (Lovenox)  Sequential Compression Devices Ordered  Patient Centered Rounds: I performed bedside rounds with nursing staff today  Discussions with Specialists or Other Care Team Provider: Neuroneurology    Education and Discussions with Family / Patient: Attempted to update  (son) via phone  Left voicemail  Marie    Current Length of Stay: 3 day(s)  Current Patient Status: Inpatient   Discharge Plan: Anticipate discharge in 24-48 hrs to prior assisted or independent living facility  Code Status: Level 1 - Full Code    Subjective:   Patient seen and evaluated by me at the bedside  He is displaying expressive aphasia and only answers the word yes to certain questions  He follows no commands but does react to my voice  GCS 13, unable to obtain NIHSS scale  Objective:     Vitals:   Temp (24hrs), Av 2 °F (36 8 °C), Min:97 8 °F (36 6 °C), Max:98 7 °F (37 1 °C)    Temp:  [97 8 °F (36 6 °C)-98 7 °F (37 1 °C)] 98 7 °F (37 1 °C)  HR:  [62-90] 73  Resp:  [18] 18  BP: (130-164)/(65-84) 139/67  SpO2:  [90 %-92 %] 92 %  Body mass index is 24 57 kg/m²  Input and Output Summary (last 24 hours):      Intake/Output Summary (Last 24 hours) at 2023 1541  Last data filed at 2023 1100  Gross per 24 hour   Intake 1140 ml   Output 675 ml   Net 465 ml       Physical Exam:   Physical Exam  Constitutional:       General: He is not in acute distress  Appearance: Normal appearance  He is ill-appearing  HENT:      Head: Normocephalic and atraumatic  Mouth/Throat:      Mouth: Mucous membranes are dry  Eyes:      General: No scleral icterus  Extraocular Movements: Extraocular movements intact  Pupils: Pupils are equal, round, and reactive to light  Cardiovascular:      Rate and Rhythm: Normal rate and regular rhythm  Pulses: Normal pulses  Heart sounds: Normal heart sounds  No murmur heard  Pulmonary:      Effort: Pulmonary effort is normal  No respiratory distress  Breath sounds: Normal breath sounds  No wheezing or rales  Abdominal:      General: There is no distension  Palpations: Abdomen is soft  Tenderness: There is no abdominal tenderness  There is no guarding  Musculoskeletal:      Right lower leg: No edema  Left lower leg: No edema  Skin:     General: Skin is warm  Capillary Refill: Capillary refill takes less than 2 seconds  Neurological:      Mental Status: He is alert  He is disoriented        Comments: Expressive aphasia  Responds to verbal and physical stimuli as well as pain  Patient cannot cooperate with the rest neuro exam          Additional Data:     Labs:  Results from last 7 days   Lab Units 06/16/23  0911   WBC Thousand/uL 11 21*   HEMOGLOBIN g/dL 14 6   HEMATOCRIT % 44 4   PLATELETS Thousands/uL 273     Results from last 7 days   Lab Units 06/16/23  0538   SODIUM mmol/L 133*   POTASSIUM mmol/L 5 1   CHLORIDE mmol/L 102   CO2 mmol/L 20*   BUN mg/dL 34*   CREATININE mg/dL 1 29   ANION GAP mmol/L 11   CALCIUM mg/dL 7 6*   ALBUMIN g/dL 3 1*   GLUCOSE RANDOM mg/dL 106     Results from last 7 days   Lab Units 06/13/23  0930   INR  1 00     Results from last 7 days   Lab Units 06/13/23  0953   POC GLUCOSE mg/dl 121     Results from last 7 days   Lab Units 23  0454   HEMOGLOBIN A1C % 5 7*           Lines/Drains:  Invasive Devices     Peripheral Intravenous Line  Duration           Peripheral IV 23 Distal;Right;Upper;Ventral (anterior) Arm 2 days    Peripheral IV 06/15/23 Left;Proximal;Upper;Ventral (anterior) Arm 1 day          Drain  Duration           External Urinary Catheter 3 days                  Telemetry:  Telemetry Orders (From admission, onward)             24 Hour Telemetry Monitoring  Continuous x 24 Hours (Telem)           Question:  Reason for 24 Hour Telemetry  Answer:  TIA/Suspected CVA/ Confirmed CVA                 Telemetry Reviewed: Normal Sinus Rhythm  Indication for Continued Telemetry Use: Acute CVA              Imaging: Reviewed radiology reports from this admission including: MRI brain    Recent Cultures (last 7 days):         Last 24 Hours Medication List:   Current Facility-Administered Medications   Medication Dose Route Frequency Provider Last Rate   • acetaminophen  650 mg Oral Q8H PRN ROXY Hernandez     • amLODIPine  2 5 mg Oral Daily ROXY Hernandez     • aspirin  81 mg Oral Daily ROXY Hernandez     • atorvastatin  40 mg Oral QPM ROXY Hernandez     • carvedilol  6 25 mg Oral BID With Meals ROXY Hernandez     • chlorhexidine  15 mL Mouth/Throat Q12H [address] ROXY Hernandez     • enoxaparin  30 mg Subcutaneous Q24H [address] ROXY Hernandez     • labetalol  10 mg Intravenous Q6H PRN ROXY Kumari     • multi-electrolyte  50 mL/hr Intravenous Continuous Erika Humphrey MD 50 mL/hr (23 1417)   • senna-docusate sodium  1 tablet Oral HS ROXY Kumari          Today, Patient Was Seen By: Erika Humphrey MD    **Please Note: This note may have been constructed using a voice recognition system  **

## 2023-06-16 NOTE — DISCHARGE INSTR - AVS FIRST PAGE
Dear Natali Moon,     It was our pleasure to care for you here at [facility]  It is our hope that we were always able to exceed the expected standards for your care during your stay  You were hospitalized due to stroke  You were cared for on the third floor by Zaire Molina MD under the service of Luz Villatoro MD with the [facility] who covers for your primary care physician (PCP), Lashanda Bustillo MD, while you were hospitalized  If you have any questions or concerns related to this hospitalization, you may contact us at [phone]  For follow up as well as any medication refills, we recommend that you follow up with your primary care physician  A registered nurse will reach out to you by phone within a few days after your discharge to answer any additional questions that you may have after going home  However, at this time we provide for you here, the most important instructions / recommendations at discharge:     Notable Medication Adjustments -   Take aspirin 81 mg daily  Take Lipitor 40 mg daily  Take Norvasc 2 5 mg daily  Take carvedilol 6 125 mg twice daily  Take all other medications as prescribed  Testing Required after Discharge -   Follow-up with cardiology to get Zio patch or loop recorder monitor  ** Please contact your PCP to request testing orders for any of the testing recommended here **  Important follow up information -   Please follow-up with cardiology and PCP within 2 weeks of discharge  Other Instructions -   None  Please review this entire after visit summary as additional general instructions including medication list, appointments, activity, diet, any pertinent wound care, and other additional recommendations from your care team that may be provided for you        Sincerely,     Zaire Molina MD

## 2023-06-16 NOTE — CASE MANAGEMENT
Pending Authorization#[id number] for patient to be transferred to [facility] Willi Schumacher will be sent to Medical Director at [facility]  Faxed updated OT Notes to support case

## 2023-06-16 NOTE — CASE MANAGEMENT
Case Management Progress Note    Patient name Alison Sr S /S -01 MRN [id number]  : 1932 Date 2023       LOS (days): 3  Geometric Mean LOS (GMLOS) (days): 3 20  Days to GMLOS:0 2        OBJECTIVE:        Current admission status: Inpatient  Preferred Pharmacy: No Pharmacies Listed  Primary Care Provider: Ranjeet Morocho MD    Primary Insurance: [address] [place] [address]  Secondary Insurance:     PROGRESS NOTE:    CM s/w patient's DIL Stephenie Ca @ [phone]) regarding STR choice  Choice is for [facility]  Facility reserved in [facility] [address]  Insurance auth tasked to [address] via [facility]      ELISA BoothW, ACSW, ACM, [facility]  23 11:28 AM

## 2023-06-16 NOTE — SPEECH THERAPY NOTE
"Speech Language/Pathology    Patient Name: Chandni Shah    MRBKT'Q Date: 6/16/2023     Problem List  Principal Problem:    Stroke [facility])  Active Problems:    Essential hypertension    CKD (chronic kidney disease)    Constipation    Hyponatremia    Leukocytosis    Fall         Past Medical History  No past medical history on file  Past Surgical History  No past surgical history on file  Subjective:  Pt awake and alert, upright in bed, and on room air as SLP entered the room  Pt responded to [employer] greeting with social smile  Objective:  Pt seen for skilled ST session to trial upgraded solids  Pt unable to bite through soft solids without dentition  Clinician broke off a small piece of soft solid and pt was able to feed it to himself  Pt with prolonged but functional mastication  Pt reports \"it's hard to eat without my teeth\"  Pt consumed thin liquids via straw without overt s/s of aspiration/dysphagia  Pt conversing with clinician  Pt benefits from extended time in order to process and respond to questions  Voice clear following PO trials  Assessment:  No overt s/s of aspiration/dysphgia given thin liquids by straw  Continue puree solids and re-assess for diet advancement once dentures are brought to facility         Plan/Recommendations:  Continue puree with thin liquids  ST to consider diet advancement when dentures are brought to facility    "

## 2023-06-16 NOTE — PLAN OF CARE
Problem: MOBILITY - ADULT  Goal: Maintain or return to baseline ADL function  Description: INTERVENTIONS:  -  Assess patient's ability to carry out ADLs; assess patient's baseline for ADL function and identify physical deficits which impact ability to perform ADLs (bathing, care of mouth/teeth, toileting, grooming, dressing, etc )  - Assess/evaluate cause of self-care deficits   - Assess range of motion  - Assess patient's mobility; develop plan if impaired  - Assess patient's need for assistive devices and provide as appropriate  - Encourage maximum independence but intervene and supervise when necessary  - Involve family in performance of ADLs  - Assess for home care needs following discharge   - Consider OT consult to assist with ADL evaluation and planning for discharge  - Provide patient education as appropriate  6/16/2023 0218 by Mellisa Henry RN  Outcome: Progressing  6/16/2023 0218 by Mellisa Henry RN  Outcome: Progressing  6/16/2023 0217 by Mellisa Henry RN  Outcome: Progressing  Goal: Maintains/Returns to pre admission functional level  Description: INTERVENTIONS:  - Perform BMAT or MOVE assessment daily    - Set and communicate daily mobility goal to care team and patient/family/caregiver  - Collaborate with rehabilitation services on mobility goals if consulted  - Perform Range of Motion times a day  - Reposition patient every  hours    - Dangle patient times a day  - Stand patient times a day  - Ambulate patient times a day  - Out of bed to chair times a day   - Out of bed for meals  times a day  - Out of bed for toileting  - Record patient progress and toleration of activity level   6/16/2023 0218 by Mellisa Henry RN  Outcome: Progressing  6/16/2023 0218 by Mellisa Henry RN  Outcome: Progressing  6/16/2023 0217 by Mellisa Henry RN  Outcome: Progressing     Problem: PAIN - ADULT  Goal: Verbalizes/displays adequate comfort level or baseline comfort level  Description: Interventions:  - Encourage patient to monitor pain and request assistance  - Assess pain using appropriate pain scale  - Administer analgesics based on type and severity of pain and evaluate response  - Implement non-pharmacological measures as appropriate and evaluate response  - Consider cultural and social influences on pain and pain management  - Notify physician/advanced practitioner if interventions unsuccessful or patient reports new pain  6/16/2023 0218 by Deann Watson RN  Outcome: Progressing  6/16/2023 0218 by Deann Watson RN  Outcome: Progressing  6/16/2023 0217 by Deann Watson RN  Outcome: Progressing     Problem: INFECTION - ADULT  Goal: Absence or prevention of progression during hospitalization  Description: INTERVENTIONS:  - Assess and monitor for signs and symptoms of infection  - Monitor lab/diagnostic results  - Monitor all insertion sites, i e  indwelling lines, tubes, and drains  - Monitor endotracheal if appropriate and nasal secretions for changes in amount and color  - [place] appropriate cooling/warming therapies per order  - Administer medications as ordered  - Instruct and encourage patient and family to use good hand hygiene technique  - Identify and instruct in appropriate isolation precautions for identified infection/condition  6/16/2023 0218 by Deann Watson RN  Outcome: Progressing  6/16/2023 0218 by Deann Watson RN  Outcome: Progressing  6/16/2023 0217 by Deann Watson RN  Outcome: Progressing  Goal: Absence of fever/infection during neutropenic period  Description: INTERVENTIONS:  - Monitor WBC    6/16/2023 0218 by Deann Watson RN  Outcome: Progressing  6/16/2023 0218 by Deann Watson RN  Outcome: Progressing  6/16/2023 0217 by Deann Waston RN  Outcome: Progressing     Problem: SAFETY ADULT  Goal: Maintain or return to baseline ADL function  Description: INTERVENTIONS:  -  Assess patient's ability to carry out ADLs; assess patient's baseline for ADL function and identify physical deficits which impact ability to perform ADLs (bathing, care of mouth/teeth, toileting, grooming, dressing, etc )  - Assess/evaluate cause of self-care deficits   - Assess range of motion  - Assess patient's mobility; develop plan if impaired  - Assess patient's need for assistive devices and provide as appropriate  - Encourage maximum independence but intervene and supervise when necessary  - Involve family in performance of ADLs  - Assess for home care needs following discharge   - Consider OT consult to assist with ADL evaluation and planning for discharge  - Provide patient education as appropriate  6/16/2023 0218 by Kylie Catherine RN  Outcome: Progressing  6/16/2023 0218 by Kylie Catherine RN  Outcome: Progressing  6/16/2023 0217 by Kylie Catherine RN  Outcome: Progressing  Goal: Maintains/Returns to pre admission functional level  Description: INTERVENTIONS:  - Perform BMAT or MOVE assessment daily    - Set and communicate daily mobility goal to care team and patient/family/caregiver  - Collaborate with rehabilitation services on mobility goals if consulted  - Perform Range of Motion times a day  - Reposition patient every  hours    - Dangle patient  times a day  - Stand patient times a day  - Ambulate patient  times a day  - Out of bed to chair  times a day   - Out of bed for meals  times a day  - Out of bed for toileting  - Record patient progress and toleration of activity level   6/16/2023 0218 by Kylie Catherine RN  Outcome: Progressing  6/16/2023 0218 by Kylie Catherine RN  Outcome: Progressing  6/16/2023 0217 by Kylie Catherine RN  Outcome: Progressing  Goal: Patient will remain free of falls  Description: INTERVENTIONS:  - Educate patient/family on patient safety including physical limitations  - Instruct patient to call for assistance with activity   - Consult OT/PT to assist with strengthening/mobility   - Keep Call bell within reach  - Keep bed low and locked with side rails adjusted as appropriate  - Keep care items and personal belongings within reach  - Initiate and maintain comfort rounds  - Make Fall Risk Sign visible to staff  - Offer Toileting every  Hours, in advance of need  - Initiate/Maintain alarm  - Obtain necessary fall risk management equipment:  - Apply yellow socks and bracelet for high fall risk patients  - Consider moving patient to room near nurses station  6/16/2023 0218 by María Elena Grajeda RN  Outcome: Progressing  6/16/2023 0218 by María Elena Grajeda RN  Outcome: Progressing  6/16/2023 0217 by María Elena Grajeda RN  Outcome: Progressing     Problem: DISCHARGE PLANNING  Goal: Discharge to home or other facility with appropriate resources  Description: INTERVENTIONS:  - Identify barriers to discharge w/patient and caregiver  - Arrange for needed discharge resources and transportation as appropriate  - Identify discharge learning needs (meds, wound care, etc )  - Arrange for interpretive services to assist at discharge as needed  - Refer to Case Management Department for coordinating discharge planning if the patient needs post-hospital services based on physician/advanced practitioner order or complex needs related to functional status, cognitive ability, or social support system  6/16/2023 0218 by María Elena Grajeda RN  Outcome: Progressing  6/16/2023 0218 by María Elena Grajeda RN  Outcome: Progressing  6/16/2023 0217 by María Elena Grajeda RN  Outcome: Progressing     Problem: Prexisting or High Potential for Compromised Skin Integrity  Goal: Skin integrity is maintained or improved  Description: INTERVENTIONS:  - Identify patients at risk for skin breakdown  - Assess and monitor skin integrity  - Assess and monitor nutrition and hydration status  - Monitor labs   - Assess for incontinence   - Turn and reposition patient  - Assist with mobility/ambulation  - Relieve pressure over bony prominences  - Avoid friction and shearing  - Provide appropriate hygiene as needed including keeping skin clean and dry  - Evaluate need for skin moisturizer/barrier cream  - Collaborate with interdisciplinary team   - Patient/family teaching  - Consider wound care consult   6/16/2023 0218 by Christianne Becker RN  Outcome: Progressing  6/16/2023 0218 by Christianne Becker RN  Outcome: Progressing  6/16/2023 0217 by Christianne Becker RN  Outcome: Progressing     Problem: Nutrition/Hydration-ADULT  Goal: Nutrient/Hydration intake appropriate for improving, restoring or maintaining nutritional needs  Description: Monitor and assess patient's nutrition/hydration status for malnutrition  Collaborate with interdisciplinary team and initiate plan and interventions as ordered  Monitor patient's weight and dietary intake as ordered or per policy  Utilize nutrition screening tool and intervene as necessary  Determine patient's food preferences and provide high-protein, high-caloric foods as appropriate  INTERVENTIONS:  - Monitor oral intake, urinary output, labs, and treatment plans  - Assess nutrition and hydration status and recommend course of action  - Evaluate amount of meals eaten  - Assist patient with eating if necessary   - Allow adequate time for meals  - Recommend/ encourage appropriate diets, oral nutritional supplements, and vitamin/mineral supplements  - Order, calculate, and assess calorie counts as needed  - Recommend, monitor, and adjust tube feedings based on assessed needs  - Assess need for intravenous fluids  - Provide nutrition/hydration education as appropriate  - Include patient/family/caregiver in decisions related to nutrition  6/16/2023 0218 by Christianne Becker RN  Outcome: Progressing  6/16/2023 0218 by Christianne Becker RN  Outcome: Progressing  6/16/2023 0217 by Christianne Becker RN  Outcome: Progressing     Problem: Neurological Deficit  Goal: Neurological status is stable or improving  Description: Interventions:  - Monitor and assess patient's level of consciousness, motor function, sensory function, and level of assistance needed for ADLs  - Monitor and report changes from baseline   Collaborate with interdisciplinary team to initiate plan and implement interventions as ordered  - Provide and maintain a safe environment  - Consider seizure precautions  - Consider fall precautions  - Consider aspiration precautions  - Consider bleeding precautions  6/16/2023 0218 by Sonia Vora RN  Outcome: Progressing  6/16/2023 0218 by Sonia Vora RN  Outcome: Progressing     Problem: Activity Intolerance/Impaired Mobility  Goal: Mobility/activity is maintained at optimum level for patient  Description: Interventions:  - Assess and monitor patient  barriers to mobility and need for assistive/adaptive devices  - Assess patient's emotional response to limitations  - Collaborate with interdisciplinary team and initiate plans and interventions as ordered  - Encourage independent activity per ability   - Maintain proper body alignment  - Perform active/passive rom as tolerated/ordered  - Plan activities to conserve energy   - Turn patient as appropriate  6/16/2023 0218 by Sonia Vora RN  Outcome: Progressing  6/16/2023 0218 by Sonia Vora RN  Outcome: Progressing     Problem: Potential for Aspiration  Goal: Non-ventilated patient's risk of aspiration is minimized  Description: Assess and monitor vital signs, respiratory status, and labs (WBC)  Monitor for signs of aspiration (tachypnea, cough, rales, wheezing, cyanosis, fever)  - Assess and monitor patient's ability to swallow  - Place patient up in chair to eat if possible  - HOB up at 90 degrees to eat if unable to get patient up into chair   - Supervise patient during oral intake  - Instruct patient/ family to take small bites  - Instruct patient/ family to take small single sips when taking liquids    - Follow patient-specific strategies generated by speech pathologist   6/16/2023 0218 by Sonia Vora RN  Outcome: Progressing  6/16/2023 0218 by Sonia Vora RN  Outcome: Progressing  Goal: Ventilated patient's risk of aspiration is minimized  Description: Assess and monitor vital signs, respiratory status, airway cuff pressure, and labs (WBC)  Monitor for signs of aspiration (tachypnea, cough, rales, wheezing, cyanosis, fever)  - Elevate head of bed 30 degrees if patient has tube feeding   - Monitor tube feeding  6/16/2023 0218 by Kelle Love RN  Outcome: Progressing  6/16/2023 0218 by Kelle Love RN  Outcome: Progressing     Problem: Communication Impairment  Goal: Ability to express needs and understand communication  Description: Assess patient's communication skills and ability to understand information  Patient will demonstrate use of effective communication techniques, alternative methods of communication and understanding even if not able to speak  - Encourage communication and provide alternate methods of communication as needed  - Collaborate with case management/ for discharge needs  - Include patient/family/caregiver in decisions related to communication  6/16/2023 0218 by Kelle Love RN  Outcome: Progressing  6/16/2023 0218 by Kelle Love RN  Outcome: Progressing     Problem: Nutrition  Goal: Nutrition/Hydration status is improving  Description: Monitor and assess patient's nutrition/hydration status for malnutrition (ex- brittle hair, bruises, dry skin, pale skin and conjunctiva, muscle wasting, smooth red tongue, and disorientation)  Collaborate with interdisciplinary team and initiate plan and interventions as ordered  Monitor patient's weight and dietary intake as ordered or per policy  Utilize nutrition screening tool and intervene per policy  Determine patient's food preferences and provide high-protein, high-caloric foods as appropriate  - Assist patient with eating   - Allow adequate time for meals   - Encourage patient to take dietary supplement as ordered  - Collaborate with clinical nutritionist   - Include patient/family/caregiver in decisions related to nutrition    6/16/2023 [id number] by Ganesh Mendosa, RN  Outcome: Progressing  6/16/2023 0218 by Ganesh Mendosa, RN  Outcome: Progressing

## 2023-06-16 NOTE — PLAN OF CARE
Problem: MOBILITY - ADULT  Goal: Maintain or return to baseline ADL function  Description: INTERVENTIONS:  -  Assess patient's ability to carry out ADLs; assess patient's baseline for ADL function and identify physical deficits which impact ability to perform ADLs (bathing, care of mouth/teeth, toileting, grooming, dressing, etc )  - Assess/evaluate cause of self-care deficits   - Assess range of motion  - Assess patient's mobility; develop plan if impaired  - Assess patient's need for assistive devices and provide as appropriate  - Encourage maximum independence but intervene and supervise when necessary  - Involve family in performance of ADLs  - Assess for home care needs following discharge   - Consider OT consult to assist with ADL evaluation and planning for discharge  - Provide patient education as appropriate  Outcome: Progressing  Goal: Maintains/Returns to pre admission functional level  Description: INTERVENTIONS:  - Perform BMAT or MOVE assessment daily    - Set and communicate daily mobility goal to care team and patient/family/caregiver  - Collaborate with rehabilitation services on mobility goals if consulted  - Perform Range of Motion  times a day  - Reposition patient every  hours    - Dangle patient times a day  - Stand patient  times a day  - Ambulate patient  times a day  - Out of bed to chair  times a day   - Out of bed for meals  times a day  - Out of bed for toileting  - Record patient progress and toleration of activity level   Outcome: Progressing     Problem: PAIN - ADULT  Goal: Verbalizes/displays adequate comfort level or baseline comfort level  Description: Interventions:  - Encourage patient to monitor pain and request assistance  - Assess pain using appropriate pain scale  - Administer analgesics based on type and severity of pain and evaluate response  - Implement non-pharmacological measures as appropriate and evaluate response  - Consider cultural and social influences on pain and pain management  - Notify physician/advanced practitioner if interventions unsuccessful or patient reports new pain  Outcome: Progressing     Problem: INFECTION - ADULT  Goal: Absence or prevention of progression during hospitalization  Description: INTERVENTIONS:  - Assess and monitor for signs and symptoms of infection  - Monitor lab/diagnostic results  - Monitor all insertion sites, i e  indwelling lines, tubes, and drains  - Monitor endotracheal if appropriate and nasal secretions for changes in amount and color  - [place] appropriate cooling/warming therapies per order  - Administer medications as ordered  - Instruct and encourage patient and family to use good hand hygiene technique  - Identify and instruct in appropriate isolation precautions for identified infection/condition  Outcome: Progressing  Goal: Absence of fever/infection during neutropenic period  Description: INTERVENTIONS:  - Monitor WBC    Outcome: Progressing     Problem: SAFETY ADULT  Goal: Maintain or return to baseline ADL function  Description: INTERVENTIONS:  -  Assess patient's ability to carry out ADLs; assess patient's baseline for ADL function and identify physical deficits which impact ability to perform ADLs (bathing, care of mouth/teeth, toileting, grooming, dressing, etc )  - Assess/evaluate cause of self-care deficits   - Assess range of motion  - Assess patient's mobility; develop plan if impaired  - Assess patient's need for assistive devices and provide as appropriate  - Encourage maximum independence but intervene and supervise when necessary  - Involve family in performance of ADLs  - Assess for home care needs following discharge   - Consider OT consult to assist with ADL evaluation and planning for discharge  - Provide patient education as appropriate  Outcome: Progressing  Goal: Maintains/Returns to pre admission functional level  Description: INTERVENTIONS:  - Perform BMAT or MOVE assessment daily    - Set and communicate daily mobility goal to care team and patient/family/caregiver  - Collaborate with rehabilitation services on mobility goals if consulted  - Perform Range of Motion times a day  - Reposition patient every  hours    - Dangle patient  times a day  - Stand patient  times a day  - Ambulate patient  times a day  - Out of bed to chair  times a day   - Out of bed for meals  times a day  - Out of bed for toileting  - Record patient progress and toleration of activity level   Outcome: Progressing  Goal: Patient will remain free of falls  Description: INTERVENTIONS:  - Educate patient/family on patient safety including physical limitations  - Instruct patient to call for assistance with activity   - Consult OT/PT to assist with strengthening/mobility   - Keep Call bell within reach  - Keep bed low and locked with side rails adjusted as appropriate  - Keep care items and personal belongings within reach  - Initiate and maintain comfort rounds  - Make Fall Risk Sign visible to staff  - Offer Toileting every  Hours, in advance of need  - Initiate/Maintain alarm  - Obtain necessary fall risk management equipment:   - Apply yellow socks and bracelet for high fall risk patients  - Consider moving patient to room near nurses station  Outcome: Progressing     Problem: DISCHARGE PLANNING  Goal: Discharge to home or other facility with appropriate resources  Description: INTERVENTIONS:  - Identify barriers to discharge w/patient and caregiver  - Arrange for needed discharge resources and transportation as appropriate  - Identify discharge learning needs (meds, wound care, etc )  - Arrange for interpretive services to assist at discharge as needed  - Refer to Case Management Department for coordinating discharge planning if the patient needs post-hospital services based on physician/advanced practitioner order or complex needs related to functional status, cognitive ability, or social support system  Outcome: Progressing Problem: Knowledge Deficit  Goal: Patient/family/caregiver demonstrates understanding of disease process, treatment plan, medications, and discharge instructions  Description: Complete learning assessment and assess knowledge base  Interventions:  - Provide teaching at level of understanding  - Provide teaching via preferred learning methods  Outcome: Progressing     Problem: Prexisting or High Potential for Compromised Skin Integrity  Goal: Skin integrity is maintained or improved  Description: INTERVENTIONS:  - Identify patients at risk for skin breakdown  - Assess and monitor skin integrity  - Assess and monitor nutrition and hydration status  - Monitor labs   - Assess for incontinence   - Turn and reposition patient  - Assist with mobility/ambulation  - Relieve pressure over bony prominences  - Avoid friction and shearing  - Provide appropriate hygiene as needed including keeping skin clean and dry  - Evaluate need for skin moisturizer/barrier cream  - Collaborate with interdisciplinary team   - Patient/family teaching  - Consider wound care consult   Outcome: Progressing     Problem: Nutrition/Hydration-ADULT  Goal: Nutrient/Hydration intake appropriate for improving, restoring or maintaining nutritional needs  Description: Monitor and assess patient's nutrition/hydration status for malnutrition  Collaborate with interdisciplinary team and initiate plan and interventions as ordered  Monitor patient's weight and dietary intake as ordered or per policy  Utilize nutrition screening tool and intervene as necessary  Determine patient's food preferences and provide high-protein, high-caloric foods as appropriate       INTERVENTIONS:  - Monitor oral intake, urinary output, labs, and treatment plans  - Assess nutrition and hydration status and recommend course of action  - Evaluate amount of meals eaten  - Assist patient with eating if necessary   - Allow adequate time for meals  - Recommend/ encourage appropriate diets, oral nutritional supplements, and vitamin/mineral supplements  - Order, calculate, and assess calorie counts as needed  - Recommend, monitor, and adjust tube feedings based on assessed needs  - Assess need for intravenous fluids  - Provide nutrition/hydration education as appropriate  - Include patient/family/caregiver in decisions related to nutrition  Outcome: Progressing     Problem: Neurological Deficit  Goal: Neurological status is stable or improving  Description: Interventions:  - Monitor and assess patient's level of consciousness, motor function, sensory function, and level of assistance needed for ADLs  - Monitor and report changes from baseline  Collaborate with interdisciplinary team to initiate plan and implement interventions as ordered  - Provide and maintain a safe environment  - Consider seizure precautions  - Consider fall precautions  - Consider aspiration precautions  - Consider bleeding precautions  Outcome: Progressing     Problem: Activity Intolerance/Impaired Mobility  Goal: Mobility/activity is maintained at optimum level for patient  Description: Interventions:  - Assess and monitor patient  barriers to mobility and need for assistive/adaptive devices  - Assess patient's emotional response to limitations  - Collaborate with interdisciplinary team and initiate plans and interventions as ordered  - Encourage independent activity per ability   - Maintain proper body alignment  - Perform active/passive rom as tolerated/ordered  - Plan activities to conserve energy   - Turn patient as appropriate  Outcome: Progressing     Problem: Communication Impairment  Goal: Ability to express needs and understand communication  Description: Assess patient's communication skills and ability to understand information  Patient will demonstrate use of effective communication techniques, alternative methods of communication and understanding even if not able to speak       - Encourage communication and provide alternate methods of communication as needed  - Collaborate with case management/ for discharge needs  - Include patient/family/caregiver in decisions related to communication  Outcome: Progressing     Problem: Potential for Aspiration  Goal: Non-ventilated patient's risk of aspiration is minimized  Description: Assess and monitor vital signs, respiratory status, and labs (WBC)  Monitor for signs of aspiration (tachypnea, cough, rales, wheezing, cyanosis, fever)  - Assess and monitor patient's ability to swallow  - Place patient up in chair to eat if possible  - HOB up at 90 degrees to eat if unable to get patient up into chair   - Supervise patient during oral intake  - Instruct patient/ family to take small bites  - Instruct patient/ family to take small single sips when taking liquids  - Follow patient-specific strategies generated by speech pathologist   Outcome: Progressing  Goal: Ventilated patient's risk of aspiration is minimized  Description: Assess and monitor vital signs, respiratory status, airway cuff pressure, and labs (WBC)  Monitor for signs of aspiration (tachypnea, cough, rales, wheezing, cyanosis, fever)  - Elevate head of bed 30 degrees if patient has tube feeding   - Monitor tube feeding  Outcome: Progressing     Problem: Nutrition  Goal: Nutrition/Hydration status is improving  Description: Monitor and assess patient's nutrition/hydration status for malnutrition (ex- brittle hair, bruises, dry skin, pale skin and conjunctiva, muscle wasting, smooth red tongue, and disorientation)  Collaborate with interdisciplinary team and initiate plan and interventions as ordered  Monitor patient's weight and dietary intake as ordered or per policy  Utilize nutrition screening tool and intervene per policy  Determine patient's food preferences and provide high-protein, high-caloric foods as appropriate       - Assist patient with eating   - Allow adequate time for meals   - Encourage patient to take dietary supplement as ordered  - Collaborate with clinical nutritionist   - Include patient/family/caregiver in decisions related to nutrition    Outcome: Progressing

## 2023-06-16 NOTE — PROGRESS NOTES
-- Patient:  -- MRN: [id number]  -- Aidin Request ID: [id number]  -- Level of care reserved: Edward Germain  -- Partner Reserved: [place] Willi Schumacher, [facility] [place], [address] [place] [address] [phone]  -- Clinical needs requested:  -- Geography searched: 15 miles around [facility] [phone]  -- Start of Service:  -- Request sent: 4:02pm EDT on 6/14/2023 by Alley Yañez  -- Partner reserved: 11:23am EDT on 6/16/2023 by Samanta Munoz  -- Choice list shared: 3:13pm EDT on 6/15/2023 by Jessica Lyons

## 2023-06-16 NOTE — CASE MANAGEMENT
Christie Singer 50 received request for authorization from Care Manager    Authorization request for: SNF  Facility Name: [facility]  BMI:[id number]  Facility MD: Dr Brissa Fuentes    NPI: [id number]  Authorization initiated by contacting insurance:L[employer]ette  Via: [phone]  Pending Reference #:[id number]  Clinicals submitted via [facility]#[phone]

## 2023-06-16 NOTE — DISCHARGE SUMMARY
[facility]  Discharge- Karen Alcocer 2/27/1932, 80 y o  male MRN: [id number]  Unit/Bed#: S -01 Encounter: [id number]  Primary Care Provider: Martinez Hunter MD   Date and time admitted to hospital: 6/13/2023  9:18 AM    * Stroke [facility])  Assessment & Plan  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  Focal severe stenosis of proximal P2 and P3 segment of right PCA  · Patient given TNK and was monitored initially in the ICU  · Repeat CT head at 24 hours negative for any acute bleed  · Patient was NIH HSS 12  · Still having significant expressive aphasia  · 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 in the left perirolandic, left posterior frontal, left parietal, left posterior temporal and bilateral occipital lobes  · Speech recommends dysphagia 1 diet    Plan:  · Continue Lipitor 40 mg daily  · Aspirin 81 mg daily  · Neurochecks  · Normotension  · Telemetry  · Likely discharge to [facility]  · Patient will follow-up with cardiology for Zio patch and loop recorder    [address]  • Pt presented as witness fall, no reported headstrike  • Pt does not take any AC/AP at home  • Fall likely d/t weakness secondary to CVA  • Imaging negative for traumatic injuries  • PT/OT recommend patient rehab  • Fall precations    Leukocytosis  Assessment & Plan  • WBC - 18 27 on admission  • Pt remains afebrile  • UA: negative for leukocytes and nitrates  +protein  No WBCs or bacteria noted     • MRSA negative  • COVID flu RSV negative  • WBC downtrending, monitor CBCs    Hyponatremia  Assessment & Plan  • Sodium - 133  • Baseline 132 to 135, at baseline  • No intervention or workup at this time  • Under BMPs    Constipation  Assessment & Plan  Continue Senokot    CKD (chronic kidney disease)  Assessment & Plan  Lab Results   Component Value Date    CREATININE 1 49 (H) 06/15/2023    CREATININE 1 46 (H) 06/14/2023    CREATININE 1 43 (H) 06/13/2023    EGFR 40 06/15/2023    EGFR 41 06/14/2023    EGFR 32 06/13/2023   Creatinine currently at baseline  Avoid nephrotoxins  Monitor BMPs    Essential hypertension  Assessment & Plan  Goal normotension at this time  Home regimen Norvasc 2 5 mg daily, Coreg 6 25 mg twice daily  We will continue home regimen with labetalol as as needed if SBP over 180      Medical Problems     Resolved Problems  Date Reviewed: 6/14/2023   None       Discharging Resident: Sharon Castellanos MD  Discharging Attending: Iza Casillas MD  PCP: Ranjeet Morocho MD  Admission Date:   Admission Orders (From admission, onward)     Ordered        06/13/23 1117  Inpatient Admission  Once                      Discharge Date: 06/16/23    Consultations During Hospital Stay:  · Neurology  · PT OT    Procedures Performed:   · None    Significant Findings / Test Results:   MRI brain wo contrast   Final Result by Shay Funk MD (06/15 1201)      Acute small multifocal infarcts in left perirolandic, left posterior frontal, left parietal, left posterior temporal, and bilateral occipital lobes  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 with chronic hemosiderin deposition, likely sequela of hemorrhagic infarct  Chronic small infarcts in left cerebellum  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  The study was marked in [facility] for immediate notification  Workstation performed: [id number]         CT head wo contrast   Final Result by Serena Espinoza MD (06/14 1010)      1  No acute intracranial abnormality  No significant interval change  2   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     3   Chronic microangiopathic change  Workstation performed: [id number]         CTA stroke alert (head/neck)   Final Result by Todd Rueda MD (06/13 1008)      1  No intracranial large vessel occlusion  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  Focal severe stenosis at proximal P2 and P3 segment of right PCA  2   No hemodynamically significant stenosis or dissection of cervical carotid and vertebral arteries  Findings were directly discussed with Anuradha Sanchez at 9:44 a m  Workstation performed: [id number]         CT stroke alert brain   Final Result by Todd Rueda MD (06/13 1004)      1  Exam is somewhat compromised by motion artifact  2   No acute intracranial CT abnormality  Small left parietal scalp hematoma  3   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  4   Chronic microangiopathy  Findings were directly discussed with Anuradha Sanchez at 9:44 a m  Workstation performed: [id number]         XR Trauma multiple (SLB/SLRA trauma bay ONLY)   Final Result by Robert Pittman MD (06/13 8847)      No acute pulmonary pathology  No obvious displaced fractures within limitation of supine AP chest technique  Workstation performed: [id number]         XR chest portable   Final Result by Robert Pittman MD (06/13 4204)      No acute pulmonary pathology  No obvious displaced fractures within limitation of supine AP chest technique  Workstation performed: [id number]           ·     Incidental Findings:   ·     Test Results Pending at Discharge (will require follow up):   · None     Outpatient Tests Requested:  · Zio patch outpatient    Complications: Stroke    Reason for Admission: Stroke    Hospital Course:   Kelly Murray is a 80 y o  male patient with past medical history of hypertension, CKD, hyponatremia who originally presented to the hospital on 6/13/2023 due to fall at his nursing facility  Patient arrived as a trauma alert, had unequal pupils right more than left with right-sided weakness and not verbally responsive  Trauma work-up was negative  Stroke alert initiated and neurology was consulted  CT head initially negative for any bleeding or acute stroke  CTA did not reveal any large vessel occlusion  Patient was deemed candidate for TNK and received TNK on 6/13 and was later transferred to the ICU  Patient also had blood pressures in the 082S systolic which improved with labetalol  Brain MRI later showed multiple acute small multifocal infarcts in several areas, suggestive of embolic source of stroke  Patient continued to be displaying expressive aphasia, weakness and confusion  Patient initially did not follow commands and only responded one-word sentences inappropriately  Neurology recommends normotension at this time, aspirin 81 mg daily, atorvastatin 40 mg nightly  They also recommend outpatient evaluation with cardiology with Zio patch placement, and if Zio patch negative then recommend loop recorder placement to evaluate for potential A-fib given cardioembolic appearance of the strokes  Patient demonstrated improvement as the days passed, started responding to questions more appropriately, cooperating more with physical exam and neuro exam, and following commands  However, there are still significant deficits in comprehension and movement  Patient was able to work with PT and OT and was noted to have significant deficits in balance, endurance, and gait  However, he does continue to show improvement  PT OT recommends him returning back to his facility with rehab services  Patient is at this point hemodynamically stable enough for discharge  Please see above list of diagnoses and related plan for additional information  Condition at Discharge: stable    Discharge Day Visit / Exam:   Subjective: Confused, altered    Not on "any acute distress  Vitals: Blood Pressure: 134/84 (06/16/23 0813)  Pulse: 66 (06/16/23 0813)  Temperature: 97 8 °F (36 6 °C) (06/16/23 0700)  Temp Source: Oral (06/16/23 0700)  Respirations: 18 (06/16/23 0700)  Height: 5' 11\" (180 3 cm) (06/13/23 1345)  Weight - Scale: 79 9 kg (176 lb 2 4 oz) (06/16/23 0600)  SpO2: 90 % (06/16/23 0700)  Exam:   Physical Exam  Constitutional:       General: He is not in acute distress  Appearance: Normal appearance  He is ill-appearing  HENT:      Head: Normocephalic and atraumatic  Mouth/Throat:      Mouth: Mucous membranes are dry  Eyes:      General: No scleral icterus  Extraocular Movements: Extraocular movements intact  Pupils: Pupils are equal, round, and reactive to light  Cardiovascular:      Rate and Rhythm: Normal rate and regular rhythm  Pulses: Normal pulses  Heart sounds: Normal heart sounds  No murmur heard  Pulmonary:      Effort: Pulmonary effort is normal  No respiratory distress  Breath sounds: Normal breath sounds  No wheezing or rales  Abdominal:      General: There is no distension  Palpations: Abdomen is soft  Tenderness: There is no abdominal tenderness  There is no guarding  Musculoskeletal:      Right lower leg: No edema  Left lower leg: No edema  Skin:     General: Skin is warm  Capillary Refill: Capillary refill takes less than 2 seconds  Neurological:      Mental Status: He is alert  He is disoriented  Sensory: No sensory deficit  Motor: No weakness  Comments: Expressive aphasia, improving  Answering some questions appropriately  Responds to verbal and physical stimuli as well as pain  Today patient cooperated with strength and sensory exam, no focal deficits in these areas noted  Discussion with Family: Updated  (daughter in law) via phone      Discharge instructions/Information to patient and family:   See after visit summary for information " provided to patient and family  Provisions for Follow-Up Care:  See after visit summary for information related to follow-up care and any pertinent home health orders  Disposition:   Other [facility] Readmission: None    Discharge Medications:  See after visit summary for reconciled discharge medications provided to patient and/or family        **Please Note: This note may have been constructed using a voice recognition system**

## 2023-06-16 NOTE — OCCUPATIONAL THERAPY NOTE
Occupational Therapy Progress Note     Patient Name: Ondina aLndry  Today's Date: 6/16/2023  Problem List  Principal Problem:    Stroke [facility])  Active Problems:    Essential hypertension    CKD (chronic kidney disease)    Constipation    Hyponatremia    Leukocytosis    Fall              06/16/23 1301   OT Last Visit   OT Visit Date 06/16/23   Note Type   Note Type Treatment for insurance authorization   Pain Assessment   Pain Assessment Tool FLACC   Pain Rating: FLACC (Rest) - Face 0   Pain Rating: FLACC (Rest) - Legs 0   Pain Rating: FLACC (Rest) - Activity 0   Pain Rating: FLACC (Rest) - Cry 0   Pain Rating: FLACC (Rest) - Consolability 0   Score: FLACC (Rest) 0   Pain Rating: FLACC (Activity) - Face 0   Pain Rating: FLACC (Activity) - Legs 0   Pain Rating: FLACC (Activity) - Activity 0   Pain Rating: FLACC (Activity) - Cry 0   Pain Rating: FLACC (Activity) - Consolability 0   Score: FLACC (Activity) 0   Restrictions/Precautions   Weight Bearing Precautions Per Order No   Other Precautions Cognitive; Chair Alarm; Bed Alarm; Fall Risk;Multiple lines  (pt primarily non verbal answers yes/no typically)   Lifestyle   Intrinsic Gratification With this therapist pt able to speak some short sentences intermittently, unable to report what he likes to do, but able to communicate about current weather and hospital stay   ADL   Grooming Assistance 3  Moderate Assistance   Grooming Deficit Increased time to complete;Supervision/safety;Verbal cueing   Grooming Comments washing face   UB Dressing Assistance 2  Maximal Assistance   UB Dressing Deficit Increased time to complete; Thread RUE; Thread LUE   UB Dressing Comments doff/donning gown   Toileting Assistance  1  Total Assistance   Toileting Comments incontinent of urine   Bed Mobility   Supine to Sit 2  Maximal assistance   Additional items Assist x 1; Increased time required;Verbal cues;LE management   Transfers   Sit to Stand 2  Maximal assistance   Additional items Assist x "2;Increased time required;Verbal cues; Bedrails   Stand to Sit 2  Maximal assistance   Additional items Assist x 2; Increased time required;Verbal cues;Armrests   Functional Mobility   Functional Mobility 2  Maximal assistance   Additional Comments Ax2, able to side step to recliner chair with HHA   Subjective   Subjective \"Where am I\" \"My head is all funny\" \"It's raining outside\"   Cognition   Overall Cognitive Status Impaired   Arousal/Participation Alert; Cooperative   Attention Difficulty attending to directions  (With hand over hand demonstration pt with improved ability to follow directions)   Orientation Level Oriented to person;Disoriented to place; Disoriented to time;Disoriented to situation  (responds to name)   Memory Unable to assess  (minimally responsive to PLOF or memory questions)   Following Commands Follows one step commands inconsistently   Comments Pleasantly confused, smiling at therapist frequently, able to respond ~25% of the time with correct responses   Activity Tolerance   Activity Tolerance Patient tolerated treatment well   Medical Staff Made Aware KAM Peres Leidy   Assessment   Assessment Pt seen on this date for skilled OT treatment session  At start of session pt supine in bed  Pt agreeable to participate  Pt required decreased assistance with bed mobility, demonstrating improved sitting tolerance at EOB, pt with improved command following, noted to improve performance with hand over hand demonstration  Pt able to complete functional mobility to recliner chair, sitting in chair participating in UB dressing, with improved performance and able to don sleeves  Pt with improved overall cognition able to hold increased conversations with therapist  Pt would continue to benefit from skilled OT treatment sessions in order to address remaining deficits and continue to recommend d/c to rehab when medically stable     Plan   Goal Expiration Date 06/26/23   OT Treatment Day 1   OT Frequency 2-3x/wk " Recommendation   OT Discharge Recommendation Post acute rehabilitation services   AM-[facility] Daily Activity Inpatient   Lower Body Dressing 1   Bathing 2   Toileting 2   Upper Body Dressing 2   Grooming 2   Eating 3   Daily Activity Raw Score 12   Daily Activity Standardized Score (Calc for Raw Score >=11) 30 6   AM-[facility] Applied Cognition Inpatient   Following a Speech/Presentation 1   Understanding Ordinary Conversation 2   Taking Medications 1   Remembering Where Things Are Placed or Put Away 1   Remembering List of 4-5 Errands 1   Taking Care of Complicated Tasks 1   Applied Cognition Raw Score 7   Applied Cognition Standardized Score 15 17   End of Consult   Patient Position at End of Consult Bedside chair;Bed/Chair alarm activated; All needs within reach       Pt goals to be met by 6/26/23 to max I w/ ADLs and improve engagement to return to OF w/ staff assist includes:     -Pt will consistently follow 1 step directions during self care ADLs w/ no more than 1 cue / prompt to max I and improve engagement PROGRESSING     -Pt will complete feeding w/ mod I after set- up PROGRESSING     -Pt will complete UBD w/ min A to max I and minimize burden of care PROGRESSING     -Pt will complete functional transfers to bed, chair, and toilet using LRAD, DME as needed w/ mod A to max I w/ ADLs PROGRESSING     -Pt will demonstrate good attention and participation in ongoing eval of functional mobility to assist in [address]     -Pt will complete bed mobility supine <> sit w/ min A x1 to max I and minimize burden of care PROGRESSING     -Pt will demonstrate improved activity and sitting tolerance OOB In chair for all meals PROGRESSING     -Pt will demonstrate improved activity tolerance to participate in ADLs in supported sitting for at least 10 minutes w/ out sign/ symptoms of fatigue or rest breaksPROGRESSING        The patient's raw score on the AM-[facility] Daily Activity Inpatient Short Form is 12   A raw score of less than 19 suggests the patient may benefit from discharge to post-acute rehabilitation services  Please refer to the recommendation of the Occupational Therapist for safe discharge planning        Beata Serrano MS, OTR/L

## 2023-06-17 LAB
ANION GAP SERPL CALCULATED.3IONS-SCNC: 9 MMOL/L (ref 4–13)
ANION GAP SERPL CALCULATED.3IONS-SCNC: 9 MMOL/L (ref 4–13)
BASOPHILS # BLD AUTO: 0.06 THOUSANDS/ÂΜL (ref 0–0.1)
BASOPHILS # BLD AUTO: 0.06 THOUSANDS/ÂΜL (ref 0–0.1)
BASOPHILS NFR BLD AUTO: 1 % (ref 0–1)
BASOPHILS NFR BLD AUTO: 1 % (ref 0–1)
BUN SERPL-MCNC: 29 MG/DL (ref 5–25)
BUN SERPL-MCNC: 29 MG/DL (ref 5–25)
CALCIUM SERPL-MCNC: 8.7 MG/DL (ref 8.4–10.2)
CALCIUM SERPL-MCNC: 8.7 MG/DL (ref 8.4–10.2)
CHLORIDE SERPL-SCNC: 102 MMOL/L (ref 96–108)
CHLORIDE SERPL-SCNC: 102 MMOL/L (ref 96–108)
CO2 SERPL-SCNC: 23 MMOL/L (ref 21–32)
CO2 SERPL-SCNC: 23 MMOL/L (ref 21–32)
CREAT SERPL-MCNC: 1.31 MG/DL (ref 0.6–1.3)
CREAT SERPL-MCNC: 1.31 MG/DL (ref 0.6–1.3)
EOSINOPHIL # BLD AUTO: 0.53 THOUSAND/ÂΜL (ref 0–0.61)
EOSINOPHIL # BLD AUTO: 0.53 THOUSAND/ÂΜL (ref 0–0.61)
EOSINOPHIL NFR BLD AUTO: 4 % (ref 0–6)
EOSINOPHIL NFR BLD AUTO: 4 % (ref 0–6)
ERYTHROCYTE [DISTWIDTH] IN BLOOD BY AUTOMATED COUNT: 14 % (ref 11.6–15.1)
ERYTHROCYTE [DISTWIDTH] IN BLOOD BY AUTOMATED COUNT: 14 % (ref 11.6–15.1)
GFR SERPL CREATININE-BSD FRML MDRD: 47 ML/MIN/1.73SQ M
GFR SERPL CREATININE-BSD FRML MDRD: 47 ML/MIN/1.73SQ M
GLUCOSE SERPL-MCNC: 109 MG/DL (ref 65–140)
GLUCOSE SERPL-MCNC: 109 MG/DL (ref 65–140)
HCT VFR BLD AUTO: 42.1 % (ref 36.5–49.3)
HCT VFR BLD AUTO: 42.1 % (ref 36.5–49.3)
HGB BLD-MCNC: 13.8 G/DL (ref 12–17)
HGB BLD-MCNC: 13.8 G/DL (ref 12–17)
IMM GRANULOCYTES # BLD AUTO: 0.12 THOUSAND/UL (ref 0–0.2)
IMM GRANULOCYTES # BLD AUTO: 0.12 THOUSAND/UL (ref 0–0.2)
IMM GRANULOCYTES NFR BLD AUTO: 1 % (ref 0–2)
IMM GRANULOCYTES NFR BLD AUTO: 1 % (ref 0–2)
LYMPHOCYTES # BLD AUTO: 1.86 THOUSANDS/ÂΜL (ref 0.6–4.47)
LYMPHOCYTES # BLD AUTO: 1.86 THOUSANDS/ÂΜL (ref 0.6–4.47)
LYMPHOCYTES NFR BLD AUTO: 15 % (ref 14–44)
LYMPHOCYTES NFR BLD AUTO: 15 % (ref 14–44)
MCH RBC QN AUTO: 29.3 PG (ref 26.8–34.3)
MCH RBC QN AUTO: 29.3 PG (ref 26.8–34.3)
MCHC RBC AUTO-ENTMCNC: 32.8 G/DL (ref 31.4–37.4)
MCHC RBC AUTO-ENTMCNC: 32.8 G/DL (ref 31.4–37.4)
MCV RBC AUTO: 89 FL (ref 82–98)
MCV RBC AUTO: 89 FL (ref 82–98)
MONOCYTES # BLD AUTO: 1.09 THOUSAND/ÂΜL (ref 0.17–1.22)
MONOCYTES # BLD AUTO: 1.09 THOUSAND/ÂΜL (ref 0.17–1.22)
MONOCYTES NFR BLD AUTO: 9 % (ref 4–12)
MONOCYTES NFR BLD AUTO: 9 % (ref 4–12)
NEUTROPHILS # BLD AUTO: 8.64 THOUSANDS/ÂΜL (ref 1.85–7.62)
NEUTROPHILS # BLD AUTO: 8.64 THOUSANDS/ÂΜL (ref 1.85–7.62)
NEUTS SEG NFR BLD AUTO: 70 % (ref 43–75)
NEUTS SEG NFR BLD AUTO: 70 % (ref 43–75)
NRBC BLD AUTO-RTO: 0 /100 WBCS
NRBC BLD AUTO-RTO: 0 /100 WBCS
PLATELET # BLD AUTO: 286 THOUSANDS/UL (ref 149–390)
PLATELET # BLD AUTO: 286 THOUSANDS/UL (ref 149–390)
PMV BLD AUTO: 10.3 FL (ref 8.9–12.7)
PMV BLD AUTO: 10.3 FL (ref 8.9–12.7)
POTASSIUM SERPL-SCNC: 4.4 MMOL/L (ref 3.5–5.3)
POTASSIUM SERPL-SCNC: 4.4 MMOL/L (ref 3.5–5.3)
RBC # BLD AUTO: 4.71 MILLION/UL (ref 3.88–5.62)
RBC # BLD AUTO: 4.71 MILLION/UL (ref 3.88–5.62)
SODIUM SERPL-SCNC: 134 MMOL/L (ref 135–147)
SODIUM SERPL-SCNC: 134 MMOL/L (ref 135–147)
WBC # BLD AUTO: 12.3 THOUSAND/UL (ref 4.31–10.16)
WBC # BLD AUTO: 12.3 THOUSAND/UL (ref 4.31–10.16)

## 2023-06-17 PROCEDURE — 94640 AIRWAY INHALATION TREATMENT: CPT

## 2023-06-17 PROCEDURE — 80048 BASIC METABOLIC PNL TOTAL CA: CPT

## 2023-06-17 PROCEDURE — 97530 THERAPEUTIC ACTIVITIES: CPT

## 2023-06-17 PROCEDURE — 99232 SBSQ HOSP IP/OBS MODERATE 35: CPT | Performed by: INTERNAL MEDICINE

## 2023-06-17 PROCEDURE — 85025 COMPLETE CBC W/AUTO DIFF WBC: CPT

## 2023-06-17 PROCEDURE — 94760 N-INVAS EAR/PLS OXIMETRY 1: CPT

## 2023-06-17 RX ORDER — ALBUTEROL SULFATE 2.5 MG/3ML
2.5 SOLUTION RESPIRATORY (INHALATION) EVERY 6 HOURS PRN
Status: DISCONTINUED | OUTPATIENT
Start: 2023-06-17 | End: 2023-06-19 | Stop reason: HOSPADM

## 2023-06-17 RX ADMIN — CARVEDILOL 6.25 MG: 6.25 TABLET, FILM COATED ORAL at 17:07

## 2023-06-17 RX ADMIN — CARVEDILOL 6.25 MG: 6.25 TABLET, FILM COATED ORAL at 10:33

## 2023-06-17 RX ADMIN — ENOXAPARIN SODIUM 30 MG: 30 INJECTION SUBCUTANEOUS at 10:33

## 2023-06-17 RX ADMIN — ASPIRIN 81 MG: 81 TABLET, COATED ORAL at 10:32

## 2023-06-17 RX ADMIN — SENNOSIDES AND DOCUSATE SODIUM 2 TABLET: 50; 8.6 TABLET ORAL at 10:33

## 2023-06-17 RX ADMIN — SENNOSIDES AND DOCUSATE SODIUM 2 TABLET: 50; 8.6 TABLET ORAL at 17:07

## 2023-06-17 RX ADMIN — AMLODIPINE BESYLATE 2.5 MG: 2.5 TABLET ORAL at 10:33

## 2023-06-17 RX ADMIN — ATORVASTATIN CALCIUM 40 MG: 40 TABLET, FILM COATED ORAL at 17:07

## 2023-06-17 RX ADMIN — CHLORHEXIDINE GLUCONATE 15 ML: 1.2 SOLUTION ORAL at 21:15

## 2023-06-17 RX ADMIN — ALBUTEROL SULFATE 2.5 MG: 2.5 SOLUTION RESPIRATORY (INHALATION) at 02:25

## 2023-06-17 NOTE — PLAN OF CARE
"                                               Progress Note    Client Name: Ruchi Lerner  Date: 1/18/2021         Service Type: Individual  Video Visit: No     Session Start Time: 8:00am  Session End Time: 8:47am     Session Length: 47minutes    Session #: 47    Attendees: Client attended alone    The patient has been notified of the following:      \"We have found that certain health care needs can be provided without the need for a face to face visit.  This service lets us provide the care you need with a phone conversation.       I will have full access to your [place] medical record during this entire phone call.   I will be taking notes for your medical record.      Since this is like an office visit, we will bill your insurance company for this service.       There are potential benefits and risks of telephone visits (e.g. limits to patient confidentiality) that differ from in-person visits.?  Confidentiality still applies for telephone services, and nobody will record the visit.  It is important to be in a quiet, private space that is free of distractions (including cell phone or other devices) during the visit.??      If during the course of the call I believe a telephone visit is not appropriate, you will not be charged for this service\"     Consent has been obtained for this service by care team member: Yes        Treatment Plan Last Reviewed: 1/18/2021  DATA  Interactive Complexity: No  Crisis: No       Progress Since Last Session (Related to Symptoms / Goals / Homework):  Symptoms: improving, some anxiety around the coronavirus increasing cases and health of her loved ones. Continued anxiety around coronavirus and political atmosphere    Homework: Partially completed      Episode of Care Goals: Satisfactory progress - ACTION (Actively working towards change); Intervened by reinforcing change plan / affirming steps taken     Current / Ongoing Stressors and Concerns:   Exploring emotions around " Problem: MOBILITY - ADULT  Goal: Maintain or return to baseline ADL function  Description: INTERVENTIONS:  -  Assess patient's ability to carry out ADLs; assess patient's baseline for ADL function and identify physical deficits which impact ability to perform ADLs (bathing, care of mouth/teeth, toileting, grooming, dressing, etc )  - Assess/evaluate cause of self-care deficits   - Assess range of motion  - Assess patient's mobility; develop plan if impaired  - Assess patient's need for assistive devices and provide as appropriate  - Encourage maximum independence but intervene and supervise when necessary  - Involve family in performance of ADLs  - Assess for home care needs following discharge   - Consider OT consult to assist with ADL evaluation and planning for discharge  - Provide patient education as appropriate  Outcome: Progressing  Goal: Maintains/Returns to pre admission functional level  Description: INTERVENTIONS:  - Perform BMAT or MOVE assessment daily    - Set and communicate daily mobility goal to care team and patient/family/caregiver  - Collaborate with rehabilitation services on mobility goals if consulted  - Perform Range of Motion times a day  - Reposition patient every hours    - Dangle patient times a day  - Stand patient times a day  - Ambulate patient  times a day  - Out of bed to chair  times a day   - Out of bed for meals  times a day  - Out of bed for toileting  - Record patient progress and toleration of activity level   Outcome: Progressing     Problem: PAIN - ADULT  Goal: Verbalizes/displays adequate comfort level or baseline comfort level  Description: Interventions:  - Encourage patient to monitor pain and request assistance  - Assess pain using appropriate pain scale  - Administer analgesics based on type and severity of pain and evaluate response  - Implement non-pharmacological measures as appropriate and evaluate response  - Consider cultural and social influences on pain and "losing out on cheerleading and school with everything related to Covid and how she wishes things could go back to normal. Missing going to school and how things would be different for her if COVID wasn't present.     Treatment Objective(s) Addressed in This Session:   Processing anxiety and world events     Intervention:  CBT: Identifying topics that are continuing to create stress and anxiety for her (political atmosphere and COVID) and how things would be different for her if the events were resolved. Explored ideal situations and miracle question and how she can work on decreasing her anxiety symptoms around the \"unknown\" when/if things will resolve.     ASSESSMENT: Current Emotional / Mental Status (status of significant symptoms):   Risk status (Self / Other harm or suicidal ideation)   Client denies current fears or concerns for personal safety.   Client denies current or recent suicidal ideation or behaviors.   Client denies current or recent homicidal ideation or behaviors.   Client denies current or recent self injurious behavior or ideation.   Client denies other safety concerns.   Client Client reports there has been no change in risk factors since their last session.     Client Client reports there has been no change in protective factors since their last session.     A safety and risk management plan has not been developed at this time, however client was given the after-hours number / 911 should there be a change in any of these risk factors.     Appearance:   Could not assess, phone session   Eye Contact:   Could not assess, phone session   Psychomotor Behavior: Could not assess, phone session   Attitude:   Cooperative    Orientation:   All   Speech    Rate / Production: Normal     Volume:  Normal    Mood:    Euthymic   Affect:    Appropriate    Thought Content:  Clear    Thought Form:  Coherent  Goal Directed  Logical    Insight:    Fair      Medication Review:   No changes to current psychiatric " pain management  - Notify physician/advanced practitioner if interventions unsuccessful or patient reports new pain  Outcome: Progressing     Problem: INFECTION - ADULT  Goal: Absence or prevention of progression during hospitalization  Description: INTERVENTIONS:  - Assess and monitor for signs and symptoms of infection  - Monitor lab/diagnostic results  - Monitor all insertion sites, i e  indwelling lines, tubes, and drains  - Monitor endotracheal if appropriate and nasal secretions for changes in amount and color  - [place] appropriate cooling/warming therapies per order  - Administer medications as ordered  - Instruct and encourage patient and family to use good hand hygiene technique  - Identify and instruct in appropriate isolation precautions for identified infection/condition  Outcome: Progressing  Goal: Absence of fever/infection during neutropenic period  Description: INTERVENTIONS:  - Monitor WBC    Outcome: Progressing     Problem: SAFETY ADULT  Goal: Maintain or return to baseline ADL function  Description: INTERVENTIONS:  -  Assess patient's ability to carry out ADLs; assess patient's baseline for ADL function and identify physical deficits which impact ability to perform ADLs (bathing, care of mouth/teeth, toileting, grooming, dressing, etc )  - Assess/evaluate cause of self-care deficits   - Assess range of motion  - Assess patient's mobility; develop plan if impaired  - Assess patient's need for assistive devices and provide as appropriate  - Encourage maximum independence but intervene and supervise when necessary  - Involve family in performance of ADLs  - Assess for home care needs following discharge   - Consider OT consult to assist with ADL evaluation and planning for discharge  - Provide patient education as appropriate  Outcome: Progressing  Goal: Maintains/Returns to pre admission functional level  Description: INTERVENTIONS:  - Perform BMAT or MOVE assessment daily    - Set and communicate daily mobility goal to care team and patient/family/caregiver  - Collaborate with rehabilitation services on mobility goals if consulted  - Perform Range of Motion  times a day  - Reposition patient every  hours    - Dangle patient  times a day  - Stand patient times a day  - Ambulate patient  times a day  - Out of bed to chair  times a day   - Out of bed for meals times a day  - Out of bed for toileting  - Record patient progress and toleration of activity level   Outcome: Progressing  Goal: Patient will remain free of falls  Description: INTERVENTIONS:  - Educate patient/family on patient safety including physical limitations  - Instruct patient to call for assistance with activity   - Consult OT/PT to assist with strengthening/mobility   - Keep Call bell within reach  - Keep bed low and locked with side rails adjusted as appropriate  - Keep care items and personal belongings within reach  - Initiate and maintain comfort rounds  - Make Fall Risk Sign visible to staff  - Offer Toileting every  Hours, in advance of need  - Initiate/Maintain alarm  - Obtain necessary fall risk management equipment:   - Apply yellow socks and bracelet for high fall risk patients  - Consider moving patient to room near nurses station  Outcome: Progressing medication(s)     Medication Compliance:   Yes     Changes in Health Issues:   None reported     Chemical Use Review:   Substance Use: Chemical use reviewed, no active concerns identified      Tobacco Use: No current tobacco use.      Diagnosis:  Major Depressive Disorder, Recurrent Episode, In partial remission  Generalized Anxiety Disorder    Collateral Reports Completed:   Not Applicable    PLAN: (Client Tasks / Therapist Tasks / Other)  Continue with individual therapy.  Identifying ways to challenge anxious thoughts.  Janae Frnaklin, [facility]  ________________________________________________________________________    Treatment Plan    Client's Name: Ruchi Lerner  YOB: 2004    Date: 1/18/2021    DSM-V Diagnoses: 296.35 (F33.41)  Major Depressive Disorder, Recurrent Episode, In partial remission _ or 300.02 (F41.1) Generalized Anxiety Disorder  Psychosocial / Contextual Factors: struggling with school being cancelled in person due to COVID-19, stuck inside with family and not seeing friends  WHODAS: N/A.    Referral / Collaboration:  Referral to another professional/service is not indicated at this time..    Anticipated number of session or this episode of care: 16-20.      MeasurableTreatment Goal(s) related to diagnosis / functional impairment(s)  Goal 1: Report a decrease in anxiety symptoms.     Objective #A (Client Action)    Status: Continued - Date(s):1/18/2021   Client will use cognitive strategies identified in therapy to challenge anxious thoughts.    Intervention(s)  Therapist will explore origin of anxious thoughts using CBT.    Objective #B  Client will use at least 2 new coping skills for anxiety management in the next 12 weeks.    Status: Continued - Date(s):1/18/2021     Intervention(s)  Therapist will teach new coping skills to practice and assign as homework.    Goal 2: Client will notice a increase in motivation, energy, and interest     I will know I've met my goal when I  want to do things more.      Objective #A (Client Action)    Client will Increase interest, engagement, and pleasure in doing things.  Status: Continued - Date(s):1/18/2021     Intervention(s)  Therapist will use DBT and CBT skills to assess depressive symptoms.    Objective #B  Client will Feel less tired and more energy during the day .  Status: Continued - Date(s):1/18/2021     Intervention(s)  Therapist will assign homework to focus on getting quality sleep at night and less staying up late on electronics.    Goal 3: Client will think through consequences to choices.    Objective #A (Client Action)    Client will think about how their decisions impact their relationships.  Status: Continued - Date(s):1/18/2021     Intervention(s)  Therapist will review empathy and interpersonal relationships.    Objective #B  Client will think through choices and consequences.    Status: Continued - Date(s):1/18/2021     Intervention(s)  Therapist will explore healthy decision making.      Client and Parent / Guardian have reviewed and agreed to the above plan.      Janae Franklin, ISELA January 19, 2021

## 2023-06-17 NOTE — ASSESSMENT & PLAN NOTE
Lab Results   Component Value Date    EGFR 47 06/17/2023    EGFR 48 06/16/2023    EGFR 40 06/15/2023    CREATININE 1 31 (H) 06/17/2023    CREATININE 1 29 06/16/2023    CREATININE 1 49 (H) 06/15/2023   Creatinine currently at baseline  Avoid nephrotoxins  Monitor BMPs

## 2023-06-17 NOTE — ASSESSMENT & PLAN NOTE
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  Focal severe stenosis of proximal P2 and P3 segment of right PCA  · Patient given TNK and was monitored initially in the ICU  · Repeat CT head at 24 hours negative for any acute bleed  · Patient was NIH HSS 12  · Still having significant expressive aphasia  · 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 in the left perirolandic, left posterior frontal, left parietal, left posterior temporal and bilateral occipital lobes    · Speech recommends dysphagia 1 diet    Plan:  · Continue Lipitor 40 mg daily  · Aspirin 81 mg daily  · Neurochecks  · Normotension  · Telemetry  · Likely discharge to [facility], pending placement/transport  · Patient will follow-up with cardiology for Zio patch and loop recorder

## 2023-06-17 NOTE — PLAN OF CARE
Problem: MOBILITY - ADULT  Goal: Maintain or return to baseline ADL function  Description: INTERVENTIONS:  -  Assess patient's ability to carry out ADLs; assess patient's baseline for ADL function and identify physical deficits which impact ability to perform ADLs (bathing, care of mouth/teeth, toileting, grooming, dressing, etc )  - Assess/evaluate cause of self-care deficits   - Assess range of motion  - Assess patient's mobility; develop plan if impaired  - Assess patient's need for assistive devices and provide as appropriate  - Encourage maximum independence but intervene and supervise when necessary  - Involve family in performance of ADLs  - Assess for home care needs following discharge   - Consider OT consult to assist with ADL evaluation and planning for discharge  - Provide patient education as appropriate  Outcome: Progressing  Goal: Maintains/Returns to pre admission functional level  Description: INTERVENTIONS:  - Perform BMAT or MOVE assessment daily    - Set and communicate daily mobility goal to care team and patient/family/caregiver  - Collaborate with rehabilitation services on mobility goals if consulted  - Reposition patient every 2 hours    - Record patient progress and toleration of activity level   Outcome: Progressing     Problem: PAIN - ADULT  Goal: Verbalizes/displays adequate comfort level or baseline comfort level  Description: Interventions:  - Encourage patient to monitor pain and request assistance  - Assess pain using appropriate pain scale  - Administer analgesics based on type and severity of pain and evaluate response  - Implement non-pharmacological measures as appropriate and evaluate response  - Consider cultural and social influences on pain and pain management  - Notify physician/advanced practitioner if interventions unsuccessful or patient reports new pain  Outcome: Progressing     Problem: INFECTION - ADULT  Goal: Absence or prevention of progression during hospitalization  Description: INTERVENTIONS:  - Assess and monitor for signs and symptoms of infection  - Monitor lab/diagnostic results  - Monitor all insertion sites, i e  indwelling lines, tubes, and drains  - Monitor endotracheal if appropriate and nasal secretions for changes in amount and color  - Carson appropriate cooling/warming therapies per order  - Administer medications as ordered  - Instruct and encourage patient and family to use good hand hygiene technique  - Identify and instruct in appropriate isolation precautions for identified infection/condition  Outcome: Progressing  Goal: Absence of fever/infection during neutropenic period  Description: INTERVENTIONS:  - Monitor WBC    Outcome: Progressing     Problem: SAFETY ADULT  Goal: Maintain or return to baseline ADL function  Description: INTERVENTIONS:  -  Assess patient's ability to carry out ADLs; assess patient's baseline for ADL function and identify physical deficits which impact ability to perform ADLs (bathing, care of mouth/teeth, toileting, grooming, dressing, etc )  - Assess/evaluate cause of self-care deficits   - Assess range of motion  - Assess patient's mobility; develop plan if impaired  - Assess patient's need for assistive devices and provide as appropriate  - Encourage maximum independence but intervene and supervise when necessary  - Involve family in performance of ADLs  - Assess for home care needs following discharge   - Consider OT consult to assist with ADL evaluation and planning for discharge  - Provide patient education as appropriate  Outcome: Progressing  Goal: Maintains/Returns to pre admission functional level  Description: INTERVENTIONS:  - Perform BMAT or MOVE assessment daily    - Set and communicate daily mobility goal to care team and patient/family/caregiver  - Collaborate with rehabilitation services on mobility goals if consulted  - Reposition patient every 2 hours    - Record patient progress and toleration of activity level   Outcome: Progressing  Goal: Patient will remain free of falls  Description: INTERVENTIONS:  - Educate patient/family on patient safety including physical limitations  - Instruct patient to call for assistance with activity   - Consult OT/PT to assist with strengthening/mobility   - Keep Call bell within reach  - Keep bed low and locked with side rails adjusted as appropriate  - Keep care items and personal belongings within reach  - Initiate and maintain comfort rounds  - Make Fall Risk Sign visible to staff  - Offer Toileting every 2 Hours, in advance of need  - Initiate/Maintain bed alarm  - Obtain necessary fall risk management equipment  - Apply yellow socks and bracelet for high fall risk patients  - Consider moving patient to room near nurses station  Outcome: Progressing     Problem: DISCHARGE PLANNING  Goal: Discharge to home or other facility with appropriate resources  Description: INTERVENTIONS:  - Identify barriers to discharge w/patient and caregiver  - Arrange for needed discharge resources and transportation as appropriate  - Identify discharge learning needs (meds, wound care, etc )  - Arrange for interpretive services to assist at discharge as needed  - Refer to Case Management Department for coordinating discharge planning if the patient needs post-hospital services based on physician/advanced practitioner order or complex needs related to functional status, cognitive ability, or social support system  Outcome: Progressing     Problem: Knowledge Deficit  Goal: Patient/family/caregiver demonstrates understanding of disease process, treatment plan, medications, and discharge instructions  Description: Complete learning assessment and assess knowledge base    Interventions:  - Provide teaching at level of understanding  - Provide teaching via preferred learning methods  Outcome: Progressing     Problem: Prexisting or High Potential for Compromised Skin Integrity  Goal: Skin integrity is maintained or improved  Description: INTERVENTIONS:  - Identify patients at risk for skin breakdown  - Assess and monitor skin integrity  - Assess and monitor nutrition and hydration status  - Monitor labs   - Assess for incontinence   - Turn and reposition patient  - Assist with mobility/ambulation  - Relieve pressure over bony prominences  - Avoid friction and shearing  - Provide appropriate hygiene as needed including keeping skin clean and dry  - Evaluate need for skin moisturizer/barrier cream  - Collaborate with interdisciplinary team   - Patient/family teaching  - Consider wound care consult   Outcome: Progressing     Problem: Nutrition/Hydration-ADULT  Goal: Nutrient/Hydration intake appropriate for improving, restoring or maintaining nutritional needs  Description: Monitor and assess patient's nutrition/hydration status for malnutrition  Collaborate with interdisciplinary team and initiate plan and interventions as ordered  Monitor patient's weight and dietary intake as ordered or per policy  Utilize nutrition screening tool and intervene as necessary  Determine patient's food preferences and provide high-protein, high-caloric foods as appropriate       INTERVENTIONS:  - Monitor oral intake, urinary output, labs, and treatment plans  - Assess nutrition and hydration status and recommend course of action  - Evaluate amount of meals eaten  - Assist patient with eating if necessary   - Allow adequate time for meals  - Recommend/ encourage appropriate diets, oral nutritional supplements, and vitamin/mineral supplements  - Order, calculate, and assess calorie counts as needed  - Recommend, monitor, and adjust tube feedings based on assessed needs  - Assess need for intravenous fluids  - Provide nutrition/hydration education as appropriate  - Include patient/family/caregiver in decisions related to nutrition  Outcome: Progressing     Problem: Neurological Deficit  Goal: Neurological status is stable or improving  Description: Interventions:  - Monitor and assess patient's level of consciousness, motor function, sensory function, and level of assistance needed for ADLs  - Monitor and report changes from baseline  Collaborate with interdisciplinary team to initiate plan and implement interventions as ordered  - Provide and maintain a safe environment  - Consider seizure precautions  - Consider fall precautions  - Consider aspiration precautions  - Consider bleeding precautions  Outcome: Progressing     Problem: Activity Intolerance/Impaired Mobility  Goal: Mobility/activity is maintained at optimum level for patient  Description: Interventions:  - Assess and monitor patient  barriers to mobility and need for assistive/adaptive devices  - Assess patient's emotional response to limitations  - Collaborate with interdisciplinary team and initiate plans and interventions as ordered  - Encourage independent activity per ability   - Maintain proper body alignment  - Perform active/passive rom as tolerated/ordered  - Plan activities to conserve energy   - Turn patient as appropriate  Outcome: Progressing     Problem: Communication Impairment  Goal: Ability to express needs and understand communication  Description: Assess patient's communication skills and ability to understand information  Patient will demonstrate use of effective communication techniques, alternative methods of communication and understanding even if not able to speak  - Encourage communication and provide alternate methods of communication as needed  - Collaborate with case management/ for discharge needs  - Include patient/family/caregiver in decisions related to communication  Outcome: Progressing     Problem: Potential for Aspiration  Goal: Non-ventilated patient's risk of aspiration is minimized  Description: Assess and monitor vital signs, respiratory status, and labs (WBC)    Monitor for signs of aspiration (tachypnea, cough, rales, wheezing, cyanosis, fever)  - Assess and monitor patient's ability to swallow  - Place patient up in chair to eat if possible  - HOB up at 90 degrees to eat if unable to get patient up into chair   - Supervise patient during oral intake  - Instruct patient/ family to take small bites  - Instruct patient/ family to take small single sips when taking liquids  - Follow patient-specific strategies generated by speech pathologist   Outcome: Progressing  Goal: Ventilated patient's risk of aspiration is minimized  Description: Assess and monitor vital signs, respiratory status, airway cuff pressure, and labs (WBC)  Monitor for signs of aspiration (tachypnea, cough, rales, wheezing, cyanosis, fever)  - Elevate head of bed 30 degrees if patient has tube feeding   - Monitor tube feeding  Outcome: Progressing     Problem: Nutrition  Goal: Nutrition/Hydration status is improving  Description: Monitor and assess patient's nutrition/hydration status for malnutrition (ex- brittle hair, bruises, dry skin, pale skin and conjunctiva, muscle wasting, smooth red tongue, and disorientation)  Collaborate with interdisciplinary team and initiate plan and interventions as ordered  Monitor patient's weight and dietary intake as ordered or per policy  Utilize nutrition screening tool and intervene per policy  Determine patient's food preferences and provide high-protein, high-caloric foods as appropriate  - Assist patient with eating   - Allow adequate time for meals   - Encourage patient to take dietary supplement as ordered  - Collaborate with clinical nutritionist   - Include patient/family/caregiver in decisions related to nutrition    Outcome: Progressing

## 2023-06-17 NOTE — PLAN OF CARE
Problem: PHYSICAL THERAPY ADULT  Goal: Performs mobility at highest level of function for planned discharge setting  See evaluation for individualized goals  Description: Treatment/Interventions: Functional transfer training, LE strengthening/ROM, Therapeutic exercise, Cognitive reorientation, Patient/family training, Equipment eval/education, Bed mobility, Spoke to nursing, Spoke to case management, OT (gait training when appropriate)  Equipment Recommended:  (TBD)       See flowsheet documentation for full assessment, interventions and recommendations  Outcome: Progressing  Note: Prognosis: Fair  Problem List: Decreased strength, Decreased endurance, Impaired balance, Decreased mobility, Decreased cognition, Impaired judgement, Decreased safety awareness  Assessment: pt began tx session lying supine in [facility] bed and was agreeable to participate in PT intervention  Progress was noted this tx session as pt was able to complete a Supine<>sit EOB transfer with a decrease in level of assistance required compared to previous tx sessions mod ax1 with LE and HHA in order to complete transfer to seated EOB  pt was able to sit EOB w/o LOB while being educated on all functional transfers to and from RW  pt completed 3 STS with HHA and min Ax1 w/o LOB  On initial STS pt with bowel incontinence an the floor as pt and floor required cleaning and pt required to seated EOB  pt was able to static stand with HHA and min Ax1 for over 3 minutes w/o LOB before attempting a SPT from EOB to recliner in order to clean pt bed  Post tx pt in bed with call bell and all pt needs met  Continue to recommend post acute rehab services at the time of D/C in order to maximize pt functional independence and safety with all OOB mobility  PT Discharge Recommendation: Post acute rehabilitation services    See flowsheet documentation for full assessment

## 2023-06-17 NOTE — PHYSICAL THERAPY NOTE
PHYSICAL THERAPY NOTE          Patient Name: Cher Haynes  FGPWT'P Date: 6/17/2023 06/17/23 1521   PT Last Visit   PT Visit Date 06/17/23   Note Type   Note Type Treatment   Pain Assessment   Pain Assessment Tool FLACC   Pain Score No Pain   Betancourt-Baker FACES Pain Rating 0   Hospital Pain Intervention(s) Repositioned; Ambulation/increased activity; Rest   Multiple Pain Sites No   Pain Rating: FLACC (Rest) - Face 0   Pain Rating: FLACC (Rest) - Legs 0   Pain Rating: FLACC (Rest) - Activity 0   Pain Rating: FLACC (Rest) - Cry 0   Pain Rating: FLACC (Rest) - Consolability 0   Score: FLACC (Rest) 0   Pain Rating: FLACC (Activity) - Face 0   Pain Rating: FLACC (Activity) - Legs 0   Pain Rating: FLACC (Activity) - Activity 0   Pain Rating: FLACC (Activity) - Cry 0   Pain Rating: FLACC (Activity) - Consolability 0   Score: FLACC (Activity) 0   Restrictions/Precautions   Weight Bearing Precautions Per Order No   Other Precautions Cognitive; Chair Alarm; Bed Alarm; Fall Risk  (pt spoke a few words in todays tx session  pt stated his aunt is in the hospital and that he has to use the bathroom)   General   Chart Reviewed Yes   Response to Previous Treatment Patient with no complaints from previous session  Family/Caregiver Present No   Cognition   Overall Cognitive Status Impaired   Arousal/Participation Alert; Cooperative   Attention Difficulty attending to directions   Orientation Level Oriented to person   Memory Unable to assess   Following Commands Follows one step commands inconsistently   Comments pt pleasantly confused throughout tx session   Subjective   Subjective pt agreeable to participate in PTB intervention   Bed Mobility   Supine to Sit 3  Moderate assistance   Additional items Assist x 1;HOB elevated; Bedrails; Increased time required;Verbal cues;LE management; Other  (HHA)   Sit to Supine 2  Maximal assistance   Additional items Assist x 1;HOB elevated; Bedrails; Increased time required;Verbal cues;LE management; Other  (trunk management)   Transfers   Sit to Stand 4  Minimal assistance   Additional items Assist x 1; Increased time required;Verbal cues  (HHA)   Stand to Sit 3  Moderate assistance   Additional items Assist x 1; Armrests; Increased time required;Verbal cues  (HHA)   Stand pivot 3  Moderate assistance   Additional items Assist x 1; Increased time required;Verbal cues;Armrests  (HHA)   Additional Comments pt demonstrated difficulty completing SPT from EOB to recliner and back to EOB as pt didnt know how to step towards recliner or EOB   Ambulation/Elevation   Gait pattern Not appropriate   Balance   Static Sitting Fair -   Static Standing Poor +   Ambulatory Zero   Endurance Deficit   Endurance Deficit Yes   Endurance Deficit Description limited activity tolerance and functional mobility   Assessment   Prognosis Fair   Problem List Decreased strength;Decreased endurance; Impaired balance;Decreased mobility; Decreased cognition; Impaired judgement;Decreased safety awareness   Assessment pt began tx session lying supine in [facility] bed and was agreeable to participate in PT intervention  Progress was noted this tx session as pt was able to complete a Supine<>sit EOB transfer with a decrease in level of assistance required compared to previous tx sessions mod ax1 with LE and HHA in order to complete transfer to seated EOB  pt was able to sit EOB w/o LOB while being educated on all functional transfers to and from RW  pt completed 3 STS with HHA and min Ax1 w/o LOB  On initial STS pt with bowel incontinence an the floor as pt and floor required cleaning and pt required to seated EOB  pt was able to static stand with HHA and min Ax1 for over 3 minutes w/o LOB before attempting a SPT from EOB to recliner in order to clean pt bed  Post tx pt in bed with call bell and all pt needs met   Continue to recommend post acute rehab services at the time of D/C in order to maximize pt functional independence and safety with all OOB mobility  Goals   Patient Goals pt stated he has to use the bathroom   STG Expiration Date 06/24/23   PT Treatment Day 1   Plan   Treatment/Interventions Functional transfer training;LE strengthening/ROM; Therapeutic exercise; Endurance training;Cognitive reorientation;Patient/family training;Equipment eval/education; Bed mobility;Gait training;Spoke to nursing   Progress Slow progress, decreased activity tolerance   PT Frequency 3-5x/wk   Recommendation   PT Discharge Recommendation Post acute rehabilitation services   AM-PAC Basic Mobility Inpatient   Turning in Flat Bed Without Bedrails 2   Lying on Back to Sitting on Edge of Flat Bed Without Bedrails 3   Moving Bed to Chair 2   Standing Up From Chair Using Arms 3   Walk in Room 1   Climb 3-5 Stairs With Railing 1   Basic Mobility Inpatient Raw Score 12   Basic Mobility Standardized Score 32 23   Highest Level Of Mobility   -[facility] Goal 4: Move to chair/commode   -[facility] Achieved 4: Move to [employer] Provided Other  (bed mobility and functional transfers)   Patient Reinforcement needed   End of Consult   Patient Position at End of Consult Supine;Bed/Chair alarm activated; All needs within reach   The patient's AM-PAC Basic Mobility Inpatient Short Form Raw Score is 12  A Raw score of less than or equal to 16 suggests the patient may benefit from discharge to post-acute rehabilitation services  Please also refer to the recommendation of the Physical Therapist for safe discharge planning       Umang Lawrence

## 2023-06-17 NOTE — PROGRESS NOTES
[facility]  Progress Note  Name: Laura Sosa  MRN: [id number]  Unit/Bed#: S -01 I Date of Admission: 6/13/2023   Date of Service: 6/17/2023 I Hospital Day: 4    Assessment/Plan   * Stroke 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  Focal severe stenosis of proximal P2 and P3 segment of right PCA  · Patient given TNK and was monitored initially in the ICU  · Repeat CT head at 24 hours negative for any acute bleed  · Patient was NIH HSS 12  · Still having significant expressive aphasia  · 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 in the left perirolandic, left posterior frontal, left parietal, left posterior temporal and bilateral occipital lobes  · Speech recommends dysphagia 1 diet    Plan:  · Continue Lipitor 40 mg daily  · Aspirin 81 mg daily  · Neurochecks  · Normotension  · Telemetry  · Likely discharge to [facility], pending placement/transport  · Patient will follow-up with cardiology for Zio patch and loop recorder    [address]  • Pt presented as witness fall, no reported headstrike  • Pt does not take any AC/AP at home  • Fall likely d/t weakness secondary to CVA  • Imaging negative for traumatic injuries  • PT/OT recommend patient rehab  • Fall precations    Leukocytosis  Assessment & Plan  • WBC - 18 27 on admission  • Pt remains afebrile  • UA: negative for leukocytes and nitrates  +protein  No WBCs or bacteria noted     • MRSA negative  • COVID flu RSV negative  • WBC downtrending, monitor CBCs    Hyponatremia  Assessment & Plan  • Sodium - 133  • Baseline 132 to 135, at baseline  • No intervention or workup at this time  • Under BMPs    Constipation  Assessment & Plan  Continue Senokot    CKD (chronic kidney disease)  Assessment & Plan  Lab Results   Component Value Date    EGFR 47 2023    EGFR 48 2023    EGFR 40 06/15/2023    CREATININE 1 31 (H) 2023    CREATININE 1 29 2023    CREATININE 1 49 (H) 06/15/2023   Creatinine currently at baseline  Avoid nephrotoxins  Monitor BMPs    Essential hypertension  Assessment & Plan  Goal normotension at this time  Home regimen Norvasc 2 5 mg daily, Coreg 6 25 mg twice daily  We will continue home regimen with labetalol as as needed if SBP over 180         VTE Pharmacologic Prophylaxis:   High Risk (Score >/= 5) - Pharmacological DVT Prophylaxis Ordered: enoxaparin (Lovenox)  Sequential Compression Devices Ordered  Patient Centered Rounds: I performed bedside rounds with nursing staff today  Discussions with Specialists or Other Care Team Provider: Neuroneurology    Education and Discussions with Family / Patient: Attempted to update  (son) via phone  Left voicemail  Marie    Current Length of Stay: 4 day(s)  Current Patient Status: Inpatient   Discharge Plan: Anticipate discharge in 24-48 hrs to prior assisted or independent living facility  Code Status: Level 1 - Full Code    Subjective:   Patient seen and evaluated by me at the bedside  He is still displaying some expressive aphasia  He answers questions with more words  He is showing more mobility  Still having significant confusion, expressive aphasia, weakness  No acute distress or respiratory distress  Objective:     Vitals:   Temp (24hrs), Av 4 °F (36 9 °C), Min:98 1 °F (36 7 °C), Max:98 7 °F (37 1 °C)    Temp:  [98 1 °F (36 7 °C)-98 7 °F (37 1 °C)] 98 1 °F (36 7 °C)  HR:  [67-86] 86  Resp:  [16-18] 16  BP: (139-169)/(67-87) 169/81  SpO2:  [90 %-93 %] 91 %  Body mass index is 24 57 kg/m²  Input and Output Summary (last 24 hours):      Intake/Output Summary (Last 24 hours) at 2023 14 [facility] filed at 6/17/2023 0920  Gross per 24 hour   Intake 600 ml   Output 350 ml   Net 250 ml       Physical Exam:   Physical Exam  Constitutional:       General: He is not in acute distress  Appearance: Normal appearance  He is ill-appearing  HENT:      Head: Normocephalic and atraumatic  Mouth/Throat:      Mouth: Mucous membranes are dry  Eyes:      General: No scleral icterus  Extraocular Movements: Extraocular movements intact  Pupils: Pupils are equal, round, and reactive to light  Cardiovascular:      Rate and Rhythm: Normal rate and regular rhythm  Pulses: Normal pulses  Heart sounds: Normal heart sounds  No murmur heard  Pulmonary:      Effort: Pulmonary effort is normal  No respiratory distress  Breath sounds: Normal breath sounds  No wheezing or rales  Abdominal:      General: There is no distension  Palpations: Abdomen is soft  Tenderness: There is no abdominal tenderness  There is no guarding  Musculoskeletal:      Right lower leg: No edema  Left lower leg: No edema  Skin:     General: Skin is warm  Capillary Refill: Capillary refill takes less than 2 seconds  Neurological:      Mental Status: He is alert  He is disoriented        Comments: Expressive aphasia  Responds to verbal and physical stimuli as well as pain  Patient cannot cooperate with the rest neuro exam          Additional Data:     Labs:  Results from last 7 days   Lab Units 06/17/23  0445   WBC Thousand/uL 12 30*   HEMOGLOBIN g/dL 13 8   HEMATOCRIT % 42 1   PLATELETS Thousands/uL 286   NEUTROS PCT % 70   LYMPHS PCT % 15   MONOS PCT % 9   EOS PCT % 4     Results from last 7 days   Lab Units 06/17/23  0445 06/16/23  0538   SODIUM mmol/L 134* 133*   POTASSIUM mmol/L 4 4 5 1   CHLORIDE mmol/L 102 102   CO2 mmol/L 23 20*   BUN mg/dL 29* 34*   CREATININE mg/dL 1 31* 1 29   ANION GAP mmol/L 9 11   CALCIUM mg/dL 8 7 7 6*   ALBUMIN g/dL  --  3 1*   GLUCOSE RANDOM mg/dL 109 106     Results from last 7 days   Lab Units 23  0930   INR  1 00     Results from last 7 days   Lab Units 23  0953   POC GLUCOSE mg/dl 121     Results from last 7 days   Lab Units 23  0454   HEMOGLOBIN A1C % 5 7*           Lines/Drains:  Invasive Devices     Peripheral Intravenous Line  Duration           Peripheral IV 06/15/23 Left;Proximal;Upper;Ventral (anterior) Arm 1 day          Drain  Duration           External Urinary Catheter 3 days                  Telemetry:  Telemetry Orders (From admission, onward)             24 Hour Telemetry Monitoring  Continuous x 24 Hours (Telem)           Question:  Reason for 24 Hour Telemetry  Answer:  TIA/Suspected CVA/ Confirmed CVA                 Telemetry Reviewed: Normal Sinus Rhythm  Indication for Continued Telemetry Use: Acute CVA              Imaging: Reviewed radiology reports from this admission including: MRI brain    Recent Cultures (last 7 days):         Last 24 Hours Medication List:   Current Facility-Administered Medications   Medication Dose Route Frequency Provider Last Rate   • acetaminophen  650 mg Oral Q8H PRN ROXY Villegas     • albuterol  2 5 mg Nebulization Q6H PRN Kari Guan DO     • amLODIPine  2 5 mg Oral Daily ROXY Villegas     • aspirin  81 mg Oral Daily ROXY Villegas     • atorvastatin  40 mg Oral QPM ROXY Villegas     • carvedilol  6 25 mg Oral BID With Meals ROXY Villegas     • chlorhexidine  15 mL Mouth/Throat Q12H [address] ROXY Villegas     • enoxaparin  30 mg Subcutaneous Q24H [address] ROXY Villegas     • labetalol  10 mg Intravenous Q6H PRN ROXY Villegas     • polyethylene glycol  17 g Oral Daily Mark Cobb MD     • senna-docusate sodium  2 tablet Oral BID Mark Cobb MD          Today, Patient Was Seen By: Mark Cobb MD    **Please Note: This note may have been constructed using a voice recognition system  **

## 2023-06-18 LAB
ANION GAP SERPL CALCULATED.3IONS-SCNC: 9 MMOL/L (ref 4–13)
ANION GAP SERPL CALCULATED.3IONS-SCNC: 9 MMOL/L (ref 4–13)
BASOPHILS # BLD AUTO: 0.05 THOUSANDS/ÂΜL (ref 0–0.1)
BASOPHILS # BLD AUTO: 0.05 THOUSANDS/ÂΜL (ref 0–0.1)
BASOPHILS NFR BLD AUTO: 1 % (ref 0–1)
BASOPHILS NFR BLD AUTO: 1 % (ref 0–1)
BUN SERPL-MCNC: 25 MG/DL (ref 5–25)
BUN SERPL-MCNC: 25 MG/DL (ref 5–25)
CALCIUM SERPL-MCNC: 9.2 MG/DL (ref 8.4–10.2)
CALCIUM SERPL-MCNC: 9.2 MG/DL (ref 8.4–10.2)
CHLORIDE SERPL-SCNC: 102 MMOL/L (ref 96–108)
CHLORIDE SERPL-SCNC: 102 MMOL/L (ref 96–108)
CO2 SERPL-SCNC: 23 MMOL/L (ref 21–32)
CO2 SERPL-SCNC: 23 MMOL/L (ref 21–32)
CREAT SERPL-MCNC: 1.29 MG/DL (ref 0.6–1.3)
CREAT SERPL-MCNC: 1.29 MG/DL (ref 0.6–1.3)
EOSINOPHIL # BLD AUTO: 0.38 THOUSAND/ÂΜL (ref 0–0.61)
EOSINOPHIL # BLD AUTO: 0.38 THOUSAND/ÂΜL (ref 0–0.61)
EOSINOPHIL NFR BLD AUTO: 4 % (ref 0–6)
EOSINOPHIL NFR BLD AUTO: 4 % (ref 0–6)
ERYTHROCYTE [DISTWIDTH] IN BLOOD BY AUTOMATED COUNT: 14.2 % (ref 11.6–15.1)
ERYTHROCYTE [DISTWIDTH] IN BLOOD BY AUTOMATED COUNT: 14.2 % (ref 11.6–15.1)
GFR SERPL CREATININE-BSD FRML MDRD: 48 ML/MIN/1.73SQ M
GFR SERPL CREATININE-BSD FRML MDRD: 48 ML/MIN/1.73SQ M
GLUCOSE SERPL-MCNC: 97 MG/DL (ref 65–140)
GLUCOSE SERPL-MCNC: 97 MG/DL (ref 65–140)
HCT VFR BLD AUTO: 47.3 % (ref 36.5–49.3)
HCT VFR BLD AUTO: 47.3 % (ref 36.5–49.3)
HGB BLD-MCNC: 15.1 G/DL (ref 12–17)
HGB BLD-MCNC: 15.1 G/DL (ref 12–17)
IMM GRANULOCYTES # BLD AUTO: 0.08 THOUSAND/UL (ref 0–0.2)
IMM GRANULOCYTES # BLD AUTO: 0.08 THOUSAND/UL (ref 0–0.2)
IMM GRANULOCYTES NFR BLD AUTO: 1 % (ref 0–2)
IMM GRANULOCYTES NFR BLD AUTO: 1 % (ref 0–2)
LYMPHOCYTES # BLD AUTO: 1.68 THOUSANDS/ÂΜL (ref 0.6–4.47)
LYMPHOCYTES # BLD AUTO: 1.68 THOUSANDS/ÂΜL (ref 0.6–4.47)
LYMPHOCYTES NFR BLD AUTO: 17 % (ref 14–44)
LYMPHOCYTES NFR BLD AUTO: 17 % (ref 14–44)
MCH RBC QN AUTO: 28.7 PG (ref 26.8–34.3)
MCH RBC QN AUTO: 28.7 PG (ref 26.8–34.3)
MCHC RBC AUTO-ENTMCNC: 31.9 G/DL (ref 31.4–37.4)
MCHC RBC AUTO-ENTMCNC: 31.9 G/DL (ref 31.4–37.4)
MCV RBC AUTO: 90 FL (ref 82–98)
MCV RBC AUTO: 90 FL (ref 82–98)
MONOCYTES # BLD AUTO: 0.83 THOUSAND/ÂΜL (ref 0.17–1.22)
MONOCYTES # BLD AUTO: 0.83 THOUSAND/ÂΜL (ref 0.17–1.22)
MONOCYTES NFR BLD AUTO: 8 % (ref 4–12)
MONOCYTES NFR BLD AUTO: 8 % (ref 4–12)
NEUTROPHILS # BLD AUTO: 7.17 THOUSANDS/ÂΜL (ref 1.85–7.62)
NEUTROPHILS # BLD AUTO: 7.17 THOUSANDS/ÂΜL (ref 1.85–7.62)
NEUTS SEG NFR BLD AUTO: 69 % (ref 43–75)
NEUTS SEG NFR BLD AUTO: 69 % (ref 43–75)
NRBC BLD AUTO-RTO: 0 /100 WBCS
NRBC BLD AUTO-RTO: 0 /100 WBCS
PLATELET # BLD AUTO: 269 THOUSANDS/UL (ref 149–390)
PLATELET # BLD AUTO: 269 THOUSANDS/UL (ref 149–390)
PMV BLD AUTO: 9.7 FL (ref 8.9–12.7)
PMV BLD AUTO: 9.7 FL (ref 8.9–12.7)
POTASSIUM SERPL-SCNC: 4.5 MMOL/L (ref 3.5–5.3)
POTASSIUM SERPL-SCNC: 4.5 MMOL/L (ref 3.5–5.3)
RBC # BLD AUTO: 5.26 MILLION/UL (ref 3.88–5.62)
RBC # BLD AUTO: 5.26 MILLION/UL (ref 3.88–5.62)
SODIUM SERPL-SCNC: 134 MMOL/L (ref 135–147)
SODIUM SERPL-SCNC: 134 MMOL/L (ref 135–147)
WBC # BLD AUTO: 10.19 THOUSAND/UL (ref 4.31–10.16)
WBC # BLD AUTO: 10.19 THOUSAND/UL (ref 4.31–10.16)

## 2023-06-18 PROCEDURE — 94640 AIRWAY INHALATION TREATMENT: CPT

## 2023-06-18 PROCEDURE — 99232 SBSQ HOSP IP/OBS MODERATE 35: CPT | Performed by: INTERNAL MEDICINE

## 2023-06-18 PROCEDURE — 85025 COMPLETE CBC W/AUTO DIFF WBC: CPT

## 2023-06-18 PROCEDURE — 80048 BASIC METABOLIC PNL TOTAL CA: CPT

## 2023-06-18 PROCEDURE — 94760 N-INVAS EAR/PLS OXIMETRY 1: CPT

## 2023-06-18 RX ADMIN — CARVEDILOL 6.25 MG: 6.25 TABLET, FILM COATED ORAL at 17:54

## 2023-06-18 RX ADMIN — ASPIRIN 81 MG: 81 TABLET, COATED ORAL at 08:41

## 2023-06-18 RX ADMIN — CHLORHEXIDINE GLUCONATE 15 ML: 1.2 SOLUTION ORAL at 21:03

## 2023-06-18 RX ADMIN — AMLODIPINE BESYLATE 2.5 MG: 2.5 TABLET ORAL at 08:41

## 2023-06-18 RX ADMIN — ENOXAPARIN SODIUM 30 MG: 30 INJECTION SUBCUTANEOUS at 08:41

## 2023-06-18 RX ADMIN — SENNOSIDES AND DOCUSATE SODIUM 2 TABLET: 50; 8.6 TABLET ORAL at 08:41

## 2023-06-18 RX ADMIN — ALBUTEROL SULFATE 2.5 MG: 2.5 SOLUTION RESPIRATORY (INHALATION) at 09:35

## 2023-06-18 RX ADMIN — CARVEDILOL 6.25 MG: 6.25 TABLET, FILM COATED ORAL at 08:46

## 2023-06-18 RX ADMIN — POLYETHYLENE GLYCOL 3350 17 G: 17 POWDER, FOR SOLUTION ORAL at 08:41

## 2023-06-18 RX ADMIN — ATORVASTATIN CALCIUM 40 MG: 40 TABLET, FILM COATED ORAL at 17:54

## 2023-06-18 NOTE — PLAN OF CARE
Problem: MOBILITY - ADULT  Goal: Maintain or return to baseline ADL function  Description: INTERVENTIONS:  -  Assess patient's ability to carry out ADLs; assess patient's baseline for ADL function and identify physical deficits which impact ability to perform ADLs (bathing, care of mouth/teeth, toileting, grooming, dressing, etc )  - Assess/evaluate cause of self-care deficits   - Assess range of motion  - Assess patient's mobility; develop plan if impaired  - Assess patient's need for assistive devices and provide as appropriate  - Encourage maximum independence but intervene and supervise when necessary  - Involve family in performance of ADLs  - Assess for home care needs following discharge   - Consider OT consult to assist with ADL evaluation and planning for discharge  - Provide patient education as appropriate  Outcome: Progressing  Goal: Maintains/Returns to pre admission functional level  Description: INTERVENTIONS:  - Perform BMAT or MOVE assessment daily    - Set and communicate daily mobility goal to care team and patient/family/caregiver  - Collaborate with rehabilitation services on mobility goals if consulted  - Perform Range of Motion  times a day  - Reposition patient every  hours    - Dangle patient  times a day  - Stand patient  times a day  - Ambulate patient  times a day  - Out of bed to chair  times a day   - Out of bed for meals  times a day  - Out of bed for toileting  - Record patient progress and toleration of activity level   Outcome: Progressing     Problem: PAIN - ADULT  Goal: Verbalizes/displays adequate comfort level or baseline comfort level  Description: Interventions:  - Encourage patient to monitor pain and request assistance  - Assess pain using appropriate pain scale  - Administer analgesics based on type and severity of pain and evaluate response  - Implement non-pharmacological measures as appropriate and evaluate response  - Consider cultural and social influences on pain and pain management  - Notify physician/advanced practitioner if interventions unsuccessful or patient reports new pain  Outcome: Progressing     Problem: INFECTION - ADULT  Goal: Absence or prevention of progression during hospitalization  Description: INTERVENTIONS:  - Assess and monitor for signs and symptoms of infection  - Monitor lab/diagnostic results  - Monitor all insertion sites, i e  indwelling lines, tubes, and drains  - Monitor endotracheal if appropriate and nasal secretions for changes in amount and color  - [place] appropriate cooling/warming therapies per order  - Administer medications as ordered  - Instruct and encourage patient and family to use good hand hygiene technique  - Identify and instruct in appropriate isolation precautions for identified infection/condition  Outcome: Progressing  Goal: Absence of fever/infection during neutropenic period  Description: INTERVENTIONS:  - Monitor WBC    Outcome: Progressing     Problem: SAFETY ADULT  Goal: Maintain or return to baseline ADL function  Description: INTERVENTIONS:  -  Assess patient's ability to carry out ADLs; assess patient's baseline for ADL function and identify physical deficits which impact ability to perform ADLs (bathing, care of mouth/teeth, toileting, grooming, dressing, etc )  - Assess/evaluate cause of self-care deficits   - Assess range of motion  - Assess patient's mobility; develop plan if impaired  - Assess patient's need for assistive devices and provide as appropriate  - Encourage maximum independence but intervene and supervise when necessary  - Involve family in performance of ADLs  - Assess for home care needs following discharge   - Consider OT consult to assist with ADL evaluation and planning for discharge  - Provide patient education as appropriate  Outcome: Progressing  Goal: Maintains/Returns to pre admission functional level  Description: INTERVENTIONS:  - Perform BMAT or MOVE assessment daily    - Set and communicate daily mobility goal to care team and patient/family/caregiver  - Collaborate with rehabilitation services on mobility goals if consulted  - Perform Range of Motion  times a day  - Reposition patient every  hours    - Dangle patient  times a day  - Stand patient  times a day  - Ambulate patient  times a day  - Out of bed to chair  times a day   - Out of bed for meals  times a day  - Out of bed for toileting  - Record patient progress and toleration of activity level   Outcome: Progressing  Goal: Patient will remain free of falls  Description: INTERVENTIONS:  - Educate patient/family on patient safety including physical limitations  - Instruct patient to call for assistance with activity   - Consult OT/PT to assist with strengthening/mobility   - Keep Call bell within reach  - Keep bed low and locked with side rails adjusted as appropriate  - Keep care items and personal belongings within reach  - Initiate and maintain comfort rounds  - Make Fall Risk Sign visible to staff  - Offer Toileting every  Hours, in advance of need  - Initiate/Maintain alarm  - Obtain necessary fall risk management equipment:  - Apply yellow socks and bracelet for high fall risk patients  - Consider moving patient to room near nurses station  Outcome: Progressing     Problem: DISCHARGE PLANNING  Goal: Discharge to home or other facility with appropriate resources  Description: INTERVENTIONS:  - Identify barriers to discharge w/patient and caregiver  - Arrange for needed discharge resources and transportation as appropriate  - Identify discharge learning needs (meds, wound care, etc )  - Arrange for interpretive services to assist at discharge as needed  - Refer to Case Management Department for coordinating discharge planning if the patient needs post-hospital services based on physician/advanced practitioner order or complex needs related to functional status, cognitive ability, or social support system  Outcome: Progressing Problem: Knowledge Deficit  Goal: Patient/family/caregiver demonstrates understanding of disease process, treatment plan, medications, and discharge instructions  Description: Complete learning assessment and assess knowledge base  Interventions:  - Provide teaching at level of understanding  - Provide teaching via preferred learning methods  Outcome: Progressing     Problem: Prexisting or High Potential for Compromised Skin Integrity  Goal: Skin integrity is maintained or improved  Description: INTERVENTIONS:  - Identify patients at risk for skin breakdown  - Assess and monitor skin integrity  - Assess and monitor nutrition and hydration status  - Monitor labs   - Assess for incontinence   - Turn and reposition patient  - Assist with mobility/ambulation  - Relieve pressure over bony prominences  - Avoid friction and shearing  - Provide appropriate hygiene as needed including keeping skin clean and dry  - Evaluate need for skin moisturizer/barrier cream  - Collaborate with interdisciplinary team   - Patient/family teaching  - Consider wound care consult   Outcome: Progressing     Problem: Nutrition/Hydration-ADULT  Goal: Nutrient/Hydration intake appropriate for improving, restoring or maintaining nutritional needs  Description: Monitor and assess patient's nutrition/hydration status for malnutrition  Collaborate with interdisciplinary team and initiate plan and interventions as ordered  Monitor patient's weight and dietary intake as ordered or per policy  Utilize nutrition screening tool and intervene as necessary  Determine patient's food preferences and provide high-protein, high-caloric foods as appropriate       INTERVENTIONS:  - Monitor oral intake, urinary output, labs, and treatment plans  - Assess nutrition and hydration status and recommend course of action  - Evaluate amount of meals eaten  - Assist patient with eating if necessary   - Allow adequate time for meals  - Recommend/ encourage appropriate diets, oral nutritional supplements, and vitamin/mineral supplements  - Order, calculate, and assess calorie counts as needed  - Recommend, monitor, and adjust tube feedings based on assessed needs  - Assess need for intravenous fluids  - Provide nutrition/hydration education as appropriate  - Include patient/family/caregiver in decisions related to nutrition  Outcome: Progressing     Problem: Neurological Deficit  Goal: Neurological status is stable or improving  Description: Interventions:  - Monitor and assess patient's level of consciousness, motor function, sensory function, and level of assistance needed for ADLs  - Monitor and report changes from baseline  Collaborate with interdisciplinary team to initiate plan and implement interventions as ordered  - Provide and maintain a safe environment  - Consider seizure precautions  - Consider fall precautions  - Consider aspiration precautions  - Consider bleeding precautions  Outcome: Progressing     Problem: Activity Intolerance/Impaired Mobility  Goal: Mobility/activity is maintained at optimum level for patient  Description: Interventions:  - Assess and monitor patient  barriers to mobility and need for assistive/adaptive devices  - Assess patient's emotional response to limitations  - Collaborate with interdisciplinary team and initiate plans and interventions as ordered  - Encourage independent activity per ability   - Maintain proper body alignment  - Perform active/passive rom as tolerated/ordered  - Plan activities to conserve energy   - Turn patient as appropriate  Outcome: Progressing     Problem: Communication Impairment  Goal: Ability to express needs and understand communication  Description: Assess patient's communication skills and ability to understand information  Patient will demonstrate use of effective communication techniques, alternative methods of communication and understanding even if not able to speak       - Encourage communication and provide alternate methods of communication as needed  - Collaborate with case management/ for discharge needs  - Include patient/family/caregiver in decisions related to communication  Outcome: Progressing     Problem: Potential for Aspiration  Goal: Non-ventilated patient's risk of aspiration is minimized  Description: Assess and monitor vital signs, respiratory status, and labs (WBC)  Monitor for signs of aspiration (tachypnea, cough, rales, wheezing, cyanosis, fever)  - Assess and monitor patient's ability to swallow  - Place patient up in chair to eat if possible  - HOB up at 90 degrees to eat if unable to get patient up into chair   - Supervise patient during oral intake  - Instruct patient/ family to take small bites  - Instruct patient/ family to take small single sips when taking liquids  - Follow patient-specific strategies generated by speech pathologist   Outcome: Progressing  Goal: Ventilated patient's risk of aspiration is minimized  Description: Assess and monitor vital signs, respiratory status, airway cuff pressure, and labs (WBC)  Monitor for signs of aspiration (tachypnea, cough, rales, wheezing, cyanosis, fever)  - Elevate head of bed 30 degrees if patient has tube feeding   - Monitor tube feeding  Outcome: Progressing     Problem: Nutrition  Goal: Nutrition/Hydration status is improving  Description: Monitor and assess patient's nutrition/hydration status for malnutrition (ex- brittle hair, bruises, dry skin, pale skin and conjunctiva, muscle wasting, smooth red tongue, and disorientation)  Collaborate with interdisciplinary team and initiate plan and interventions as ordered  Monitor patient's weight and dietary intake as ordered or per policy  Utilize nutrition screening tool and intervene per policy  Determine patient's food preferences and provide high-protein, high-caloric foods as appropriate       - Assist patient with eating   - Allow adequate time for meals   - Encourage patient to take dietary supplement as ordered  - Collaborate with clinical nutritionist   - Include patient/family/caregiver in decisions related to nutrition    Outcome: Progressing

## 2023-06-18 NOTE — ASSESSMENT & PLAN NOTE
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  Focal severe stenosis of proximal P2 and P3 segment of right PCA  · Patient given TNK and was monitored initially in the ICU  · Repeat CT head at 24 hours negative for any acute bleed  · Patient was NIH HSS 12  · Still having significant expressive aphasia  · 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 in the left perirolandic, left posterior frontal, left parietal, left posterior temporal and bilateral occipital lobes    · Speech recommends dysphagia 1 diet    Plan:  · Continue Lipitor 40 mg daily  · Aspirin 81 mg daily  · Neurochecks  · Normotension  · Telemetry  · Likely discharge to [facility], pending authorization  · Patient will follow-up with cardiology for Zio patch and loop recorder

## 2023-06-18 NOTE — PROGRESS NOTES
[facility]  Progress Note  Name: Bobo Griggs  MRN: [id number]  Unit/Bed#: S -01 I Date of Admission: 6/13/2023   Date of Service: 6/18/2023 I Hospital Day: 5    Assessment/Plan   * Stroke [facility])  Assessment & Plan  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  Focal severe stenosis of proximal P2 and P3 segment of right PCA  · Patient given TNK and was monitored initially in the ICU  · Repeat CT head at 24 hours negative for any acute bleed  · Patient was NIH HSS 12  · Still having significant expressive aphasia  · 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 in the left perirolandic, left posterior frontal, left parietal, left posterior temporal and bilateral occipital lobes  · Speech recommends dysphagia 1 diet    Plan:  · Continue Lipitor 40 mg daily  · Aspirin 81 mg daily  · Neurochecks  · Normotension  · Telemetry  · Likely discharge to [facility], pending authorization  · Patient will follow-up with cardiology for Zio patch and loop recorder    [address]  • Pt presented as witness fall, no reported headstrike  • Pt does not take any AC/AP at home  • Fall likely d/t weakness secondary to CVA  • Imaging negative for traumatic injuries  • PT/OT recommend patient rehab  • Fall precations    Leukocytosis  Assessment & Plan  • WBC - 18 27 on admission  • Pt remains afebrile  • UA: negative for leukocytes and nitrates  +protein  No WBCs or bacteria noted     • MRSA negative  • COVID flu RSV negative  • WBC downtrending, monitor CBCs    Hyponatremia  Assessment & Plan  • Sodium - 133  • Baseline 132 to 135, at baseline  • No intervention or workup at this time    Constipation  Assessment & Plan  Continue Senokot    CKD (chronic kidney disease)  Assessment & Plan  Recent Labs     06/16/23  0538 06/17/23  0445 06/18/23  0903   CREATININE 1 29 1 31* 1 29   EGFR 48 47 48     Estimated Creatinine Clearance: 39 7 mL/min (by C-G formula based on SCr of 1 29 mg/dL)  Creatinine currently at baseline  Avoid nephrotoxins  Monitor BMPs    Essential hypertension  Assessment & Plan  Goal normotension at this time  Home regimen Norvasc 2 5 mg daily, Coreg 6 25 mg twice daily  We will continue home regimen with labetalol as as needed if SBP over 180         VTE Pharmacologic Prophylaxis: VTE Score: 8 High Risk (Score >/= 5) - Pharmacological DVT Prophylaxis Ordered: enoxaparin (Lovenox)  Sequential Compression Devices Ordered  Patient Centered Rounds: I performed bedside rounds with nursing staff today  Discussions with Specialists or Other Care Team Provider: Case management    Education and Discussions with Family / Patient: Attempted to update  (son) via phone  Left voicemail  Total Time Spent on Date of Encounter in care of patient: 25 minutes This time was spent on one or more of the following: performing physical exam; counseling and coordination of care; obtaining or reviewing history; documenting in the medical record; reviewing/ordering tests, medications or procedures; communicating with other healthcare professionals and discussing with patient's family/caregivers  Current Length of Stay: 5 day(s)  Current Patient Status: Inpatient   Certification Statement: The patient will continue to require additional inpatient hospital stay due to pending auth for [facility]  Discharge Plan: Anticipate discharge tomorrow to rehab facility  Code Status: Level 1 - Full Code    Subjective: And interactive with me this morning, stating that he feels fine  He asked where the bathroom is, stating he needed to urinate    However nursing noted that he had some saturation to 88 to 89 % on room air, placed him on 2 L, notified respiratory therapy to administer a nebulization treatment  Objective:     Vitals:   Temp (24hrs), Av 3 °F (36 8 °C), Min:98 1 °F (36 7 °C), Max:98 7 °F (37 1 °C)    Temp:  [98 1 °F (36 7 °C)-98 7 °F (37 1 °C)] 98 1 °F (36 7 °C)  HR:  [65-74] 65  Resp:  [16-18] 16  BP: (143-189)/(82-94) 143/94  SpO2:  [89 %-93 %] 93 %  Body mass index is 24 57 kg/m²  Input and Output Summary (last 24 hours): Intake/Output Summary (Last 24 hours) at 2023 1216  Last data filed at 2023 1300  Gross per 24 hour   Intake 240 ml   Output --   Net 240 ml       Physical Exam:   Physical Exam  Vitals and nursing note reviewed  Constitutional:       General: He is not in acute distress  Appearance: He is well-developed  HENT:      Head: Normocephalic and atraumatic  Eyes:      Conjunctiva/sclera: Conjunctivae normal    Cardiovascular:      Rate and Rhythm: Normal rate and regular rhythm  Heart sounds: No murmur heard  Pulmonary:      Effort: Pulmonary effort is normal  No respiratory distress  Breath sounds: Normal breath sounds  No wheezing  Abdominal:      Palpations: Abdomen is soft  Tenderness: There is no abdominal tenderness  Musculoskeletal:         General: No swelling  Cervical back: Neck supple  Skin:     General: Skin is warm and dry  Capillary Refill: Capillary refill takes less than 2 seconds  Neurological:      Mental Status: He is alert     Psychiatric:         Mood and Affect: Mood normal           Additional Data:     Labs:  Results from last 7 days   Lab Units 23  0903   WBC Thousand/uL 10 19*   HEMOGLOBIN g/dL 15 1   HEMATOCRIT % 47 3   PLATELETS Thousands/uL 269   NEUTROS PCT % 69   LYMPHS PCT % 17   MONOS PCT % 8   EOS PCT % 4     Results from last 7 days   Lab Units 23  0903 23  0445 23  0538   SODIUM mmol/L 134*   < > 133*   POTASSIUM mmol/L 4 5   < > 5 1   CHLORIDE mmol/L 102   < > 102   CO2 mmol/L 23   < > 20*   BUN mg/dL 25   < > 34*   CREATININE mg/dL 1 29   < > 1 29   ANION GAP mmol/L 9   < > 11   CALCIUM mg/dL 9 2   < > 7 6*   ALBUMIN g/dL  --   --  3 1*   GLUCOSE RANDOM mg/dL 97   < > 106    < > = values in this interval not displayed  Results from last 7 days   Lab Units 06/13/23  0930   INR  1 00     Results from last 7 days   Lab Units 06/13/23  0953   POC GLUCOSE mg/dl 121     Results from last 7 days   Lab Units 06/14/23  0454   HEMOGLOBIN A1C % 5 7*           Lines/Drains:  Invasive Devices     Peripheral Intravenous Line  Duration           Peripheral IV 06/15/23 Left;Proximal;Upper;Ventral (anterior) Arm 2 days          Drain  Duration           External Urinary Catheter 4 days                      Imaging: No pertinent imaging reviewed  Recent Cultures (last 7 days):         Last 24 Hours Medication List:   Current Facility-Administered Medications   Medication Dose Route Frequency Provider Last Rate   • acetaminophen  650 mg Oral Q8H PRN ROXY Marie     • albuterol  2 5 mg Nebulization Q6H PRN Kari Guan DO     • amLODIPine  2 5 mg Oral Daily ROXY Marie     • aspirin  81 mg Oral Daily ROXY Marie     • atorvastatin  40 mg Oral QPM ROXY Marie     • carvedilol  6 25 mg Oral BID With Meals ROXY Marie     • chlorhexidine  15 mL Mouth/Throat Q12H [address] ROXY Marie     • enoxaparin  30 mg Subcutaneous Q24H [address] ROXY Marie     • labetalol  10 mg Intravenous Q6H PRN ROXY Marie     • polyethylene glycol  17 g Oral Daily Robyn Arteaga MD     • senna-docusate sodium  2 tablet Oral BID Robyn Arteaga MD          Today, Patient Was Seen By: Gabino Renae DO    **Please Note: This note may have been constructed using a voice recognition system  **

## 2023-06-18 NOTE — CASE MANAGEMENT
Case Management Progress Note    Patient name Channing GOLDEN /S -01 MRN [id number]  : 1932 Date 2023       LOS (days): 5  Geometric Mean LOS (GMLOS) (days): 3 20  Days to GMLOS:-1 8        OBJECTIVE:        Current admission status: Inpatient  Preferred Pharmacy: No Pharmacies Listed  Primary Care Provider: Bhumika Romero MD    Primary Insurance: [address] [place] [address]  Secondary Insurance:     PROGRESS NOTE:    Updated clinicals faxed to [facility] regarding pending authorization # [id number]

## 2023-06-18 NOTE — ASSESSMENT & PLAN NOTE
Recent Labs     06/16/23  0538 06/17/23  0445 06/18/23  0903   CREATININE 1 29 1 31* 1 29   EGFR 48 47 48     Estimated Creatinine Clearance: 39 7 mL/min (by C-G formula based on SCr of 1 29 mg/dL)      Creatinine currently at baseline  Avoid nephrotoxins  Monitor BMPs

## 2023-06-19 ENCOUNTER — TELEPHONE (OUTPATIENT)
Dept: OTHER | Facility: OTHER | Age: 88
End: 2023-06-19

## 2023-06-19 VITALS
HEIGHT: 71 IN | BODY MASS INDEX: 24.57 KG/M2 | HEIGHT: 71 IN | DIASTOLIC BLOOD PRESSURE: 78 MMHG | OXYGEN SATURATION: 90 % | HEART RATE: 72 BPM | SYSTOLIC BLOOD PRESSURE: 127 MMHG | OXYGEN SATURATION: 90 % | RESPIRATION RATE: 17 BRPM | DIASTOLIC BLOOD PRESSURE: 78 MMHG | HEART RATE: 72 BPM | TEMPERATURE: 98.2 F | BODY MASS INDEX: 24.57 KG/M2 | SYSTOLIC BLOOD PRESSURE: 127 MMHG | RESPIRATION RATE: 17 BRPM | WEIGHT: 175.49 LBS | WEIGHT: 175.49 LBS | TEMPERATURE: 98.2 F

## 2023-06-19 LAB
ANION GAP SERPL CALCULATED.3IONS-SCNC: 6 MMOL/L (ref 4–13)
ANION GAP SERPL CALCULATED.3IONS-SCNC: 6 MMOL/L (ref 4–13)
BUN SERPL-MCNC: 32 MG/DL (ref 5–25)
BUN SERPL-MCNC: 32 MG/DL (ref 5–25)
CALCIUM SERPL-MCNC: 8.8 MG/DL (ref 8.4–10.2)
CALCIUM SERPL-MCNC: 8.8 MG/DL (ref 8.4–10.2)
CHLORIDE SERPL-SCNC: 103 MMOL/L (ref 96–108)
CHLORIDE SERPL-SCNC: 103 MMOL/L (ref 96–108)
CO2 SERPL-SCNC: 26 MMOL/L (ref 21–32)
CO2 SERPL-SCNC: 26 MMOL/L (ref 21–32)
CREAT SERPL-MCNC: 1.45 MG/DL (ref 0.6–1.3)
CREAT SERPL-MCNC: 1.45 MG/DL (ref 0.6–1.3)
ERYTHROCYTE [DISTWIDTH] IN BLOOD BY AUTOMATED COUNT: 14.2 % (ref 11.6–15.1)
ERYTHROCYTE [DISTWIDTH] IN BLOOD BY AUTOMATED COUNT: 14.2 % (ref 11.6–15.1)
GFR SERPL CREATININE-BSD FRML MDRD: 41 ML/MIN/1.73SQ M
GFR SERPL CREATININE-BSD FRML MDRD: 41 ML/MIN/1.73SQ M
GLUCOSE SERPL-MCNC: 112 MG/DL (ref 65–140)
GLUCOSE SERPL-MCNC: 112 MG/DL (ref 65–140)
HCT VFR BLD AUTO: 43.2 % (ref 36.5–49.3)
HCT VFR BLD AUTO: 43.2 % (ref 36.5–49.3)
HGB BLD-MCNC: 14.2 G/DL (ref 12–17)
HGB BLD-MCNC: 14.2 G/DL (ref 12–17)
MCH RBC QN AUTO: 29.2 PG (ref 26.8–34.3)
MCH RBC QN AUTO: 29.2 PG (ref 26.8–34.3)
MCHC RBC AUTO-ENTMCNC: 32.9 G/DL (ref 31.4–37.4)
MCHC RBC AUTO-ENTMCNC: 32.9 G/DL (ref 31.4–37.4)
MCV RBC AUTO: 89 FL (ref 82–98)
MCV RBC AUTO: 89 FL (ref 82–98)
PLATELET # BLD AUTO: 263 THOUSANDS/UL (ref 149–390)
PLATELET # BLD AUTO: 263 THOUSANDS/UL (ref 149–390)
PMV BLD AUTO: 10.6 FL (ref 8.9–12.7)
PMV BLD AUTO: 10.6 FL (ref 8.9–12.7)
POTASSIUM SERPL-SCNC: 4.6 MMOL/L (ref 3.5–5.3)
POTASSIUM SERPL-SCNC: 4.6 MMOL/L (ref 3.5–5.3)
RBC # BLD AUTO: 4.86 MILLION/UL (ref 3.88–5.62)
RBC # BLD AUTO: 4.86 MILLION/UL (ref 3.88–5.62)
SODIUM SERPL-SCNC: 135 MMOL/L (ref 135–147)
SODIUM SERPL-SCNC: 135 MMOL/L (ref 135–147)
WBC # BLD AUTO: 10.15 THOUSAND/UL (ref 4.31–10.16)
WBC # BLD AUTO: 10.15 THOUSAND/UL (ref 4.31–10.16)

## 2023-06-19 PROCEDURE — 85027 COMPLETE CBC AUTOMATED: CPT

## 2023-06-19 PROCEDURE — 80048 BASIC METABOLIC PNL TOTAL CA: CPT

## 2023-06-19 PROCEDURE — 97116 GAIT TRAINING THERAPY: CPT

## 2023-06-19 PROCEDURE — 99239 HOSP IP/OBS DSCHRG MGMT >30: CPT | Performed by: INTERNAL MEDICINE

## 2023-06-19 PROCEDURE — 97164 PT RE-EVAL EST PLAN CARE: CPT

## 2023-06-19 PROCEDURE — 97535 SELF CARE MNGMENT TRAINING: CPT

## 2023-06-19 RX ORDER — ACETAMINOPHEN 160 MG/5ML
500 SUSPENSION ORAL EVERY 6 HOURS PRN
Status: DISCONTINUED | OUTPATIENT
Start: 2023-06-19 | End: 2023-06-19 | Stop reason: HOSPADM

## 2023-06-19 RX ORDER — CARVEDILOL 6.25 MG/1
6.25 TABLET ORAL 2 TIMES DAILY WITH MEALS
Qty: 60 TABLET | Refills: 0
Start: 2023-06-19 | End: 2023-07-19

## 2023-06-19 RX ORDER — ALBUTEROL SULFATE 2.5 MG/3ML
2.5 SOLUTION RESPIRATORY (INHALATION) EVERY 6 HOURS PRN
Qty: 3 ML | Refills: 0
Start: 2023-06-19 | End: 2023-07-19

## 2023-06-19 RX ORDER — AMLODIPINE BESYLATE 2.5 MG/1
2.5 TABLET ORAL DAILY
Refills: 0
Start: 2023-06-19 | End: 2023-07-19

## 2023-06-19 RX ORDER — ATORVASTATIN CALCIUM 40 MG/1
40 TABLET, FILM COATED ORAL EVERY EVENING
Qty: 30 TABLET | Refills: 0
Start: 2023-06-19 | End: 2023-07-19

## 2023-06-19 RX ADMIN — AMLODIPINE BESYLATE 2.5 MG: 2.5 TABLET ORAL at 08:55

## 2023-06-19 RX ADMIN — CARVEDILOL 6.25 MG: 6.25 TABLET, FILM COATED ORAL at 08:56

## 2023-06-19 RX ADMIN — CHLORHEXIDINE GLUCONATE 15 ML: 1.2 SOLUTION ORAL at 08:56

## 2023-06-19 RX ADMIN — SENNOSIDES AND DOCUSATE SODIUM 2 TABLET: 50; 8.6 TABLET ORAL at 08:55

## 2023-06-19 RX ADMIN — ENOXAPARIN SODIUM 30 MG: 30 INJECTION SUBCUTANEOUS at 08:56

## 2023-06-19 RX ADMIN — POLYETHYLENE GLYCOL 3350 17 G: 17 POWDER, FOR SOLUTION ORAL at 08:55

## 2023-06-19 NOTE — PLAN OF CARE
"  Problem: OCCUPATIONAL THERAPY ADULT  Goal: Performs self-care activities at highest level of function for planned discharge setting  See evaluation for individualized goals  Description: Treatment Interventions: ADL retraining, Functional transfer training, UE strengthening/ROM, Endurance training, Cognitive reorientation, Patient/family training, Equipment evaluation/education, Compensatory technique education, Fine motor coordination activities, Neuromuscular reeducation, Continued evaluation, Energy conservation, Activityengagement  Equipment Recommended:  (Will continue to assess)       See flowsheet documentation for full assessment, interventions and recommendations  Outcome: Progressing  Note: Limitation: Decreased ADL status, Decreased UE strength, Decreased Safe judgement during ADL, Decreased cognition, Decreased endurance, Decreased fine motor control, Decreased self-care trans, Decreased high-level ADLs     Assessment: Pt seen on this date for skilled OT treatment session  At start of session pt sitting in chair  Pt agreeable and motivated to participate in session, nodding head eagerly  Pt required decreased assistance with all functional transfers and mobility  Demonstrating improved alertness during ADL tasks and improved direction following, able to complete toileting without VC, demonstrating improved overall activity tolerance, motivation, performance with ADLs, functional mobility and transfers  Improved overall cognition, having short conversations with therapists and able to make needs known \"I have to use the bathroom\"  Pt would continue to benefit from skilled OT treatment sessions in order to address remaining deficits and recommend return to facility with therapy services when medically stable       OT Discharge Recommendation: (S) Return to facility with rehabilitation services (as long as facility able to provide assistance as needed)          "

## 2023-06-19 NOTE — PLAN OF CARE
Problem: MOBILITY - ADULT  Goal: Maintain or return to baseline ADL function  Description: INTERVENTIONS:  -  Assess patient's ability to carry out ADLs; assess patient's baseline for ADL function and identify physical deficits which impact ability to perform ADLs (bathing, care of mouth/teeth, toileting, grooming, dressing, etc )  - Assess/evaluate cause of self-care deficits   - Assess range of motion  - Assess patient's mobility; develop plan if impaired  - Assess patient's need for assistive devices and provide as appropriate  - Encourage maximum independence but intervene and supervise when necessary  - Involve family in performance of ADLs  - Assess for home care needs following discharge   - Consider OT consult to assist with ADL evaluation and planning for discharge  - Provide patient education as appropriate  Outcome: Adequate for Discharge  Goal: Maintains/Returns to pre admission functional level  Description: INTERVENTIONS:  - Perform BMAT or MOVE assessment daily    - Set and communicate daily mobility goal to care team and patient/family/caregiver  - Collaborate with rehabilitation services on mobility goals if consulted  - Perform Range of Motion 2 times a day  - Reposition patient every 2 hours    - Dangle patient 2 times a day  - Stand patient 2 times a day  - Ambulate patient 3 times a day  - Out of bed to chair 3 times a day   - Out of bed for meals 3 times a day  - Out of bed for toileting  - Record patient progress and toleration of activity level   Outcome: Adequate for Discharge     Problem: PAIN - ADULT  Goal: Verbalizes/displays adequate comfort level or baseline comfort level  Description: Interventions:  - Encourage patient to monitor pain and request assistance  - Assess pain using appropriate pain scale  - Administer analgesics based on type and severity of pain and evaluate response  - Implement non-pharmacological measures as appropriate and evaluate response  - Consider cultural and social influences on pain and pain management  - Notify physician/advanced practitioner if interventions unsuccessful or patient reports new pain  Outcome: Adequate for Discharge     Problem: INFECTION - ADULT  Goal: Absence or prevention of progression during hospitalization  Description: INTERVENTIONS:  - Assess and monitor for signs and symptoms of infection  - Monitor lab/diagnostic results  - Monitor all insertion sites, i e  indwelling lines, tubes, and drains  - Monitor endotracheal if appropriate and nasal secretions for changes in amount and color  - [place] appropriate cooling/warming therapies per order  - Administer medications as ordered  - Instruct and encourage patient and family to use good hand hygiene technique  - Identify and instruct in appropriate isolation precautions for identified infection/condition  Outcome: Adequate for Discharge  Goal: Absence of fever/infection during neutropenic period  Description: INTERVENTIONS:  - Monitor WBC    Outcome: Adequate for Discharge     Problem: SAFETY ADULT  Goal: Maintain or return to baseline ADL function  Description: INTERVENTIONS:  -  Assess patient's ability to carry out ADLs; assess patient's baseline for ADL function and identify physical deficits which impact ability to perform ADLs (bathing, care of mouth/teeth, toileting, grooming, dressing, etc )  - Assess/evaluate cause of self-care deficits   - Assess range of motion  - Assess patient's mobility; develop plan if impaired  - Assess patient's need for assistive devices and provide as appropriate  - Encourage maximum independence but intervene and supervise when necessary  - Involve family in performance of ADLs  - Assess for home care needs following discharge   - Consider OT consult to assist with ADL evaluation and planning for discharge  - Provide patient education as appropriate  Outcome: Adequate for Discharge  Goal: Maintains/Returns to pre admission functional level  Description: INTERVENTIONS:  - Perform BMAT or MOVE assessment daily    - Set and communicate daily mobility goal to care team and patient/family/caregiver  - Collaborate with rehabilitation services on mobility goals if consulted  - Perform Range of Motion 2 times a day  - Reposition patient every 2 hours    - Dangle patient 2 times a day  - Stand patient 2 times a day  - Ambulate patient 3 times a day  - Out of bed to chair 3 times a day   - Out of bed for meals 3 times a day  - Out of bed for toileting  - Record patient progress and toleration of activity level   Outcome: Adequate for Discharge  Goal: Patient will remain free of falls  Description: INTERVENTIONS:  - Educate patient/family on patient safety including physical limitations  - Instruct patient to call for assistance with activity   - Consult OT/PT to assist with strengthening/mobility   - Keep Call bell within reach  - Keep bed low and locked with side rails adjusted as appropriate  - Keep care items and personal belongings within reach  - Initiate and maintain comfort rounds  - Make Fall Risk Sign visible to staff  - Offer Toileting every 2 Hours, in advance of need  - Initiate/Maintain bed alarm  - Obtain necessary fall risk management equipment: bed alarm  - Apply yellow socks and bracelet for high fall risk patients  - Consider moving patient to room near nurses station  Outcome: Adequate for Discharge     Problem: DISCHARGE PLANNING  Goal: Discharge to home or other facility with appropriate resources  Description: INTERVENTIONS:  - Identify barriers to discharge w/patient and caregiver  - Arrange for needed discharge resources and transportation as appropriate  - Identify discharge learning needs (meds, wound care, etc )  - Arrange for interpretive services to assist at discharge as needed  - Refer to Case Management Department for coordinating discharge planning if the patient needs post-hospital services based on physician/advanced practitioner order or complex needs related to functional status, cognitive ability, or social support system  Outcome: Adequate for Discharge     Problem: Knowledge Deficit  Goal: Patient/family/caregiver demonstrates understanding of disease process, treatment plan, medications, and discharge instructions  Description: Complete learning assessment and assess knowledge base  Interventions:  - Provide teaching at level of understanding  - Provide teaching via preferred learning methods  Outcome: Adequate for Discharge     Problem: Prexisting or High Potential for Compromised Skin Integrity  Goal: Skin integrity is maintained or improved  Description: INTERVENTIONS:  - Identify patients at risk for skin breakdown  - Assess and monitor skin integrity  - Assess and monitor nutrition and hydration status  - Monitor labs   - Assess for incontinence   - Turn and reposition patient  - Assist with mobility/ambulation  - Relieve pressure over bony prominences  - Avoid friction and shearing  - Provide appropriate hygiene as needed including keeping skin clean and dry  - Evaluate need for skin moisturizer/barrier cream  - Collaborate with interdisciplinary team   - Patient/family teaching  - Consider wound care consult   Outcome: Adequate for Discharge     Problem: Nutrition/Hydration-ADULT  Goal: Nutrient/Hydration intake appropriate for improving, restoring or maintaining nutritional needs  Description: Monitor and assess patient's nutrition/hydration status for malnutrition  Collaborate with interdisciplinary team and initiate plan and interventions as ordered  Monitor patient's weight and dietary intake as ordered or per policy  Utilize nutrition screening tool and intervene as necessary  Determine patient's food preferences and provide high-protein, high-caloric foods as appropriate       INTERVENTIONS:  - Monitor oral intake, urinary output, labs, and treatment plans  - Assess nutrition and hydration status and recommend course of action  - Evaluate amount of meals eaten  - Assist patient with eating if necessary   - Allow adequate time for meals  - Recommend/ encourage appropriate diets, oral nutritional supplements, and vitamin/mineral supplements  - Order, calculate, and assess calorie counts as needed  - Recommend, monitor, and adjust tube feedings based on assessed needs  - Assess need for intravenous fluids  - Provide nutrition/hydration education as appropriate  - Include patient/family/caregiver in decisions related to nutrition  Outcome: Adequate for Discharge     Problem: Neurological Deficit  Goal: Neurological status is stable or improving  Description: Interventions:  - Monitor and assess patient's level of consciousness, motor function, sensory function, and level of assistance needed for ADLs  - Monitor and report changes from baseline  Collaborate with interdisciplinary team to initiate plan and implement interventions as ordered  - Provide and maintain a safe environment  - Consider seizure precautions  - Consider fall precautions  - Consider aspiration precautions  - Consider bleeding precautions  Outcome: Adequate for Discharge     Problem: Activity Intolerance/Impaired Mobility  Goal: Mobility/activity is maintained at optimum level for patient  Description: Interventions:  - Assess and monitor patient  barriers to mobility and need for assistive/adaptive devices  - Assess patient's emotional response to limitations  - Collaborate with interdisciplinary team and initiate plans and interventions as ordered  - Encourage independent activity per ability   - Maintain proper body alignment  - Perform active/passive rom as tolerated/ordered    - Plan activities to conserve energy   - Turn patient as appropriate  Outcome: Adequate for Discharge     Problem: Communication Impairment  Goal: Ability to express needs and understand communication  Description: Assess patient's communication skills and ability to understand information  Patient will demonstrate use of effective communication techniques, alternative methods of communication and understanding even if not able to speak  - Encourage communication and provide alternate methods of communication as needed  - Collaborate with case management/ for discharge needs  - Include patient/family/caregiver in decisions related to communication  Outcome: Adequate for Discharge     Problem: Potential for Aspiration  Goal: Non-ventilated patient's risk of aspiration is minimized  Description: Assess and monitor vital signs, respiratory status, and labs (WBC)  Monitor for signs of aspiration (tachypnea, cough, rales, wheezing, cyanosis, fever)  - Assess and monitor patient's ability to swallow  - Place patient up in chair to eat if possible  - HOB up at 90 degrees to eat if unable to get patient up into chair   - Supervise patient during oral intake  - Instruct patient/ family to take small bites  - Instruct patient/ family to take small single sips when taking liquids  - Follow patient-specific strategies generated by speech pathologist   Outcome: Adequate for Discharge  Goal: Ventilated patient's risk of aspiration is minimized  Description: Assess and monitor vital signs, respiratory status, airway cuff pressure, and labs (WBC)  Monitor for signs of aspiration (tachypnea, cough, rales, wheezing, cyanosis, fever)  - Elevate head of bed 30 degrees if patient has tube feeding   - Monitor tube feeding  Outcome: Adequate for Discharge     Problem: Nutrition  Goal: Nutrition/Hydration status is improving  Description: Monitor and assess patient's nutrition/hydration status for malnutrition (ex- brittle hair, bruises, dry skin, pale skin and conjunctiva, muscle wasting, smooth red tongue, and disorientation)  Collaborate with interdisciplinary team and initiate plan and interventions as ordered    Monitor patient's weight and dietary intake as ordered or per policy  Utilize nutrition screening tool and intervene per policy  Determine patient's food preferences and provide high-protein, high-caloric foods as appropriate  - Assist patient with eating   - Allow adequate time for meals   - Encourage patient to take dietary supplement as ordered  - Collaborate with clinical nutritionist   - Include patient/family/caregiver in decisions related to nutrition    Outcome: Adequate for Discharge

## 2023-06-19 NOTE — NURSING NOTE
Attempted to call report to receiving facility [facility]  No answer voicemail left with call back number   Patient is a  at 1700

## 2023-06-19 NOTE — CASE MANAGEMENT
Case Management Progress Note    Patient name Tonie Vines  Location S /S -01 MRN [id number]  : 1932 Date 2023       LOS (days): 6  Geometric Mean LOS (GMLOS) (days): 3 20  Days to GMLOS:-2 8        OBJECTIVE:        Current admission status: Inpatient  Preferred Pharmacy: No Pharmacies Listed  Primary Care Provider: Luis Hill MD    Primary Insurance: [address] [facility]  Secondary Insurance:     PROGRESS NOTE:    CM d/c support notified this CM of intent to deny for STR and need for peer to peer by noon today  P2P info provided to resident and attending via TT for f/u

## 2023-06-19 NOTE — CASE MANAGEMENT
Case Management Discharge Planning Note    Patient name Stefani Thomas  Location S /S -01 MRN [id number]  : 1932 Date 2023       Current Admission Date: 2023  Current Admission Diagnosis:Stroke [facility])   Patient Active Problem List    Diagnosis Date Noted   • Stroke ([address] ) 2023   • Essential hypertension 2023   • CKD (chronic kidney disease) 2023   • Constipation 2023   • Hyponatremia 2023   • Leukocytosis 2023   • Fall 2023      LOS (days): 6  Geometric Mean LOS (GMLOS) (days): 3 20  Days to GMLOS:-2 9     OBJECTIVE:  Risk of Unplanned Readmission Score: 12 43         Current admission status: Inpatient   Preferred Pharmacy: No Pharmacies Listed  Primary Care Provider: Zan Garcia MD    Primary Insurance: [address] [place] [address] REP  Secondary Insurance:     [address] [place] [address] Number: [id number]

## 2023-06-19 NOTE — PHYSICAL THERAPY NOTE
PHYSICAL THERAPY REEVAL NOTE    Patient Name: Kelly HIDALGO Date: 6/19/2023 06/19/23 1054   PT Last Visit   PT Visit Date 06/19/23   Pain Assessment   Pain Assessment Tool 0-10   Pain Score No Pain   Pain Rating: FLACC (Rest) - Face 0   Pain Rating: FLACC (Rest) - Legs 0   Pain Rating: FLACC (Rest) - Activity 0   Pain Rating: FLACC (Rest) - Cry 0   Pain Rating: FLACC (Rest) - Consolability 0   Score: FLACC (Rest) 0   Pain Rating: FLACC (Activity) - Face 0   Pain Rating: FLACC (Activity) - Legs 0   Pain Rating: FLACC (Activity) - Activity 0   Pain Rating: FLACC (Activity) - Cry 0   Pain Rating: FLACC (Activity) - Consolability 0   Score: FLACC (Activity) 0   Restrictions/Precautions   Other Precautions Chair Alarm; Bed Alarm;Cognitive; Fall Risk   General   Chart Reviewed Yes   Additional Pertinent History Reeval completed due to pt exceeding previously established goals  Dx: CVA, s/p fall, leukocytosis, hyponatremia, constipation, essential HTN, and CKD  Personal factors and comorbidities: see initial eval  UEs: WFL active ROM  LEs: WFL active ROM, 3+ to 4-/5  Clinical presentation: unstable/unpredictable (evident in need for standby to min assist to mobilize safely, necessity for verbal cues to maintain mobility technique/safety, inconsistent communication)  Family/Caregiver Present No   Cognition   Arousal/Participation Cooperative   Attention Difficulty attending to directions   Orientation Level Unable to assess; Other (Comment)  (pt turned head to name  unable to assess cognition )   Following Commands Follows one step commands inconsistently   Comments pt was identified in computer and using ID bracelet  Subjective   Subjective pt seen sitting out of bed  pt did not verbally communicate initially during session but was able to verbalize after OT entered   pt occasionally nodded and shook head to questions (though not consistently)  Transfers   Sit to Stand 4  Minimal assistance  (pt did not require physical assistance, just input for technique/safety)   Additional items Assist x 1; Increased time required;Verbal cues  (for LE positioning)   Stand to Sit 4  Minimal assistance  (pt did not require physical assistance, just input for technique/safety)   Additional items Assist x 1; Increased time required; Impulsive;Verbal cues  (for body positioning)   Toilet transfer 4  Minimal assistance   Additional items Assist x 1;Standard toilet; Increased time required;Verbal cues  (for grab bar use)   Ambulation/Elevation   Gait pattern Foward flexed; Short stride; Inconsistent vivian   Gait Assistance 4  Minimal assist  (pt did not require physical assistance, just input for technique/safety)   Additional items Assist x 1;Verbal cues  (for walker positioning)   Assistive Device Rolling walker   Distance 30 feet x2  (pt declined additional ambulation, would have tolerated additional ambulation)   Balance   Static Sitting Good   Dynamic Sitting Fair   Static Standing Fair  (w/ roller walker)   Ambulatory Fair -  (w/ roller walker)   Activity Tolerance   Activity Tolerance Patient limited by fatigue   Nurse Made Aware spoke to [facility] NSG, [employer], stickK OT   Equipment Use   Comments ankle pumps 30  quad sets, heel slides and hip abduction 10 each  Assessment   Problem List Decreased strength;Decreased endurance; Impaired balance;Decreased mobility; Decreased cognition; Impaired judgement;Decreased safety awareness   Assessment Pt shows improvement in mobility status since PT was initiated w/ initiation of ambulation and decreased level of assistance  Improvement was also noted in pt's ability to communicate and actively participate in PT intervention  Pt continues to need assistance w/ all phases of mobility, though for technique/safety and not for physical assistance   Mobility impairments are related to weakness, impaired balance, decreased endurance and gait deviations  Functional impairment is also evident in Barthel Index score of 35/100  Continued inpatient PT is needed to reduce fall risk and increase level of function  Discharge recommendation is for PT upon return to [facility] to maximize level of independence  Goals   Patient Goals pt did not provide goals when asked  will continue to assess  STG Expiration Date 06/29/23   Short Term Goal #1 pt will: Perform bed mobility modified independent to increase level of independence, Perform all transfers modified independent to improve independence, Ambulate 200 ft  with roller walker modified independent w/o LOB to improve functional independence, Tolerate 3 hr OOB to faciliate upright tolerance, Improve Barthel Index score to 55 or greater to facilitate independence and Complete Timed Up and Go or Comfortable Gait Speed to further assess mobility and monitor progress   PT Treatment Day 1   Plan   Treatment/Interventions Functional transfer training;LE strengthening/ROM; Therapeutic exercise; Endurance training;Cognitive reorientation;Patient/family training;Equipment eval/education; Bed mobility;Gait training   Progress Progressing toward goals   PT Frequency 3-5x/wk   Recommendation   PT Discharge Recommendation Return to facility with rehabilitation services   AM-PAC Basic Mobility Inpatient   Turning in Flat Bed Without Bedrails 4   Lying on Back to Sitting on Edge of Flat Bed Without Bedrails 3   Moving Bed to Chair 3   Standing Up From Chair Using Arms 3   Walk in Room 3   Climb 3-5 Stairs With Railing 1   Basic Mobility Inpatient Raw Score 17   Basic Mobility Standardized Score 39 67   Highest Level Of Mobility   -HLM Goal 5: Stand one or more mins   JH-HLM Achieved 7: Walk 25 feet or more   End of Consult   Patient Position at End of Consult Bedside chair;Bed/Chair alarm activated; All needs within reach     The patient's AM-PAC Basic Mobility Inpatient Short Form Raw Score is 17   A Raw score of greater than 16 suggests the patient may benefit from discharge to home  Please also refer to the recommendation of the Physical Therapist for safe discharge planning  Skilled inpatient PT recommended while in hospital to progress pt toward treatment goals      Margot Yang, PT

## 2023-06-19 NOTE — PLAN OF CARE
Problem: MOBILITY - ADULT  Goal: Maintain or return to baseline ADL function  Description: INTERVENTIONS:  -  Assess patient's ability to carry out ADLs; assess patient's baseline for ADL function and identify physical deficits which impact ability to perform ADLs (bathing, care of mouth/teeth, toileting, grooming, dressing, etc )  - Assess/evaluate cause of self-care deficits   - Assess range of motion  - Assess patient's mobility; develop plan if impaired  - Assess patient's need for assistive devices and provide as appropriate  - Encourage maximum independence but intervene and supervise when necessary  - Involve family in performance of ADLs  - Assess for home care needs following discharge   - Consider OT consult to assist with ADL evaluation and planning for discharge  - Provide patient education as appropriate  Outcome: Progressing  Goal: Maintains/Returns to pre admission functional level  Description: INTERVENTIONS:  - Perform BMAT or MOVE assessment daily    - Set and communicate daily mobility goal to care team and patient/family/caregiver  - Collaborate with rehabilitation services on mobility goals if consulted  - Perform Range of Motion  times a day  - Reposition patient every  hours    - Dangle patient  times a day  - Stand patient  times a day  - Ambulate patient  times a day  - Out of bed to chair  times a day   - Out of bed for meals  times a day  - Out of bed for toileting  - Record patient progress and toleration of activity level   Outcome: Progressing     Problem: PAIN - ADULT  Goal: Verbalizes/displays adequate comfort level or baseline comfort level  Description: Interventions:  - Encourage patient to monitor pain and request assistance  - Assess pain using appropriate pain scale  - Administer analgesics based on type and severity of pain and evaluate response  - Implement non-pharmacological measures as appropriate and evaluate response  - Consider cultural and social influences on pain and pain management  - Notify physician/advanced practitioner if interventions unsuccessful or patient reports new pain  Outcome: Progressing     Problem: INFECTION - ADULT  Goal: Absence or prevention of progression during hospitalization  Description: INTERVENTIONS:  - Assess and monitor for signs and symptoms of infection  - Monitor lab/diagnostic results  - Monitor all insertion sites, i e  indwelling lines, tubes, and drains  - Monitor endotracheal if appropriate and nasal secretions for changes in amount and color  - Mabie appropriate cooling/warming therapies per order  - Administer medications as ordered  - Instruct and encourage patient and family to use good hand hygiene technique  - Identify and instruct in appropriate isolation precautions for identified infection/condition  Outcome: Progressing  Goal: Absence of fever/infection during neutropenic period  Description: INTERVENTIONS:  - Monitor WBC    Outcome: Progressing     Problem: SAFETY ADULT  Goal: Maintain or return to baseline ADL function  Description: INTERVENTIONS:  -  Assess patient's ability to carry out ADLs; assess patient's baseline for ADL function and identify physical deficits which impact ability to perform ADLs (bathing, care of mouth/teeth, toileting, grooming, dressing, etc )  - Assess/evaluate cause of self-care deficits   - Assess range of motion  - Assess patient's mobility; develop plan if impaired  - Assess patient's need for assistive devices and provide as appropriate  - Encourage maximum independence but intervene and supervise when necessary  - Involve family in performance of ADLs  - Assess for home care needs following discharge   - Consider OT consult to assist with ADL evaluation and planning for discharge  - Provide patient education as appropriate  Outcome: Progressing  Goal: Maintains/Returns to pre admission functional level  Description: INTERVENTIONS:  - Perform BMAT or MOVE assessment daily    - Set and communicate daily mobility goal to care team and patient/family/caregiver  - Collaborate with rehabilitation services on mobility goals if consulted  - Perform Range of Motion  times a day  - Reposition patient every  hours    - Dangle patient  times a day  - Stand patient  times a day  - Ambulate patient  times a day  - Out of bed to chair  times a day   - Out of bed for meals  times a day  - Out of bed for toileting  - Record patient progress and toleration of activity level   Outcome: Progressing  Goal: Patient will remain free of falls  Description: INTERVENTIONS:  - Educate patient/family on patient safety including physical limitations  - Instruct patient to call for assistance with activity   - Consult OT/PT to assist with strengthening/mobility   - Keep Call bell within reach  - Keep bed low and locked with side rails adjusted as appropriate  - Keep care items and personal belongings within reach  - Initiate and maintain comfort rounds  - Make Fall Risk Sign visible to staff  - Offer Toileting every  Hours, in advance of need  - Initiate/Maintain alarm  - Obtain necessary fall risk management equipment  - Apply yellow socks and bracelet for high fall risk patients  - Consider moving patient to room near nurses station  Outcome: Progressing     Problem: DISCHARGE PLANNING  Goal: Discharge to home or other facility with appropriate resources  Description: INTERVENTIONS:  - Identify barriers to discharge w/patient and caregiver  - Arrange for needed discharge resources and transportation as appropriate  - Identify discharge learning needs (meds, wound care, etc )  - Arrange for interpretive services to assist at discharge as needed  - Refer to Case Management Department for coordinating discharge planning if the patient needs post-hospital services based on physician/advanced practitioner order or complex needs related to functional status, cognitive ability, or social support system  Outcome: Progressing Problem: Knowledge Deficit  Goal: Patient/family/caregiver demonstrates understanding of disease process, treatment plan, medications, and discharge instructions  Description: Complete learning assessment and assess knowledge base  Interventions:  - Provide teaching at level of understanding  - Provide teaching via preferred learning methods  Outcome: Progressing     Problem: Prexisting or High Potential for Compromised Skin Integrity  Goal: Skin integrity is maintained or improved  Description: INTERVENTIONS:  - Identify patients at risk for skin breakdown  - Assess and monitor skin integrity  - Assess and monitor nutrition and hydration status  - Monitor labs   - Assess for incontinence   - Turn and reposition patient  - Assist with mobility/ambulation  - Relieve pressure over bony prominences  - Avoid friction and shearing  - Provide appropriate hygiene as needed including keeping skin clean and dry  - Evaluate need for skin moisturizer/barrier cream  - Collaborate with interdisciplinary team   - Patient/family teaching  - Consider wound care consult   Outcome: Progressing     Problem: Nutrition/Hydration-ADULT  Goal: Nutrient/Hydration intake appropriate for improving, restoring or maintaining nutritional needs  Description: Monitor and assess patient's nutrition/hydration status for malnutrition  Collaborate with interdisciplinary team and initiate plan and interventions as ordered  Monitor patient's weight and dietary intake as ordered or per policy  Utilize nutrition screening tool and intervene as necessary  Determine patient's food preferences and provide high-protein, high-caloric foods as appropriate       INTERVENTIONS:  - Monitor oral intake, urinary output, labs, and treatment plans  - Assess nutrition and hydration status and recommend course of action  - Evaluate amount of meals eaten  - Assist patient with eating if necessary   - Allow adequate time for meals  - Recommend/ encourage appropriate diets, oral nutritional supplements, and vitamin/mineral supplements  - Order, calculate, and assess calorie counts as needed  - Recommend, monitor, and adjust tube feedings based on assessed needs  - Assess need for intravenous fluids  - Provide nutrition/hydration education as appropriate  - Include patient/family/caregiver in decisions related to nutrition  Outcome: Progressing     Problem: Neurological Deficit  Goal: Neurological status is stable or improving  Description: Interventions:  - Monitor and assess patient's level of consciousness, motor function, sensory function, and level of assistance needed for ADLs  - Monitor and report changes from baseline  Collaborate with interdisciplinary team to initiate plan and implement interventions as ordered  - Provide and maintain a safe environment  - Consider seizure precautions  - Consider fall precautions  - Consider aspiration precautions  - Consider bleeding precautions  Outcome: Progressing     Problem: Activity Intolerance/Impaired Mobility  Goal: Mobility/activity is maintained at optimum level for patient  Description: Interventions:  - Assess and monitor patient  barriers to mobility and need for assistive/adaptive devices  - Assess patient's emotional response to limitations  - Collaborate with interdisciplinary team and initiate plans and interventions as ordered  - Encourage independent activity per ability   - Maintain proper body alignment  - Perform active/passive rom as tolerated/ordered  - Plan activities to conserve energy   - Turn patient as appropriate  Outcome: Progressing     Problem: Communication Impairment  Goal: Ability to express needs and understand communication  Description: Assess patient's communication skills and ability to understand information  Patient will demonstrate use of effective communication techniques, alternative methods of communication and understanding even if not able to speak       - Encourage communication and provide alternate methods of communication as needed  - Collaborate with case management/ for discharge needs  - Include patient/family/caregiver in decisions related to communication  Outcome: Progressing     Problem: Potential for Aspiration  Goal: Non-ventilated patient's risk of aspiration is minimized  Description: Assess and monitor vital signs, respiratory status, and labs (WBC)  Monitor for signs of aspiration (tachypnea, cough, rales, wheezing, cyanosis, fever)  - Assess and monitor patient's ability to swallow  - Place patient up in chair to eat if possible  - HOB up at 90 degrees to eat if unable to get patient up into chair   - Supervise patient during oral intake  - Instruct patient/ family to take small bites  - Instruct patient/ family to take small single sips when taking liquids  - Follow patient-specific strategies generated by speech pathologist   Outcome: Progressing  Goal: Ventilated patient's risk of aspiration is minimized  Description: Assess and monitor vital signs, respiratory status, airway cuff pressure, and labs (WBC)  Monitor for signs of aspiration (tachypnea, cough, rales, wheezing, cyanosis, fever)  - Elevate head of bed 30 degrees if patient has tube feeding   - Monitor tube feeding  Outcome: Progressing     Problem: Nutrition  Goal: Nutrition/Hydration status is improving  Description: Monitor and assess patient's nutrition/hydration status for malnutrition (ex- brittle hair, bruises, dry skin, pale skin and conjunctiva, muscle wasting, smooth red tongue, and disorientation)  Collaborate with interdisciplinary team and initiate plan and interventions as ordered  Monitor patient's weight and dietary intake as ordered or per policy  Utilize nutrition screening tool and intervene per policy  Determine patient's food preferences and provide high-protein, high-caloric foods as appropriate       - Assist patient with eating   - Allow adequate time for meals   - Encourage patient to take dietary supplement as ordered  - Collaborate with clinical nutritionist   - Include patient/family/caregiver in decisions related to nutrition    Outcome: Progressing

## 2023-06-19 NOTE — CASE MANAGEMENT
Support Center has received intent to deny     Denial received for: SNF  Facility: [facility]  Denial #: [id number]  Denial Reason: does not meet CMS guidelines  Peer to Peer phone#: [phone] opt 5      Deadline: 6/19 @ [facility]  Care Manager notified: Anna Mayer

## 2023-06-19 NOTE — QUICK NOTE
Called the patient's son Salome Fisher and let him know that the patient is being discharged today to [facility] acute re[facility]  Answered all questions and concerns  Verbalized understanding

## 2023-06-19 NOTE — CASE MANAGEMENT
Case Management Discharge Planning Note    Patient name [place]  Location S /S -01 MRN [id number]  : 1932 Date 2023       Current Admission Date: 2023  Current Admission Diagnosis:Stroke [facility])   Patient Active Problem List    Diagnosis Date Noted   • Stroke ([address] ) 2023   • Essential hypertension 2023   • CKD (chronic kidney disease) 2023   • Constipation 2023   • Hyponatremia 2023   • Leukocytosis 2023   • Fall 2023      LOS (days): 6  Geometric Mean LOS (GMLOS) (days): 3 20  Days to GMLOS:-2 9     OBJECTIVE:  Risk of Unplanned Readmission Score: 12 43         Current admission status: Inpatient   Preferred Pharmacy: No Pharmacies Listed  Primary Care Provider: Tosha Manning MD    Primary Insurance: [address] [place] [address]  Secondary Insurance:     DISCHARGE DETAILS:    Discharge planning discussed with[de-identified] patient's son Avelina Herreraor of Choice: Yes  Comments - Freedom of Choice: CM f/u with pt's son Anuj Avendano re: dcp for either STR or return to facility with rehab services per PT/OT updated notes from today  Anuj Avendano chose for patient to attend [facility] for STR  Transport requested via [employer] - confirmed for 5pm p/u to STR today  All parties notified of same  CM contacted family/caregiver?: Yes             Contacts  Patient Contacts: Anuj Avendano  Relationship to Patient[de-identified] Family  Contact Method: Phone  Phone Number: [phone]  Reason/Outcome: Continuity of Care, Emergency Contact, Referral, Discharge Planning              Other Referral/Resources/Interventions Provided:  Interventions: Transportation  Referral Comments: Transport confirmed for 5pm today via [employer] to [address] today  All parties notified of same              Discharge Destination Plan[de-identified] Short Term Rehab  Transport at Discharge : [address]): [employer]  ETA of Transport (Date): 23  ETA of Transport (Time): 1700               IMM reviewed with patient's caregiver, patient's caregiver agrees with discharge determination    IMM Given (Date):: 06/19/23  IMM Given to[de-identified] Family  Family notified[de-identified] Derrell Grover (son)       [address] : [address]  Receiving Facility/Agency Phone Number: [phone]  Facility/Agency Fax Number: [phone]

## 2023-06-19 NOTE — OCCUPATIONAL THERAPY NOTE
"    Occupational Therapy Evaluation     Patient Name: Fred Acevedo  Today's Date: 6/19/2023  Problem List  Principal Problem:    Stroke [facility])  Active Problems:    Essential hypertension    CKD (chronic kidney disease)    Constipation    Hyponatremia    Leukocytosis    Fall    Past Medical History  No past medical history on file  Past Surgical History  No past surgical history on file           06/19/23 1106   OT Last Visit   OT Visit Date 06/19/23   Note Type   Note Type Treatment for insurance authorization   Pain Assessment   Pain Assessment Tool 0-10   Pain Score No Pain   Restrictions/Precautions   Weight Bearing Precautions Per Order No   Other Precautions Cognitive; Chair Alarm; Bed Alarm; Fall Risk   ADL   Grooming Assistance 4  Minimal Assistance   Grooming Deficit Increased time to complete;Supervision/safety;Verbal cueing   Grooming Comments standing at sink, A for location of materials, able to wash hands and use mouthwash   UB Dressing Assistance 4  Minimal Assistance   UB Dressing Deficit Increased time to complete;Supervision/safety;Verbal cueing   UB Dressing Comments donning robe   Toileting Assistance  5  Supervision/Setup   Toileting Deficit Increased time to complete;Clothing management down;Perineal hygiene;Clothing management up   Toileting Comments sitting on toilet to complete, able to retrieve toilet paper, complete hygiene and manage clothing   Transfers   Sit to Stand 5  Supervision  (CGA)   Additional items Increased time required;Verbal cues   Stand to Sit 5  Supervision  (CGA)   Additional items Increased time required;Verbal cues   Toilet transfer 5  Supervision  (CGA)   Additional items Increased time required;Verbal cues;Standard toilet  (use of grab bar)   Additional Comments use of RW   Functional Mobility   Functional Mobility 4  Minimal assistance  (CGA)   Additional Comments functional household distance bed >< bathroom   Additional items Rolling walker   Subjective   Subjective \"I " "don't have any teeth to brush\"   Cognition   Overall Cognitive Status Impaired   Arousal/Participation Alert; Cooperative   Attention Attends with cues to redirect   Orientation Level Oriented to person;Disoriented to place; Disoriented to time;Disoriented to situation   Memory Unable to assess   Following Commands Follows one step commands with increased time or repetition   Comments pleasant and cooperative, communicative with therapist intermittently, requiring increased response time   Activity Tolerance   Activity Tolerance Patient tolerated treatment well   Medical Staff Made Aware MALDONADO Santiago   Assessment   Assessment Pt seen on this date for skilled OT treatment session  At start of session pt sitting in chair  Pt agreeable and motivated to participate in session, nodding head eagerly  Pt required decreased assistance with all functional transfers and mobility  Demonstrating improved alertness during ADL tasks and improved direction following, able to complete toileting without VC, demonstrating improved overall activity tolerance, motivation, performance with ADLs, functional mobility and transfers  Improved overall cognition, having short conversations with therapists and able to make needs known \"I have to use the bathroom\"  Pt would continue to benefit from skilled OT treatment sessions in order to address remaining deficits and recommend return to facility with therapy services when medically stable     Plan   Goal Expiration Date 06/26/23   OT Treatment Day 2   OT Frequency 2-3x/wk   Recommendation   OT Discharge Recommendation (S)  Return to facility with rehabilitation services  (as long as facility able to provide assistance as needed)   AM-PAC Daily Activity Inpatient   Lower Body Dressing 2   Bathing 3   Toileting 3   Upper Body Dressing 3   Grooming 3   Eating 4   Daily Activity Raw Score 18   Daily Activity Standardized Score (Calc for Raw Score >=11) 38 66   AM-PAC Applied Cognition Inpatient " Following a Speech/Presentation 3   Understanding Ordinary Conversation 3   Taking Medications 2   Remembering Where Things Are Placed or Put Away 2   Remembering List of 4-5 Errands 1   Taking Care of Complicated Tasks 1   Applied Cognition Raw Score 12   Applied Cognition Standardized Score 28 82   End of Consult   Patient Position at End of Consult Bedside chair;Bed/Chair alarm activated; All needs within reach     Pt goals to be met by 6/26/23 to max I w/ ADLs and improve engagement to return to PLOF w/ staff assist includes:     -Pt will consistently follow 1 step directions during self care ADLs w/ no more than 1 cue / prompt to max I and improve engagement PROGRESSING     -Pt will complete feeding w/ mod I after set- up PROGRESSING     -Pt will complete UBD w/ min A to max I and minimize burden of care PROGRESSING     -Pt will complete functional transfers to bed, chair, and toilet using LRAD, DME as needed w/ mod A to max I w/ ADLs MET: upgrade: mod I     -Pt will demonstrate good attention and participation in ongoing eval of functional mobility to assist in [address]     -Pt will complete bed mobility supine <> sit w/ min A x1 to max I and minimize burden of care     -Pt will demonstrate improved activity and sitting tolerance OOB In chair for all meals PROGRESSING     -Pt will demonstrate improved activity tolerance to participate in ADLs in supported sitting for at least 10 minutes w/ out sign/ symptoms of fatigue or rest breaks MET: upgrade 20 min    The patient's raw score on the AM-PAC Daily Activity Inpatient Short Form is 18  A raw score of less than 19 suggests the patient may benefit from discharge to post-acute rehabilitation services  Please refer to the recommendation of the Occupational Therapist for safe discharge planning  This session, pt required and most appropriately benefited from skilled OT/PT co-treat due to  anticipated regression from baseline    OT and PT goals were addressed separately as seen in documentation       Laurie Sarabia MS, OTR/L

## 2023-06-19 NOTE — CASE MANAGEMENT
Christie Singer 50 has received approved authorization from insurance:   Zora Ley in by Rep: Davee Aschoff P#  [phone]  Authorization received for: SNF  Facility: [facility]  Authorization #: [id number]  Start of Care: 6/19  Next Review Date: 6/21  Continued Stay Care Coordinator: Svitlana Quinn  P#: [phone]  Submit next review to: [phone]   Care Manager notified: Luis Alberto Jordan

## 2023-06-19 NOTE — PLAN OF CARE
Problem: PHYSICAL THERAPY ADULT  Goal: Performs mobility at highest level of function for planned discharge setting  See evaluation for individualized goals  Description: Treatment/Interventions: Functional transfer training, LE strengthening/ROM, Therapeutic exercise, Cognitive reorientation, Patient/family training, Equipment eval/education, Bed mobility, Spoke to nursing, Spoke to case management, OT (gait training when appropriate)  Equipment Recommended:  (TBD)       See flowsheet documentation for full assessment, interventions and recommendations  Outcome: Progressing  Note: Prognosis: Fair  Problem List: Decreased strength, Decreased endurance, Impaired balance, Decreased mobility, Decreased cognition, Impaired judgement, Decreased safety awareness  Assessment: Pt shows improvement in mobility status since PT was initiated w/ initiation of ambulation and decreased level of assistance  Improvement was also noted in pt's ability to communicate and actively participate in PT intervention  Pt continues to need assistance w/ all phases of mobility, though for technique/safety and not for physical assistance  Mobility impairments are related to weakness, impaired balance, decreased endurance and gait deviations  Functional impairment is also evident in Barthel Index score of 35/100  Continued inpatient PT is needed to reduce fall risk and increase level of function  Discharge recommendation is for PT upon return to [facility] to maximize level of independence  PT Discharge Recommendation: Return to facility with rehabilitation services    See flowsheet documentation for full assessment

## 2023-06-20 ENCOUNTER — NURSING HOME VISIT (OUTPATIENT)
Dept: GERIATRICS | Facility: OTHER | Age: 88
End: 2023-06-20
Payer: COMMERCIAL

## 2023-06-20 DIAGNOSIS — I63.531 CEREBROVASCULAR ACCIDENT (CVA) DUE TO STENOSIS OF RIGHT POSTERIOR CEREBRAL ARTERY (HCC): Primary | ICD-10-CM

## 2023-06-20 DIAGNOSIS — R26.2 AMBULATORY DYSFUNCTION: ICD-10-CM

## 2023-06-20 DIAGNOSIS — K59.00 CONSTIPATION, UNSPECIFIED CONSTIPATION TYPE: ICD-10-CM

## 2023-06-20 DIAGNOSIS — D72.829 LEUKOCYTOSIS, UNSPECIFIED TYPE: ICD-10-CM

## 2023-06-20 DIAGNOSIS — I10 ESSENTIAL HYPERTENSION: ICD-10-CM

## 2023-06-20 DIAGNOSIS — N18.31 STAGE 3A CHRONIC KIDNEY DISEASE (HCC): ICD-10-CM

## 2023-06-20 DIAGNOSIS — E87.1 HYPONATREMIA: ICD-10-CM

## 2023-06-20 PROCEDURE — 99306 1ST NF CARE HIGH MDM 50: CPT | Performed by: FAMILY MEDICINE

## 2023-06-20 NOTE — UTILIZATION REVIEW
NOTIFICATION OF ADMISSION DISCHARGE   This is a Notification of Discharge from [facility]  Please be advised that this patient has been discharge from our facility  Below you will find the admission and discharge date and time including the patient’s disposition  UTILIZATION REVIEW CONTACT:  Carlie Skiff  Utilization   Network Utilization Review Department  Phone: [phone] x carefully listen to the prompts  All voicemails are confidential   Email: [email or web address]     ADMISSION INFORMATION  PRESENTATION DATE: 6/13/2023  9:18 AM  OBERVATION ADMISSION DATE:   INPATIENT ADMISSION DATE: 6/13/23 11:17 AM   DISCHARGE DATE: 6/19/2023  5:00 PM   DISPOSITION:Non [facility]    IMPORTANT INFORMATION:  Send all requests for admission clinical reviews, approved or denied determinations and any other requests to dedicated fax number below belonging to the campus where the patient is receiving treatment   List of dedicated fax numbers:  [address] DENIALS (Administrative/Medical Necessity) [phone]   [address] (Maternity/NICU/Pediatrics) [phone]   [facility] [phone]   [facility] 87 [phone]   [address] [phone]   220 [facility] [phone]   90 [facility] [phone]   [address] [place], [place] [address] [phone]   [facility]  [phone]   [facility] [phone]   412 [facility] 850 [facility] [phone]

## 2023-06-20 NOTE — PROGRESS NOTES
[address]  [address], [address] [place] [address] post acute SNF 31  History and Physical    NAME: Alison Houston  AGE: 80 y o  SEX: male [id number]    DATE OF ENCOUNTER: 6/21/2023    Code status:  DNR/DNI    Assessment and Plan     1  Cerebrovascular accident (CVA) due to stenosis of right posterior cerebral artery (HCC)  - stable  - S/p TNK  - cont Atorvastatin 40 mg po qhs  - cont ASA 81 mg po qd  - PT/OT    2  Essential hypertension  - cont Amlodipine 2 5 mg po qd  - cont Coreg 6 25 mg po bid    3  Ambulatory dysfunction  - PT/OT ordered  - Fall precautions in place  - using wheelchair    4  Hyponatremia  - Na 133  - monitor Na levels    5  Leukocytosis, unspecified type  - trending down  - monitor CBC    6  Constipation, unspecified constipation type  - increase fluid intake    7  Stage 3a chronic kidney disease (HCC)  - monitor BUN/Cr  - encourage po hydration    CBC, BMP ordered  All medications and routine orders were reviewed and updated as needed  Plan discussed with: staff and patient    Chief Complaint     Seen for admission at [address] [place] [address]    History of Present Illness     Alison Houston, a 79 y/o male with PMH of HTN, CKD and hyponatremia got admitted to the hospital after a fall  On evaluation he had right sided weakness and aphasia, stroke alert was initiated  CT head was negative for any bleeding  CTA head and neck showed severe stenosis of right MCA and PCA branches  He was given Tenecteplase (TNK), tolerated well  MRI head showed acute small multifocal infarcts in the left brain  Speech recommended dysphagia 1 diet  He got discharged to [facility] for subacute rehab  He was seen and examined, stable  He is alert, awake, approachable, minimally speaking  He is unable to give any history  Staff have no concerns at this time      HISTORY:  Past Medical History:   Diagnosis Date   • CKD (chronic kidney disease)    • HTN (hypertension)    • Hyponatremia History reviewed  No pertinent family history  Social History     Socioeconomic History   • Marital status: Single     Spouse name: None   • Number of children: None   • Years of education: None   • Highest education level: None   Occupational History   • None   Tobacco Use   • Smoking status: None   • Smokeless tobacco: None   Substance and Sexual Activity   • Alcohol use: None   • Drug use: None   • Sexual activity: None   Other Topics Concern   • None   Social History Narrative   • None     Social Determinants of Health     Financial Resource Strain: Not on file   Food Insecurity: Not on file   Transportation Needs: Not on file   Physical Activity: Not on file   Stress: Not on file   Social Connections: Not on file   Intimate Partner Violence: Not on file   Housing Stability: Not on file       Allergies:  No Known Allergies    Review of Systems     Review of Systems   Unable to perform ROS: Other   aphasia    Medications and orders     All medications reviewed and updated in Nursing Home EMR  Objective     Vitals: T: 97 4, P: 76, R: 16, BP: 154/86, 96% on RA, Wt: 176 lbs    Physical Exam  Vitals and nursing note reviewed  Constitutional:       General: He is not in acute distress  Appearance: Normal appearance  He is well-developed  He is not diaphoretic  HENT:      Head: Normocephalic and atraumatic  Nose: Nose normal       Mouth/Throat:      Mouth: Mucous membranes are moist       Pharynx: Oropharynx is clear  No oropharyngeal exudate  Eyes:      General: No scleral icterus  Right eye: No discharge  Left eye: No discharge  Extraocular Movements: Extraocular movements intact  Conjunctiva/sclera: Conjunctivae normal    Cardiovascular:      Rate and Rhythm: Normal rate and regular rhythm  Heart sounds: Normal heart sounds  No murmur heard  Pulmonary:      Effort: Pulmonary effort is normal  No respiratory distress  Breath sounds: Normal breath sounds   No wheezing  Chest:      Chest wall: No tenderness  Abdominal:      General: Bowel sounds are normal  There is no distension  Palpations: Abdomen is soft  Tenderness: There is no abdominal tenderness  There is no guarding  Musculoskeletal:         General: No tenderness or deformity  Right lower leg: No edema  Left lower leg: No edema  Comments: Impaired ROM in UE  Intact ROM in LE   Skin:     General: Skin is warm and dry  Findings: Rash present  Comments: Red raised spots on back   Neurological:      Mental Status: He is alert  Cranial Nerves: No cranial nerve deficit  Motor: No abnormal muscle tone  Coordination: Coordination normal       Comments: Oriented to self  Alert, awake, interactive  Expressive aphasia, impaired comprehension  shakiness   Psychiatric:         Mood and Affect: Mood normal          Behavior: Behavior normal          Pertinent Laboratory/Diagnostic Studies: The following labs/studies were reviewed please see chart or hospital paperwork for details    Ref Range & Units 6/19/23 0517 6/18/23 0903 6/17/23 0445 6/16/23 0538 6/15/23 0506 6/14/23 1440 6/13/23 0930    Sodium 135 - 147 mmol/L 135  134 Low   134 Low   133 Low   134 Low   133 Low   133 Low     Potassium 3 5 - 5 3 mmol/L 4 6  4 5  4 4  5 1 CM  4 4  4 3  5 2    Chloride 96 - 108 mmol/L 103  102  102  102  101  99  96    CO2 21 - 32 mmol/L 26  23  23  20 Low   23  23  26    ANION GAP 4 - 13 mmol/L 6  9  9  11  10  11  11    BUN 5 - 25 mg/dL 32 High   25  29 High   34 High   31 High   27 High   21    Creatinine 0 60 - 1 30 mg/dL 1 45 High   1 29 CM  1 31 High  CM  1 29 CM  1 49 High  CM  1 46 High  CM  1 43 High  CM    Comment: Standardized to IDMS reference method   Glucose 65 - 140 mg/dL 112  97 CM  109 CM  106 CM  113 CM  141 High  CM  123 CM       Calcium 8 4 - 10 2 mg/dL 8 8  9 2  8 7  7 6 Low   8 7  8 9  10 2    eGFR ml/min/1 73sq m 41  48  47         Ref Range & Units 6/19/23 2188 6/18/23 0903 6/17/23 0445 6/16/23 0911 6/15/23 1032 6/14/23 1440 6/13/23 0930    WBC 4 31 - 10 16 Thousand/uL 10 15  10 19 High   12 30 High   11 21 High   13 42 High   15 80 High   18 27 High     RBC 3 88 - 5 62 Million/uL 4 86  5 26  4 71  5 00  4 94  5 48  6 17 High  R    Hemoglobin 12 0 - 17 0 g/dL 14 2  15 1  13 8  14 6  14 3  16 1  17 7 High  R    Hematocrit 36 5 - 49 3 % 43 2  47 3  42 1  44 4  43 7  47 5  53 5 High  R    MCV 82 - 98 fL 89  90  89  89  89  87  87    MCH 26 8 - 34 3 pg 29 2  28 7  29 3  29 2  28 9  29 4  28 7    MCHC 31 4 - 37 4 g/dL 32 9  31 9  32 8  32 9  32 7  33 9  33 1    RDW 11 6 - 15 1 % 14 2  14 2  14 0  14 4  14 4  14 4  13 9    Platelets 757 - 198 Thousands/uL 263  269  286  273  278  295  357    MPV 8 9 - 12 7 fL 10 6  9 7  10 3  9 8  9 9  9 6  9 5        - Counseling Documentation: patient was counseled regarding: prognosis

## 2023-06-21 PROBLEM — R26.2 AMBULATORY DYSFUNCTION: Status: ACTIVE | Noted: 2023-06-21

## 2023-06-22 LAB
BASE EXCESS BLDA CALC-SCNC: 0 MMOL/L (ref -2–3)
BASE EXCESS BLDA CALC-SCNC: 0 MMOL/L (ref -2–3)
CA-I BLD-SCNC: 1.19 MMOL/L (ref 1.12–1.32)
CA-I BLD-SCNC: 1.19 MMOL/L (ref 1.12–1.32)
GLUCOSE SERPL-MCNC: 132 MG/DL (ref 65–140)
GLUCOSE SERPL-MCNC: 132 MG/DL (ref 65–140)
HCO3 BLDA-SCNC: 25.4 MMOL/L (ref 24–30)
HCO3 BLDA-SCNC: 25.4 MMOL/L (ref 24–30)
HCT VFR BLD CALC: 55 % (ref 36.5–49.3)
HCT VFR BLD CALC: 55 % (ref 36.5–49.3)
HGB BLDA-MCNC: 18.7 G/DL (ref 12–17)
HGB BLDA-MCNC: 18.7 G/DL (ref 12–17)
PCO2 BLD: 27 MMOL/L (ref 21–32)
PCO2 BLD: 27 MMOL/L (ref 21–32)
PCO2 BLD: 44.8 MM HG (ref 42–50)
PCO2 BLD: 44.8 MM HG (ref 42–50)
PH BLD: 7.36 [PH] (ref 7.3–7.4)
PH BLD: 7.36 [PH] (ref 7.3–7.4)
PO2 BLD: 32 MM HG (ref 35–45)
PO2 BLD: 32 MM HG (ref 35–45)
POTASSIUM BLD-SCNC: 5.6 MMOL/L (ref 3.5–5.3)
POTASSIUM BLD-SCNC: 5.6 MMOL/L (ref 3.5–5.3)
SAO2 % BLD FROM PO2: 59 % (ref 60–85)
SAO2 % BLD FROM PO2: 59 % (ref 60–85)
SODIUM BLD-SCNC: 133 MMOL/L (ref 136–145)
SODIUM BLD-SCNC: 133 MMOL/L (ref 136–145)
SPECIMEN SOURCE: ABNORMAL
SPECIMEN SOURCE: ABNORMAL

## 2023-06-23 ENCOUNTER — NURSING HOME VISIT (OUTPATIENT)
Dept: GERIATRICS | Facility: OTHER | Age: 88
End: 2023-06-23
Payer: COMMERCIAL

## 2023-06-23 VITALS
DIASTOLIC BLOOD PRESSURE: 75 MMHG | HEART RATE: 81 BPM | OXYGEN SATURATION: 97 % | DIASTOLIC BLOOD PRESSURE: 75 MMHG | SYSTOLIC BLOOD PRESSURE: 135 MMHG | WEIGHT: 176 LBS | BODY MASS INDEX: 24.55 KG/M2 | RESPIRATION RATE: 18 BRPM | TEMPERATURE: 97.1 F | OXYGEN SATURATION: 97 % | RESPIRATION RATE: 18 BRPM | SYSTOLIC BLOOD PRESSURE: 135 MMHG | BODY MASS INDEX: 24.55 KG/M2 | TEMPERATURE: 97.1 F | HEART RATE: 81 BPM | WEIGHT: 176 LBS

## 2023-06-23 DIAGNOSIS — R26.2 AMBULATORY DYSFUNCTION: ICD-10-CM

## 2023-06-23 DIAGNOSIS — I10 ESSENTIAL HYPERTENSION: ICD-10-CM

## 2023-06-23 DIAGNOSIS — K59.00 CONSTIPATION, UNSPECIFIED CONSTIPATION TYPE: ICD-10-CM

## 2023-06-23 DIAGNOSIS — N18.31 STAGE 3A CHRONIC KIDNEY DISEASE (HCC): ICD-10-CM

## 2023-06-23 DIAGNOSIS — D72.829 LEUKOCYTOSIS, UNSPECIFIED TYPE: ICD-10-CM

## 2023-06-23 DIAGNOSIS — I63.531 CEREBROVASCULAR ACCIDENT (CVA) DUE TO STENOSIS OF RIGHT POSTERIOR CEREBRAL ARTERY (HCC): Primary | ICD-10-CM

## 2023-06-23 DIAGNOSIS — E87.1 HYPONATREMIA: ICD-10-CM

## 2023-06-23 PROCEDURE — 99309 SBSQ NF CARE MODERATE MDM 30: CPT

## 2023-06-23 NOTE — PROGRESS NOTES
[facility]  [address]  [phone]  FACILITY: [facility]  Code 31 (STR)        NAME: Young Silva  AGE: 80 y o  SEX: male CODE STATUS: No CPR    DATE OF ENCOUNTER: 6/23/2023    Assessment and Plan     1  Cerebrovascular accident (CVA) due to stenosis of right posterior cerebral artery (HCC)  Assessment & Plan:  Presented from home following a fall and noted to have aphasia, right upper extremity sensory deficits and weakness  CT head initially negative for any acute pathology  CT head and neck showed short segment severe stenosis of the proximal dorsal branch of right MCA M2 division, focal severe stenosis of proximal P2 and P3 segment of the right PCA  MRI brain showed acute small multifocal infarcts of the left brain  Patient received TNK and was monitored in the ICU  Patient continues to have aphasia and right sided weakness  Continue atorvastatin 40 mg PO daily  Continue aspirin 81 mg PO daily  Continue PT/OT/ST  Continue modified diet w/ pureed consistency  Will need outpatient f/u w/ cardiology for zio patch and possible loop recorder      2  Essential hypertension  Assessment & Plan:  BP has been controlled  Continue carvedilol 6 25 mg PO bid w/ hold parameters  Continue amlodipine 2 5 mg PO daily  Monitor VS  Avoid hypotension      3  Stage 3a chronic kidney disease [facility])  Assessment & Plan:  Lab Results   Component Value Date    EGFR 41 06/19/2023    EGFR 48 06/18/2023    EGFR 47 06/17/2023    CREATININE 1 45 (H) 06/19/2023    CREATININE 1 29 06/18/2023    CREATININE 1 31 (H) 06/17/2023   Near baseline  Avoid hypotension and nephrotoxins  Will trend BMP      4  Constipation, unspecified constipation type  Assessment & Plan:  Denies any complaints on today's exam  Will add senna 8 6 mg PO daily PRN      5  Hyponatremia  Assessment & Plan:  Improved at 135  Will trend BMP routinely      6   Leukocytosis, unspecified type  Assessment & Plan:  Trending down  Last WBC count 10 15  No s/s of infection  Vitals stable  Will trend CBC      7  Ambulatory dysfunction  Assessment & Plan:  Multifactoral  Maintain fall precautions  Continue PT/OT  Ensure adequate nutrition and hydration   for dispo planning           All medications and routine orders were reviewed and updated as needed  Chief Complaint     STR follow up visit    Past Medical and Surgica History      Past Medical History:   Diagnosis Date   • CKD (chronic kidney disease)    • HTN (hypertension)    • Hyponatremia      No past surgical history on file  No Known Allergies       History of Present Illness     Natali oMon is a 80year old male admitted to [address] [place] [address] for STR following a hospitalization for a fall  He has a PMH including but not limited to HTN, CKD and ambulatory dysfunction  The patient presented to the ED from home following a fall  On evaluation he was noted to have right sided weakness and aphasia  Stroke alert was initiated  CT head was negative  CT head and neck showed severe stenosis of right MCA and PCA branches  He was deemed a candidate for TNK  MRI brain showed acute small multifocal infarcts of the left brain  Patient was treated in the ICU w/ hypertension  Neurology was consulted and recommended normotension, aspirin and atorvastatin  They also recommended an outpatient f/u w/ cardiology for a Zio patch  If negative, patient may possibly need a loop recorder for potential A  Fib given cardioembolic appearance of stroke  Patient's overall status improved although, he still has significant deficits in comprehension and movement  He was recommended for STR  The patient is seen today for a routine STR follow up visit  The patient was seen and examined at bedside in stable condition  He is alert and oriented to self  Subjective data is limited given aphasia  He appears comfortable and denies any complaints other than having to urinate   Overall, he denies CP/SOB/N/V/D  Denies lightheadedness, dizziness, headaches, vision changes  Patient states they are eating well and staying hydrated  Denies any bowel or bladder issues  No concerns from nursing staff  The patient's allergies, past medical, surgical, social and family history were reviewed and unchanged  Review of Systems     Review of Systems   Constitutional: Negative  Negative for appetite change, chills, fatigue and fever  HENT: Negative  Negative for congestion, facial swelling, rhinorrhea, sneezing and sore throat  Eyes: Negative  Negative for redness, itching and visual disturbance  Respiratory: Negative  Negative for cough, chest tightness, shortness of breath and wheezing  Cardiovascular: Negative for chest pain, palpitations and leg swelling  Gastrointestinal: Negative for abdominal distention, abdominal pain, constipation, nausea and vomiting  Genitourinary: Negative  Negative for difficulty urinating, flank pain, frequency and hematuria  Musculoskeletal: Positive for gait problem  Negative for arthralgias, back pain, myalgias and neck stiffness  Skin: Negative for pallor, rash and wound  Neurological: Positive for speech difficulty and weakness  Negative for dizziness, light-headedness, numbness and headaches  Psychiatric/Behavioral: Negative for agitation, confusion and sleep disturbance  The patient is not nervous/anxious  Objective     Vitals:   Vitals:    06/23/23 0955   BP: 135/75   Pulse: 81   Resp: 18   Temp: (!) 97 1 °F (36 2 °C)   SpO2: 97%         Physical Exam  Vitals reviewed  Constitutional:       General: He is not in acute distress  Appearance: Normal appearance  He is not ill-appearing, toxic-appearing or diaphoretic  HENT:      Head: Normocephalic and atraumatic  Right Ear: External ear normal       Left Ear: External ear normal       Nose: Nose normal  No congestion or rhinorrhea        Mouth/Throat:      Mouth: Mucous membranes are moist       Pharynx: Oropharynx is clear  No oropharyngeal exudate or posterior oropharyngeal erythema  Eyes:      General:         Right eye: No discharge  Left eye: No discharge  Extraocular Movements: Extraocular movements intact  Conjunctiva/sclera: Conjunctivae normal       Pupils: Pupils are equal, round, and reactive to light  Cardiovascular:      Rate and Rhythm: Normal rate and regular rhythm  Pulses: Normal pulses  Heart sounds: Normal heart sounds  No murmur heard  No friction rub  No gallop  Pulmonary:      Effort: Pulmonary effort is normal  No respiratory distress  Breath sounds: Normal breath sounds  No wheezing  Chest:      Chest wall: No tenderness  Abdominal:      General: Abdomen is flat  Bowel sounds are normal  There is no distension  Palpations: Abdomen is soft  There is no mass  Tenderness: There is no abdominal tenderness  There is no guarding  Musculoskeletal:         General: No swelling or tenderness  Normal range of motion  Cervical back: Normal range of motion and neck supple  No rigidity or tenderness  Right lower leg: No edema  Left lower leg: No edema  Skin:     General: Skin is warm and dry  Coloration: Skin is not jaundiced  Findings: No bruising, erythema or rash  Neurological:      General: No focal deficit present  Mental Status: He is alert  Mental status is at baseline  Sensory: No sensory deficit  Motor: Weakness present  Coordination: Coordination normal       Gait: Gait abnormal    Psychiatric:         Mood and Affect: Mood normal          Behavior: Behavior normal          Pertinent Laboratory/Diagnostic Studies:   Reviewed in facility chart-stable    HGB 14 2  WBC 10 15  PLTS 263    BUN 32  CREAT 1 45    K 4 6    Current Medications   Medications reviewed and updated see facility [facility] for details        Current Outpatient Medications:   •  albuterol "(2 5 mg/3 mL) 0 083 % nebulizer solution, Take 3 mL (2 5 mg total) by nebulization every 6 (six) hours as needed for wheezing, Disp: 3 mL, Rfl: 0  •  amLODIPine (NORVASC) 2 5 mg tablet, Take 1 tablet (2 5 mg total) by mouth daily, Disp: , Rfl: 0  •  aspirin (ECOTRIN LOW STRENGTH) 81 mg EC tablet, Take 1 tablet (81 mg total) by mouth daily, Disp: 30 tablet, Rfl: 0  •  atorvastatin (LIPITOR) 40 mg tablet, Take 1 tablet (40 mg total) by mouth every evening, Disp: 30 tablet, Rfl: 0  •  carvedilol (COREG) 6 25 mg tablet, Take 1 tablet (6 25 mg total) by mouth 2 (two) times a day with meals, Disp: 60 tablet, Rfl: 0     Please note:  Voice-recognition software may have been used in the preparation of this document  Occasional wrong word or \"sound-alike\" substitutions may have occurred due to the inherent limitations of voice recognition software  Interpretation should be guided by ROXY Hooper  6/23/2023  9:43 AM    "

## 2023-06-23 NOTE — ASSESSMENT & PLAN NOTE
BP has been controlled  Continue carvedilol 6 25 mg PO bid w/ hold parameters  Continue amlodipine 2 5 mg PO daily  Monitor VS  Avoid hypotension

## 2023-06-23 NOTE — ASSESSMENT & PLAN NOTE
Presented from home following a fall and noted to have aphasia, right upper extremity sensory deficits and weakness  CT head initially negative for any acute pathology  CT head and neck showed short segment severe stenosis of the proximal dorsal branch of right MCA M2 division, focal severe stenosis of proximal P2 and P3 segment of the right PCA  MRI brain showed acute small multifocal infarcts of the left brain  Patient received TNK and was monitored in the ICU  Patient continues to have aphasia and right sided weakness  Continue atorvastatin 40 mg PO daily  Continue aspirin 81 mg PO daily  Continue PT/OT/ST  Continue modified diet w/ pureed consistency  Will need outpatient f/u w/ cardiology for zio patch and possible loop recorder

## 2023-06-23 NOTE — ASSESSMENT & PLAN NOTE
Multifactoral  Maintain fall precautions  Continue PT/OT  Ensure adequate nutrition and hydration   for dispo planning

## 2023-06-23 NOTE — ASSESSMENT & PLAN NOTE
Lab Results   Component Value Date    EGFR 41 06/19/2023    EGFR 48 06/18/2023    EGFR 47 06/17/2023    CREATININE 1 45 (H) 06/19/2023    CREATININE 1 29 06/18/2023    CREATININE 1 31 (H) 06/17/2023   Near baseline  Avoid hypotension and nephrotoxins  Will trend BMP

## 2023-06-27 ENCOUNTER — NURSING HOME VISIT (OUTPATIENT)
Age: 88
End: 2023-06-27

## 2023-06-27 ENCOUNTER — TELEPHONE (OUTPATIENT)
Dept: NEUROLOGY | Facility: CLINIC | Age: 88
End: 2023-06-27

## 2023-06-27 VITALS
SYSTOLIC BLOOD PRESSURE: 125 MMHG | DIASTOLIC BLOOD PRESSURE: 64 MMHG | HEART RATE: 70 BPM | TEMPERATURE: 97.1 F | OXYGEN SATURATION: 97 %

## 2023-06-27 DIAGNOSIS — I10 ESSENTIAL HYPERTENSION: ICD-10-CM

## 2023-06-27 DIAGNOSIS — N18.31 STAGE 3A CHRONIC KIDNEY DISEASE (HCC): ICD-10-CM

## 2023-06-27 DIAGNOSIS — K59.00 CONSTIPATION, UNSPECIFIED CONSTIPATION TYPE: ICD-10-CM

## 2023-06-27 DIAGNOSIS — R26.2 AMBULATORY DYSFUNCTION: ICD-10-CM

## 2023-06-27 DIAGNOSIS — I63.531 CEREBROVASCULAR ACCIDENT (CVA) DUE TO STENOSIS OF RIGHT POSTERIOR CEREBRAL ARTERY (HCC): Primary | ICD-10-CM

## 2023-06-27 RX ORDER — SENNA PLUS 8.6 MG/1
1 TABLET ORAL DAILY PRN
COMMUNITY

## 2023-06-27 NOTE — ASSESSMENT & PLAN NOTE
Lab Results   Component Value Date    EGFR 41 06/19/2023    EGFR 48 06/18/2023    EGFR 47 06/17/2023    CREATININE 1 45 (H) 06/19/2023    CREATININE 1 29 06/18/2023    CREATININE 1 31 (H) 06/17/2023     Baseline creatinine appears to be around 1 4  Creatinine on 6/26/2023 1 6  Bladder scan once  Not currently on any nephrotoxic medications  Encourage fluids  Repeat BMP on Friday

## 2023-06-27 NOTE — ASSESSMENT & PLAN NOTE
Presented to the ED following a fall at home with marked aphasia and right upper extremity sensory deficits and weakness  CT head initially negative for any acute changes  CT head and neck showed short segment severe stenosis of the proximal dorsal branch of right MCA M2 division, focal severe stenosis of proximal P2 and P3 segment of the right PCA  MRI brain showed acute small multifocal infarcts of the left brain  Patient received TNK in the ICU  Patient continues with aphasia and right-sided weakness  Continue atorvastatin 40 mg p o  daily and aspirin 81 mg p o  daily  Continue PT/OT/ST  Continue modified diet with puréed consistency  Aspiration precautions  Will need outpatient follow-up with cardiology for Zio patch and possible loop recorder

## 2023-06-27 NOTE — TELEPHONE ENCOUNTER
Post CVA Discharge Follow Up  Hospitalization: 6/13/23-6/19/23    According to chart, patient discharged to [address] [facility]. Called facility, spoke with the patient's nurse, Misty. She reports the patient is doing well. Denies any new or worsening stroke-like symptoms. Ambulation / ADLs:  Patient mobilizing via wheelchair. He continues to require assistance with ADLs and transfers. Patient is on a mechanical soft diet. He remains incontinent of urine and bowel at this time. Medication Review:  Reviewed medications with them. There have not been any medication changes since discharge from the hospital. No reported missed doses, medication side effects, or signs of bleeding. Last reported /64    Appointments:  Scheduled stroke hospital follow up appointment with scheduling staff member. Provided date/time/location/provider details. There is no estimated discharge date at this time.

## 2023-06-27 NOTE — PROGRESS NOTES
[address]  [phone]  FACILITY: [facility]  Code 31 (STR)        NAME: Shoaib Yip  AGE: 80 y o  SEX: male CODE STATUS: No CPR    DATE OF ENCOUNTER: 6/27/2023    Assessment and Plan     1  Cerebrovascular accident (CVA) due to stenosis of right posterior cerebral artery (HCC)  Assessment & Plan:  Presented to the ED following a fall at home with marked aphasia and right upper extremity sensory deficits and weakness  CT head initially negative for any acute changes  CT head and neck showed short segment severe stenosis of the proximal dorsal branch of right MCA M2 division, focal severe stenosis of proximal P2 and P3 segment of the right PCA  MRI brain showed acute small multifocal infarcts of the left brain  Patient received TNK in the ICU  Patient continues with aphasia and right-sided weakness  Continue atorvastatin 40 mg p o  daily and aspirin 81 mg p o  daily  Continue PT/OT/ST  Continue modified diet with puréed consistency  Aspiration precautions  Will need outpatient follow-up with cardiology for Zio patch and possible loop recorder      2  Essential hypertension  Assessment & Plan:  Blood pressure well controlled 125/64  Goal BP less than 140/90  Continue amlodipine 2 5 mg daily and carvedilol 6 25 mg twice daily  Monitor BP at short-term rehab      3  Ambulatory dysfunction  Assessment & Plan:  Multifactorial  PT/OT  Fall and safety precautions      4  Constipation, unspecified constipation type  Assessment & Plan:  Continue senna 8 6 mg p o  daily as needed      5   Stage 3a chronic kidney disease [facility])  Assessment & Plan:  Lab Results   Component Value Date    EGFR 41 06/19/2023    EGFR 48 06/18/2023    EGFR 47 06/17/2023    CREATININE 1 45 (H) 06/19/2023    CREATININE 1 29 06/18/2023    CREATININE 1 31 (H) 06/17/2023     Baseline creatinine appears to be around 1 4  Creatinine on 6/26/2023 1 6  Bladder scan once  Not currently on any nephrotoxic medications  Encourage fluids  Repeat BMP on Friday         All medications and routine orders were reviewed and updated as needed  Chief Complaint     STR follow up visit    Past Medical and Surgica History      Past Medical History:   Diagnosis Date   • CKD (chronic kidney disease)    • HTN (hypertension)    • Hyponatremia      No past surgical history on file  No Known Allergies       History of Present Illness     Abraham Cowart is a 80-year-old male being seen at short-term rehab for follow-up of acute and chronic medical conditions  He had recent CVA resulting in hospitalization and ICU admission  He is status post TNK  He was admitted to short-term rehab for physical therapy  He has severe aphasia given recent stroke  Nursing without concerns at this time  The patient's allergies, past medical, surgical, social and family history were reviewed and unchanged  Review of Systems     Review of Systems   Unable to perform ROS: Other (severe aphagia)         Objective     Vitals:   Vitals:    06/27/23 1547   BP: 125/64   Pulse: 70   Temp: (!) 97 1 °F (36 2 °C)   SpO2: 97%         Physical Exam  Vitals reviewed  Constitutional:       General: He is not in acute distress  Appearance: Normal appearance  He is not ill-appearing, toxic-appearing or diaphoretic  HENT:      Head: Normocephalic and atraumatic  Eyes:      Conjunctiva/sclera: Conjunctivae normal    Cardiovascular:      Rate and Rhythm: Normal rate and regular rhythm  Pulses: Normal pulses  Heart sounds: Normal heart sounds  No murmur heard  Pulmonary:      Effort: Pulmonary effort is normal  No respiratory distress  Breath sounds: Normal breath sounds  Abdominal:      General: Bowel sounds are normal  There is no distension  Palpations: Abdomen is soft  Tenderness: There is no abdominal tenderness  Musculoskeletal:      Right lower leg: No edema  Left lower leg: No edema     Skin: "General: Skin is warm and dry  Neurological:      General: No focal deficit present  Mental Status: He is alert  Motor: Weakness present  Comments: Unclear orientation given severe aphagia   Psychiatric:         Mood and Affect: Mood normal          Behavior: Behavior is cooperative  Cognition and Memory: Cognition is impaired  Pertinent Laboratory/Diagnostic Studies:   Reviewed in facility chart-stable      Current Medications   Medications reviewed and updated see facility [facility] for details  Current Outpatient Medications:   •  senna (SENOKOT) 8 6 MG tablet, Take 1 tablet by mouth daily as needed, Disp: , Rfl:   •  albuterol (2 5 mg/3 mL) 0 083 % nebulizer solution, Take 3 mL (2 5 mg total) by nebulization every 6 (six) hours as needed for wheezing, Disp: 3 mL, Rfl: 0  •  amLODIPine (NORVASC) 2 5 mg tablet, Take 1 tablet (2 5 mg total) by mouth daily, Disp: , Rfl: 0  •  aspirin (ECOTRIN LOW STRENGTH) 81 mg EC tablet, Take 1 tablet (81 mg total) by mouth daily, Disp: 30 tablet, Rfl: 0  •  atorvastatin (LIPITOR) 40 mg tablet, Take 1 tablet (40 mg total) by mouth every evening, Disp: 30 tablet, Rfl: 0  •  carvedilol (COREG) 6 25 mg tablet, Take 1 tablet (6 25 mg total) by mouth 2 (two) times a day with meals, Disp: 60 tablet, Rfl: 0     Plan discussed with Dr Deandra Mishra noted agreement with assessment and plan  Please note:  Voice-recognition software may have been used in the preparation of this document  Occasional wrong word or \"sound-alike\" substitutions may have occurred due to the inherent limitations of voice recognition software  Interpretation should be guided by ELLIE Karimi  6/27/2023  3:48 PM    "

## 2023-06-27 NOTE — ASSESSMENT & PLAN NOTE
Blood pressure well controlled 125/64  Goal BP less than 140/90  Continue amlodipine 2 5 mg daily and carvedilol 6 25 mg twice daily  Monitor BP at short-term rehab

## 2023-06-29 ENCOUNTER — NURSING HOME VISIT (OUTPATIENT)
Dept: GERIATRICS | Facility: OTHER | Age: 88
End: 2023-06-29

## 2023-06-29 VITALS
SYSTOLIC BLOOD PRESSURE: 138 MMHG | TEMPERATURE: 97.1 F | OXYGEN SATURATION: 97 % | HEART RATE: 74 BPM | BODY MASS INDEX: 24.69 KG/M2 | RESPIRATION RATE: 18 BRPM | WEIGHT: 177 LBS | DIASTOLIC BLOOD PRESSURE: 70 MMHG

## 2023-06-29 DIAGNOSIS — N18.31 STAGE 3A CHRONIC KIDNEY DISEASE (HCC): ICD-10-CM

## 2023-06-29 DIAGNOSIS — D72.829 LEUKOCYTOSIS, UNSPECIFIED TYPE: ICD-10-CM

## 2023-06-29 DIAGNOSIS — K59.00 CONSTIPATION, UNSPECIFIED CONSTIPATION TYPE: ICD-10-CM

## 2023-06-29 DIAGNOSIS — I63.531 CEREBROVASCULAR ACCIDENT (CVA) DUE TO STENOSIS OF RIGHT POSTERIOR CEREBRAL ARTERY (HCC): Primary | ICD-10-CM

## 2023-06-29 DIAGNOSIS — R26.2 AMBULATORY DYSFUNCTION: ICD-10-CM

## 2023-06-29 DIAGNOSIS — I10 ESSENTIAL HYPERTENSION: ICD-10-CM

## 2023-06-29 NOTE — ASSESSMENT & PLAN NOTE
Patient presented to the ED from home following a fall w/ marked aphasia and right upper extremity sensory deficits and weakness  CT head initially negative  CT head and neck revealed short segment severe stenosis of the proximal dorsal branch of right MCA M2 division, focal severe stenosis of proximal P2 and P3 segment of the right PCA  MRI brain showed acute small multifocal infarcts of the left brain  Patient received TNK and treated in the ICU  Patient continues w/ aphasia and right-sided weakness  Continue atorvastatin 40 mg PO daily and aspirin 81 mg PO daily  Continue PT/OT/ST  Continue modified diet w/ pureed consistency  Aspiration precautions  Will need outpatient f/u w/ cardiology for Zio patch and possible loop recorder

## 2023-06-29 NOTE — ASSESSMENT & PLAN NOTE
Last WBC count slightly elevated from prior at 11 7  No s/s of infection  Vitals stable  Trend CBC routinely

## 2023-06-29 NOTE — ASSESSMENT & PLAN NOTE
Lab Results   Component Value Date    EGFR 41 06/19/2023    EGFR 48 06/18/2023    EGFR 47 06/17/2023    CREATININE 1 45 (H) 06/19/2023    CREATININE 1 29 06/18/2023    CREATININE 1 31 (H) 06/17/2023   Baseline appears to be around 1 4  Last creat 1 6  Bladder scan x 1 <150 ml  Avoid hypotension and nephrotoxins  Encourage fluids  Trend BMP routinely

## 2023-06-29 NOTE — PROGRESS NOTES
[facility]  Josias York 79  [phone]  FACILITY: [facility]  Code 31 (STR)        NAME: Arnie Sample  AGE: 80 y o  SEX: male CODE STATUS: No CPR    DATE OF ENCOUNTER: 6/29/2023    Assessment and Plan     1  Cerebrovascular accident (CVA) due to stenosis of right posterior cerebral artery [facility])  Assessment & Plan:  Patient presented to the ED from home following a fall w/ marked aphasia and right upper extremity sensory deficits and weakness  CT head initially negative  CT head and neck revealed short segment severe stenosis of the proximal dorsal branch of right MCA M2 division, focal severe stenosis of proximal P2 and P3 segment of the right PCA  MRI brain showed acute small multifocal infarcts of the left brain  Patient received TNK and treated in the ICU  Patient continues w/ aphasia and right-sided weakness  Continue atorvastatin 40 mg PO daily and aspirin 81 mg PO daily  Continue PT/OT/ST  Continue modified diet w/ pureed consistency  Aspiration precautions  Will need outpatient f/u w/ cardiology for Zio patch and possible loop recorder      2  Essential hypertension  Assessment & Plan:  BP has been controlled  Continue amlodipine 2 5 mg PO daily  Continue carvedilol 6 25 mg PO bid  Monitor VS  Avoid hypotension      3  Stage 3a chronic kidney disease [facility])  Assessment & Plan:  Lab Results   Component Value Date    EGFR 41 06/19/2023    EGFR 48 06/18/2023    EGFR 47 06/17/2023    CREATININE 1 45 (H) 06/19/2023    CREATININE 1 29 06/18/2023    CREATININE 1 31 (H) 06/17/2023   Baseline appears to be around 1 4  Last creat 1 6  Bladder scan x 1 <150 ml  Avoid hypotension and nephrotoxins  Encourage fluids  Trend BMP routinely      4   Constipation, unspecified constipation type  Assessment & Plan:  Continue senna 8 6 mg PO daily PRN      5  Leukocytosis, unspecified type  Assessment & Plan:  Last WBC count slightly elevated from prior at 11 7  No s/s of infection  Vitals stable  Trend CBC routinely      6  Ambulatory dysfunction  Assessment & Plan:  Multifactoral  Maintain fall precautions  Continue PT/OT  Ensure adequate nutrition and hydration   for dispo planning         All medications and routine orders were reviewed and updated as needed  Chief Complaint     STR follow up visit    Past Medical and Surgica History      Past Medical History:   Diagnosis Date   • CKD (chronic kidney disease)    • HTN (hypertension)    • Hyponatremia      No past surgical history on file  No Known Allergies       History of Present Illness     Fadia Mendez is a 80year old male admitted to [address] [place] [address] for STR following a hospitalization for a fall  He has a PMH including but not limited to HTN, CKD and ambulatory dysfunction      The patient presented to the ED from home following a fall  On evaluation he was noted to have right sided weakness and aphasia  Stroke alert was initiated  CT head was negative  CT head and neck showed severe stenosis of right MCA and PCA branches  He was deemed a candidate for TNK  MRI brain showed acute small multifocal infarcts of the left brain  Patient was treated in the ICU w/ hypertension  Neurology was consulted and recommended normotension, aspirin and atorvastatin  They also recommended an outpatient f/u w/ cardiology for a Zio patch  If negative, patient may possibly need a loop recorder for potential A  Fib given cardioembolic appearance of stroke  Patient's overall status improved although, he still has significant deficits in comprehension and movement  He was recommended for STR      The patient is seen today for a routine STR follow up visit      The patient was seen and examined at bedside in stable condition  He is alert and oriented to self  Subjective data is limited given aphasia  Patient shakes his head yes and no to questions  He was able to state that he has to use the bathroom   He was assisted to the bathroom  Patient denies any pain and is not in any acute distress  He denied any other complaints today  Per chart review, patient is eating very well and having regular BMs  No concerns from nursing staff  The patient's allergies, past medical, surgical, social and family history were reviewed and unchanged  Review of Systems     Review of Systems   Constitutional: Negative  Negative for appetite change, chills, fatigue and fever  HENT: Negative  Negative for congestion, facial swelling, rhinorrhea, sneezing and sore throat  Eyes: Negative  Negative for redness, itching and visual disturbance  Respiratory: Negative  Negative for cough, chest tightness, shortness of breath and wheezing  Cardiovascular: Negative for chest pain, palpitations and leg swelling  Gastrointestinal: Negative for abdominal distention, abdominal pain, constipation, nausea and vomiting  Genitourinary: Negative  Negative for difficulty urinating, flank pain, frequency and hematuria  Musculoskeletal: Positive for gait problem  Negative for arthralgias, back pain, myalgias and neck stiffness  Skin: Negative for pallor, rash and wound  Neurological: Positive for speech difficulty and weakness  Negative for dizziness, light-headedness, numbness and headaches  Psychiatric/Behavioral: Negative for agitation, confusion and sleep disturbance  The patient is not nervous/anxious  Objective     Vitals:   Vitals:    06/29/23 0927   BP: 138/70   Pulse: 74   Resp: 18   Temp: (!) 97 1 °F (36 2 °C)   SpO2: 97%         Physical Exam  Vitals reviewed  Constitutional:       General: He is not in acute distress  Appearance: Normal appearance  He is not ill-appearing, toxic-appearing or diaphoretic  HENT:      Head: Normocephalic and atraumatic  Right Ear: External ear normal       Left Ear: External ear normal       Nose: Nose normal  No congestion or rhinorrhea        Mouth/Throat:      Mouth: Mucous membranes are moist       Pharynx: Oropharynx is clear  No oropharyngeal exudate or posterior oropharyngeal erythema  Eyes:      General:         Right eye: No discharge  Left eye: No discharge  Extraocular Movements: Extraocular movements intact  Conjunctiva/sclera: Conjunctivae normal       Pupils: Pupils are equal, round, and reactive to light  Cardiovascular:      Rate and Rhythm: Normal rate and regular rhythm  Pulses: Normal pulses  Heart sounds: Normal heart sounds  No murmur heard  No friction rub  No gallop  Pulmonary:      Effort: Pulmonary effort is normal  No respiratory distress  Breath sounds: Normal breath sounds  No wheezing  Chest:      Chest wall: No tenderness  Abdominal:      General: Abdomen is flat  Bowel sounds are normal  There is no distension  Palpations: Abdomen is soft  There is no mass  Tenderness: There is no abdominal tenderness  There is no guarding  Musculoskeletal:         General: No swelling or tenderness  Normal range of motion  Cervical back: Normal range of motion and neck supple  No rigidity or tenderness  Right lower leg: No edema  Left lower leg: No edema  Skin:     General: Skin is warm and dry  Coloration: Skin is not jaundiced  Findings: No bruising, erythema or rash  Neurological:      General: No focal deficit present  Mental Status: He is alert  Sensory: No sensory deficit  Motor: Weakness present  Coordination: Coordination normal       Gait: Gait abnormal    Psychiatric:         Mood and Affect: Mood normal          Behavior: Behavior normal          Pertinent Laboratory/Diagnostic Studies:   Reviewed in facility chart-stable    HGB 13 5  WBC 11 7  PLTS 369    BUN 20  CREAT 1 6    K 5 0    Current Medications   Medications reviewed and updated see facility [facility] for details        Current Outpatient Medications:   •  albuterol (2 5 mg/3 mL) 0 083 % nebulizer "solution, Take 3 mL (2 5 mg total) by nebulization every 6 (six) hours as needed for wheezing, Disp: 3 mL, Rfl: 0  •  amLODIPine (NORVASC) 2 5 mg tablet, Take 1 tablet (2 5 mg total) by mouth daily, Disp: , Rfl: 0  •  aspirin (ECOTRIN LOW STRENGTH) 81 mg EC tablet, Take 1 tablet (81 mg total) by mouth daily, Disp: 30 tablet, Rfl: 0  •  atorvastatin (LIPITOR) 40 mg tablet, Take 1 tablet (40 mg total) by mouth every evening, Disp: 30 tablet, Rfl: 0  •  carvedilol (COREG) 6 25 mg tablet, Take 1 tablet (6 25 mg total) by mouth 2 (two) times a day with meals, Disp: 60 tablet, Rfl: 0  •  senna (SENOKOT) 8 6 MG tablet, Take 1 tablet by mouth daily as needed, Disp: , Rfl:      Please note:  Voice-recognition software may have been used in the preparation of this document  Occasional wrong word or \"sound-alike\" substitutions may have occurred due to the inherent limitations of voice recognition software  Interpretation should be guided by ELLIE Barrios  6/29/2023  9:26 AM    "

## 2023-06-29 NOTE — ASSESSMENT & PLAN NOTE
BP has been controlled  Continue amlodipine 2 5 mg PO daily  Continue carvedilol 6 25 mg PO bid  Monitor VS  Avoid hypotension

## 2023-07-05 ENCOUNTER — NURSING HOME VISIT (OUTPATIENT)
Dept: GERIATRICS | Facility: OTHER | Age: 88
End: 2023-07-05
Payer: COMMERCIAL

## 2023-07-05 VITALS
WEIGHT: 174 LBS | BODY MASS INDEX: 24.27 KG/M2 | DIASTOLIC BLOOD PRESSURE: 65 MMHG | TEMPERATURE: 97.1 F | RESPIRATION RATE: 18 BRPM | OXYGEN SATURATION: 97 % | HEART RATE: 76 BPM | SYSTOLIC BLOOD PRESSURE: 142 MMHG

## 2023-07-05 DIAGNOSIS — N18.31 STAGE 3A CHRONIC KIDNEY DISEASE (HCC): ICD-10-CM

## 2023-07-05 DIAGNOSIS — I10 ESSENTIAL HYPERTENSION: Primary | ICD-10-CM

## 2023-07-05 DIAGNOSIS — I63.9 STROKE (HCC): ICD-10-CM

## 2023-07-05 DIAGNOSIS — D72.829 LEUKOCYTOSIS, UNSPECIFIED TYPE: ICD-10-CM

## 2023-07-05 DIAGNOSIS — R26.2 AMBULATORY DYSFUNCTION: ICD-10-CM

## 2023-07-05 DIAGNOSIS — K59.00 CONSTIPATION, UNSPECIFIED CONSTIPATION TYPE: ICD-10-CM

## 2023-07-05 PROCEDURE — 99316 NF DSCHRG MGMT 30 MIN+: CPT

## 2023-07-05 RX ORDER — CARVEDILOL 6.25 MG/1
6.25 TABLET ORAL 2 TIMES DAILY WITH MEALS
Qty: 60 TABLET | Refills: 0 | Status: CANCELLED | OUTPATIENT
Start: 2023-07-05

## 2023-07-05 RX ORDER — ALBUTEROL SULFATE 2.5 MG/3ML
2.5 SOLUTION RESPIRATORY (INHALATION) EVERY 6 HOURS PRN
Qty: 125 ML | Refills: 0 | Status: CANCELLED | OUTPATIENT
Start: 2023-07-05 | End: 2023-08-04

## 2023-07-05 RX ORDER — SENNA PLUS 8.6 MG/1
1 TABLET ORAL DAILY PRN
Qty: 30 TABLET | Refills: 0 | Status: CANCELLED | OUTPATIENT
Start: 2023-07-05

## 2023-07-05 RX ORDER — ATORVASTATIN CALCIUM 40 MG/1
40 TABLET, FILM COATED ORAL EVERY EVENING
Qty: 30 TABLET | Refills: 0 | Status: CANCELLED | OUTPATIENT
Start: 2023-07-05 | End: 2023-08-04

## 2023-07-05 NOTE — ASSESSMENT & PLAN NOTE
Lab Results   Component Value Date    EGFR 41 06/19/2023    EGFR 48 06/18/2023    EGFR 47 06/17/2023    CREATININE 1.45 (H) 06/19/2023    CREATININE 1.29 06/18/2023    CREATININE 1.31 (H) 06/17/2023   Baseline around 1.4  Last creat improved at 1.5  Avoid hypotension and nephrotoxins  Encourage fluids  F/u w/ PCP for routine monitoring

## 2023-07-05 NOTE — ASSESSMENT & PLAN NOTE
Presented to ED from home following a fall w/ marked aphasia and right upper extremity sensory deficits and weakness  CT head initially negative  CT head and neck revealed short segment severe stenosis of the proximal dorsal branch of the right MCA M2 division, focal severe stenosis of the proximal P2 and P3 segment of the right PCA  MRI brain showed acute small multifocal infarcts of the left brain  Patient received TNK and was treated in the ICU  Patient continues w/ aphasia and right-sided weakness  Continue atorvastatin 40 mg PO daily and aspirin 81 mg PO daily  Continue PT/OT/ST  Continue modified diet w/ mechanical soft, thin liquid consistency  Maintain aspiration precautions  Will need outpatient f/u w/ cardiology for Zio patch and possible loop recorder

## 2023-07-05 NOTE — ASSESSMENT & PLAN NOTE
Multifactoral  Maintain fall precautions  Continue PT/OT  Ensure adequate nutrition and hydration   for dispo planning  Patient will be discharging to [facility]

## 2023-07-05 NOTE — PROGRESS NOTES
[address]  [phone]  DISCHARGE SUMMARY  FACILITY: [facility]  Code 31    NAME: Carmencita Perez  AGE: 80 y.o. SEX: male   CODE STATUS: No CPR    DATE OF ADMISSION: 6/19/2023   DATE OF DISCHARGE: 7/6/2023   DISCHARGE DISPOSITION: Stable for discharge to home with family support and home health PT/OT/SN services. Reason for Admission: Patient was admitted to [address] [place] [address] for rehabilitation after hospitalization for a fall and stroke. Past Medical and Surgical History:   Past Medical History:   Diagnosis Date   • CKD (chronic kidney disease)    • HL (hearing loss)    • HTN (hypertension)    • Hypertension    • Hyponatremia    • Prostate CA ([address] )       No past surgical history on file. Course of stay:   Carmencita Perez is a 80year old male admitted to [address] [facility] for STR following a hospitalization for a fall. He has a PMH including but not limited to HTN, CKD and ambulatory dysfunction.     The patient presented to the ED from home following a fall. On evaluation he was noted to have right sided weakness and aphasia. Stroke alert was initiated. CT head was negative. CT head and neck showed severe stenosis of right MCA and PCA branches. He was deemed a candidate for TNK. MRI brain showed acute small multifocal infarcts of the left brain. Patient was treated in the ICU w/ hypertension. Neurology was consulted and recommended normotension, aspirin and atorvastatin. They also recommended an outpatient f/u w/ cardiology for a Zio patch. If negative, patient may possibly need a loop recorder for potential A. Fib given cardioembolic appearance of stroke. Patient's overall status improved although, he still has significant deficits in comprehension and movement. He was recommended for STR.     The patient is seen today for anticipated discharge 7/6/23.     The patient was seen and examined at bedside in stable condition.  He is alert and oriented to self. Subjective data is limited given aphasia. Patient shakes his head yes and no to questions. Patient is smiling and appears to be doing well. He denies any complaints on today's exam and is not in any acute distress. Per chart review, patient is eating very well and having regular BMs. No concerns from nursing staff. Patient is medically stable for d/c. He will be discharging to [facility]. Recommend f/u w/ PCP.      ROS:  Review of Systems   Constitutional: Negative. Negative for appetite change, chills, fatigue and fever. HENT: Negative. Negative for congestion, facial swelling, rhinorrhea, sneezing and sore throat. Eyes: Negative. Negative for redness, itching and visual disturbance. Respiratory: Negative. Negative for cough, chest tightness, shortness of breath and wheezing. Cardiovascular: Negative for chest pain, palpitations and leg swelling. Gastrointestinal: Negative for abdominal distention, abdominal pain, constipation, nausea and vomiting. Genitourinary: Negative. Negative for difficulty urinating, flank pain, frequency and hematuria. Musculoskeletal: Positive for gait problem. Negative for arthralgias, back pain, myalgias and neck stiffness. Skin: Negative for pallor, rash and wound. Neurological: Positive for speech difficulty and weakness. Negative for dizziness, light-headedness, numbness and headaches. Psychiatric/Behavioral: Negative for agitation, confusion and sleep disturbance. The patient is not nervous/anxious. PHYSICAL EXAM:  VITALS:   Vitals:    07/05/23 0942   BP: 142/65   Pulse: 76   Resp: 18   Temp: (!) 97.1 °F (36.2 °C)   SpO2: 97%        Physical Exam  Vitals reviewed. Constitutional:       General: He is not in acute distress. Appearance: Normal appearance. He is not ill-appearing, toxic-appearing or diaphoretic. HENT:      Head: Normocephalic and atraumatic.       Right Ear: External ear normal.      Left Ear: External ear normal.      Nose: Nose normal. No congestion or rhinorrhea. Mouth/Throat:      Mouth: Mucous membranes are moist.      Pharynx: Oropharynx is clear. No oropharyngeal exudate or posterior oropharyngeal erythema. Eyes:      General:         Right eye: No discharge. Left eye: No discharge. Extraocular Movements: Extraocular movements intact. Conjunctiva/sclera: Conjunctivae normal.      Pupils: Pupils are equal, round, and reactive to light. Cardiovascular:      Rate and Rhythm: Normal rate and regular rhythm. Pulses: Normal pulses. Heart sounds: Normal heart sounds. No murmur heard. No friction rub. No gallop. Pulmonary:      Effort: Pulmonary effort is normal. No respiratory distress. Breath sounds: Normal breath sounds. No wheezing. Chest:      Chest wall: No tenderness. Abdominal:      General: Abdomen is flat. Bowel sounds are normal. There is no distension. Palpations: Abdomen is soft. There is no mass. Tenderness: There is no abdominal tenderness. There is no guarding. Musculoskeletal:         General: No swelling or tenderness. Normal range of motion. Cervical back: Normal range of motion and neck supple. No rigidity or tenderness. Right lower leg: No edema. Left lower leg: No edema. Skin:     General: Skin is warm and dry. Coloration: Skin is not jaundiced. Findings: No bruising, erythema or rash. Neurological:      General: No focal deficit present. Mental Status: He is alert. Sensory: No sensory deficit. Motor: Weakness present. Coordination: Coordination normal.      Gait: Gait abnormal.   Psychiatric:         Mood and Affect: Mood normal.         Behavior: Behavior normal.         Admission Diagnoses:   1. Essential hypertension  Assessment & Plan:  BP has been controlled  Continue amlodipine 2.5 mg PO daily  Continue carvedilol 6.25 mg PO bid  Monitor VS  Avoid hypotension      2.  Stage 3a chronic kidney disease [facility])  Assessment & Plan:  Lab Results   Component Value Date    EGFR 41 06/19/2023    EGFR 48 06/18/2023    EGFR 47 06/17/2023    CREATININE 1.45 (H) 06/19/2023    CREATININE 1.29 06/18/2023    CREATININE 1.31 (H) 06/17/2023   Baseline around 1.4  Last creat improved at 1.5  Avoid hypotension and nephrotoxins  Encourage fluids  F/u w/ PCP for routine monitoring      3. Constipation, unspecified constipation type  Assessment & Plan:  Continue senna 8.6 mg PO daily PRN      4. Leukocytosis, unspecified type  Assessment & Plan:  Improved at 10.2  No s/s of infection  Vitals stable      5. Ambulatory dysfunction  Assessment & Plan:  Multifactoral  Maintain fall precautions  Continue PT/OT  Ensure adequate nutrition and hydration   for dispo planning  Patient will be discharging to [facility]      6.  Stroke [facility])  Assessment & Plan:  Presented to ED from home following a fall w/ marked aphasia and right upper extremity sensory deficits and weakness  CT head initially negative  CT head and neck revealed short segment severe stenosis of the proximal dorsal branch of the right MCA M2 division, focal severe stenosis of the proximal P2 and P3 segment of the right PCA  MRI brain showed acute small multifocal infarcts of the left brain  Patient received TNK and was treated in the ICU  Patient continues w/ aphasia and right-sided weakness  Continue atorvastatin 40 mg PO daily and aspirin 81 mg PO daily  Continue PT/OT/ST  Continue modified diet w/ mechanical soft, thin liquid consistency  Maintain aspiration precautions  Will need outpatient f/u w/ cardiology for Zio patch and possible loop recorder           Follow-up Recommendations:    • Outpatient Follow up with PCP in the next 2 weeks  • Home Health PT/OT/SN services    Labs and testing performed during stay:    HGB 13.7  WBC 10.2  PLTS 373    BUN 21  CREAT 1.5    K 4.7    Discharge Medications: See discharge medication list which was reviewed and signed.       Current Outpatient Medications:   •  acetaminophen (TYLENOL) 325 mg tablet, Take 650 mg by mouth every 6 (six) hours as needed for mild pain, Disp: , Rfl:   •  acetaminophen (TYLENOL) 325 mg tablet, Take 650 mg by mouth every 6 (six) hours as needed for mild pain or fever, Disp: , Rfl:   •  albuterol (2.5 mg/3 mL) 0.083 % nebulizer solution, Take 3 mL (2.5 mg total) by nebulization every 6 (six) hours as needed for wheezing, Disp: 3 mL, Rfl: 0  •  albuterol (PROVENTIL HFA,VENTOLIN HFA) 90 mcg/act inhaler, Inhale 2 puffs every 4 (four) hours as needed for wheezing or shortness of breath, Disp: , Rfl: 0  •  amLODIPine (NORVASC) 2.5 mg tablet, Take 1 tablet (2.5 mg total) by mouth daily, Disp: 30 tablet, Rfl: 0  •  amLODIPine (NORVASC) 2.5 mg tablet, Take 1 tablet (2.5 mg total) by mouth daily, Disp: , Rfl: 0  •  aspirin (ECOTRIN LOW STRENGTH) 81 mg EC tablet, Take 1 tablet (81 mg total) by mouth daily, Disp: 30 tablet, Rfl: 0  •  atorvastatin (LIPITOR) 40 mg tablet, Take 1 tablet (40 mg total) by mouth every evening, Disp: 30 tablet, Rfl: 0  •  carvedilol (COREG) 6.25 mg tablet, Take 6.25 mg by mouth 2 (two) times a day with meals, Disp: , Rfl:   •  carvedilol (COREG) 6.25 mg tablet, Take 6.25 mg by mouth 2 (two) times a day with meals, Disp: , Rfl:   •  carvedilol (COREG) 6.25 mg tablet, Take 1 tablet (6.25 mg total) by mouth 2 (two) times a day with meals, Disp: 60 tablet, Rfl: 0  •  lisinopril (ZESTRIL) 10 mg tablet, Take 10 mg by mouth daily, Disp: , Rfl:   •  lisinopril (ZESTRIL) 10 mg tablet, Take 10 mg by mouth daily, Disp: , Rfl:   •  senna (SENOKOT) 8.6 MG tablet, Take 1 tablet by mouth daily as needed, Disp: , Rfl:   •  senna-docusate sodium (SENOKOT S) 8.6-50 mg per tablet, Take 1 tablet by mouth 2 (two) times a day, Disp: 60 tablet, Rfl: 0     Discussion with patient/family and further instructions:  -Fall precautions  -Aspiration precautions  -Bleeding precautions  -Monitor for signs/symptoms of infection  -Medication list was reviewed and signed  -DME form was completed    Status at time of discharge: Stable    Billing based on time. Time spent on unit, 40 minutes. Time spent counseling pt on debility/condition, 30 minutes. Please note:  Voice-recognition software may have been used in the preparation of this document. Occasional wrong word or "sound-alike" substitutions may have occurred due to the inherent limitations of voice recognition software. Interpretation should be guided by context.         ELLIE Weir  7/5/2023

## 2023-07-05 NOTE — ASSESSMENT & PLAN NOTE
BP has been controlled  Continue amlodipine 2.5 mg PO daily  Continue carvedilol 6.25 mg PO bid  Monitor VS  Avoid hypotension

## 2023-07-17 PROBLEM — I10 HTN (HYPERTENSION): Chronic | Status: RESOLVED | Noted: 2021-11-08 | Resolved: 2023-07-17

## 2023-07-17 PROBLEM — I63.9 STROKE (HCC): Status: RESOLVED | Noted: 2023-06-13 | Resolved: 2023-07-17

## 2023-07-17 PROBLEM — N18.9 CKD (CHRONIC KIDNEY DISEASE): Status: RESOLVED | Noted: 2021-11-08 | Resolved: 2023-07-17

## 2023-07-17 PROBLEM — I10 ESSENTIAL HYPERTENSION: Status: RESOLVED | Noted: 2023-06-13 | Resolved: 2023-07-17

## 2023-07-25 ENCOUNTER — TELEPHONE (OUTPATIENT)
Dept: NEUROLOGY | Facility: CLINIC | Age: 88
End: 2023-07-25

## 2023-07-25 NOTE — TELEPHONE ENCOUNTER
Patient's family member Samia Dowell said, Camilla Hay was not talking right and would be in tomorrow for his appointment. I found this out while making Confirmation Calls. Patient had a 8:15 am appt. with Christelle Luciano. I cancelled the appointment.

## 2023-07-29 ENCOUNTER — APPOINTMENT (EMERGENCY)
Dept: CT IMAGING | Facility: HOSPITAL | Age: 88
DRG: 641 | End: 2023-07-29
Payer: COMMERCIAL

## 2023-07-29 ENCOUNTER — HOSPITAL ENCOUNTER (INPATIENT)
Facility: HOSPITAL | Age: 88
LOS: 5 days | Discharge: NURSING, REHAB OR LONG-TERM CARE | DRG: 641 | End: 2023-08-03
Attending: EMERGENCY MEDICINE | Admitting: INTERNAL MEDICINE
Payer: COMMERCIAL

## 2023-07-29 DIAGNOSIS — R26.2 AMBULATORY DYSFUNCTION: ICD-10-CM

## 2023-07-29 DIAGNOSIS — R62.7 FAILURE TO THRIVE IN ADULT: ICD-10-CM

## 2023-07-29 DIAGNOSIS — R41.82 ALTERED MENTAL STATUS: Primary | ICD-10-CM

## 2023-07-29 DIAGNOSIS — R53.1 GENERALIZED WEAKNESS: ICD-10-CM

## 2023-07-29 LAB
ALBUMIN SERPL BCP-MCNC: 4 G/DL (ref 3.5–5)
ALP SERPL-CCNC: 85 U/L (ref 34–104)
ALT SERPL W P-5'-P-CCNC: 15 U/L (ref 7–52)
ANION GAP SERPL CALCULATED.3IONS-SCNC: 8 MMOL/L
AST SERPL W P-5'-P-CCNC: 13 U/L (ref 13–39)
BACTERIA UR QL AUTO: NORMAL /HPF
BASOPHILS # BLD AUTO: 0.04 THOUSANDS/ÂΜL (ref 0–0.1)
BASOPHILS NFR BLD AUTO: 0 % (ref 0–1)
BILIRUB SERPL-MCNC: 0.8 MG/DL (ref 0.2–1)
BILIRUB UR QL STRIP: NEGATIVE
BUN SERPL-MCNC: 23 MG/DL (ref 5–25)
CALCIUM SERPL-MCNC: 9 MG/DL (ref 8.4–10.2)
CARDIAC TROPONIN I PNL SERPL HS: 8 NG/L
CHLORIDE SERPL-SCNC: 98 MMOL/L (ref 96–108)
CLARITY UR: CLEAR
CO2 SERPL-SCNC: 25 MMOL/L (ref 21–32)
COLOR UR: YELLOW
CREAT SERPL-MCNC: 1.52 MG/DL (ref 0.6–1.3)
EOSINOPHIL # BLD AUTO: 0.09 THOUSAND/ÂΜL (ref 0–0.61)
EOSINOPHIL NFR BLD AUTO: 1 % (ref 0–6)
ERYTHROCYTE [DISTWIDTH] IN BLOOD BY AUTOMATED COUNT: 14.6 % (ref 11.6–15.1)
GFR SERPL CREATININE-BSD FRML MDRD: 39 ML/MIN/1.73SQ M
GLUCOSE SERPL-MCNC: 190 MG/DL (ref 65–140)
GLUCOSE SERPL-MCNC: 216 MG/DL (ref 65–140)
GLUCOSE UR STRIP-MCNC: NEGATIVE MG/DL
HCT VFR BLD AUTO: 43.8 % (ref 36.5–49.3)
HGB BLD-MCNC: 14.3 G/DL (ref 12–17)
HGB UR QL STRIP.AUTO: NEGATIVE
IMM GRANULOCYTES # BLD AUTO: 0.1 THOUSAND/UL (ref 0–0.2)
IMM GRANULOCYTES NFR BLD AUTO: 1 % (ref 0–2)
KETONES UR STRIP-MCNC: NEGATIVE MG/DL
LEUKOCYTE ESTERASE UR QL STRIP: NEGATIVE
LYMPHOCYTES # BLD AUTO: 1.45 THOUSANDS/ÂΜL (ref 0.6–4.47)
LYMPHOCYTES NFR BLD AUTO: 12 % (ref 14–44)
MCH RBC QN AUTO: 29.1 PG (ref 26.8–34.3)
MCHC RBC AUTO-ENTMCNC: 32.6 G/DL (ref 31.4–37.4)
MCV RBC AUTO: 89 FL (ref 82–98)
MONOCYTES # BLD AUTO: 0.99 THOUSAND/ÂΜL (ref 0.17–1.22)
MONOCYTES NFR BLD AUTO: 8 % (ref 4–12)
NEUTROPHILS # BLD AUTO: 9.62 THOUSANDS/ÂΜL (ref 1.85–7.62)
NEUTS SEG NFR BLD AUTO: 78 % (ref 43–75)
NITRITE UR QL STRIP: NEGATIVE
NON-SQ EPI CELLS URNS QL MICRO: NORMAL /HPF
NRBC BLD AUTO-RTO: 0 /100 WBCS
PH UR STRIP.AUTO: 5.5 [PH]
PLATELET # BLD AUTO: 280 THOUSANDS/UL (ref 149–390)
PMV BLD AUTO: 9.7 FL (ref 8.9–12.7)
POTASSIUM SERPL-SCNC: 4.2 MMOL/L (ref 3.5–5.3)
PROT SERPL-MCNC: 6.6 G/DL (ref 6.4–8.4)
PROT UR STRIP-MCNC: ABNORMAL MG/DL
RBC # BLD AUTO: 4.92 MILLION/UL (ref 3.88–5.62)
RBC #/AREA URNS AUTO: NORMAL /HPF
SODIUM SERPL-SCNC: 131 MMOL/L (ref 135–147)
SP GR UR STRIP.AUTO: 1.02 (ref 1–1.03)
TSH SERPL DL<=0.05 MIU/L-ACNC: 1.05 UIU/ML (ref 0.45–4.5)
UROBILINOGEN UR STRIP-ACNC: 2 MG/DL
WBC # BLD AUTO: 12.29 THOUSAND/UL (ref 4.31–10.16)
WBC #/AREA URNS AUTO: NORMAL /HPF

## 2023-07-29 PROCEDURE — 82948 REAGENT STRIP/BLOOD GLUCOSE: CPT

## 2023-07-29 PROCEDURE — 85025 COMPLETE CBC W/AUTO DIFF WBC: CPT | Performed by: EMERGENCY MEDICINE

## 2023-07-29 PROCEDURE — 99285 EMERGENCY DEPT VISIT HI MDM: CPT

## 2023-07-29 PROCEDURE — 81001 URINALYSIS AUTO W/SCOPE: CPT | Performed by: EMERGENCY MEDICINE

## 2023-07-29 PROCEDURE — 99222 1ST HOSP IP/OBS MODERATE 55: CPT | Performed by: INTERNAL MEDICINE

## 2023-07-29 PROCEDURE — 36415 COLL VENOUS BLD VENIPUNCTURE: CPT | Performed by: EMERGENCY MEDICINE

## 2023-07-29 PROCEDURE — 93005 ELECTROCARDIOGRAM TRACING: CPT

## 2023-07-29 PROCEDURE — G1004 CDSM NDSC: HCPCS

## 2023-07-29 PROCEDURE — 80053 COMPREHEN METABOLIC PANEL: CPT | Performed by: EMERGENCY MEDICINE

## 2023-07-29 PROCEDURE — 70450 CT HEAD/BRAIN W/O DYE: CPT

## 2023-07-29 PROCEDURE — 84484 ASSAY OF TROPONIN QUANT: CPT | Performed by: EMERGENCY MEDICINE

## 2023-07-29 PROCEDURE — 84443 ASSAY THYROID STIM HORMONE: CPT | Performed by: EMERGENCY MEDICINE

## 2023-07-29 RX ORDER — ATORVASTATIN CALCIUM 40 MG/1
40 TABLET, FILM COATED ORAL EVERY EVENING
Status: DISCONTINUED | OUTPATIENT
Start: 2023-07-30 | End: 2023-08-03 | Stop reason: HOSPADM

## 2023-07-29 RX ORDER — LISINOPRIL 10 MG/1
10 TABLET ORAL DAILY
Status: DISCONTINUED | OUTPATIENT
Start: 2023-07-30 | End: 2023-08-03 | Stop reason: HOSPADM

## 2023-07-29 RX ORDER — ACETAMINOPHEN 325 MG/1
650 TABLET ORAL EVERY 6 HOURS PRN
Status: DISCONTINUED | OUTPATIENT
Start: 2023-07-29 | End: 2023-08-03 | Stop reason: HOSPADM

## 2023-07-29 RX ORDER — AMLODIPINE BESYLATE 2.5 MG/1
2.5 TABLET ORAL DAILY
Status: DISCONTINUED | OUTPATIENT
Start: 2023-07-30 | End: 2023-08-03 | Stop reason: HOSPADM

## 2023-07-29 RX ORDER — ONDANSETRON 2 MG/ML
4 INJECTION INTRAMUSCULAR; INTRAVENOUS EVERY 6 HOURS PRN
Status: DISCONTINUED | OUTPATIENT
Start: 2023-07-29 | End: 2023-08-03 | Stop reason: HOSPADM

## 2023-07-29 RX ORDER — HEPARIN SODIUM 5000 [USP'U]/ML
5000 INJECTION, SOLUTION INTRAVENOUS; SUBCUTANEOUS EVERY 8 HOURS SCHEDULED
Status: DISCONTINUED | OUTPATIENT
Start: 2023-07-30 | End: 2023-08-03 | Stop reason: HOSPADM

## 2023-07-29 RX ORDER — SODIUM CHLORIDE 9 MG/ML
75 INJECTION, SOLUTION INTRAVENOUS CONTINUOUS
Status: DISCONTINUED | OUTPATIENT
Start: 2023-07-30 | End: 2023-07-31

## 2023-07-29 RX ORDER — CARVEDILOL 6.25 MG/1
6.25 TABLET ORAL 2 TIMES DAILY WITH MEALS
Status: DISCONTINUED | OUTPATIENT
Start: 2023-07-30 | End: 2023-08-03 | Stop reason: HOSPADM

## 2023-07-29 NOTE — ED PROVIDER NOTES
History  Chief Complaint   Patient presents with   • Altered Mental Status     Per nursing home and family patient is more confused and fatigued than usual. Pt is unable to provide any history, is alert to self. Pt has no complaints at this time. 70-year-old male presents with his son and daughter-in-law at bedside, they report that over the past few days the patient has had decreased appetite, generalized weakness, fatigue, and is not acting his usual self. They note that a few weeks ago he had a intracranial bleed and was sent to rehab for a few weeks for which he did very well and he came home last week, and over the past 2 days he is eating almost nothing. They note that he is very hard of hearing at baseline, the history and review of systems are limited by the patient's difficulty hearing and altered mental status. The patient at this time has no complaints, he also does not understand why he is here. Prior to Admission Medications   Prescriptions Last Dose Informant Patient Reported?  Taking?   acetaminophen (TYLENOL) 325 mg tablet  Outside Facility (Specify) Yes No   Sig: Take 650 mg by mouth every 6 (six) hours as needed for mild pain   acetaminophen (TYLENOL) 325 mg tablet   Yes No   Sig: Take 650 mg by mouth every 6 (six) hours as needed for mild pain or fever   albuterol (PROVENTIL HFA,VENTOLIN HFA) 90 mcg/act inhaler   No No   Sig: Inhale 2 puffs every 4 (four) hours as needed for wheezing or shortness of breath   amLODIPine (NORVASC) 2.5 mg tablet   No No   Sig: Take 1 tablet (2.5 mg total) by mouth daily   amLODIPine (NORVASC) 2.5 mg tablet   No No   Sig: Take 1 tablet (2.5 mg total) by mouth daily   aspirin (ECOTRIN LOW STRENGTH) 81 mg EC tablet   No No   Sig: Take 1 tablet (81 mg total) by mouth daily   atorvastatin (LIPITOR) 40 mg tablet   No No   Sig: Take 1 tablet (40 mg total) by mouth every evening   carvedilol (COREG) 6.25 mg tablet  Outside Facility (Specify) Yes No   Sig: Take 6.25 mg by mouth 2 (two) times a day with meals   carvedilol (COREG) 6.25 mg tablet   Yes No   Sig: Take 6.25 mg by mouth 2 (two) times a day with meals   carvedilol (COREG) 6.25 mg tablet   No No   Sig: Take 1 tablet (6.25 mg total) by mouth 2 (two) times a day with meals   lisinopril (ZESTRIL) 10 mg tablet  Outside Facility (Specify) Yes No   Sig: Take 10 mg by mouth daily   lisinopril (ZESTRIL) 10 mg tablet   Yes No   Sig: Take 10 mg by mouth daily   senna (SENOKOT) 8.6 MG tablet   Yes No   Sig: Take 1 tablet by mouth daily as needed   senna-docusate sodium (SENOKOT S) 8.6-50 mg per tablet   No No   Sig: Take 1 tablet by mouth 2 (two) times a day      Facility-Administered Medications: None       Past Medical History:   Diagnosis Date   • CKD (chronic kidney disease)    • HL (hearing loss)    • HTN (hypertension)    • Hypertension    • Hyponatremia    • Prostate CA ([address])        History reviewed. No pertinent surgical history. History reviewed. No pertinent family history. I have reviewed and agree with the history as documented. E-Cigarette/Vaping   • E-Cigarette Use Never User      E-Cigarette/Vaping Substances     Social History     Tobacco Use   • Smokeless tobacco: Never   Vaping Use   • Vaping Use: Never used   Substance Use Topics   • Alcohol use: Never   • Drug use: Never       Review of Systems   Unable to perform ROS: Dementia       Physical Exam  Physical Exam  Vitals and nursing note reviewed. Constitutional:       General: He is not in acute distress. Appearance: Normal appearance. HENT:      Head: Normocephalic and atraumatic. Right Ear: External ear normal.      Left Ear: External ear normal.      Mouth/Throat:      Mouth: Mucous membranes are moist.      Pharynx: Oropharynx is clear. Eyes:      Conjunctiva/sclera: Conjunctivae normal.      Pupils: Pupils are equal, round, and reactive to light. Cardiovascular:      Rate and Rhythm: Normal rate and regular rhythm. Heart sounds: Normal heart sounds. Pulmonary:      Effort: Pulmonary effort is normal. No respiratory distress. Breath sounds: Normal breath sounds. Abdominal:      General: Abdomen is flat. There is no distension. Palpations: Abdomen is soft. Tenderness: There is no abdominal tenderness. Musculoskeletal:         General: No deformity. Normal range of motion. Cervical back: Normal range of motion. Skin:     General: Skin is warm and dry. Neurological:      General: No focal deficit present. Mental Status: He is alert.       Comments: Patient is oriented to person only   Psychiatric:         Mood and Affect: Mood normal.         Behavior: Behavior normal.         Vital Signs  ED Triage Vitals [07/29/23 1926]   Temperature Pulse Respirations Blood Pressure SpO2   98.6 °F (37 °C) 64 16 120/57 92 %      Temp Source Heart Rate Source Patient Position - Orthostatic VS BP Location FiO2 (%)   Oral Monitor Lying Right arm --      Pain Score       --           Vitals:    07/29/23 1926   BP: 120/57   Pulse: 64   Patient Position - Orthostatic VS: Lying         Visual Acuity      ED Medications  Medications - No data to display    Diagnostic Studies  Results Reviewed     None                 No orders to display              Procedures  ECG 12 Lead Documentation Only    Date/Time: 7/29/2023 10:14 PM    Performed by: Kristyn Mcdaniel MD  Authorized by: Kristyn Mcdaniel MD    ECG reviewed by me, the ED Provider: yes    Patient location:  ED  Previous ECG:     Previous ECG:  Compared to current    Similarity:  No change  Interpretation:     Interpretation: normal    Rate:     ECG rate:  65    ECG rate assessment: normal    Rhythm:     Rhythm: sinus rhythm    Ectopy:     Ectopy: none    QRS:     QRS axis:  Normal    QRS intervals:  Normal  Conduction:     Conduction: normal    ST segments:     ST segments:  Normal  T waves:     T waves: normal               ED Course       SBIRT 22yo+    Flowsheet Row Most Recent Value   Initial Alcohol Screen: US AUDIT-C     1. How often do you have a drink containing alcohol? 0 Filed at: 07/29/2023 1928   2. How many drinks containing alcohol do you have on a typical day you are drinking? 0 Filed at: 07/29/2023 1928   3a. Male UNDER 65: How often do you have five or more drinks on one occasion? 0 Filed at: 07/29/2023 1928   3b. FEMALE Any Age, or MALE 65+: How often do you have 4 or more drinks on one occassion? 0 Filed at: 07/29/2023 1928   Audit-C Score 0 Filed at: 07/29/2023 1928   ENEDINA: How many times in the past year have you. .. Used an illegal drug or used a prescription medication for non-medical reasons? Never Filed at: 07/29/2023 1928            Medical Decision Making  26-year-old male, presents with family at bedside, hard of hearing, altered mental status, weakness, decreased appetite, decreased p.o. intake including little fluids, and appears more confused than usual, will be evaluated for infectious versus electrolyte abnormality causes of altered mental status, including pneumonia, urinary tract infections, dehydration, disposition is pending work-up. Amount and/or Complexity of Data Reviewed  Labs: ordered. Radiology: ordered. Disposition  Final diagnoses:   None     ED Disposition     None      Follow-up Information    None         Patient's Medications   Discharge Prescriptions    No medications on file       No discharge procedures on file.     PDMP Review     None          ED Provider  Electronically Signed by 07/29/23 2130     hs TnI 0hr 8 ng/L     TSH [[id number]]  (Normal) Collected: 07/29/23 2036    Lab Status: Final result Specimen: Blood from Arm, Right Updated: 07/29/23 2116     TSH 3RD GENERATON 1.047 uIU/mL     Comprehensive metabolic panel [[id number]]  (Abnormal) Collected: 07/29/23 2036    Lab Status: Final result Specimen: Blood from Arm, Right Updated: 07/29/23 2059     Sodium 131 mmol/L      Potassium 4.2 mmol/L      Chloride 98 mmol/L      CO2 25 mmol/L      ANION GAP 8 mmol/L      BUN 23 mg/dL      Creatinine 1.52 mg/dL      Glucose 190 mg/dL      Calcium 9.0 mg/dL      AST 13 U/L      ALT 15 U/L      Alkaline Phosphatase 85 U/L      Total Protein 6.6 g/dL      Albumin 4.0 g/dL      Total Bilirubin 0.80 mg/dL      eGFR 39 ml/min/1.73sq m     Narrative:      [facility] for Chronic Kidney Disease (CKD):   •  Stage 1 with normal or high GFR (GFR > 90 mL/min/1.73 square meters)  •  Stage 2 Mild CKD (GFR = 60-89 mL/min/1.73 square meters)  •  Stage 3A Moderate CKD (GFR = 45-59 mL/min/1.73 square meters)  •  Stage 3B Moderate CKD (GFR = 30-44 mL/min/1.73 square meters)  •  Stage 4 Severe CKD (GFR = 15-29 mL/min/1.73 square meters)  •  Stage 5 End Stage CKD (GFR <15 mL/min/1.73 square meters)  Note: GFR calculation is accurate only with a steady state creatinine    CBC and differential [[id number]]  (Abnormal) Collected: 07/29/23 2036    Lab Status: Final result Specimen: Blood from Arm, Right Updated: 07/29/23 2047     WBC 12.29 Thousand/uL      RBC 4.92 Million/uL      Hemoglobin 14.3 g/dL      Hematocrit 43.8 %      MCV 89 fL      MCH 29.1 pg      MCHC 32.6 g/dL      RDW 14.6 %      MPV 9.7 fL      Platelets 588 Thousands/uL      nRBC 0 /100 WBCs      Neutrophils Relative 78 %      Immat GRANS % 1 %      Lymphocytes Relative 12 %      Monocytes Relative 8 %      Eosinophils Relative 1 %      Basophils Relative 0 %      Neutrophils Absolute 9.62 Thousands/µL      Immature Grans Absolute 0.10 Thousand/uL      Lymphocytes Absolute 1.45 Thousands/µL      Monocytes Absolute 0.99 Thousand/µL      Eosinophils Absolute 0.09 Thousand/µL      Basophils Absolute 0.04 Thousands/µL     Fingerstick Glucose (POCT) [[id number]]  (Abnormal) Collected: 07/29/23 1932    Lab Status: Final result Updated: 07/29/23 1957     POC Glucose 216 mg/dl                  MRI brain wo contrast   Final Result by Reinaldo Russell DO (07/30 1711)      No acute ischemia. 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 Small acute infarct seen approximately 6 weeks ago no longer demonstrate restricted diffusion. Workstation performed: [id number]         CT head without contrast   Final Result by Kendall Miles MD (07/29 2213)      No acute intracranial abnormality. Workstation performed: [id number]                    Procedures  ECG 12 Lead Documentation Only    Date/Time: 7/29/2023 10:14 PM    Performed by: Alycia Mccullough MD  Authorized by: Alycia Mccullough MD    ECG reviewed by me, the ED Provider: yes    Patient location:  ED  Previous ECG:     Previous ECG:  Compared to current    Similarity:  No change  Interpretation:     Interpretation: normal    Rate:     ECG rate:  65    ECG rate assessment: normal    Rhythm:     Rhythm: sinus rhythm    Ectopy:     Ectopy: none    QRS:     QRS axis:  Normal    QRS intervals:  Normal  Conduction:     Conduction: normal    ST segments:     ST segments:  Normal  T waves:     T waves: normal               ED Course       SBIRT 22yo+    Flowsheet Row Most Recent Value   Initial Alcohol Screen: US AUDIT-C     1. How often do you have a drink containing alcohol? 0 Filed at: 07/29/2023 1928   2. How many drinks containing alcohol do you have on a typical day you are drinking? 0 Filed at: 07/29/2023 1928   3a. Male UNDER 65: How often do you have five or more drinks on one occasion? 0 Filed at: 07/29/2023 1928   3b. FEMALE Any Age, or MALE 65+: How often do you have 4 or more drinks on one occassion? 0 Filed at: 07/29/2023 1928   Audit-C Score 0 Filed at: 07/29/2023 1928   ENEDINA: How many times in the past year have you. .. Used an illegal drug or used a prescription medication for non-medical reasons? Never Filed at: 07/29/2023 1928            Medical Decision Making  80-year-old male, presents with family at bedside, hard of hearing, altered mental status, weakness, decreased appetite, decreased p.o. intake including little fluids, and appears more confused than usual, will be evaluated for infectious versus electrolyte abnormality causes of altered mental status, including pneumonia, urinary tract infections, dehydration, disposition is pending work-up. Patient's workup shows no significant electrolyte abnormalities or signs of dehydration, the patient is still altered, and is having difficulty walking, the patient will be admitted for further management. Amount and/or Complexity of Data Reviewed  Labs: ordered. Radiology: ordered. Risk  Decision regarding hospitalization. Disposition  Final diagnoses:    Altered mental status   Failure to thrive in adult   Generalized weakness     Time reflects when diagnosis was documented in both MDM as applicable and the Disposition within this note     Time User Action Codes Description Comment    7/29/2023 10:48 PM Albertina Denver Add [R41.82] Altered mental status     7/29/2023 10:49 PM Albertina Denver Add [R62.7] Failure to thrive in adult     7/29/2023 10:49 PM Albertina Denver Add [R53.1] Generalized weakness     8/3/2023 11:55 AM Ghazal Jose Add [R26.2] Ambulatory dysfunction       ED Disposition     ED Disposition   Admit    Condition   Stable    Date/Time   Sat Jul 29, 2023 10:48 PM    Comment   Case was discussed with ALYSA and the patient's admission status was agreed to be Admission Status: inpatient status to the service of Dr. Ebony Cortez .            MD Documentation    [address] Name, [address], [address]     Follow up With Specialties Details Why [address], [address] [place] [address]       [address] Patient [address]  Schedule an appointment as soon as possible for a visit  [address] [place] [address] [facility], [address] [place], [place], [address]    [address] Occupational Therapy Occupational Therapy   [address] [facility] [facility], [place], [place] [facility] [place], [address]          Discharge Medication List as of 8/3/2023  1:32 PM      CONTINUE these medications which have NOT CHANGED    Details   acetaminophen (TYLENOL) 325 mg tablet Take 650 mg by mouth every 6 (six) hours as needed for mild pain or fever, Historical Med      amLODIPine (NORVASC) 2.5 mg tablet Take 1 tablet (2.5 mg total) by mouth daily, Starting Mon 6/19/2023, Until Wed 7/19/2023, No Print      aspirin (ECOTRIN LOW STRENGTH) 81 mg EC tablet Take 1 tablet (81 mg total) by mouth daily, Starting Mon 6/19/2023, Until Wed 7/19/2023, No Print      atorvastatin (LIPITOR) 40 mg tablet Take 1 tablet (40 mg total) by mouth every evening, Starting Mon 6/19/2023, Until Wed 7/19/2023, No Print      carvedilol (COREG) 6.25 mg tablet Take 6.25 mg by mouth 2 (two) times a day with meals, Historical Med      lisinopril (ZESTRIL) 10 mg tablet Take 10 mg by mouth daily, Historical Med      senna (SENOKOT) 8.6 MG tablet Take 1 tablet by mouth daily as needed, Historical Med         STOP taking these medications       amLODIPine (NORVASC) 2.5 mg tablet Comments:   Reason for Stopping:         senna-docusate sodium (SENOKOT S) 8.6-50 mg per tablet Comments:   Reason for Stopping:               Outpatient Discharge Orders   Ambulatory Referral to Physical Therapy   Standing Status: Future Standing Exp. Date: 08/03/24      Ambulatory Referral to Occupational Therapy   Standing Status: Future Standing Exp.  Date: 08/03/24      Discharge Diet     Discharge Condtion:  Stabilized     Patient Aware of Diagnosis: No     Free of Communicable Disease:   Yes     No dressing needed     Activity:  As Tolerated       PDMP Review     None          ED Provider  Electronically Signed by           Alycia Mccullough MD  08/10/23 5416

## 2023-07-30 ENCOUNTER — APPOINTMENT (INPATIENT)
Dept: MRI IMAGING | Facility: HOSPITAL | Age: 88
DRG: 641 | End: 2023-07-30
Payer: COMMERCIAL

## 2023-07-30 PROBLEM — R73.03 PREDIABETES: Status: ACTIVE | Noted: 2023-07-30

## 2023-07-30 LAB
2HR DELTA HS TROPONIN: -2 NG/L
4HR DELTA HS TROPONIN: 0 NG/L
ANION GAP SERPL CALCULATED.3IONS-SCNC: 7 MMOL/L
BUN SERPL-MCNC: 19 MG/DL (ref 5–25)
CALCIUM SERPL-MCNC: 9.3 MG/DL (ref 8.4–10.2)
CARDIAC TROPONIN I PNL SERPL HS: 6 NG/L
CARDIAC TROPONIN I PNL SERPL HS: 8 NG/L
CHLORIDE SERPL-SCNC: 101 MMOL/L (ref 96–108)
CO2 SERPL-SCNC: 28 MMOL/L (ref 21–32)
CREAT SERPL-MCNC: 1.36 MG/DL (ref 0.6–1.3)
ERYTHROCYTE [DISTWIDTH] IN BLOOD BY AUTOMATED COUNT: 14.7 % (ref 11.6–15.1)
GFR SERPL CREATININE-BSD FRML MDRD: 45 ML/MIN/1.73SQ M
GLUCOSE SERPL-MCNC: 94 MG/DL (ref 65–140)
HCT VFR BLD AUTO: 43.8 % (ref 36.5–49.3)
HGB BLD-MCNC: 14.5 G/DL (ref 12–17)
MCH RBC QN AUTO: 29.1 PG (ref 26.8–34.3)
MCHC RBC AUTO-ENTMCNC: 33.1 G/DL (ref 31.4–37.4)
MCV RBC AUTO: 88 FL (ref 82–98)
PLATELET # BLD AUTO: 266 THOUSANDS/UL (ref 149–390)
PMV BLD AUTO: 9.6 FL (ref 8.9–12.7)
POTASSIUM SERPL-SCNC: 4 MMOL/L (ref 3.5–5.3)
RBC # BLD AUTO: 4.99 MILLION/UL (ref 3.88–5.62)
SODIUM SERPL-SCNC: 136 MMOL/L (ref 135–147)
WBC # BLD AUTO: 10.35 THOUSAND/UL (ref 4.31–10.16)

## 2023-07-30 PROCEDURE — 70551 MRI BRAIN STEM W/O DYE: CPT

## 2023-07-30 PROCEDURE — 84484 ASSAY OF TROPONIN QUANT: CPT | Performed by: EMERGENCY MEDICINE

## 2023-07-30 PROCEDURE — 80048 BASIC METABOLIC PNL TOTAL CA: CPT

## 2023-07-30 PROCEDURE — 92610 EVALUATE SWALLOWING FUNCTION: CPT

## 2023-07-30 PROCEDURE — 85027 COMPLETE CBC AUTOMATED: CPT

## 2023-07-30 PROCEDURE — 87081 CULTURE SCREEN ONLY: CPT | Performed by: INTERNAL MEDICINE

## 2023-07-30 RX ADMIN — HEPARIN SODIUM 5000 UNITS: 5000 INJECTION INTRAVENOUS; SUBCUTANEOUS at 13:35

## 2023-07-30 RX ADMIN — CARVEDILOL 6.25 MG: 6.25 TABLET, FILM COATED ORAL at 16:20

## 2023-07-30 RX ADMIN — SODIUM CHLORIDE 75 ML/HR: 0.9 INJECTION, SOLUTION INTRAVENOUS at 01:23

## 2023-07-30 RX ADMIN — ATORVASTATIN CALCIUM 40 MG: 40 TABLET, FILM COATED ORAL at 17:46

## 2023-07-30 RX ADMIN — CARVEDILOL 6.25 MG: 6.25 TABLET, FILM COATED ORAL at 08:48

## 2023-07-30 RX ADMIN — HEPARIN SODIUM 5000 UNITS: 5000 INJECTION INTRAVENOUS; SUBCUTANEOUS at 05:41

## 2023-07-30 RX ADMIN — AMLODIPINE BESYLATE 2.5 MG: 2.5 TABLET ORAL at 08:47

## 2023-07-30 RX ADMIN — HEPARIN SODIUM 5000 UNITS: 5000 INJECTION INTRAVENOUS; SUBCUTANEOUS at 21:01

## 2023-07-30 RX ADMIN — ATORVASTATIN CALCIUM 40 MG: 40 TABLET, FILM COATED ORAL at 01:23

## 2023-07-30 RX ADMIN — LISINOPRIL 10 MG: 10 TABLET ORAL at 08:47

## 2023-07-30 RX ADMIN — SODIUM CHLORIDE 75 ML/HR: 0.9 INJECTION, SOLUTION INTRAVENOUS at 20:57

## 2023-07-30 RX ADMIN — ASPIRIN 81 MG: 81 TABLET, COATED ORAL at 08:47

## 2023-07-30 NOTE — ASSESSMENT & PLAN NOTE
· Failure to thrive as evidenced by poor oral intake for the past few days at assisted living facility. · Generalized weakness with difficulty ambulating at assisted living facility reported by son and daughter-in-law. · Has been compliant with home medications without symptoms of nausea and vomiting. · Daughter-in-law does state the patient has chronically had issues with swallowing. No aspiration events noted.     Plan:  · PT/OT eval  · Gentle IV NS at 75 cc/hr for hydration  · Encourage PO intake  · Fall precautions  · Speech and swallow eval

## 2023-07-30 NOTE — ASSESSMENT & PLAN NOTE
· POA blood glucose 190  · A1c on 6/15: 5.7  · If blood glucose continues to increase, can consider starting SSI

## 2023-07-30 NOTE — ASSESSMENT & PLAN NOTE
· Patient brought in my son and daughter-in-law with report of increasing altered mental status with generalized weakness for the past 2-3 days. · Poor oral intake with reduction in fluid intake at his assisted living facility. · Was previously admitted on 6/13 as stroke alert after sustaining a fall. Noted to have symptoms of aphasia and right upper extremity sensory deficit and weakness. The patient's daughter-in-law denies similar symptoms presently. 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 · He was discharged on aspirin and atorvastatin and was instructed to follow-up with cardiology for zio patch/loop recorder. · In the ED, hemodynamically stable. Intermittently responsive and oriented to person, place, but not time. · ECG NSR  · UA with microscopy unremarkable  · Recent echo on 6/15 showed EF of 65% and grade I diastolic dysfunction  · CT head without contrast showed no acute intracranial abnormalities  · No clear infectious source with leukocytosis continuing to downtrend from last admission. No reported steroid use.   · Renal function baseline without uremia  · No acute ischemia on MRI brain    Plan:  · Monitor on telemetry  · Delirium precautions  · Speech and swallow eval and recommendations appreciated  · Continue aspirin 81 mg daily and atorvastatin 40 mg daily  · Continue to monitor CBC and BMP

## 2023-07-30 NOTE — PLAN OF CARE
Problem: SAFETY ADULT  Goal: Patient will remain free of falls  Description: INTERVENTIONS:  - Educate patient/family on patient safety including physical limitations  - Instruct patient to call for assistance with activity   - Consult OT/PT to assist with strengthening/mobility   - Keep Call bell within reach  - Keep bed low and locked with side rails adjusted as appropriate  - Keep care items and personal belongings within reach  - Initiate and maintain comfort rounds  - Make Fall Risk Sign visible to staff  - Offer Toileting every 2 Hours, in advance of need  - Initiate/Maintain bed alarm   - Obtain necessary fall risk management equipment:   - Apply yellow socks and bracelet for high fall risk patients  - Consider moving patient to room near nurses station  Outcome: Progressing     Problem: MOBILITY - ADULT  Goal: Maintain or return to baseline ADL function  Description: INTERVENTIONS:  -  Assess patient's ability to carry out ADLs; assess patient's baseline for ADL function and identify physical deficits which impact ability to perform ADLs (bathing, care of mouth/teeth, toileting, grooming, dressing, etc.)  - Assess/evaluate cause of self-care deficits   - Assess range of motion  - Assess patient's mobility; develop plan if impaired  - Assess patient's need for assistive devices and provide as appropriate  - Encourage maximum independence but intervene and supervise when necessary  - Involve family in performance of ADLs  - Assess for home care needs following discharge   - Consider OT consult to assist with ADL evaluation and planning for discharge  - Provide patient education as appropriate  Outcome: Progressing     Problem: Knowledge Deficit  Goal: Patient/family/caregiver demonstrates understanding of disease process, treatment plan, medications, and discharge instructions  Description: Complete learning assessment and assess knowledge base.   Interventions:  - Provide teaching at level of understanding  - Provide teaching via preferred learning methods  Outcome: Progressing     Problem: Knowledge Deficit  Goal: Patient/family/caregiver demonstrates understanding of disease process, treatment plan, medications, and discharge instructions  Description: Complete learning assessment and assess knowledge base.   Interventions:  - Provide teaching at level of understanding  - Provide teaching via preferred learning methods  Outcome: Progressing

## 2023-07-30 NOTE — H&P
[address] [facility]  H&P  Name: Flaquito Garcia 80 y.o. male I MRN: [id number]  Unit/Bed#: S -01 I Date of Admission: 7/29/2023   Date of Service: 7/30/2023 I Hospital Day: 1      Assessment/Plan   * Altered mental status  Assessment & Plan  · Patient brought in my son and daughter-in-law with report of increasing altered mental status with generalized weakness for the past 2-3 days. · Poor oral intake with reduction in fluid intake at his assisted living facility. · Was previously admitted on 6/13 as stroke alert after sustaining a fall. Noted to have symptoms of aphasia and right upper extremity sensory deficit and weakness. The patient's daughter-in-law denies similar symptoms presently. 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 · He was discharged on aspirin and atorvastatin and was instructed to follow-up with cardiology for zio patch/loop recorder. · In the ED, hemodynamically stable. Intermittently responsive and oriented to person, place, but not time. · ECG NSR  · UA with microscopy unremarkable  · Recent echo on 6/15 showed EF of 65% and grade I diastolic dysfunction  · CT head without contrast showed no acute intracranial abnormalities  · No clear infectious source with leukocytosis continuing to downtrend from last admission. No reported steroid use. · Renal function baseline without uremia    Plan:  · Will obtain MRI brain to rule out acute CVA  · Can consider neurology consult depending on MRI results  · Monitor on telemetry  · Delirium precautions  · Speech and swallow eval  · Continue aspirin 81 mg daily and atorvastatin 40 mg daily  · Continue to monitor CBC and BMP      Failure to thrive in adult  Assessment & Plan  · Failure to thrive as evidenced by poor oral intake for the past few days at assisted living facility.   · Generalized weakness with difficulty ambulating at assisted living facility reported by son and daughter-in-law. · Has been compliant with home medications without symptoms of nausea and vomiting. · Daughter-in-law does state the patient has chronically had issues with swallowing. No aspiration events noted. Plan:  · PT/OT eval  · Start IV NS at 75 cc/hr for hydration  · Encourage PO intake  · Fall precautions  · Speech and swallow eval      Prediabetes  Assessment & Plan  · POA blood glucose 190  · A1c on 6/15: 5.7  · If blood glucose continues to increase, can consider starting SSI    Leukocytosis  Assessment & Plan  · POA WBC count of 12K  · Afebrile on presentation with no report of fever, chills, and cough per family. · UA negative for UTI  · WBC count elevated during last admission and continues to downtrend from 18K  · Continue to monitor CBC    Hyponatremia  Assessment & Plan  · POA sodium 131  · Etiology likely secondary to poor oral intake   · No report of nausea, vomiting, or diarrhea  · Start IV NS at 75 cc/hr  · Monitor BMP    HTN (hypertension)  Assessment & Plan  · Continue Norvasc 2.5 mg daily, Coreg 6.25 mg daily, and lisinopril 10 mg daily  · Continue to monitor blood pressures    CKD (chronic kidney disease) stage 3, GFR 30-59 ml/min [facility])  Assessment & Plan  Lab Results   Component Value Date    EGFR 39 07/29/2023    EGFR 41 06/19/2023    EGFR 48 06/18/2023    CREATININE 1.52 (H) 07/29/2023    CREATININE 1.45 (H) 06/19/2023    CREATININE 1.29 06/18/2023     · POA Cr 1.52 (baseline ~1.3-1.4)  · Mild increase in Cr likely due to poor oral intake and dehydration  · Avoid hypotension and nephrotoxins  · Continue to monitor BMP           VTE Pharmacologic Prophylaxis: VTE Score: 16 High Risk (Score >/= 5) - Pharmacological DVT Prophylaxis Ordered: heparin. Sequential Compression Devices Ordered.   Code Status: Level 3 - DNAR and DNI   Discussion with family: Updated  (daughter in law) via phone.    Anticipated Length of Stay: Patient will be admitted on an inpatient basis with an anticipated length of stay of greater than 2 midnights secondary to altered mental status. Chief Complaint: Altered mental status    History of Present Illness:  hCester Mejia is a 80 y.o. 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 The patient's son and daughter-in-law noted increasing altered mental status, fatigue, and generalized weakness over the past 2-3 days. He was noted to have poor oral intake and reduced fluid intake at his assisted living facility, which has been a recent change since [facility] and was reported to be doing very well. The family states this is a change from his baseline mentation but he has always been hard of hearing. The patient did report earlier in the day that he had a terrible headache that resolved following Tylenol. No report of aphasia or upper extremity sensory deficit or weakness, which were noted during his prior CVA. No history of seizure. Daughter-in-law denies any new fever, chills, cough, and SOB. In the ED, the patient was hemodynamically stable. UA was negative for UTI. CT head without contrast was negative. The patient will be admitted to [facility] for further work-up of AMS. Review of Systems:  Review of Systems   Unable to perform ROS: Mental status change       Past Medical and Surgical History:   Past Medical History:   Diagnosis Date   • CKD (chronic kidney disease)    • HL (hearing loss)    • HTN (hypertension)    • Hypertension    • Hyponatremia    • Prostate CA [facility])        History reviewed. No pertinent surgical history. Meds/Allergies:  Prior to Admission medications    Medication Sig Start Date End Date Taking?  Authorizing Provider   acetaminophen (TYLENOL) 325 mg tablet Take 650 mg by mouth every 6 (six) hours as needed for mild pain Historical Provider, MD   acetaminophen (TYLENOL) 325 mg tablet Take 650 mg by mouth every 6 (six) hours as needed for mild pain or fever    Historical Provider, MD   albuterol (PROVENTIL HFA,VENTOLIN HFA) 90 mcg/act inhaler Inhale 2 puffs every 4 (four) hours as needed for wheezing or shortness of breath 9/1/21   Jaz Rubio PA-C   amLODIPine (NORVASC) 2.5 mg tablet Take 1 tablet (2.5 mg total) by mouth daily 11/13/21   Sherri Mullen MD   amLODIPine (NORVASC) 2.5 mg tablet Take 1 tablet (2.5 mg total) by mouth daily 6/19/23 7/19/23  Jia Pool MD   aspirin (ECOTRIN LOW STRENGTH) 81 mg EC tablet Take 1 tablet (81 mg total) by mouth daily 6/19/23 7/19/23  Jia Pool MD   atorvastatin (LIPITOR) 40 mg tablet Take 1 tablet (40 mg total) by mouth every evening 6/19/23 7/19/23  Jia Pool MD   carvedilol (COREG) 6.25 mg tablet Take 6.25 mg by mouth 2 (two) times a day with meals    Historical Provider, MD   carvedilol (COREG) 6.25 mg tablet Take 6.25 mg by mouth 2 (two) times a day with meals    Historical Provider, MD   carvedilol (COREG) 6.25 mg tablet Take 1 tablet (6.25 mg total) by mouth 2 (two) times a day with meals 6/19/23 7/19/23  Jia Pool MD   lisinopril (ZESTRIL) 10 mg tablet Take 10 mg by mouth daily    Historical Provider, MD   lisinopril (ZESTRIL) 10 mg tablet Take 10 mg by mouth daily    Historical Provider, MD   senna (SENOKOT) 8.6 MG tablet Take 1 tablet by mouth daily as needed    Historical Provider, MD   senna-docusate sodium (SENOKOT S) 8.6-50 mg per tablet Take 1 tablet by mouth 2 (two) times a day 11/13/21   Sherri Mullen MD     I have reviewed home medications with patient family member.     Allergies: No Known Allergies    Social History:  Marital Status: Single   Occupation: N/A  Patient Pre-hospital Living Situation: Assisted Living  Patient Pre-hospital Level of Mobility: walks  Patient Pre-hospital Diet Restrictions: N/A  Substance Use History:   Social History     Substance and Sexual Activity   Alcohol Use Never     Social History     Tobacco Use   Smoking Status Not on file   Smokeless Tobacco Never     Social History     Substance and Sexual Activity   Drug Use Never       Family History:  History reviewed. No pertinent family history. Physical Exam:     Vitals:   Blood Pressure: 149/80 (23)  Pulse: 59 (23)  Temperature: 98.6 °F (37 °C) (23)  Temp Source: Oral (23)  Respirations: 20 (23)  Weight - Scale: 83.4 kg (183 lb 13.8 oz) (23)  SpO2: 93 % (23)    Physical Exam  Constitutional:       General: He is not in acute distress. Appearance: Normal appearance. He is not ill-appearing. HENT:      Head: Normocephalic and atraumatic. Right Ear: External ear normal.      Left Ear: External ear normal.      Nose: Nose normal.   Eyes:      Extraocular Movements: Extraocular movements intact. Conjunctiva/sclera: Conjunctivae normal.   Cardiovascular:      Rate and Rhythm: Normal rate and regular rhythm. Pulses: Normal pulses. Heart sounds: Normal heart sounds, S1 normal and S2 normal. No murmur heard. Pulmonary:      Effort: Pulmonary effort is normal. No respiratory distress. Breath sounds: Normal breath sounds. No wheezing, rhonchi or rales. Abdominal:      General: Abdomen is flat. Bowel sounds are normal. There is no distension. Palpations: Abdomen is soft. Tenderness: There is no abdominal tenderness. There is no guarding or rebound. Musculoskeletal:      Cervical back: Neck supple. Right lower leg: No edema. Left lower le+ Pitting Edema present. Skin:     General: Skin is warm and dry. Neurological:      General: No focal deficit present. Mental Status: He is alert. He is confused. Cranial Nerves: No dysarthria or facial asymmetry. Sensory: Sensation is intact.       Motor: Motor function is intact. Comments: AAOx2 to person and place but not to time, 5/5 muscle strength in the right upper extremity    Psychiatric:         Mood and Affect: Mood normal.         Speech: Speech normal.         Behavior: Behavior normal.      Comments: Intermittently noncommunicative, will occasionally respond with non-sensical words          Additional Data:     Lab Results:  Results from last 7 days   Lab Units 07/29/23 2036   WBC Thousand/uL 12.29*   HEMOGLOBIN g/dL 14.3   HEMATOCRIT % 43.8   PLATELETS Thousands/uL 280   NEUTROS PCT % 78*   LYMPHS PCT % 12*   MONOS PCT % 8   EOS PCT % 1     Results from last 7 days   Lab Units 07/29/23 2036   SODIUM mmol/L 131*   POTASSIUM mmol/L 4.2   CHLORIDE mmol/L 98   CO2 mmol/L 25   BUN mg/dL 23   CREATININE mg/dL 1.52*   ANION GAP mmol/L 8   CALCIUM mg/dL 9.0   ALBUMIN g/dL 4.0   TOTAL BILIRUBIN mg/dL 0.80   ALK PHOS U/L 85   ALT U/L 15   AST U/L 13   GLUCOSE RANDOM mg/dL 190*         Results from last 7 days   Lab Units 07/29/23  1932   POC GLUCOSE mg/dl 216*               Lines/Drains:  Invasive Devices     Peripheral Intravenous Line  Duration           Peripheral IV 07/29/23 Dorsal (posterior); Left Hand 1 day          Drain  Duration           External Urinary Catheter 46 days                    Imaging: Reviewed radiology reports from this admission including: CT head  CT head without contrast   Final Result by Mary Boston MD (07/29 2213)      No acute intracranial abnormality. Workstation performed: [id number]         MRI inpatient order    (Results Pending)       EKG and Other Studies Reviewed on Admission:   · EKG: NSR. HR 65.    ** Please Note: This note has been constructed using a voice recognition system.  **

## 2023-07-30 NOTE — ASSESSMENT & PLAN NOTE
· POA sodium 131  · Etiology likely secondary to poor oral intake   · No report of nausea, vomiting, or diarrhea  · Start IV NS at 75 cc/hr  · Monitor BMP

## 2023-07-30 NOTE — PLAN OF CARE
Problem: MOBILITY - ADULT  Goal: Maintain or return to baseline ADL function  Description: INTERVENTIONS:  -  Assess patient's ability to carry out ADLs; assess patient's baseline for ADL function and identify physical deficits which impact ability to perform ADLs (bathing, care of mouth/teeth, toileting, grooming, dressing, etc.)  - Assess/evaluate cause of self-care deficits   - Assess range of motion  - Assess patient's mobility; develop plan if impaired  - Assess patient's need for assistive devices and provide as appropriate  - Encourage maximum independence but intervene and supervise when necessary  - Involve family in performance of ADLs  - Assess for home care needs following discharge   - Consider OT consult to assist with ADL evaluation and planning for discharge  - Provide patient education as appropriate  Outcome: Progressing  Goal: Maintains/Returns to pre admission functional level  Description: INTERVENTIONS:  - Perform BMAT or MOVE assessment daily.   - Set and communicate daily mobility goal to care team and patient/family/caregiver. - Collaborate with rehabilitation services on mobility goals if consulted  - Perform Range of Motion 4 times a day. - Reposition patient every 2 hours.   - Dangle patient 3 times a day  - Stand patient 3 times a day  - Ambulate patient 3 times a day  - Out of bed to chair 3 times a day   - Out of bed for meals 3 times a day  - Out of bed for toileting  - Record patient progress and toleration of activity level   Outcome: Progressing     Problem: Prexisting or High Potential for Compromised Skin Integrity  Goal: Skin integrity is maintained or improved  Description: INTERVENTIONS:  - Identify patients at risk for skin breakdown  - Assess and monitor skin integrity  - Assess and monitor nutrition and hydration status  - Monitor labs   - Assess for incontinence   - Turn and reposition patient  - Assist with mobility/ambulation  - Relieve pressure over bony prominences  - Avoid friction and shearing  - Provide appropriate hygiene as needed including keeping skin clean and dry  - Evaluate need for skin moisturizer/barrier cream  - Collaborate with interdisciplinary team   - Patient/family teaching  - Consider wound care consult   Outcome: Progressing     Problem: SAFETY ADULT  Goal: Patient will remain free of falls  Description: INTERVENTIONS:  - Educate patient/family on patient safety including physical limitations  - Instruct patient to call for assistance with activity   - Consult OT/PT to assist with strengthening/mobility   - Keep Call bell within reach  - Keep bed low and locked with side rails adjusted as appropriate  - Keep care items and personal belongings within reach  - Initiate and maintain comfort rounds  - Make Fall Risk Sign visible to staff  - Offer Toileting every 2 Hours, in advance of need  - Initiate/Maintain bed alarm  - Obtain necessary fall risk management equipment  - Apply yellow socks and bracelet for high fall risk patients  - Consider moving patient to room near nurses station  Outcome: Progressing     Problem: DISCHARGE PLANNING  Goal: Discharge to home or other facility with appropriate resources  Description: INTERVENTIONS:  - Identify barriers to discharge w/patient and caregiver  - Arrange for needed discharge resources and transportation as appropriate  - Identify discharge learning needs (meds, wound care, etc.)  - Arrange for interpretive services to assist at discharge as needed  - Refer to Case Management Department for coordinating discharge planning if the patient needs post-hospital services based on physician/advanced practitioner order or complex needs related to functional status, cognitive ability, or social support system  Outcome: Progressing     Problem: Knowledge Deficit  Goal: Patient/family/caregiver demonstrates understanding of disease process, treatment plan, medications, and discharge instructions  Description: Complete learning assessment and assess knowledge base.   Interventions:  - Provide teaching at level of understanding  - Provide teaching via preferred learning methods  Outcome: Progressing

## 2023-07-30 NOTE — ASSESSMENT & PLAN NOTE
Lab Results   Component Value Date    WBC 10.35 (H) 07/30/2023    WBC 12.29 (H) 07/29/2023    WBC 10.15 06/19/2023      · POA WBC count of 12K  · Afebrile on presentation with no report of fever, chills, and cough per family.   · UA negative for UTI  · WBC count elevated during last admission and continues to downtrend from 18K  · Continue to monitor CBC

## 2023-07-30 NOTE — SPEECH THERAPY NOTE
Speech-Language Pathology Bedside Swallow Evaluation      Patient Name: Garrison Butcher    HNTQC'D Date: 7/30/2023     Problem List  Principal Problem:    Altered mental status  Active Problems:    CKD (chronic kidney disease) stage 3, GFR 30-59 ml/min ([facility])    HTN (hypertension)    Hyponatremia    Leukocytosis    Failure to thrive in adult    Prediabetes      Past Medical History  Past Medical History:   Diagnosis Date   • CKD (chronic kidney disease)    • HL (hearing loss)    • HTN (hypertension)    • Hypertension    • Hyponatremia    • Prostate CA [facility])        Past Surgical History  History reviewed. No pertinent surgical history. Summary   Pt presented with functional appearing oral and pharyngeal stage swallowing skills with omelet, 10 ozs thin liquid (2 water, 8 black coffee) and pills with water. While ingesting fresh cut fruit, he presented with occasions of strong moist cough (cough persisted or re-occurred when RN & I remained/conversed with pt  ie cough not always associated in time with swallow/po intake)). Recommended Diet: soft/level 3 diet and thin liquids   Recommended Form of Meds: whole with liquid   Aspiration precautions and swallowing strategies: upright posture and slow rate of feeding  Other Recommendations: Continue oral care, ensure use of dentures for all meals        Current Medical Status  Garrison Butcher is a 80 y.o. 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 He was noted to have poor oral intake and reduced fluid intake at his assisted living facility, which has been a recent change since he had returned from [facility] rehab last week. In the ED, the patient was hemodynamically stable. UA was negative for UTI. CT head without contrast was negative. The patient will be admitted to [facility] for further work-up of AMS. SLP Swallowing Evaluation ordered at this time.      Current Precautions: Delirium    Allergies:  No known food allergies    Past medical history:  Please see H&P for details    Special Studies:  7/29 CT of head:  No acute intracranial abnormality. MRI of brain:  pending    Social/Education/Vocational Hx:  Pt lives in assisted living facility/[facility]    Swallow Information   Current Symptoms/Concerns: family reported "chronic issues with swallowing"but on regular textured food/thin liquid at [facility]  Current Diet: regular diet and thin liquids   Baseline Diet: regular diet and thin liquids      Baseline Assessment   Behavior/Cognition: alert, [place], jovial  Speech/Language Status: able to follow commands inconsistently/with cues, able to express basic needs with no significant dysarthria noted  Patient Positioning: upright in bed but preferred to lean to L (repositioned x3 durign full meal and medication administration)  Pain Status/Interventions/Response to Interventions:  No report of or nonverbal indications of pain. Swallow Mechanism Exam  Facial: symmetrical  Labial: WFL  Lingual: WFL  Velum: symmetrical  Mandible: adequate ROM  Dentition: edentulous and full dentures  Vocal quality: generally clear ("wet" at very end of meal)   Volitional Cough: strong/productive   Respiratory Status: on RA      Consistencies Assessed and Performance   Materials administered included thin liquid, pills w/thin liquid, cheese omelt, fresh fruit well cut    Oral Stage: suspect mild impairment  Mastication: appeared adequate with the materials administered today. Bolus formation/transfer: min imparied with food with min lingual residue with omelet, can not r/o premature spillage with fresh fruit/juice from fruit. Pharyngeal Stage: suspect min impairment bu toccasion cough is so strong that attention is brought to cough episode. Swallow Mechanics:  Swallowing initiation appeared prompt with all liquids. .  Laryngeal rise: judged to be within functional limits.     No coughing, throat clearing, change in vocal quality or respiratory status noted with intake of thin liquids, meds with thin liquid or omelet. Immediate cough x1 noted with ingestion of fresh fruit (x1-2 delayed cough ? If related)    Esophageal Concerns: no known hx but sxs note at meals end today (can not r/o esophogeal dismotility with "backflow"    Strategies and Efficacy: was repeatedly positioned upright, minimized distractions    Summary and Recommendations (see above)    Results Reviewed with: patient and RN     Treatment Recommended: continue eval/diagnostic tx, r/o any respiratory concerns or recurrent sxs.     Patient Stated Goal:  Did not state    Short Term Goals:    -Pt will tolerate Dysphagia 3/advanced (dental soft) diet and thin liquid with no significant s/s oral or pharyngeal dysphagia across 1-2 diagnostic session/s.    -Patient will tolerate trials of upgraded/regular textured food with no significant s/s of oral/ pharyngeal or esophageal dysphagiaacross 1-3 diagnostic sessions     Berenice Peoples [address] [place] [address] [id number]  [place] License [id number]  Available via [facility] Text

## 2023-07-30 NOTE — ASSESSMENT & PLAN NOTE
Lab Results   Component Value Date    EGFR 39 07/29/2023    EGFR 41 06/19/2023    EGFR 48 06/18/2023    CREATININE 1.52 (H) 07/29/2023    CREATININE 1.45 (H) 06/19/2023    CREATININE 1.29 06/18/2023     · POA Cr 1.52 (baseline ~1.3-1.4)  · Mild increase in Cr likely due to poor oral intake and dehydration  · Avoid hypotension and nephrotoxins  · Continue to monitor BMP

## 2023-07-30 NOTE — ASSESSMENT & PLAN NOTE
Lab Results   Component Value Date    EGFR 45 07/30/2023    EGFR 39 07/29/2023    EGFR 41 06/19/2023    CREATININE 1.36 (H) 07/30/2023    CREATININE 1.52 (H) 07/29/2023    CREATININE 1.45 (H) 06/19/2023     · POA Cr 1.52 (baseline ~1.3-1.4)  · Mild increase in Cr likely due to poor oral intake and dehydration  · Avoid hypotension and nephrotoxins  · Continue to monitor BMP

## 2023-07-30 NOTE — ASSESSMENT & PLAN NOTE
Lab Results   Component Value Date    SODIUM 136 07/30/2023    SODIUM 131 (L) 07/29/2023    SODIUM 135 06/19/2023      · POA sodium 131  · Etiology likely secondary to poor oral intake   · No report of nausea, vomiting, or diarrhea  · IV NS at 75 cc/hr  · Monitor BMP

## 2023-07-30 NOTE — ASSESSMENT & PLAN NOTE
· Patient brought in my son and daughter-in-law with report of increasing altered mental status with generalized weakness for the past 2-3 days. · Poor oral intake with reduction in fluid intake at his assisted living facility. · Was previously admitted on 6/13 as stroke alert after sustaining a fall. Noted to have symptoms of aphasia and right upper extremity sensory deficit and weakness. The patient's daughter-in-law denies similar symptoms presently. 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 · He was discharged on aspirin and atorvastatin and was instructed to follow-up with cardiology for zio patch/loop recorder. · In the ED, hemodynamically stable. Intermittently responsive and oriented to person, place, but not time. · ECG NSR  · UA with microscopy unremarkable  · Recent echo on 6/15 showed EF of 65% and grade I diastolic dysfunction  · CT head without contrast showed no acute intracranial abnormalities  · No clear infectious source with leukocytosis continuing to downtrend from last admission. No reported steroid use.   · Renal function baseline without uremia    Plan:  · Will obtain MRI brain to rule out acute CVA  · Can consider neurology consult depending on MRI results  · Monitor on telemetry  · Delirium precautions  · Speech and swallow eval  · Continue aspirin 81 mg daily and atorvastatin 40 mg daily  · Continue to monitor CBC and BMP

## 2023-07-30 NOTE — ASSESSMENT & PLAN NOTE
· Continue Norvasc 2.5 mg daily, Coreg 6.25 mg daily, and lisinopril 10 mg daily  · Continue to monitor blood pressures

## 2023-07-30 NOTE — PLAN OF CARE
Pt presented with functional appearing oral and pharyngeal stage swallowing skills with omelet, 10 ozs thin liquid (2 water, 8 black coffee) and pills with water. While ingesting fresh cut fruit, he presented with occasions of strong moist cough (cough persisted or re-occurred when RN & I remained/conversed with pt  ie cough not always associated in time with swallow/po intake)).       Recommended Diet: soft/level 3 diet and thin liquids   Recommended Form of Meds: whole with liquid   Aspiration precautions and swallowing strategies: upright posture and slow rate of feeding  Other Recommendations: Continue oral care, ensure use of dentures for all meals

## 2023-07-30 NOTE — ASSESSMENT & PLAN NOTE
· Failure to thrive as evidenced by poor oral intake for the past few days at assisted living facility. · Generalized weakness with difficulty ambulating at assisted living facility reported by son and daughter-in-law. · Has been compliant with home medications without symptoms of nausea and vomiting. · Daughter-in-law does state the patient has chronically had issues with swallowing. No aspiration events noted.     Plan:  · PT/OT eval  · Start IV NS at 75 cc/hr for hydration  · Encourage PO intake  · Fall precautions  · Speech and swallow eval

## 2023-07-31 LAB
ANION GAP SERPL CALCULATED.3IONS-SCNC: 9 MMOL/L
ATRIAL RATE: 65 BPM
BUN SERPL-MCNC: 23 MG/DL (ref 5–25)
CALCIUM SERPL-MCNC: 8.4 MG/DL (ref 8.4–10.2)
CHLORIDE SERPL-SCNC: 105 MMOL/L (ref 96–108)
CK SERPL-CCNC: 49 U/L (ref 39–308)
CO2 SERPL-SCNC: 20 MMOL/L (ref 21–32)
CREAT SERPL-MCNC: 1.26 MG/DL (ref 0.6–1.3)
ERYTHROCYTE [DISTWIDTH] IN BLOOD BY AUTOMATED COUNT: 14.7 % (ref 11.6–15.1)
GFR SERPL CREATININE-BSD FRML MDRD: 49 ML/MIN/1.73SQ M
GLUCOSE SERPL-MCNC: 95 MG/DL (ref 65–140)
HCT VFR BLD AUTO: 42 % (ref 36.5–49.3)
HGB BLD-MCNC: 13.2 G/DL (ref 12–17)
MCH RBC QN AUTO: 28.9 PG (ref 26.8–34.3)
MCHC RBC AUTO-ENTMCNC: 31.4 G/DL (ref 31.4–37.4)
MCV RBC AUTO: 92 FL (ref 82–98)
MRSA NOSE QL CULT: NORMAL
P AXIS: 55 DEGREES
PLATELET # BLD AUTO: 245 THOUSANDS/UL (ref 149–390)
PMV BLD AUTO: 9.9 FL (ref 8.9–12.7)
POTASSIUM SERPL-SCNC: 4 MMOL/L (ref 3.5–5.3)
PR INTERVAL: 168 MS
QRS AXIS: -17 DEGREES
QRSD INTERVAL: 86 MS
QT INTERVAL: 392 MS
QTC INTERVAL: 407 MS
RBC # BLD AUTO: 4.57 MILLION/UL (ref 3.88–5.62)
SODIUM SERPL-SCNC: 134 MMOL/L (ref 135–147)
T WAVE AXIS: 38 DEGREES
VENTRICULAR RATE: 65 BPM
WBC # BLD AUTO: 9.86 THOUSAND/UL (ref 4.31–10.16)

## 2023-07-31 PROCEDURE — 93010 ELECTROCARDIOGRAM REPORT: CPT | Performed by: INTERNAL MEDICINE

## 2023-07-31 PROCEDURE — 97167 OT EVAL HIGH COMPLEX 60 MIN: CPT

## 2023-07-31 PROCEDURE — 80048 BASIC METABOLIC PNL TOTAL CA: CPT | Performed by: INTERNAL MEDICINE

## 2023-07-31 PROCEDURE — 82550 ASSAY OF CK (CPK): CPT | Performed by: STUDENT IN AN ORGANIZED HEALTH CARE EDUCATION/TRAINING PROGRAM

## 2023-07-31 PROCEDURE — 99232 SBSQ HOSP IP/OBS MODERATE 35: CPT | Performed by: INTERNAL MEDICINE

## 2023-07-31 PROCEDURE — 97163 PT EVAL HIGH COMPLEX 45 MIN: CPT

## 2023-07-31 PROCEDURE — 85027 COMPLETE CBC AUTOMATED: CPT | Performed by: INTERNAL MEDICINE

## 2023-07-31 RX ADMIN — ASPIRIN 81 MG: 81 TABLET, COATED ORAL at 08:44

## 2023-07-31 RX ADMIN — ATORVASTATIN CALCIUM 40 MG: 40 TABLET, FILM COATED ORAL at 17:00

## 2023-07-31 RX ADMIN — HEPARIN SODIUM 5000 UNITS: 5000 INJECTION INTRAVENOUS; SUBCUTANEOUS at 21:19

## 2023-07-31 RX ADMIN — HEPARIN SODIUM 5000 UNITS: 5000 INJECTION INTRAVENOUS; SUBCUTANEOUS at 13:15

## 2023-07-31 RX ADMIN — CARVEDILOL 6.25 MG: 6.25 TABLET, FILM COATED ORAL at 08:44

## 2023-07-31 RX ADMIN — CARVEDILOL 6.25 MG: 6.25 TABLET, FILM COATED ORAL at 17:00

## 2023-07-31 RX ADMIN — AMLODIPINE BESYLATE 2.5 MG: 2.5 TABLET ORAL at 08:44

## 2023-07-31 RX ADMIN — HEPARIN SODIUM 5000 UNITS: 5000 INJECTION INTRAVENOUS; SUBCUTANEOUS at 05:11

## 2023-07-31 RX ADMIN — LISINOPRIL 10 MG: 10 TABLET ORAL at 08:44

## 2023-07-31 NOTE — PLAN OF CARE
Problem: PHYSICAL THERAPY ADULT  Goal: Performs mobility at highest level of function for planned discharge setting. See evaluation for individualized goals. Description: Treatment/Interventions: Functional transfer training, LE strengthening/ROM, Therapeutic exercise, Endurance training, Cognitive reorientation, Patient/family training, Equipment eval/education, Bed mobility, Gait training, Compensatory technique education  Equipment Recommended: Hernando Stoner       See flowsheet documentation for full assessment, interventions and recommendations. 7/31/2023 1458 by Indira Jefferson PT  Note: Prognosis: Good  Problem List: Decreased endurance, Impaired balance, Decreased mobility, Decreased cognition, Impaired judgement, Decreased safety awareness  Assessment: Brianda Fan is a 80 y.o. Male who presents to [facility] on 7/29/23 due to AMS (increaesd confusion, fatigue) and diagnosis of AMS. Orders for PT eval and treat received, w/ activity orders of up and OOB as tolerated. Comorbidities affecting pt's functional mobility at time of evaluation include: CKD, HTN, CVA, lacunar infarction. Personal factors affecting DC include: ambulating w/ assistive device, decreased cognition, positive fall history, inability to perform IADLs and inability to perform ADLs. At baseline, pt mobilizes w/ RW, and w/ 1-4 fall(s) in the previous 6 months. Upon evaluation, pt presents w/ the following deficits: impaired balance, impaired cognition, decreased safety awareness, decreased endurance/activity tolerance and gait deviations. Pt currently requires supervision for transfers, supervision w/ RW for ambulation. Pt's clinical presentation is unstable/unpredictable due to need for input for mobility technique, need to input for safety awareness, recent h/o falls, ongoing medical management. From a PT/mobility standpoint given the above findings, DC recommendation is: Return to facility w/ skilled PT needs.  During current admission, pt will benefit from continued skilled inpatient PT in the acute care setting in order to address the above deficits and to maximize function and mobility prior to DC from acute care. PT Discharge Recommendation: Return to facility with rehabilitation services    See flowsheet documentation for full assessment.

## 2023-07-31 NOTE — PLAN OF CARE
Problem: MOBILITY - ADULT  Goal: Maintain or return to baseline ADL function  Description: INTERVENTIONS:  -  Assess patient's ability to carry out ADLs; assess patient's baseline for ADL function and identify physical deficits which impact ability to perform ADLs (bathing, care of mouth/teeth, toileting, grooming, dressing, etc.)  - Assess/evaluate cause of self-care deficits   - Assess range of motion  - Assess patient's mobility; develop plan if impaired  - Assess patient's need for assistive devices and provide as appropriate  - Encourage maximum independence but intervene and supervise when necessary  - Involve family in performance of ADLs  - Assess for home care needs following discharge   - Consider OT consult to assist with ADL evaluation and planning for discharge  - Provide patient education as appropriate  Outcome: Progressing     Problem: SAFETY ADULT  Goal: Patient will remain free of falls  Description: INTERVENTIONS:  - Educate patient/family on patient safety including physical limitations  - Instruct patient to call for assistance with activity   - Consult OT/PT to assist with strengthening/mobility   - Keep Call bell within reach  - Keep bed low and locked with side rails adjusted as appropriate  - Keep care items and personal belongings within reach  - Initiate and maintain comfort rounds  - Make Fall Risk Sign visible to staff  - Offer Toileting every 2-3 Hours, in advance of need  - Initiate/Maintain bed alarm, increased comfort rounds  - Obtain necessary fall risk management equipment: alarms, non-slip yellow socks  - Apply yellow socks and bracelet for high fall risk patients  - Consider moving patient to room near nurses station  Outcome: Progressing

## 2023-07-31 NOTE — UTILIZATION REVIEW
Initial Clinical Review    Admission: Date/Time/Statement:   Admission Orders (From admission, onward)     Ordered        07/29/23 2252  INPATIENT ADMISSION  Once                      Orders Placed This Encounter   Procedures   • INPATIENT ADMISSION     Standing Status:   Standing     Number of Occurrences:   1     Order Specific Question:   Level of Care     Answer:   Med Surg [16]     Order Specific Question:   Estimated length of stay     Answer:   More than 2 Midnights     Order Specific Question:   Certification     Answer:   I certify that inpatient services are medically necessary for this patient for a duration of greater than two midnights. See H&P and MD Progress Notes for additional information about the patient's course of treatment. ED Arrival Information     Expected   -    Arrival   7/29/2023 19:23    Acuity   Urgent            Means of arrival   Ambulance    Escorted by   [facility]    Admission type   Emergency            Arrival complaint   -           Chief Complaint   Patient presents with   • Altered Mental Status     Per nursing home and family patient is more confused and fatigued than usual. Pt is unable to provide any history, is alert and oriented to self. Pt has no complaints at this time. Initial Presentation: 80 y.o. 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 The patient's son and daughter-in-law noted increasing altered mental status, fatigue, and generalized weakness over the past 2-3 days. He was noted to have poor oral intake and reduced fluid intake at his assisted living facility, which has been a recent change since he had returned from [facility] rehab last week and was reported to be doing very well. The family states this is a change from his baseline mentation but he has always been hard of hearing.  The patient did report earlier in the day that he had a terrible headache that resolved following Tylenol. No report of aphasia or upper extremity sensory deficit or weakness, which were noted during his prior CVA. Plan: Inpatient admission for evaluation and treatment of altered mental status, failure to thrive, prediabetes, leukocytosis, hyponatremia, HTN, CKD stage 3: Mri brain, telemetry, delirium precautions, ASA 81 mg and atorvastatin 40 mg, monitor CBC and BMP, IV fluid, fall precautions, speech and swallow eval, monitor blood sugars-consider starting SSI, continue Norvasc, Coreg and lisinopril. Date: 7/30   Day 2:     Internal medicine: telemetry, delirium precautions, speech and swallow eval, continue ASA and atorvastatin, monitor CBC and BMP, continue monitoring blood sugars, continue IV fluids, PT/OT eval, fall precautions, continue Norvasc, Coreg and lisinopril. Date: 7/31    Day 3: Has surpassed a 2nd midnight with active treatments and services, which include IV fluids. ED Triage Vitals   Temperature Pulse Respirations Blood Pressure SpO2   07/29/23 1926 07/29/23 1926 07/29/23 1926 07/29/23 1926 07/29/23 1926   98.6 °F (37 °C) 64 16 120/57 92 %      Temp Source Heart Rate Source Patient Position - Orthostatic VS BP Location FiO2 (%)   07/29/23 1926 07/29/23 1926 07/29/23 1926 07/29/23 1926 --   Oral Monitor Lying Right arm       Pain Score       07/30/23 0100       No Pain          Wt Readings from Last 1 Encounters:   07/29/23 83.4 kg (183 lb 13.8 oz)     Additional Vital Signs: 7/31  Pertinent Labs/Diagnostic Test Results:   MRI brain wo contrast   Final Result by Reinaldo Altamirano DO (07/30 1711)      No acute ischemia. 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 Small acute infarct seen approximately 6 weeks ago no longer demonstrate restricted diffusion.       Workstation performed: [id number]         CT head without contrast   Final Result by Polly Almanza MD (07/29 2213)      No acute intracranial abnormality.                   Workstation performed: [id number]               Results from last 7 days   Lab Units 07/31/23  0222 07/30/23 0743 07/29/23 2036   WBC Thousand/uL 9.86 10.35* 12.29*   HEMOGLOBIN g/dL 13.2 14.5 14.3   HEMATOCRIT % 42.0 43.8 43.8   PLATELETS Thousands/uL 245 266 280   NEUTROS ABS Thousands/µL  --   --  9.62*         Results from last 7 days   Lab Units 07/31/23  0254 07/30/23 0743 07/29/23 2036   SODIUM mmol/L 134* 136 131*   POTASSIUM mmol/L 4.0 4.0 4.2   CHLORIDE mmol/L 105 101 98   CO2 mmol/L 20* 28 25   ANION GAP mmol/L 9 7 8   BUN mg/dL 23 19 23   CREATININE mg/dL 1.26 1.36* 1.52*   EGFR ml/min/1.73sq m 49 45 39   CALCIUM mg/dL 8.4 9.3 9.0     Results from last 7 days   Lab Units 07/29/23 2036   AST U/L 13   ALT U/L 15   ALK PHOS U/L 85   TOTAL PROTEIN g/dL 6.6   ALBUMIN g/dL 4.0   TOTAL BILIRUBIN mg/dL 0.80     Results from last 7 days   Lab Units 07/29/23  1932   POC GLUCOSE mg/dl 216*     Results from last 7 days   Lab Units 07/31/23 0254 07/30/23 0743 07/29/23 2036   GLUCOSE RANDOM mg/dL 95 94 190*         Results from last 7 days   Lab Units 07/30/23  0100 07/29/23  2322 07/29/23 2036   HS TNI 0HR ng/L  --   --  8   HS TNI 2HR ng/L  --  6  --    HSTNI D2 ng/L  --  -2  --    HS TNI 4HR ng/L 8  --   --    HSTNI D4 ng/L 0  --   --              Results from last 7 days   Lab Units 07/29/23 2036   TSH 3RD GENERATON uIU/mL 1.047           Results from last 7 days   Lab Units 07/29/23 2147   CLARITY UA  Clear   COLOR UA  Yellow   SPEC GRAV UA  1.019   PH UA  5.5   GLUCOSE UA mg/dl Negative   KETONES UA mg/dl Negative   BLOOD UA  Negative   PROTEIN UA mg/dl Trace*   NITRITE UA  Negative   BILIRUBIN UA  Negative   UROBILINOGEN UA (BE) mg/dl 2.0*   LEUKOCYTES UA  Negative   WBC UA /hpf 1-2   RBC UA /hpf 1-2   BACTERIA UA /hpf None Seen   EPITHELIAL CELLS WET PREP /hpf None Seen         ED Treatment:   Medication Administration from 07/29/2023 1923 to 07/30/2023 0046     None        Past Medical History:   Diagnosis Date   • CKD (chronic kidney disease)    • HL (hearing loss)    • HTN (hypertension)    • Hypertension    • Hyponatremia    • Prostate CA ([address])      Present on Admission:  • CKD (chronic kidney disease) stage 3, GFR 30-59 ml/min (HCC)  • HTN (hypertension)  • Leukocytosis  • Hyponatremia      Admitting Diagnosis: Altered mental status [R41.82]  Failure to thrive in adult [R62.7]  Generalized weakness [R53.1]  Age/Sex: 80 y.o. male  Admission Orders:  Scheduled Medications:  amLODIPine, 2.5 mg, Oral, Daily  aspirin, 81 mg, Oral, Daily  atorvastatin, 40 mg, Oral, QPM  carvedilol, 6.25 mg, Oral, BID With Meals  heparin (porcine), 5,000 Units, Subcutaneous, Q8H AZAEL  lisinopril, 10 mg, Oral, Daily      Continuous IV Infusions:  sodium chloride, 75 mL/hr, Intravenous, Continuous      PRN Meds:  acetaminophen, 650 mg, Oral, Q6H PRN  ondansetron, 4 mg, Intravenous, Q6H PRN        None    Network Utilization Review Department  ATTENTION: Please call with any questions or concerns to [phone] and carefully listen to the prompts so that you are directed to the right person. All voicemails are confidential.  Leigh Greene all requests for admission clinical reviews, approved or denied determinations and any other requests to dedicated fax number below belonging to the campus where the patient is receiving treatment.  List of dedicated fax numbers for the Facilities:  [place] DENIALS (Administrative/Medical Necessity) [phone]   [facility] (Maternity/NICU/Pediatrics) [phone] [address] [facility] [phone]   [address] [facility] 1000 [facility] [place] [address] [phone]

## 2023-07-31 NOTE — OCCUPATIONAL THERAPY NOTE
Occupational Therapy Evaluation     Patient Name: Jeffrey Vega  Today's Date: 7/31/2023  Problem List  Principal Problem:    Altered mental status  Active Problems:    CKD (chronic kidney disease) stage 3, GFR 30-59 ml/min (HCC)    HTN (hypertension)    Hyponatremia    Leukocytosis    Failure to thrive in adult    Prediabetes    Past Medical History  Past Medical History:   Diagnosis Date    CKD (chronic kidney disease)     HL (hearing loss)     HTN (hypertension)     Hypertension     Hyponatremia     Prostate CA [facility])      Past Surgical History  History reviewed. No pertinent surgical history. 07/31/23 1357   OT Last Visit   OT Visit Date 07/31/23  (Monday)   Note Type   Note type Evaluation   Pain Assessment   Pain Assessment Tool 0-10   Pain Score No Pain   Restrictions/Precautions   Weight Bearing Precautions Per Order No   Other Precautions Cognitive; Chair Alarm; Bed Alarm;Telemetry; Fall Risk   Home Living   Type of Home Other (Comment)  ([address] [facility])   Home Layout One level   [address] [facility] Accessibility Accessible via walker   [address] Walker;Grab bars   Additional Comments Pt admit from [facility]   Prior Function   Level of [place] Needs assistance with [address] [place] [address] staff   Receives Help From Family;Personal care attendant; Other (Comment)  (staff at [facility])   IADLs Family/Friend/Other provides transportation; Family/Friend/Other provides meals; Family/Friend/Other provides medication management   Falls in the last 6 months   (+ fall history)   Vocational Retired   Comments Pt needs assistance w/ IADLs and used RW for functional mobility   Lifestyle   Autonomy Pt needs assistance w/ IADLs and use of RW for functional mobility.    Reciprocal Relationships Supportive family and staff at [address] [place] [address] Rd assist   Service to Others Pt is retired   Intrinsic Gratification Will continue to assess   General   Additional Pertinent History Significant PMH impacting his occupational performance includes CVA (06/2023), prostate cancer hx/ tx, HTN, [place], . Personal and environmental factors impacting performance includes assist required at baseline, recent admission (s), inability to complete IADLs, advanced age, limited insight into deficits; supports / strengths include supportive family/ staff at [facility], active at baseline. Family/Caregiver Present No   Additional General Comments MRI impression: Advanced chronic microangiopathic change including old lacunar infarction within the right basal ganglia. Small acute infarct seen approximately 6 weeks ago no longer demonstrate restricted diffusion   Subjective   Subjective "It looks like its nice outside"   ADL   Where Assessed Chair  (vs bathroom)   Eating Assistance 5  Supervision/Setup   Eating Deficit Setup;Supervision/safety; Increased time to complete   Grooming Assistance 5  Supervision/Setup   Grooming Deficit Setup;Standing with assistive device; Supervision/safety;Verbal cueing; Increased time to complete   UB Bathing Assistance Unable to assess   LB Bathing Assistance Unable to assess   UB Dressing Assistance 4  Minimal Assistance   UB Dressing Deficit Setup;Verbal cueing;Supervision/safety; Increased time to complete  (due to multiple lines (Julien , tele))   LB Dressing Assistance 4  Minimal Assistance   LB Dressing Deficit Setup;Verbal cueing;Supervision/safety; Increased time to complete; Fasteners; Thread RLE into pants; Thread LLE into pants   Toileting Assistance  4  Minimal Assistance   Toileting Deficit Setup;Supervison/safety; Increased time to complete;Verbal cueing;Clothing management up;Clothing management down   Bed Mobility   Supine to Sit Unable to assess   Sit to Supine Unable to assess   Additional Comments Pt seated OOB In chair upon arrival and post eval w/ needs met, call bell in reach and chair alarm activated   Transfers   Sit to Stand 5  Supervision   Additional items Assist x 1; Armrests; Verbal cues   Stand to Sit 5  Supervision   Additional items Assist x 1; Armrests; Verbal cues   Toilet transfer 5  Supervision   Additional items Assist x 1; Increased time required;Verbal cues;Standard toilet   Additional Comments Pt performed sit <> stand 2 X w/ S   Functional Mobility   Functional Mobility 5  Supervision   Additional Comments household distances w/ S   Additional items Rolling walker   Balance   Static Sitting Fair +   Static Standing Fair   Ambulatory Fair -   Activity Tolerance   Activity Tolerance Patient limited by fatigue; Other (Comment)  (impaired cognition, insight into deficits)   Medical Staff Made Aware care coordination w/ PTMeghann due to decreased activity tolerance, regression from baseline. Spoke w/ Alyson OJEDA   Nurse Made Aware spoke w/ RN, Candance Dom pre/ post eval   RUE Strength   RUE Overall Strength Within Functional Limits - able to perform ADL tasks with strength  (observed during ADLs)   LUE Strength   LUE Overall Strength Within Functional Limits - able to perform ADL tasks with strength  (observed during ADLs)   Hand Function   Gross Motor Coordination Functional   Fine Motor Coordination Impaired   Hand Function Comments initiating / attempted to manage  on pants   Sensation   Light Touch   (responded to light touch)   Perception   Inattention/Neglect Appears intact   Motor Planning Cues to use objects appropriately   Cognition   Overall Cognitive Status Impaired   Arousal/Participation Alert; Cooperative   Attention Attends with cues to redirect   Orientation Level Oriented to person   Memory Decreased short term memory;Decreased recall of recent events;Decreased recall of precautions   Following Commands Follows one step commands with increased time or repetition   Comments Identified pt by full name and birthdate. Alert and agreeable to participate / cooperative. Pt is not accurate, consistent historian at baseline; primiaril non-verbal per previous OT eval (06/2023).  Pt alert, oriented to person, able to report birthdate and participate in simple coversation this afternoon   Assessment   Limitation Decreased sensation;Decreased high-level ADLs; Decreased Safe judgement during ADL   Assessment Pt is a 79yo male [facility] on 7/29/23 w/ from [facility] w/ change in mental status. Pt recently admitted and DC to rehab on 6/19/23 following a fall; diagnosed w/ stroke. Pt needs assistance w/ IADLs and min A w/ ADLs at baseline. Upon eval, pt alert and cooperative. Able to follow simple directions during ADLs. Pt required min A to complete UBD/LBD/ toileting, S sit <> stand and S using RW for functional mobility household distances. Pt completing ADLs at / near baseline level of I. Recommend return to [facility] w/ staff assist when medically stable for discharge from acute care, and active participation in ADLs w/ RN staff in acute care; OOB to chair for meals. Goals   Patient Goals Will continue to assess.  Pt did not state   Plan   OT Frequency Eval only   Recommendation   OT Discharge Recommendation Return to facility with rehabilitation services  (return to [facility] w/ staff assist)   Equipment Recommended Shower/Tub chair with back ($)   AM-PAC Daily Activity Inpatient   Lower Body Dressing 3   Bathing 3   Toileting 3   Upper Body Dressing 3   Grooming 3   Eating 4   Daily Activity Raw Score 19   Daily Activity Standardized Score (Calc for Raw Score >=11) 40.22   AM-PAC Applied Cognition Inpatient   Following a Speech/Presentation 2   Understanding Ordinary Conversation 3   Taking Medications 2   Remembering Where Things Are Placed or Put Away 2   Remembering List of 4-5 Errands 1   Taking Care of Complicated Tasks 1   Applied Cognition Raw Score 11   Applied Cognition Standardized Score 27.03   Barthel Index   Feeding 10   Bathing 0   Grooming Score 0   Dressing Score 5   Bladder Score 5   Bowels Score 5   Toilet Use Score 5   Transfers (Bed/Chair) Score 10   Mobility (Level Surface) Score 0 Stairs Score 0   Barthel Index Score 40   Modified [place] Scale   Modified [place] Scale 3   End of Consult   Patient Position at End of Consult Bedside chair;Bed/Chair alarm activated; All needs within reach   Nurse Communication Nurse aware of consult       The patient's raw score on the AM-PAC Daily Activity Inpatient Short Form is 19. A raw score of greater than or equal to 19 suggests the patient may benefit from discharge to home. Please refer to the recommendation of the Occupational Therapist for safe discharge planning. Recommend return to [facility] (detention) w/ staff assist when medically stable.      Razia Spear, OTR/L  [id number]  [id number]

## 2023-07-31 NOTE — ASSESSMENT & PLAN NOTE
· Patient brought in my son and daughter-in-law with report of increasing altered mental status with generalized weakness for the past 2-3 days. · Poor oral intake with reduction in fluid intake at his assisted living facility. · Was previously admitted on 6/13 as stroke alert after sustaining a fall. Noted to have symptoms of aphasia and right upper extremity sensory deficit and weakness. The patient's daughter-in-law denies similar symptoms presently. 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 · He was discharged on aspirin and atorvastatin and was instructed to follow-up with cardiology for zio patch/loop recorder. · In the ED, hemodynamically stable. Intermittently responsive and oriented to person, place, but not time. · ECG NSR  · UA with microscopy unremarkable  · Recent echo on 6/15 showed EF of 65% and grade I diastolic dysfunction  · CT head without contrast showed no acute intracranial abnormalities  · No clear infectious source with leukocytosis continuing to downtrend from last admission. No reported steroid use.   · Renal function baseline without uremia  · No acute ischemia on MRI brain  · 7/31 - patient at baseline according to DIL and son    Plan:  · Monitor on telemetry  · Delirium precautions  · Speech and swallow eval and recommendations appreciated  · Continue aspirin 81 mg daily and atorvastatin 40 mg daily  · Continue to monitor CBC and BMP

## 2023-07-31 NOTE — ASSESSMENT & PLAN NOTE
· Continue Norvasc 2.5 mg daily, Coreg 6.25 mg daily, and lisinopril 10 mg daily  · Continue to monitor blood pressures  · BP stable

## 2023-07-31 NOTE — DISCHARGE INSTR - AVS FIRST PAGE
Dear Jeffrey Vega,     It was our pleasure to care for you here at [facility]. It is our hope that we were always able to exceed the expected standards for your care during your stay. You were hospitalized due to altered mental status  You were cared for on the 4th floor by Carlos Swanson DO under the service of Jossy Copeland MD with the [facility] who covers for your primary care physician (PCP), Fatou Esqueda MD, while you were hospitalized. If you have any questions or concerns related to this hospitalization, you may contact us at [phone]. For follow up as well as any medication refills, we recommend that you follow up with your primary care physician. A registered nurse will reach out to you by phone within a few days after your discharge to answer any additional questions that you may have after going home. However, at this time we provide for you here, the most important instructions / recommendations at discharge:     Notable Medication Adjustments -   No medication changes made during this admission    Testing Required after Discharge -   No additional testing required after discharge    Important follow up information -   Please follow-up with your PCP within 7 days of discharge  Please undergo a physical and occupational therapy evaluation at your assisted living facility    Other Instructions -   Maintain adequate hydration, at least 64 ounces of fluids per day  Maintain adequate oral intake  To avoid further confusion, maintain familiar environment and orientation as needed    Please review this entire after visit summary as additional general instructions including medication list, appointments, activity, diet, any pertinent wound care, and other additional recommendations from your care team that may be provided for you.       Sincerely,     Carlos Swanson DO

## 2023-07-31 NOTE — CASE MANAGEMENT
Case Management Assessment & Discharge Planning Note    Patient name Jer Preciado  Location S /S -01 MRN [id number]  : 1932 Date 2023       Current Admission Date: 2023  Current Admission Diagnosis:Altered mental status   Patient Active Problem List    Diagnosis Date Noted   • Prediabetes 2023   • Failure to thrive in adult 2023   • Altered mental status 2023   • Ambulatory dysfunction 2023   • Stroke ([address]) 2023   • CKD (chronic kidney disease) 2023   • Constipation 2023   • Hyponatremia 2023   • Leukocytosis 2023   • Fall 2023   • Constipation 2021   • Lacunar infarction ([address]) 2021   • Fall 2021   • Encephalopathy acute 2021   • Abrasions of multiple sites 2021   • HTN (hypertension) 2021   • Acute metabolic encephalopathy    • Essential hypertension 2021   • History of prostate cancer 2021   • CKD (chronic kidney disease) stage 3, GFR 30-59 ml/min ([address]) 2021      LOS (days): 2  Geometric Mean LOS (GMLOS) (days):   Days to GMLOS:     OBJECTIVE:    Risk of Unplanned Readmission Score: 12.81         Current admission status: Inpatient       Preferred Pharmacy:   [address] [facility] [address]  Phone: [phone] Fax: [phone]    Primary Care Provider: Mook Charles MD    Primary Insurance: [address] [facility]  Secondary Insurance:     ASSESSMENT:  [address] [facility] Roddy Bishop Representative - Son   Primary Phone: [phone] (Mobile)  Home Phone: [phone]                              Patient Information  Admitted from[de-identified] Home  Mental Status: Alert  During Assessment patient was accompanied by: Not accompanied during assessment  Assessment information provided by[de-identified] Son, Daughter  Primary Caregiver: Self  Support Systems: Family members, Self  What city do you live in[place] entry access options.  Select all that apply.: No steps to enter home  Type of Current Residence: Facility [facility])  Upon entering residence, is there a bedroom on the main floor (no further steps)?: Yes  Upon entering residence, is there a bathroom on the main floor (no further steps)?: Yes  In the last 12 months, was there a time when you were not able to pay the mortgage or rent on time?: No  In the last 12 months, was there a time when you did not have a steady place to sleep or slept in a shelter (including now)?: No  Homeless/housing insecurity resource given?: N/A  Living Arrangements: Other (Comment) ([facility])    Activities of Daily Living Prior to Admission  Functional Status: Assistance  Completes ADLs independently?: No  Level of ADL dependence: Assistance  Ambulates independently?: Yes  Does patient use assisted devices?: Yes  Assisted Devices (DME) used: Jules Odor  Does patient currently own DME?: Yes  What DME does the patient currently own?: Jules Odor, Bedside Commode  Does patient have a history of Outpatient Therapy (PT/OT)?: Yes  Does the patient have a history of Short-Term Rehab?: Yes ([facility])  Does patient have a history of HHC?: Yes         Patient Information Continued  Income Source: Pension/senior living  Does patient have prescription coverage?: Yes  Within the past 12 months, you worried that your food would run out before you got the money to buy more.: Never true  Within the past 12 months, the food you bought just didn't last and you didn't have money to get more.: Never true  Food insecurity resource given?: N/A  Does patient receive dialysis treatments?: No  Does patient have a history of substance abuse?: No  Does patient have a history of Mental Health Diagnosis?: No         Means of Transportation  [facility] Family transport (vs. facility)  In the past 12 months, has lack of transportation kept you from medical appointments or from getting medications?: No  In the past 12 months, has lack of transportation kept you from meetings, work, or from getting things needed for daily living?: No  Was application for public transport provided?: N/A        DISCHARGE DETAILS:    Discharge planning discussed with[de-identified] Son and daughter-in-law via phone  Freedom of Choice: Yes  Comments - Freedom of Choice: FOC discussed regarding STR placement. CM contacted family/caregiver?: Yes  Were Treatment Team discharge recommendations reviewed with patient/caregiver?: Yes  Did patient/caregiver verbalize understanding of patient care needs?: Yes  Were patient/caregiver advised of the risks associated with not following Treatment Team discharge recommendations?: Yes    Contacts  Patient Contacts: Ruben Leroy  Relationship to Patient[de-identified] Family  Contact Method: Phone  Phone Number: [phone]  Reason/Outcome: Continuity of Care, Emergency Contact, Referral, Discharge [address]         Is the patient interested in [address] at discharge?: No    DME Referral Provided  Referral made for DME?: No    Other Referral/Resources/Interventions Provided:  Interventions: Short Term Rehab         Treatment Team Recommendation: Short Term Rehab  Discharge Destination Plan[de-identified] Short Term Rehab                                         Additional Comments: CM spoke with son and DIL via phone to introduce role of CM and begin discharge planning. Pt resides at [facility] and receives assistance with more challenging tasks of daily living. Pt ambulates with a RW and has hx of STR at [address] [facility]. Pt's PT/OT evaluations are currently pending, however son & MANN would be agreeable to further rehab services if recommended for same. CM reviewed FOC -- Family would like a blanket referral for review of facility options for rehab. [facility] referral placed -- CM will follow for responses.

## 2023-07-31 NOTE — PHYSICAL THERAPY NOTE
PHYSICAL THERAPY EVALUATION NOTE          Patient Name: Rochelle Miles  YIXWN'E Date: 2023          AGE:   80 y.o. Mrn:   [id number]  ADMIT DX:  Altered mental status [R41.82]  Failure to thrive in adult [R62.7]  Generalized weakness [R53.1]    Past Medical History:  Past Medical History:   Diagnosis Date   • CKD (chronic kidney disease)    • HL (hearing loss)    • HTN (hypertension)    • Hypertension    • Hyponatremia    • Prostate CA [facility])        Past Surgical History:  History reviewed. No pertinent surgical history. Length Of Stay: 2        PHYSICAL THERAPY EVALUATION:    Patient's identity confirmed via 2 patient identifiers (full name and ) at start of session       23 1411   PT Last Visit   PT Visit Date 23   Note Type   Note type Evaluation   Pain Assessment   Pain Assessment Tool 0-10   Pain Score No Pain   Restrictions/Precautions   Weight Bearing Precautions Per Order No   Other Precautions Cognitive; Chair Alarm; Bed Alarm; Fall Risk;Telemetry   Home Living   Type of Home Assisted living  [facility])   Home Layout One level;Performs ADLs on one level   Bathroom Equipment Commode   Bathroom Accessibility Accessible via walker   [place]  (RW)   Prior Function   Level of [place] Needs assistance with ADLs; Needs assistance with [facility] [address] staff   Receives Help From Personal care attendant; Family  (staff at [address] [place] [address])   IADLs Family/Friend/Other provides transportation; Family/Friend/Other provides meals; Family/Friend/Other provides medication management   Falls in the last 6 months   (+ fall history per chart review)   Comments Pt's home set-up and PLOF obtained from chart review. Pt lives at an [facility].  Per CM notes, pt amb w/ RW w/ assistance, and has assistance w/ ADLs and ADLs at facility   General   Family/Caregiver Present No   Cognition   Overall Cognitive Status Impaired   Arousal/Participation Cooperative   Attention Attends with cues to redirect   Orientation Level Oriented to person   Memory Decreased short term memory;Decreased recall of recent events;Decreased recall of precautions   Following Commands Follows one step commands with increased time or repetition   Comments Pt ID via name and  on wristband; pt agreeable to mobilizing. Pt pleasant throughout, able to follow simple commands. Per chart review, pt mainly non-verbal; however, pt was able to verbally respond to questions and to spontaneously verbalize throughout   Subjective   Subjective "thank you"   Strength RLE   RLE Overall Strength 3+/5  (grossly assessed w/ functional mobility)   Strength LLE   LLE Overall Strength 3+/5  (grossly assessed w/ functional mobility)   Bed Mobility   Additional Comments pt OOB in recliner chair upon arrival and returned to chair at end of session   Transfers   Sit to Stand 5  Supervision   Additional items Assist x 1; Armrests; Increased time required;Verbal cues   Stand to Sit 5  Supervision   Additional items Assist x 1; Armrests; Increased time required;Verbal cues   Toilet transfer 5  Supervision   Additional items Assist x 1; Increased time required;Standard toilet  (ue of R grab bar)   Ambulation/Elevation   Gait pattern Improper Weight shift;Decreased foot clearance; Short stride; Excessively slow;Decreased heel strike;Decreased toe off;Decreased hip extension; Step through pattern   Gait Assistance 5  Supervision   Additional items Assist x 1;Verbal cues   Assistive Device Rolling walker   Distance 90'+20'   Ambulation/Elevation Additional Comments pt able to negotiate RW around obstacles and while turning   Balance   Static Sitting Fair +   Dynamic Sitting Fair   Static Standing [address] -  (w/ RW)   Activity Tolerance   Activity Tolerance Patient limited by fatigue  (pt limited by cognition)   Medical Staff Made Aware Pt benefited from PT/OT care coordination w/ OT Sonja due to to allow for challenge of pt's activity tolerance, PT and OT goals were addressed individually during session; LYNETTE Parra   Nurse Made Aware RN Danilo Up   Assessment   Prognosis Good   Problem List Decreased endurance; Impaired balance;Decreased mobility; Decreased cognition; Impaired judgement;Decreased safety awareness   Assessment Addison Soni is a 80 y.o. Male who presents to [address] [place] [address] on 7/29/23 due to AMS (increaesd confusion, fatigue) and diagnosis of AMS. Orders for PT eval and treat received, w/ activity orders of up and OOB as tolerated. Comorbidities affecting pt's functional mobility at time of evaluation include: CKD, HTN, CVA, lacunar infarction. Personal factors affecting DC include: ambulating w/ assistive device, decreased cognition, positive fall history, inability to perform IADLs and inability to perform ADLs. At baseline, pt mobilizes w/ RW, and w/ 1-4 fall(s) in the previous 6 months. Upon evaluation, pt presents w/ the following deficits: impaired balance, impaired cognition, decreased safety awareness, decreased endurance/activity tolerance and gait deviations. Pt currently requires supervision for transfers, supervision w/ RW for ambulation. Pt's clinical presentation is unstable/unpredictable due to need for input for mobility technique, need to input for safety awareness, recent h/o falls, ongoing medical management. From a PT/mobility standpoint given the above findings, DC recommendation is: Return to facility w/ skilled PT needs. During current admission, pt will benefit from continued skilled inpatient PT in the acute care setting in order to address the above deficits and to maximize function and mobility prior to DC from acute care.    Goals   [facility] Expiration Date 08/10/23   Short Term Goal #1 Pt will: perform bed mobility w/ mod I to decrease pt's burden of care and increase pt's independence w/ repositioning in bed; perform transfers w/ mod I to promote increased OOB mobility; ambulate at least 250' w/ RW and mod I to increase pt's ambulatory endurance/tolerance; increase all balance ratings by at least 1 grade to decrease pt's risk of falls   PT Treatment Day 0   Plan   Treatment/Interventions Functional transfer training;LE strengthening/ROM; Therapeutic exercise; Endurance training;Cognitive reorientation;Patient/family training;Equipment eval/education; Bed mobility;Gait training; Compensatory technique education   PT Frequency 2-3x/wk   Recommendation   PT Discharge Recommendation Return to facility with rehabilitation services   Equipment Recommended Walker   AM-PAC Basic Mobility Inpatient   Turning in Flat Bed Without Bedrails 4   Lying on Back to Sitting on Edge of Flat Bed Without Bedrails 3   Moving Bed to Chair 3   Standing Up From Chair Using Arms 3   Walk in Room 3   Climb 3-5 Stairs With Railing 2   Basic Mobility Inpatient Raw Score 18   Basic Mobility Standardized Score 41.05   Highest Level Of Mobility   -HL Goal 6: Walk 10 steps or more   JH-HLM Achieved 7: Walk 25 feet or more   End of Consult   Patient Position at End of Consult Bedside chair;Bed/Chair alarm activated; All needs within reach       The patient's AM-PAC Basic Mobility Inpatient Short Form Raw Score is 18. A Raw score of greater than 16 suggests the patient may benefit from discharge to home. Please also refer to the recommendation of the Physical Therapist for safe discharge planning.     Pt will benefit from skilled inpatient PT during this admission in order to facilitate progress towards goals and to maximize functional independence prior to [address] [facility] rec: return to facility w/ skilled PT services        Rosa Lao, PT, DPT  07/31/23

## 2023-07-31 NOTE — ASSESSMENT & PLAN NOTE
Lab Results   Component Value Date    WBC 9.86 07/31/2023    WBC 10.35 (H) 07/30/2023    WBC 12.29 (H) 07/29/2023      · POA WBC count of 12K  · Afebrile on presentation with no report of fever, chills, and cough per family.   · UA negative for UTI  · WBC count elevated during last admission and continues to downtrend, recent WBC count 9.86  · Continue to monitor CBC

## 2023-07-31 NOTE — PROGRESS NOTES
[facility]  Progress Note  Name: Angelique Tanner  MRN: [id number]  Unit/Bed#: S -01 I Date of Admission: 7/29/2023   Date of Service: 7/31/2023 I Hospital Day: 2    Assessment/Plan   Prediabetes  Assessment & Plan  · POA blood glucose 190  · A1c on 6/15: 5.7  · If blood glucose continues to increase, can consider starting SSI    Failure to thrive in adult  Assessment & Plan  · Failure to thrive as evidenced by poor oral intake for the past few days at assisted living facility. · Generalized weakness with difficulty ambulating at assisted living facility reported by son and daughter-in-law. · Has been compliant with home medications without symptoms of nausea and vomiting. · Daughter-in-law does state the patient has chronically had issues with swallowing. No aspiration events noted. Plan:  · PT/OT eval pending  · Encourage PO intake  · Fall precautions  · Speech and swallow eval      Leukocytosis  Assessment & Plan  Lab Results   Component Value Date    WBC 9.86 07/31/2023    WBC 10.35 (H) 07/30/2023    WBC 12.29 (H) 07/29/2023      · POA WBC count of 12K  · Afebrile on presentation with no report of fever, chills, and cough per family.   · UA negative for UTI  · WBC count elevated during last admission and continues to downtrend, recent WBC count 9.86  · Continue to monitor CBC    Hyponatremia  Assessment & Plan  Lab Results   Component Value Date    SODIUM 134 (L) 07/31/2023    SODIUM 136 07/30/2023    SODIUM 131 (L) 07/29/2023      · Recent sodium 134  · Etiology likely secondary to poor oral intake   · No report of nausea, vomiting, or diarrhea  · Monitor BMP    HTN (hypertension)  Assessment & Plan  · Continue Norvasc 2.5 mg daily, Coreg 6.25 mg daily, and lisinopril 10 mg daily  · Continue to monitor blood pressures  · BP stable     CKD (chronic kidney disease) stage 3, GFR 30-59 ml/min [facility])  Assessment & Plan  Lab Results   Component Value Date    EGFR 49 07/31/2023 EGFR 45 07/30/2023    EGFR 39 07/29/2023    CREATININE 1.26 07/31/2023    CREATININE 1.36 (H) 07/30/2023    CREATININE 1.52 (H) 07/29/2023     · Most recent creatinine 1.26 (baseline ~1.3-1.4)  · Mild increase in Cr upon admission likely due to poor oral intake and dehydration  · Avoid hypotension and nephrotoxins  · Continue to monitor BMP      * Altered mental status  Assessment & Plan  · Patient brought in my son and daughter-in-law with report of increasing altered mental status with generalized weakness for the past 2-3 days. · Poor oral intake with reduction in fluid intake at his assisted living facility. · Was previously admitted on 6/13 as stroke alert after sustaining a fall. Noted to have symptoms of aphasia and right upper extremity sensory deficit and weakness. The patient's daughter-in-law denies similar symptoms presently. 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 · He was discharged on aspirin and atorvastatin and was instructed to follow-up with cardiology for zio patch/loop recorder. · In the ED, hemodynamically stable. Intermittently responsive and oriented to person, place, but not time. · ECG NSR  · UA with microscopy unremarkable  · Recent echo on 6/15 showed EF of 65% and grade I diastolic dysfunction  · CT head without contrast showed no acute intracranial abnormalities  · No clear infectious source with leukocytosis continuing to downtrend from last admission. No reported steroid use.   · Renal function baseline without uremia  · No acute ischemia on MRI brain  · 7/31 - patient at baseline according to DIL and son    Plan:  · Monitor on telemetry  · Delirium precautions  · Speech and swallow eval and recommendations appreciated  · Continue aspirin 81 mg daily and atorvastatin 40 mg daily  · Continue to monitor CBC and BMP               VTE Pharmacologic Prophylaxis: VTE Score: 16 High Risk (Score >/= 5) - Pharmacological DVT Prophylaxis Ordered: Heparin. Sequential Compression Devices Ordered. Mechanical VTE Prophylaxis in Place: No    Patient Centered Rounds: I have performed bedside rounds with nursing staff today. Discussions with Specialists or Other Care Team Provider: None    Education and Discussions with Family / Patient: Updated  (son and daughter in law) via phone. Current Length of Stay: 2 day(s)    Current Patient Status: Inpatient     Discharge Plan / Estimated Discharge Date: Anticipate discharge tomorrow to prior assisted or independent living facility. Code Status: Level 3 - DNAR and DNI      Subjective:   Patient was seen at bedside for continuity of care. Per night team, no major events overnight. Due to patient's baseline mental status, evaluation was limited. However, he reported that he is "fine."  Spoke with the patient's family and they stated that his mental status has returned to baseline. His daughter-in-law states that he is hard of hearing and noncommunicative at baseline. Daughter-in-law expressed concern about patient's functional status and would like him to be reevaluated by PT and OT. Patient denied shortness of breath, chest pain, constipation, cough or diarrhea. Treatment plan and next steps were discussed with patient, who expressed understanding and is amenable to the plan. He did not report any additional questions or concerns at this time. Objective:     Vitals:   Temp (24hrs), Av.7 °F (36.5 °C), Min:97.5 °F (36.4 °C), Max:97.9 °F (36.6 °C)    Temp:  [97.5 °F (36.4 °C)-97.9 °F (36.6 °C)] 97.9 °F (36.6 °C)  HR:  [68] 68  Resp:  [17-18] 18  BP: (127-144)/(58-88) 130/88  SpO2:  [93 %] 93 %  Body mass index is 25.64 kg/m². Input and Output Summary (last 24 hours):        Intake/Output Summary (Last 24 hours) at 2023 1721  Last data filed at 2023 1401  Gross per 24 hour   Intake 1000 ml Output 800 ml   Net 200 ml       Physical Exam:     Physical Exam  Constitutional:       General: He is in acute distress. Appearance: Normal appearance. He is normal weight. HENT:      Mouth/Throat:      Mouth: Mucous membranes are moist.   Cardiovascular:      Rate and Rhythm: Normal rate and regular rhythm. Pulses: Normal pulses. Heart sounds: Normal heart sounds. Abdominal:      General: Bowel sounds are normal.      Palpations: Abdomen is soft. Hernia: No hernia is present. Musculoskeletal:         General: No swelling. Skin:     General: Skin is warm and dry. Neurological:      Mental Status: Mental status is at baseline. Additional Data:     Labs:  Results from last 7 days   Lab Units 07/31/23  0222 07/30/23  0743 07/29/23  2036   WBC Thousand/uL 9.86   < > 12.29*   HEMOGLOBIN g/dL 13.2   < > 14.3   HEMATOCRIT % 42.0   < > 43.8   PLATELETS Thousands/uL 245   < > 280   NEUTROS PCT %  --   --  78*   LYMPHS PCT %  --   --  12*   MONOS PCT %  --   --  8   EOS PCT %  --   --  1    < > = values in this interval not displayed. Results from last 7 days   Lab Units 07/31/23  0254 07/30/23  0743 07/29/23  2036   SODIUM mmol/L 134*   < > 131*   POTASSIUM mmol/L 4.0   < > 4.2   CHLORIDE mmol/L 105   < > 98   CO2 mmol/L 20*   < > 25   BUN mg/dL 23   < > 23   CREATININE mg/dL 1.26   < > 1.52*   ANION GAP mmol/L 9   < > 8   CALCIUM mg/dL 8.4   < > 9.0   ALBUMIN g/dL  --   --  4.0   TOTAL BILIRUBIN mg/dL  --   --  0.80   ALK PHOS U/L  --   --  85   ALT U/L  --   --  15   AST U/L  --   --  13   GLUCOSE RANDOM mg/dL 95   < > 190*    < > = values in this interval not displayed.          Results from last 7 days   Lab Units 07/29/23  1932   POC GLUCOSE mg/dl 216*               Imaging: Reviewed radiology reports from this admission including: CT head and MRI brain        Lines/Drains:  Invasive Devices     Peripheral Intravenous Line  Duration           Peripheral IV 07/31/23 Distal;Right;Ventral (anterior) Forearm <1 day                   Last 24 Hours Medication List:   Current Facility-Administered Medications   Medication Dose Route Frequency Provider Last Rate   • acetaminophen  650 mg Oral Q6H PRN Reza Burgess, DO     • amLODIPine  2.5 mg Oral Daily Reza Burgess, DO     • aspirin  81 mg Oral Daily Reza Burgess, DO     • atorvastatin  40 mg Oral QPM Reza Burgess, DO     • carvedilol  6.25 mg Oral BID With Meals Reza Burgess, DO     • heparin (porcine)  5,000 Units Subcutaneous [facility] Reza Burgess, DO     • lisinopril  10 mg Oral Daily Reza Burgess, DO     • ondansetron  4 mg Intravenous Q6H PRN Reza Burgess DO          Today, Patient Was Seen By: Odette Schwab DO, Psychiatry PGY-1    ** Please Note: This note has been constructed using a voice recognition system.  **

## 2023-07-31 NOTE — ASSESSMENT & PLAN NOTE
· Failure to thrive as evidenced by poor oral intake for the past few days at assisted living facility. · Generalized weakness with difficulty ambulating at assisted living facility reported by son and daughter-in-law. · Has been compliant with home medications without symptoms of nausea and vomiting. · Daughter-in-law does state the patient has chronically had issues with swallowing. No aspiration events noted.     Plan:  · PT/OT eval pending  · Encourage PO intake  · Fall precautions  · Speech and swallow eval

## 2023-07-31 NOTE — PROGRESS NOTES
[facility]  Progress Note  Name: Eloisa Valdez  MRN: [id number]  Unit/Bed#: S -01 I Date of Admission: 7/29/2023   Date of Service: 7/30/2023 I Hospital Day: 1    Assessment/Plan   * Altered mental status  Assessment & Plan  · Patient brought in my son and daughter-in-law with report of increasing altered mental status with generalized weakness for the past 2-3 days. · Poor oral intake with reduction in fluid intake at his assisted living facility. · Was previously admitted on 6/13 as stroke alert after sustaining a fall. Noted to have symptoms of aphasia and right upper extremity sensory deficit and weakness. The patient's daughter-in-law denies similar symptoms presently. 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 · He was discharged on aspirin and atorvastatin and was instructed to follow-up with cardiology for zio patch/loop recorder. · In the ED, hemodynamically stable. Intermittently responsive and oriented to person, place, but not time. · ECG NSR  · UA with microscopy unremarkable  · Recent echo on 6/15 showed EF of 65% and grade I diastolic dysfunction  · CT head without contrast showed no acute intracranial abnormalities  · No clear infectious source with leukocytosis continuing to downtrend from last admission. No reported steroid use.   · Renal function baseline without uremia  · No acute ischemia on MRI brain    Plan:  · Monitor on telemetry  · Delirium precautions  · Speech and swallow eval and recommendations appreciated  · Continue aspirin 81 mg daily and atorvastatin 40 mg daily  · Continue to monitor CBC and BMP      Prediabetes  Assessment & Plan  · POA blood glucose 190  · A1c on 6/15: 5.7  · If blood glucose continues to increase, can consider starting SSI    Failure to thrive in adult  Assessment & Plan  · Failure to thrive as evidenced by poor oral intake for the past few days at assisted living facility. · Generalized weakness with difficulty ambulating at assisted living facility reported by son and daughter-in-law. · Has been compliant with home medications without symptoms of nausea and vomiting. · Daughter-in-law does state the patient has chronically had issues with swallowing. No aspiration events noted. Plan:  · PT/OT eval  · Gentle IV NS at 75 cc/hr for hydration  · Encourage PO intake  · Fall precautions  · Speech and swallow eval      Leukocytosis  Assessment & Plan  Lab Results   Component Value Date    WBC 10.35 (H) 07/30/2023    WBC 12.29 (H) 07/29/2023    WBC 10.15 06/19/2023      · POA WBC count of 12K  · Afebrile on presentation with no report of fever, chills, and cough per family.   · UA negative for UTI  · WBC count elevated during last admission and continues to downtrend from 18K  · Continue to monitor CBC    Hyponatremia  Assessment & Plan  Lab Results   Component Value Date    SODIUM 136 07/30/2023    SODIUM 131 (L) 07/29/2023    SODIUM 135 06/19/2023      · POA sodium 131  · Etiology likely secondary to poor oral intake   · No report of nausea, vomiting, or diarrhea  · IV NS at 75 cc/hr  · Monitor BMP    HTN (hypertension)  Assessment & Plan  · Continue Norvasc 2.5 mg daily, Coreg 6.25 mg daily, and lisinopril 10 mg daily  · Continue to monitor blood pressures    CKD (chronic kidney disease) stage 3, GFR 30-59 ml/min [facility])  Assessment & Plan  Lab Results   Component Value Date    EGFR 45 07/30/2023    EGFR 39 07/29/2023    EGFR 41 06/19/2023    CREATININE 1.36 (H) 07/30/2023    CREATININE 1.52 (H) 07/29/2023    CREATININE 1.45 (H) 06/19/2023     · POA Cr 1.52 (baseline ~1.3-1.4)  · Mild increase in Cr likely due to poor oral intake and dehydration  · Avoid hypotension and nephrotoxins  · Continue to monitor BMP                 VTE Pharmacologic Prophylaxis: VTE Score: 16 High Risk (Score >/= 5) - Pharmacological DVT Prophylaxis Ordered: heparin. Sequential Compression Devices Ordered. Patient Centered Rounds: I performed bedside rounds with nursing staff today. Discussions with Specialists or Other Care Team Provider:     Education and Discussions with Family / Patient: Attempted to update  (son) via phone. Unable to contact. Current Length of Stay: 1 day(s)  Current Patient Status: Inpatient   Discharge Plan: Anticipate discharge in 24-48 hrs to prior assisted or independent living facility. Code Status: Level 3 - DNAR and DNI    Subjective:   Pt seen and examined. No significant overnight event. Pt was pleasantly confused. Alert and awake bi\ut oriented to self only. Objective:     Vitals:   Temp (24hrs), Av.6 °F (37 °C), Min:98.5 °F (36.9 °C), Max:98.7 °F (37.1 °C)    Temp:  [98.5 °F (36.9 °C)-98.7 °F (37.1 °C)] 98.7 °F (37.1 °C)  HR:  [59-74] 61  Resp:  [16-20] 18  BP: (132-155)/(62-83) 150/79  SpO2:  [90 %-93 %] 90 %  Body mass index is 25.64 kg/m². Input and Output Summary (last 24 hours): Intake/Output Summary (Last 24 hours) at 2023  Last data filed at 2023  Gross per 24 hour   Intake 120 ml   Output 750 ml   Net -630 ml       Physical Exam:   Physical Exam  Vitals and nursing note reviewed. Constitutional:       General: He is not in acute distress. Appearance: He is well-developed. HENT:      Head: Normocephalic and atraumatic. Mouth/Throat:      Mouth: Mucous membranes are moist.      Pharynx: Oropharynx is clear. Eyes:      Conjunctiva/sclera: Conjunctivae normal.   Cardiovascular:      Rate and Rhythm: Normal rate and regular rhythm. Heart sounds: Normal heart sounds. No murmur heard. Pulmonary:      Effort: Pulmonary effort is normal. No respiratory distress. Breath sounds: Normal breath sounds. Abdominal:      Palpations: Abdomen is soft. Tenderness: There is no abdominal tenderness.    Musculoskeletal:         General: No swelling. Cervical back: Neck supple. Skin:     General: Skin is warm and dry. Capillary Refill: Capillary refill takes less than 2 seconds. Coloration: Skin is not jaundiced or pale. Findings: No lesion or rash. Neurological:      Mental Status: He is alert.       Comments: AAMarlon   Psychiatric:         Mood and Affect: Mood normal.          Additional Data:     Labs:  Results from last 7 days   Lab Units 07/30/23  0743 07/29/23  2036   WBC Thousand/uL 10.35* 12.29*   HEMOGLOBIN g/dL 14.5 14.3   HEMATOCRIT % 43.8 43.8   PLATELETS Thousands/uL 266 280   NEUTROS PCT %  --  78*   LYMPHS PCT %  --  12*   MONOS PCT %  --  8   EOS PCT %  --  1     Results from last 7 days   Lab Units 07/30/23  0743 07/29/23 2036   SODIUM mmol/L 136 131*   POTASSIUM mmol/L 4.0 4.2   CHLORIDE mmol/L 101 98   CO2 mmol/L 28 25   BUN mg/dL 19 23   CREATININE mg/dL 1.36* 1.52*   ANION GAP mmol/L 7 8   CALCIUM mg/dL 9.3 9.0   ALBUMIN g/dL  --  4.0   TOTAL BILIRUBIN mg/dL  --  0.80   ALK PHOS U/L  --  85   ALT U/L  --  15   AST U/L  --  13   GLUCOSE RANDOM mg/dL 94 190*         Results from last 7 days   Lab Units 07/29/23  1932   POC GLUCOSE mg/dl 216*               Lines/Drains:  Invasive Devices     Peripheral Intravenous Line  Duration           Peripheral IV 07/30/23 Left;Ventral (anterior) Forearm <1 day          Drain  Duration           External Urinary Catheter 47 days                  Telemetry:  Telemetry Orders (From admission, onward)             24 Hour Telemetry Monitoring  Continuous x 24 Hours (Telem)        Expiring   Question:  Reason for 24 Hour Telemetry  Answer:  TIA/Suspected CVA/ Confirmed CVA                 Telemetry Reviewed: Normal Sinus Rhythm  Indication for Continued Telemetry Use: Metabolic/electrolyte disturbance with high probability of dysrhythmia             Imaging: Reviewed radiology reports from this admission including: MRI brain  MRI brain wo contrast   Final Result by Gene Sammi Alba DO (07/30 1711)      No acute ischemia. 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 Small acute infarct seen approximately 6 weeks ago no longer demonstrate restricted diffusion. Workstation performed: [id number]         CT head without contrast   Final Result by Sandor Acuña MD (07/29 2213)      No acute intracranial abnormality. Workstation performed: [id number]             Recent Cultures (last 7 days):         Last 24 Hours Medication List:   Current Facility-Administered Medications   Medication Dose Route Frequency Provider Last Rate   • acetaminophen  650 mg Oral Q6H PRN [facility], DO     • amLODIPine  2.5 mg Oral Daily [facility], DO     • aspirin  81 mg Oral Daily [facility], DO     • atorvastatin  40 mg Oral QPM [facility], DO     • carvedilol  6.25 mg Oral BID With Meals [facility], DO     • heparin (porcine)  5,000 Units Subcutaneous [facility], DO     • lisinopril  10 mg Oral Daily [facility], DO     • ondansetron  4 mg Intravenous Q6H PRN [facility], DO     • sodium chloride  75 mL/hr Intravenous Continuous [facility], DO 75 mL/hr (07/30/23 0123)        Today, Patient Was Seen By: Luli Burks MD    **Please Note: This note may have been constructed using a voice recognition system. **

## 2023-07-31 NOTE — CASE MANAGEMENT
Case Management Progress Note    Patient name Page Gomez  Location S /S -01 MRN [id number]  : 1932 Date 2023       LOS (days): 2  Geometric Mean LOS (GMLOS) (days):   Days to [address]:        OBJECTIVE:        Current admission status: Inpatient  Preferred Pharmacy:   [id number]  [place] [address]  Phone: [phone] Fax: [phone]    Primary Care Provider: Ilan Lanza MD    Primary Insurance: [address] [facility]  Secondary Insurance:     PROGRESS NOTE:    Per therapy team, pt appears to be at functional baseline and is therefore appropriate for return to [address] [place] [address] at discharge. CM attempted to speak with [facility] staff to fax PT/OT notes and discuss return [phone] -- No answer. CM left  and will attempt to call back at another time.

## 2023-07-31 NOTE — ASSESSMENT & PLAN NOTE
Lab Results   Component Value Date    EGFR 49 07/31/2023    EGFR 45 07/30/2023    EGFR 39 07/29/2023    CREATININE 1.26 07/31/2023    CREATININE 1.36 (H) 07/30/2023    CREATININE 1.52 (H) 07/29/2023     · Most recent creatinine 1.26 (baseline ~1.3-1.4)  · Mild increase in Cr upon admission likely due to poor oral intake and dehydration  · Avoid hypotension and nephrotoxins  · Continue to monitor BMP

## 2023-07-31 NOTE — ASSESSMENT & PLAN NOTE
Lab Results   Component Value Date    SODIUM 134 (L) 07/31/2023    SODIUM 136 07/30/2023    SODIUM 131 (L) 07/29/2023      · Recent sodium 134  · Etiology likely secondary to poor oral intake   · No report of nausea, vomiting, or diarrhea  · Monitor BMP

## 2023-07-31 NOTE — CASE MANAGEMENT
Case Management Progress Note    Patient name Kong Ng  Location S /S -01 MRN [id number]  : 1932 Date 2023       LOS (days): 2  Geometric Mean LOS (GMLOS) (days):   Days to [address]:        OBJECTIVE:        Current admission status: Inpatient  Preferred Pharmacy:   [facility], [place] [address] [place] [address]  Phone: [phone] Fax: [phone]    Primary Care Provider: Michelle Francois MD    Primary Insurance: [address] [facility]  Secondary Insurance:     PROGRESS NOTE:    PT/OT notes faxed to [facility] ([phone]) to ensure pt can return upon medical stability (f: [phone]). Representative Mello Cantu states she will review and call this CM back. CM will follow.

## 2023-08-01 PROCEDURE — 92526 ORAL FUNCTION THERAPY: CPT

## 2023-08-01 PROCEDURE — 99232 SBSQ HOSP IP/OBS MODERATE 35: CPT | Performed by: HOSPITALIST

## 2023-08-01 RX ADMIN — CARVEDILOL 6.25 MG: 6.25 TABLET, FILM COATED ORAL at 17:20

## 2023-08-01 RX ADMIN — HEPARIN SODIUM 5000 UNITS: 5000 INJECTION INTRAVENOUS; SUBCUTANEOUS at 21:10

## 2023-08-01 RX ADMIN — ASPIRIN 81 MG: 81 TABLET, COATED ORAL at 09:14

## 2023-08-01 RX ADMIN — ATORVASTATIN CALCIUM 40 MG: 40 TABLET, FILM COATED ORAL at 17:20

## 2023-08-01 RX ADMIN — HEPARIN SODIUM 5000 UNITS: 5000 INJECTION INTRAVENOUS; SUBCUTANEOUS at 14:04

## 2023-08-01 RX ADMIN — CARVEDILOL 6.25 MG: 6.25 TABLET, FILM COATED ORAL at 09:14

## 2023-08-01 RX ADMIN — HEPARIN SODIUM 5000 UNITS: 5000 INJECTION INTRAVENOUS; SUBCUTANEOUS at 05:34

## 2023-08-01 RX ADMIN — AMLODIPINE BESYLATE 2.5 MG: 2.5 TABLET ORAL at 09:14

## 2023-08-01 RX ADMIN — LISINOPRIL 10 MG: 10 TABLET ORAL at 09:14

## 2023-08-01 NOTE — ASSESSMENT & PLAN NOTE
Lab Results   Component Value Date    EGFR 49 07/31/2023    EGFR 45 07/30/2023    EGFR 39 07/29/2023    CREATININE 1.26 07/31/2023    CREATININE 1.36 (H) 07/30/2023    CREATININE 1.52 (H) 07/29/2023     · Most recent creatinine 1.26 (baseline ~1.3-1.4)  · Mild increase in Cr upon admission likely due to poor oral intake and dehydration, normalized as above  · Avoid hypotension and nephrotoxins

## 2023-08-01 NOTE — PROGRESS NOTES
[address] [facility]  Progress Note  Name: Ebony Vasquez  MRN: [id number]  Unit/Bed#: S -01 I Date of Admission: 7/29/2023   Date of Service: 8/1/2023 I Hospital Day: 3    Assessment/Plan   Prediabetes  Assessment & Plan    · Last HbA1c 5.7, continue to monitor outpatient as indicated    Failure to thrive in adult  Assessment & Plan  · Failure to thrive as evidenced by poor oral intake resolved, patient increased PO and fluid intake  · Generalized weakness with difficulty ambulating at assisted living facility reported by son and daughter-in-law  · Patient has baseline memory impairment, patient is reportedly at his baseline  · Daughter-in-law does state the patient has chronically had issues with swallowing. No aspiration events noted.     Plan:  · PT and OT recommended outpatient rehab services  · Encourage PO intake, up to 64 oz of water per day  · Continue fall precautions      Leukocytosis  Assessment & Plan  Lab Results   Component Value Date    WBC 9.86 07/31/2023    WBC 10.35 (H) 07/30/2023    WBC 12.29 (H) 07/29/2023      · POA WBC count of 12K, normalized to 9.86  · Remains afebrile without fever or chills  · UA negative for UTI    Hyponatremia  Assessment & Plan  Lab Results   Component Value Date    SODIUM 134 (L) 07/31/2023    SODIUM 136 07/30/2023    SODIUM 131 (L) 07/29/2023      · Recent sodium 134  · Etiology likely secondary to poor oral intake     HTN (hypertension)  Assessment & Plan  · Continue Norvasc 2.5 mg daily, Coreg 6.25 mg daily, and lisinopril 10 mg daily  · BP stable at discharge    CKD (chronic kidney disease) stage 3, GFR 30-59 ml/min [facility])  Assessment & Plan  Lab Results   Component Value Date    EGFR 49 07/31/2023    EGFR 45 07/30/2023    EGFR 39 07/29/2023    CREATININE 1.26 07/31/2023    CREATININE 1.36 (H) 07/30/2023    CREATININE 1.52 (H) 07/29/2023     · Most recent creatinine 1.26 (baseline ~1.3-1.4)  · Mild increase in Cr upon admission likely due to poor oral intake and dehydration, normalized as above  · Avoid hypotension and nephrotoxins    * Altered mental status  Assessment & Plan  · Patient brought in my son and daughter-in-law with report of increasing altered mental status with generalized weakness for the past 2-3 days. · Poor oral intake with reduction in fluid intake at his assisted living facility. · Was previously admitted on 6/13 as stroke alert after sustaining a fall. Noted to have symptoms of aphasia and right upper extremity sensory deficit and weakness. 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 · He was discharged on aspirin and atorvastatin and was instructed to follow-up with cardiology for zio patch/loop recorder. · In the ED, hemodynamically stable. Intermittently responsive and oriented to person, place, but not time. · ECG NSR  · UA with microscopy unremarkable  · Recent echo on 6/15 showed EF of 65% and grade I diastolic dysfunction  · CT head without contrast showed no acute intracranial abnormalities  · No clear infectious source with leukocytosis continuing to downtrend from last admission. No reported steroid use. · No acute ischemia on MRI brain  · 7/31 - patient at baseline according to DIL and son    Plan:  · Continue aspirin 81 mg daily and atorvastatin 40 mg daily for secondary prevention                 VTE Pharmacologic Prophylaxis:   VTE Score: 16 High Risk (Score >/= 5) - Pharmacological DVT Prophylaxis Ordered: Heparin. Sequential Compression Devices Ordered. Mechanical VTE Prophylaxis in Place: No    Patient Centered Rounds: I have performed bedside rounds with nursing staff today. Discussions with Specialists or Other Care Team Provider: PT/OT/Speech recommendations appreciated    Education and Discussions with Family / Patient: Attempted to update  (daughter in law) via phone. Unable to contact. Current Length of Stay: 3 day(s)    Current Patient Status: Inpatient     Discharge Plan / Estimated Discharge Date: Anticipate discharge tomorrow to rehab facility. Code Status: Level 3 - DNAR and DNI      Subjective:   Patient was seen at bedside for continuity of care. Per night team, no major events overnight. He stated that he is feeling "fine" and "hungry." His mental status is at his baseline per family and he remains oriented to self and time, not place. Patient denied headache, shortness of breath, chest pain, abdominal pain, constipation, cough, nausea or vomiting. Treatment plan and next steps were discussed with patient, who expressed understanding and is amenable to the plan. He did not report any additional questions or concerns at this time. Objective:     Vitals:   Temp (24hrs), Av.2 °F (36.8 °C), Min:97.9 °F (36.6 °C), Max:98.8 °F (37.1 °C)    Temp:  [97.9 °F (36.6 °C)-98.8 °F (37.1 °C)] 98.1 °F (36.7 °C)  HR:  [57-64] 64  Resp:  [16-18] 16  BP: (105-140)/(60-88) 105/60  SpO2:  [93 %-95 %] 95 %  Body mass index is 25.64 kg/m². Input and Output Summary (last 24 hours): Intake/Output Summary (Last 24 hours) at 2023 1507  Last data filed at 2023 0900  Gross per 24 hour   Intake 180 ml   Output 825 ml   Net -645 ml       Physical Exam:     Physical Exam  Constitutional:       General: He is not in acute distress. Appearance: Normal appearance. Cardiovascular:      Rate and Rhythm: Normal rate and regular rhythm. Pulses: Normal pulses. Heart sounds: Normal heart sounds. Pulmonary:      Effort: Pulmonary effort is normal. No respiratory distress. Breath sounds: Normal breath sounds. Abdominal:      General: Bowel sounds are normal.      Palpations: Abdomen is soft. Neurological:      Mental Status: Mental status is at baseline. Comments: AAOx2   Psychiatric:         Thought Content:  Thought content normal.         Judgment: Judgment normal.               Additional Data:     Labs:  Results from last 7 days   Lab Units 07/31/23  0222 07/30/23  0743 07/29/23 2036   WBC Thousand/uL 9.86   < > 12.29*   HEMOGLOBIN g/dL 13.2   < > 14.3   HEMATOCRIT % 42.0   < > 43.8   PLATELETS Thousands/uL 245   < > 280   NEUTROS PCT %  --   --  78*   LYMPHS PCT %  --   --  12*   MONOS PCT %  --   --  8   EOS PCT %  --   --  1    < > = values in this interval not displayed. Results from last 7 days   Lab Units 07/31/23  0254 07/30/23  0743 07/29/23 2036   SODIUM mmol/L 134*   < > 131*   POTASSIUM mmol/L 4.0   < > 4.2   CHLORIDE mmol/L 105   < > 98   CO2 mmol/L 20*   < > 25   BUN mg/dL 23   < > 23   CREATININE mg/dL 1.26   < > 1.52*   ANION GAP mmol/L 9   < > 8   CALCIUM mg/dL 8.4   < > 9.0   ALBUMIN g/dL  --   --  4.0   TOTAL BILIRUBIN mg/dL  --   --  0.80   ALK PHOS U/L  --   --  85   ALT U/L  --   --  15   AST U/L  --   --  13   GLUCOSE RANDOM mg/dL 95   < > 190*    < > = values in this interval not displayed.          Results from last 7 days   Lab Units 07/29/23  1932   POC GLUCOSE mg/dl 216*               Imaging: Reviewed radiology reports from this admission including: CT head and MRI brain              Lines/Drains:  Invasive Devices     Peripheral Intravenous Line  Duration           Peripheral IV 07/31/23 Distal;Right;Ventral (anterior) Forearm 1 day                   Last 24 Hours Medication List:   Current Facility-Administered Medications   Medication Dose Route Frequency Provider Last Rate   • acetaminophen  650 mg Oral Q6H PRN Reza Redman, DO     • amLODIPine  2.5 mg Oral Daily Reza Redman, DO     • aspirin  81 mg Oral Daily Reza Redman, DO     • atorvastatin  40 mg Oral QPM Reza Redman, DO     • carvedilol  6.25 mg Oral BID With Meals Reza Redman, DO     • heparin (porcine)  5,000 Units Subcutaneous [facility] Reza Redman, DO     • lisinopril  10 mg Oral Daily Reza Redman DO     • ondansetron  4 mg Intravenous Q6H PRN Reza Abdalla DO          Today, Patient Was Seen By: Ad Sweeney DO, PGY-1    ** Please Note: This note has been constructed using a voice recognition system.  **

## 2023-08-01 NOTE — CASE MANAGEMENT
Case Management Discharge Planning Note    Patient name Axel Hernadez  Location S /S -01 MRN [id number]  : 1932 Date 2023       Current Admission Date: 2023  Current Admission Diagnosis:Altered mental status   Patient Active Problem List    Diagnosis Date Noted   • Prediabetes 2023   • Failure to thrive in adult 2023   • Altered mental status 2023   • Ambulatory dysfunction 2023   • Stroke ([address]) 2023   • CKD (chronic kidney disease) 2023   • Constipation 2023   • Hyponatremia 2023   • Leukocytosis 2023   • Fall 2023   • Constipation 2021   • Lacunar infarction ([address]) 2021   • Fall 2021   • Encephalopathy acute 2021   • Abrasions of multiple sites 2021   • HTN (hypertension) 2021   • Acute metabolic encephalopathy    • Essential hypertension 2021   • History of prostate cancer 2021   • CKD (chronic kidney disease) stage 3, GFR 30-59 ml/min ([address]) 2021      LOS (days): 3  Geometric Mean LOS (GMLOS) (days): 2.60  Days to GMLOS:0.1     OBJECTIVE:  Risk of Unplanned Readmission Score: 12.86         Current admission status: Inpatient   Preferred Pharmacy:   [address]  [address] [place] [id number]  Phone: [phone] Fax: [phone]    Primary Care Provider: Benigno Tanner MD    Primary Insurance: [facility] RE[facility]  Secondary Insurance:     DISCHARGE DETAILS:  CM received call from [employer] with [facility]. [facility] reviewed patient's PT/OT notes and requesting patient to discharge to an inpatient rehab setting prior to returning to facility. CM spoke with patient's MANN Walsh at [phone]. CM introduced self and role. Patient's DIL Annye Nick updated per [facility], facility requesting inpatient rehab prior to return to facility. CM discussed with family inpatient rehab preference.  Christina Romero requesting [facility] off [address] Cristina Avilez updated CM will reach out to facility. Cristina Avilez aware that insurance authorization will be initiated. CM will f/u with updates. All questions/concerns answered at this time. CM sent updated referral to [facility]. Facility able to accept and auth started. SLIM updated.

## 2023-08-01 NOTE — ASSESSMENT & PLAN NOTE
Lab Results   Component Value Date    SODIUM 134 (L) 07/31/2023    SODIUM 136 07/30/2023    SODIUM 131 (L) 07/29/2023      · Recent sodium 134  · Etiology likely secondary to poor oral intake

## 2023-08-01 NOTE — ASSESSMENT & PLAN NOTE
· Failure to thrive as evidenced by poor oral intake resolved, patient increased PO and fluid intake  · Generalized weakness with difficulty ambulating at assisted living facility reported by son and daughter-in-law  · Patient has baseline memory impairment, patient is reportedly at his baseline  · Daughter-in-law does state the patient has chronically had issues with swallowing. No aspiration events noted.     Plan:  · PT and OT recommended outpatient rehab services  · Encourage PO intake, up to 64 oz of water per day  · Continue fall precautions

## 2023-08-01 NOTE — ASSESSMENT & PLAN NOTE
· Continue Norvasc 2.5 mg daily, Coreg 6.25 mg daily, and lisinopril 10 mg daily  · BP stable at discharge

## 2023-08-01 NOTE — DISCHARGE SUMMARY
[address] [facility]  Discharge- Duy Luciano 2/27/1932, 80 y.o. male MRN: [id number]  Unit/Bed#: S -01 Encounter: [id number]  Primary Care Provider: Harry Diaz MD   Date and time admitted to hospital: 7/29/2023  7:23 PM    Prediabetes  Assessment & Plan    · Last HbA1c 5.7, continue to monitor outpatient as indicated    Failure to thrive in adult  Assessment & Plan  · Failure to thrive as evidenced by poor oral intake resolved, patient increased PO and fluid intake  · Generalized weakness with difficulty ambulating at assisted living facility reported by son and daughter-in-law  · Patient has baseline memory impairment, patient is reportedly at his baseline  · Daughter-in-law does state the patient has chronically had issues with swallowing. No aspiration events noted.     Plan:  · PT and OT recommended outpatient rehab services  · Encourage PO intake, up to 64 oz of water per day  · Continue fall precautions      Leukocytosis  Assessment & Plan  Lab Results   Component Value Date    WBC 9.86 07/31/2023    WBC 10.35 (H) 07/30/2023    WBC 12.29 (H) 07/29/2023      · POA WBC count of 12K, normalized to 9.86  · Remains afebrile without fever or chills  · UA negative for UTI    Hyponatremia  Assessment & Plan  Lab Results   Component Value Date    SODIUM 134 (L) 07/31/2023    SODIUM 136 07/30/2023    SODIUM 131 (L) 07/29/2023      · Recent sodium 134  · Etiology likely secondary to poor oral intake     HTN (hypertension)  Assessment & Plan  · Continue Norvasc 2.5 mg daily, Coreg 6.25 mg daily, and lisinopril 10 mg daily  · BP stable at discharge    CKD (chronic kidney disease) stage 3, GFR 30-59 ml/min [facility])  Assessment & Plan  Lab Results   Component Value Date    EGFR 49 07/31/2023    EGFR 45 07/30/2023    EGFR 39 07/29/2023    CREATININE 1.26 07/31/2023    CREATININE 1.36 (H) 07/30/2023    CREATININE 1.52 (H) 07/29/2023     · Most recent creatinine 1.26 (baseline ~1.3-1.4)  · Mild increase in Cr upon admission likely due to poor oral intake and dehydration, normalized as above  · Avoid hypotension and nephrotoxins    * Altered mental status  Assessment & Plan  · Patient brought in my son and daughter-in-law with report of increasing altered mental status with generalized weakness for the past 2-3 days. · Poor oral intake with reduction in fluid intake at his assisted living facility. · Was previously admitted on 6/13 as stroke alert after sustaining a fall. Noted to have symptoms of aphasia and right upper extremity sensory deficit and weakness. 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 · He was discharged on aspirin and atorvastatin and was instructed to follow-up with cardiology for zio patch/loop recorder. · In the ED, hemodynamically stable. Intermittently responsive and oriented to person, place, but not time. · ECG NSR  · UA with microscopy unremarkable  · Recent echo on 6/15 showed EF of 65% and grade I diastolic dysfunction  · CT head without contrast showed no acute intracranial abnormalities  · No clear infectious source with leukocytosis continuing to downtrend from last admission. No reported steroid use.   · No acute ischemia on MRI brain  · 7/31 - patient at baseline according to DIL and son    Plan:  · Continue aspirin 81 mg daily and atorvastatin 40 mg daily for secondary prevention          Discharging Resident Physician: Sundeep Castle DO  Attending: Rigo Campuzano MD  PCP: Michelle Francois MD  Admission Date: 7/29/2023  Discharge Date: 08/01/23    Disposition:     [address]    Reason for Admission: Altered mental status    Consultations During Hospital Stay:  · PT  · OT  · Speech    Procedures Performed:     · None    Significant Findings / Test Results:     · None    Incidental Findings:   · None      Test Results Pending at Discharge (will require follow up): · None     Outpatient Tests Requested:  · PT evaluation  · OT evaluation    Complications: No complications    Hospital Course:     Henry Quiros is a 80 y.o. 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 Per patient's daughter-in-law, patient was having increased confusion, generalized weakness, and fatigue associated decreased p.o. and fluid intake for several days prior to admission. Of note, patient was recently in an acute rehab facility following a diagnosed stroke last month and was discharged back to his assisted living facility from rehab. Upon return to his [facility], family began to notice the change in mental status and brought patient to the ED. In the ED, patient was hemodynamically stable and UA was negative for UTI. CT head without contrast was negative for any intracranial pathology. Lab results revealed leukocytosis. Patient was admitted to the floor where a MRI of the brain was ordered and results were unremarkable. Patient received IV hydration over the course of his hospital stay and his leukocytosis normalized. No other infectious causes were noted. Patient was seen by PT and OT with recommendations for discharge to rehab with both services. Due to insurance reasons, patient was discharged back to his [facility] instead of rehab with PT and OT evaluation referrals. Per family, over the course of hospital stay, patient returned to baseline mental status and was thus stable for discharge. Condition at Discharge: stable     Discharge Day Visit / Exam:     Subjective: Patient reports that he doing fine and ready to go home. He reported good appetite and had no complaints. Denied nausea, vomiting, chest pain, shortness of breath, diarrhea, constipation, or dysuria.      Vitals:   Vitals:    08/03/23 0720 BP: 142/66   Pulse: 58   Resp: 19   Temp: 98 °F (36.7 °C)   SpO2: 92%           Exam:   Physical Exam  Constitutional:       Appearance: Normal appearance. Cardiovascular:      Rate and Rhythm: Normal rate and regular rhythm. Pulses: Normal pulses. Heart sounds: Normal heart sounds. Pulmonary:      Effort: Pulmonary effort is normal.      Breath sounds: Normal breath sounds. Abdominal:      General: Bowel sounds are normal.      Palpations: Abdomen is soft. Skin:     General: Skin is warm and dry. Neurological:      General: No focal deficit present. Mental Status: Mental status is at baseline. Discussion with Family: Daughter in law    Discharge instructions/Information to patient and family:   See after visit summary for information provided to patient and family. Provisions for Follow-Up Care:  See after visit summary for information related to follow-up care and any pertinent home health orders. Planned Readmission: No     Discharge Medications:  See after visit summary for reconciled discharge medications provided to patient and family.       ** Please Note: This note has been constructed using a voice recognition system **

## 2023-08-01 NOTE — SPEECH THERAPY NOTE
Speech Language/Pathology Progress Note      Patient Name: Michael Moyer    Today's Date: 8/1/2023     Problem List  Principal Problem:    Altered mental status  Active Problems:    CKD (chronic kidney disease) stage 3, GFR 30-59 ml/min ([facility])    HTN (hypertension)    Hyponatremia    Leukocytosis    Failure to thrive in adult    Prediabetes         Past Medical History  Past Medical History:   Diagnosis Date   • CKD (chronic kidney disease)    • HL (hearing loss)    • HTN (hypertension)    • Hypertension    • Hyponatremia    • Prostate CA [facility])        Past Surgical History  History reviewed. No pertinent surgical history. Subjective:  Pt OOB in chair with lunch tray present as SLP entered the room. Pt on room air with upper/lower dentures in place. Objective:  Pt seen with the end of lunch and consumed whipped potatoes, fish, green beans, pudding, shortbread cookies, and thin liquids via cup/straw. Pt able to retreive bolus from fork, spoon, straw, and cup without difficulty. Mastication slow but functional. x2 coughs observed that were not timed with swallow. Pt with 100% PO intake. Assessment:  Pt continues to present with functional oral and pharyngeal stages of the swallow. x2 wet coughs heard that were not immediately observed following swallow. Voice clear throughout tx session.         Plan/Recommendations:  Dysphagia Level 3  Thin liquids  Medications: whole w/ liquids  ST to continue to follow LUIS FELIPE

## 2023-08-01 NOTE — ASSESSMENT & PLAN NOTE
· Patient brought in my son and daughter-in-law with report of increasing altered mental status with generalized weakness for the past 2-3 days. · Poor oral intake with reduction in fluid intake at his assisted living facility. · Was previously admitted on 6/13 as stroke alert after sustaining a fall. Noted to have symptoms of aphasia and right upper extremity sensory deficit and weakness. 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 · He was discharged on aspirin and atorvastatin and was instructed to follow-up with cardiology for zio patch/loop recorder. · In the ED, hemodynamically stable. Intermittently responsive and oriented to person, place, but not time. · ECG NSR  · UA with microscopy unremarkable  · Recent echo on 6/15 showed EF of 65% and grade I diastolic dysfunction  · CT head without contrast showed no acute intracranial abnormalities  · No clear infectious source with leukocytosis continuing to downtrend from last admission. No reported steroid use.   · No acute ischemia on MRI brain  · 7/31 - patient at baseline according to DIL and son    Plan:  · Continue aspirin 81 mg daily and atorvastatin 40 mg daily for secondary prevention

## 2023-08-01 NOTE — PLAN OF CARE
Problem: SAFETY ADULT  Goal: Patient will remain free of falls  Description: INTERVENTIONS:  - Educate patient/family on patient safety including physical limitations  - Instruct patient to call for assistance with activity   - Consult OT/PT to assist with strengthening/mobility   - Keep Call bell within reach  - Keep bed low and locked with side rails adjusted as appropriate  - Keep care items and personal belongings within reach  - Initiate and maintain comfort rounds  - Make Fall Risk Sign visible to staff  - Offer Toileting every  Hours, in advance of need  - Initiate/Maintain bed alarm  - Obtain necessary fall risk management equipment: walker   - Apply yellow socks and bracelet for high fall risk patients  - Consider moving patient to room near nurses station  Outcome: Progressing     Problem: MOBILITY - ADULT  Goal: Maintain or return to baseline ADL function  Description: INTERVENTIONS:  -  Assess patient's ability to carry out ADLs; assess patient's baseline for ADL function and identify physical deficits which impact ability to perform ADLs (bathing, care of mouth/teeth, toileting, grooming, dressing, etc.)  - Assess/evaluate cause of self-care deficits   - Assess range of motion  - Assess patient's mobility; develop plan if impaired  - Assess patient's need for assistive devices and provide as appropriate  - Encourage maximum independence but intervene and supervise when necessary  - Involve family in performance of ADLs  - Assess for home care needs following discharge   - Consider OT consult to assist with ADL evaluation and planning for discharge  - Provide patient education as appropriate  Outcome: Progressing     Problem: Prexisting or High Potential for Compromised Skin Integrity  Goal: Skin integrity is maintained or improved  Description: INTERVENTIONS:  - Identify patients at risk for skin breakdown  - Assess and monitor skin integrity  - Assess and monitor nutrition and hydration status  - Monitor labs   - Assess for incontinence   - Turn and reposition patient  - Assist with mobility/ambulation  - Relieve pressure over bony prominences  - Avoid friction and shearing  - Provide appropriate hygiene as needed including keeping skin clean and dry  - Evaluate need for skin moisturizer/barrier cream  - Collaborate with interdisciplinary team   - Patient/family teaching  - Consider wound care consult   Outcome: Progressing

## 2023-08-01 NOTE — ASSESSMENT & PLAN NOTE
Lab Results   Component Value Date    WBC 9.86 07/31/2023    WBC 10.35 (H) 07/30/2023    WBC 12.29 (H) 07/29/2023      · POA WBC count of 12K, normalized to 9.86  · Remains afebrile without fever or chills  · UA negative for UTI

## 2023-08-02 PROBLEM — D72.829 LEUKOCYTOSIS: Status: RESOLVED | Noted: 2023-06-13 | Resolved: 2023-08-02

## 2023-08-02 PROCEDURE — 99232 SBSQ HOSP IP/OBS MODERATE 35: CPT | Performed by: HOSPITALIST

## 2023-08-02 RX ADMIN — ATORVASTATIN CALCIUM 40 MG: 40 TABLET, FILM COATED ORAL at 17:12

## 2023-08-02 RX ADMIN — HEPARIN SODIUM 5000 UNITS: 5000 INJECTION INTRAVENOUS; SUBCUTANEOUS at 13:54

## 2023-08-02 RX ADMIN — ASPIRIN 81 MG: 81 TABLET, COATED ORAL at 09:13

## 2023-08-02 RX ADMIN — LISINOPRIL 10 MG: 10 TABLET ORAL at 09:13

## 2023-08-02 RX ADMIN — AMLODIPINE BESYLATE 2.5 MG: 2.5 TABLET ORAL at 09:13

## 2023-08-02 RX ADMIN — HEPARIN SODIUM 5000 UNITS: 5000 INJECTION INTRAVENOUS; SUBCUTANEOUS at 05:05

## 2023-08-02 RX ADMIN — HEPARIN SODIUM 5000 UNITS: 5000 INJECTION INTRAVENOUS; SUBCUTANEOUS at 21:22

## 2023-08-02 RX ADMIN — CARVEDILOL 6.25 MG: 6.25 TABLET, FILM COATED ORAL at 17:11

## 2023-08-02 RX ADMIN — CARVEDILOL 6.25 MG: 6.25 TABLET, FILM COATED ORAL at 09:13

## 2023-08-02 NOTE — PLAN OF CARE
Problem: MOBILITY - ADULT  Goal: Maintain or return to baseline ADL function  Description: INTERVENTIONS:  -  Assess patient's ability to carry out ADLs; assess patient's baseline for ADL function and identify physical deficits which impact ability to perform ADLs (bathing, care of mouth/teeth, toileting, grooming, dressing, etc.)  - Assess/evaluate cause of self-care deficits   - Assess range of motion  - Assess patient's mobility; develop plan if impaired  - Assess patient's need for assistive devices and provide as appropriate  - Encourage maximum independence but intervene and supervise when necessary  - Involve family in performance of ADLs  - Assess for home care needs following discharge   - Consider OT consult to assist with ADL evaluation and planning for discharge  - Provide patient education as appropriate  Outcome: Progressing     Problem: Prexisting or High Potential for Compromised Skin Integrity  Goal: Skin integrity is maintained or improved  Description: INTERVENTIONS:  - Identify patients at risk for skin breakdown  - Assess and monitor skin integrity  - Assess and monitor nutrition and hydration status  - Monitor labs   - Assess for incontinence   - Turn and reposition patient  - Assist with mobility/ambulation  - Relieve pressure over bony prominences  - Avoid friction and shearing  - Provide appropriate hygiene as needed including keeping skin clean and dry  - Evaluate need for skin moisturizer/barrier cream  - Collaborate with interdisciplinary team   - Patient/family teaching  - Consider wound care consult   Outcome: Progressing     Problem: DISCHARGE PLANNING  Goal: Discharge to home or other facility with appropriate resources  Description: INTERVENTIONS:  - Identify barriers to discharge w/patient and caregiver  - Arrange for needed discharge resources and transportation as appropriate  - Identify discharge learning needs (meds, wound care, etc.)  - Arrange for interpretive services to assist at discharge as needed  - Refer to Case Management Department for coordinating discharge planning if the patient needs post-hospital services based on physician/advanced practitioner order or complex needs related to functional status, cognitive ability, or social support system  Outcome: Progressing     Problem: Knowledge Deficit  Goal: Patient/family/caregiver demonstrates understanding of disease process, treatment plan, medications, and discharge instructions  Description: Complete learning assessment and assess knowledge base.   Interventions:  - Provide teaching at level of understanding  - Provide teaching via preferred learning methods  Outcome: Progressing

## 2023-08-02 NOTE — PLAN OF CARE
Problem: SAFETY ADULT  Goal: Patient will remain free of falls  Description: INTERVENTIONS:  - Educate patient/family on patient safety including physical limitations  - Instruct patient to call for assistance with activity   - Consult OT/PT to assist with strengthening/mobility   - Keep Call bell within reach  - Keep bed low and locked with side rails adjusted as appropriate  - Keep care items and personal belongings within reach  - Initiate and maintain comfort rounds  - Make Fall Risk Sign visible to staff  - Offer Toileting every  Hours, in advance of need  - Initiate/Maintain alarm  - Obtain necessary fall risk management equipment: walker  - Apply yellow socks and bracelet for high fall risk patients  - Consider moving patient to room near nurses station  Outcome: Progressing  Problem: Prexisting or High Potential for Compromised Skin Integrity  Goal: Skin integrity is maintained or improved  Description: INTERVENTIONS:  - Identify patients at risk for skin breakdown  - Assess and monitor skin integrity  - Assess and monitor nutrition and hydration status  - Monitor labs   - Assess for incontinence   - Turn and reposition patient  - Assist with mobility/ambulation  - Relieve pressure over bony prominences  - Avoid friction and shearing  - Provide appropriate hygiene as needed including keeping skin clean and dry  - Evaluate need for skin moisturizer/barrier cream  - Collaborate with interdisciplinary team   - Patient/family teaching  - Consider wound care consult   Outcome: Progressing

## 2023-08-02 NOTE — ASSESSMENT & PLAN NOTE
· Patient brought in my son and daughter-in-law with report of increasing altered mental status with generalized weakness for the past 2-3 days. · Poor oral intake with reduction in fluid intake at his assisted living facility. · Was previously admitted on 6/13 as stroke alert after sustaining a fall. Noted to have symptoms of aphasia and right upper extremity sensory deficit and weakness. 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 · He was discharged on aspirin and atorvastatin and was instructed to follow-up with cardiology for zio patch/loop recorder. · In the ED, hemodynamically stable. Intermittently responsive and oriented to person, place, but not time.   · ECG NSR  · UA with microscopy unremarkable  · Recent echo on 6/15 showed EF of 65% and grade I diastolic dysfunction  · CT head without contrast showed no acute intracranial abnormalities  · No acute ischemia on MRI brain  · 7/31 - patient at baseline according to DIL and son, ready for discharge    Plan:  · Continue aspirin 81 mg daily and atorvastatin 40 mg daily for secondary prevention

## 2023-08-02 NOTE — PROGRESS NOTES
[facility]  Progress Note  Name: Abhijit Elkins  MRN: [id number]  Unit/Bed#: S -01 I Date of Admission: 7/29/2023   Date of Service: 8/2/2023 I Hospital Day: 4    Assessment/Plan   Failure to thrive in adult  Assessment & Plan  · Failure to thrive as evidenced by poor oral intake which has resolved, patient is eating and drinking normally  · Generalized weakness with difficulty ambulating at assisted living facility reported by son and daughter-in-law  · Patient has baseline memory impairment, patient is reportedly at his baseline  · Daughter-in-law does state the patient has chronically had issues with swallowing. No aspiration events noted. Plan:  · PT and OT recommended outpatient rehab services  · Patient awaiting insurance authorization for placement  · Encourage PO intake, up to 64 oz of water per day      Prediabetes  Assessment & Plan  Last HbA1c 5.7, continue to monitor outpatient as indicated    * Altered mental status  Assessment & Plan  · Patient brought in my son and daughter-in-law with report of increasing altered mental status with generalized weakness for the past 2-3 days. · Poor oral intake with reduction in fluid intake at his assisted living facility. · Was previously admitted on 6/13 as stroke alert after sustaining a fall. Noted to have symptoms of aphasia and right upper extremity sensory deficit and weakness. 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 · He was discharged on aspirin and atorvastatin and was instructed to follow-up with cardiology for zio patch/loop recorder. · In the ED, hemodynamically stable. Intermittently responsive and oriented to person, place, but not time.   · ECG NSR  · UA with microscopy unremarkable  · Recent echo on 6/15 showed EF of 65% and grade I diastolic dysfunction  · CT head without contrast showed no acute intracranial abnormalities  · No acute ischemia on MRI brain  · 7/31 - patient at baseline according to DIL and son, ready for discharge    Plan:  · Continue aspirin 81 mg daily and atorvastatin 40 mg daily for secondary prevention      Hyponatremia  Assessment & Plan  Lab Results   Component Value Date    SODIUM 134 (L) 07/31/2023    SODIUM 136 07/30/2023    SODIUM 131 (L) 07/29/2023      · Recent sodium 134  · Etiology likely secondary to poor oral intake     HTN (hypertension)  Assessment & Plan  · Continue Norvasc 2.5 mg daily, Coreg 6.25 mg daily, and lisinopril 10 mg daily  · BP stable at discharge    CKD (chronic kidney disease) stage 3, GFR 30-59 ml/min [facility])  Assessment & Plan  Lab Results   Component Value Date    EGFR 49 07/31/2023    EGFR 45 07/30/2023    EGFR 39 07/29/2023    CREATININE 1.26 07/31/2023    CREATININE 1.36 (H) 07/30/2023    CREATININE 1.52 (H) 07/29/2023     · Most recent creatinine 1.26 (baseline ~1.3-1.4)  · Mild increase in Cr upon admission likely due to poor oral intake and dehydration, normalized as above  · Avoid hypotension and nephrotoxins    Leukocytosis-resolved as of 8/2/2023  Assessment & Plan  Lab Results   Component Value Date    WBC 9.86 07/31/2023    WBC 10.35 (H) 07/30/2023    WBC 12.29 (H) 07/29/2023      · POA WBC count of 12K, normalized to 9.86  · Remains afebrile without fever or chills  · UA negative for UTI           VTE Pharmacologic Prophylaxis:   VTE Score: 16 High Risk (Score >/= 5) - Pharmacological DVT Prophylaxis Ordered: Heparin. Sequential Compression Devices Ordered. Mechanical VTE Prophylaxis in Place: No    Patient Centered Rounds: I have performed bedside rounds with nursing staff today.     Discussions with Specialists or Other Care Team Provider: None    Education and Discussions with Family / Patient: Family updates per     Current Length of Stay: 4 day(s)    Current Patient Status: Inpatient     Discharge Plan / Estimated Discharge Date: Anticipate discharge tomorrow to rehab facility. Code Status: Level 3 - DNAR and DNI      Subjective:   Patient was seen at bedside for continuity of care. Per night team, no major events overnight. He reported that he was fine and hungry. Patient is currently awaiting insurance authorization then he will be discharged to rehab. Patient denied headache, shortness of breath, chest pain, constipation, diarrhea, nausea or vomiting. He did not report any additional questions or concerns at this time. Objective:     Vitals:   Temp (24hrs), Av.1 °F (36.7 °C), Min:97.7 °F (36.5 °C), Max:98.5 °F (36.9 °C)    Temp:  [97.7 °F (36.5 °C)-98.5 °F (36.9 °C)] 97.7 °F (36.5 °C)  HR:  [64-79] 64  Resp:  [16-18] 17  BP: (101-150)/(59-73) 150/71  SpO2:  [92 %-95 %] 95 %  Body mass index is 25.64 kg/m². Input and Output Summary (last 24 hours): Intake/Output Summary (Last 24 hours) at 2023 1447  Last data filed at 2023 0813  Gross per 24 hour   Intake 177 ml   Output 350 ml   Net -173 ml       Physical Exam:     Physical Exam  Constitutional:       Appearance: Normal appearance. He is normal weight. Cardiovascular:      Rate and Rhythm: Normal rate and regular rhythm. Pulses: Normal pulses. Heart sounds: Normal heart sounds. Pulmonary:      Effort: Pulmonary effort is normal.      Breath sounds: Normal breath sounds. Abdominal:      General: Bowel sounds are normal.      Palpations: Abdomen is soft. Neurological:      Mental Status: Mental status is at baseline.    Psychiatric:         Mood and Affect: Mood normal.         Judgment: Judgment normal.               Additional Data:     Labs:  Results from last 7 days   Lab Units 23  0222 23  0743 23  2036   WBC Thousand/uL 9.86   < > 12.29*   HEMOGLOBIN g/dL 13.2   < > 14.3   HEMATOCRIT % 42.0   < > 43.8   PLATELETS Thousands/uL 245   < > 280   NEUTROS PCT %  --   --  78*   LYMPHS PCT %  --   --  12*   MONOS PCT %  --   --  8   EOS PCT %  --   --  1    < > = values in this interval not displayed. Results from last 7 days   Lab Units 07/31/23  0254 07/30/23  0743 07/29/23  2036   SODIUM mmol/L 134*   < > 131*   POTASSIUM mmol/L 4.0   < > 4.2   CHLORIDE mmol/L 105   < > 98   CO2 mmol/L 20*   < > 25   BUN mg/dL 23   < > 23   CREATININE mg/dL 1.26   < > 1.52*   ANION GAP mmol/L 9   < > 8   CALCIUM mg/dL 8.4   < > 9.0   ALBUMIN g/dL  --   --  4.0   TOTAL BILIRUBIN mg/dL  --   --  0.80   ALK PHOS U/L  --   --  85   ALT U/L  --   --  15   AST U/L  --   --  13   GLUCOSE RANDOM mg/dL 95   < > 190*    < > = values in this interval not displayed. Results from last 7 days   Lab Units 07/29/23  1932   POC GLUCOSE mg/dl 216*               Imaging: No pertinent imaging reviewed. Recent Cultures (last 7 days):           Lines/Drains:  Invasive Devices     Peripheral Intravenous Line  Duration           Peripheral IV 07/31/23 Distal;Right;Ventral (anterior) Forearm 2 days                Telemetry:        Last 24 Hours Medication List:   Current Facility-Administered Medications   Medication Dose Route Frequency Provider Last Rate   • acetaminophen  650 mg Oral Q6H PRN Reza Stein DO     • amLODIPine  2.5 mg Oral Daily Reza Stein, DO     • aspirin  81 mg Oral Daily Reza Stein DO     • atorvastatin  40 mg Oral QPM Reza Stein DO     • carvedilol  6.25 mg Oral BID With Meals Reza Stein, DO     • heparin (porcine)  5,000 Units Subcutaneous [facility] Reza Stein, DO     • lisinopril  10 mg Oral Daily Reza Stein DO     • ondansetron  4 mg Intravenous Q6H PRN Reza Stein DO          Today, Patient Was Seen By: Carlos Swanson DO    ** Please Note: This note has been constructed using a voice recognition system.  **

## 2023-08-02 NOTE — CASE MANAGEMENT
Case Management Progress Note    Patient name Layla King  Location S /S -01 MRN [id number]  : 1932 Date 2023       LOS (days): 4  Geometric Mean LOS (GMLOS) (days): 2.60  Days to GMLOS:-1        OBJECTIVE:        Current admission status: Inpatient  Preferred Pharmacy:   [id number]  [place] [address]  Phone: [phone] Fax: [phone]    Primary Care Provider: Francesca Beltran MD    Primary Insurance: [facility] REP  Secondary Insurance:     PROGRESS NOTE:      CM continues to await insurance authorization for inpatient SNF placement [facility] -- If denied, Clifford Perez ( [phone]) would like pt to return to [facility] as he resides there long-term. CM spoke with Godfrey Townsend at the facility to address same @[phone]) -- She would like to await the insurance's SNF decision before accepting pt back. CM will continue to follow.

## 2023-08-02 NOTE — ASSESSMENT & PLAN NOTE
· Failure to thrive as evidenced by poor oral intake which has resolved, patient is eating and drinking normally  · Generalized weakness with difficulty ambulating at assisted living facility reported by son and daughter-in-law  · Patient has baseline memory impairment, patient is reportedly at his baseline  · Daughter-in-law does state the patient has chronically had issues with swallowing. No aspiration events noted.     Plan:  · PT and OT recommended outpatient rehab services  · Patient awaiting insurance authorization for placement  · Encourage PO intake, up to 64 oz of water per day

## 2023-08-03 VITALS
SYSTOLIC BLOOD PRESSURE: 142 MMHG | DIASTOLIC BLOOD PRESSURE: 66 MMHG | HEART RATE: 58 BPM | RESPIRATION RATE: 19 BRPM | BODY MASS INDEX: 25.64 KG/M2 | TEMPERATURE: 98 F | WEIGHT: 183.86 LBS | OXYGEN SATURATION: 92 %

## 2023-08-03 PROCEDURE — 97110 THERAPEUTIC EXERCISES: CPT

## 2023-08-03 PROCEDURE — 99239 HOSP IP/OBS DSCHRG MGMT >30: CPT | Performed by: HOSPITALIST

## 2023-08-03 PROCEDURE — 97530 THERAPEUTIC ACTIVITIES: CPT

## 2023-08-03 PROCEDURE — 97116 GAIT TRAINING THERAPY: CPT

## 2023-08-03 PROCEDURE — 92526 ORAL FUNCTION THERAPY: CPT

## 2023-08-03 RX ADMIN — HEPARIN SODIUM 5000 UNITS: 5000 INJECTION INTRAVENOUS; SUBCUTANEOUS at 06:40

## 2023-08-03 RX ADMIN — LISINOPRIL 10 MG: 10 TABLET ORAL at 08:22

## 2023-08-03 RX ADMIN — CARVEDILOL 6.25 MG: 6.25 TABLET, FILM COATED ORAL at 08:22

## 2023-08-03 RX ADMIN — ASPIRIN 81 MG: 81 TABLET, COATED ORAL at 08:22

## 2023-08-03 RX ADMIN — AMLODIPINE BESYLATE 2.5 MG: 2.5 TABLET ORAL at 08:22

## 2023-08-03 NOTE — CASE MANAGEMENT
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[address]) 2021   • Fall 2021   • Encephalopathy acute 2021   • Abrasions of multiple sites 2021   • HTN (hypertension) 2021   • Acute metabolic encephalopathy    • Essential hypertension 2021   • History of prostate cancer 2021   • CKD (chronic kidney disease) stage 3, GFR 30-59 ml/min ([address]) 2021      LOS (days): 5  Geometric Mean LOS (GMLOS) (days): 2.60  Days to GMLOS:-2     OBJECTIVE:  Risk of Unplanned Readmission Score: 13.29         Current admission status: Inpatient   Preferred Pharmacy:   [address] [place] [address]  Phone: [phone] Fax: [phone]    Primary Care Provider: Negar Lawton MD    Primary Insurance: [address] [place] [address] REP  Secondary Insurance:     DISCHARGE DETAILS:  Message received from CM support that Mary Nicholson has been denied for SNF at [facility]. Peer to peer information provided. PT worked with pt today and pt ambulated 250ft supervision level. Contacted [facility]ilez informing of denial and pt ambulating 250ft this morning. MANN did not want to move forward with peer to peer and preferred pt to return back to [facility]. CM contacted [facility] and spoke with the [address] [place] [address] of noted information.  Pt is able to return back to the facility today. Requesting any new or med changes be sent to [address] [place] [address] and a Rx for PT/OT eval and treat and facility will setup home therapy at the facility when pt returns. Resident informed    Danial Roberts updated and IMM reviewed with Nimco Harper agrees with discharge determination. Family will be transporting pt back to the facility this afternoon.                                                                                           IMM Given (Date):: 08/03/23  IMM Given to[de-identified] Family  Family notified[de-identified]  [address] Name, [address] : [facility]  Receiving Facility/Agency Phone Number: [phone]

## 2023-08-03 NOTE — SPEECH THERAPY NOTE
Speech Language/Pathology    Speech/Language Pathology Progress Note    Patient Name: Елена Prado  Today's Date: 8/3/2023       Subjective:  Pt seen at lunch for dysphagia tx follow up. Pt sitting in chair on regular diet. Objective:  Pt self fed potato salad, chopped green beans and 1 bite of chopped roast beef- did not like the roast beef. Pt w/ slow, prolonged but effective mastication/manipulation. Mild pocketing noted on R, but eventually cleared. Pt independently took sips of thin liquids by straw in between bites of food. No overt s/s aspiration noted. Delayed cough noted x1 while chewing green beans. Assessment:  Pt appears to be tolerating dysphagia 3 diet w/ thin liquids w/ low risk for aspiration. Plan/Recommendations:  Cont dysphagia 3 diet w/ thin liquids  No further speech tx follow up needed.        Daniel Shearer CCC-SLP  Speech Pathologist  Available via  tiger text

## 2023-08-03 NOTE — PHYSICAL THERAPY NOTE
PHYSICAL THERAPY NOTE    Patient Name: Mario Roque  RWQJC'P Date: 8/3/2023       08/03/23 0935   PT Last Visit   PT Visit Date 08/03/23   Note Type   Note Type Treatment   Pain Assessment   Pain Assessment Tool 0-10   Pain Score No Pain   Restrictions/Precautions   Weight Bearing Precautions Per Order No   Other Precautions Chair Alarm;Cognitive; Bed Alarm; Fall Risk   General   Family/Caregiver Present No   Subjective   Subjective Patient seated OOB in recliner and is agreeable to participate in therapy session. Pt identifers obtained from name & ID bracelet. Bed Mobility   Supine to Sit Unable to assess   Sit to Supine Unable to assess   Additional Comments Pt seated OOB in recliner pre and post session with chair alarm engaged, call bell and belongings in reach. Transfers   Sit to Stand 5  Supervision   Additional items Assist x 1; Armrests   Stand to Sit 5  Supervision   Additional items Assist x 1; Armrests; Verbal cues   Ambulation/Elevation   Gait pattern Improper Weight shift;Decreased foot clearance; Short stride; Excessively slow;Decreased heel strike;Decreased toe off   Gait Assistance 5  Supervision   Additional items Assist x 1;Verbal cues   Assistive Device Rolling walker   Distance 250' x1   Balance   Static Sitting Fair +   Dynamic Sitting Fair   Static Standing Fair -   Dynamic Standing Fair -   Ambulatory Fair -   Activity Tolerance   Activity Tolerance Patient limited by fatigue   Nurse Made Aware Spoke to Skinny Estrada RN   Exercises   Hip Abduction Sitting;15 reps;AROM; Bilateral   Hip Adduction Sitting;15 reps;AROM; Bilateral   Knee AROM Long Arc Quad Sitting;15 reps;AROM; Bilateral   Ankle Pumps Sitting;20 reps;AROM; Bilateral   Marching Sitting;15 reps;AROM; Bilateral   Assessment   Prognosis Good   Problem List Decreased endurance; Impaired balance;Decreased mobility; Decreased cognition; Impaired judgement;Decreased safety awareness   Assessment Patient agreeable to participate in therapy session. Patient remains consistent with supervision for transfers with verbal instruction for hand placement and approach to chair on descend. Pt able to ambulate increased gait distance with roller walker and supervision. Slower daniel with increased gait distance and verbal instruction for path navigation. Demonstrated good ability to manage walker around obstacles and turns. Pt participated in seated B LE exercise program with AROM and input for form and pace. Continue to focus on OOB mobility with progression of ambulation as appropriate and able. Goals   Patient Goals none stated   STG Expiration Date 08/10/23   PT Treatment Day 1   Plan   Treatment/Interventions Functional transfer training;LE strengthening/ROM; Therapeutic exercise; Endurance training;Cognitive reorientation;Patient/family training;Bed mobility; Equipment eval/education;Gait training;Spoke to nursing   PT Frequency 2-3x/wk   Recommendation   PT Discharge Recommendation Return to facility with rehabilitation services   Equipment Recommended Walker   AM-PAC Basic Mobility Inpatient   Turning in Flat Bed Without Bedrails 4   Lying on Back to Sitting on Edge of Flat Bed Without Bedrails 3   Moving Bed to Chair 3   Standing Up From Chair Using Arms 3   Walk in Room 3   Climb 3-5 Stairs With Railing 2   Basic Mobility Inpatient Raw Score 18   Basic Mobility Standardized Score 41.05   Highest Level Of Mobility   JH-HLM Goal 6: Walk 10 steps or more   JH-HLM Achieved 8: Walk 250 feet ot more   End of Consult   Patient Position at End of Consult Bedside chair;Bed/Chair alarm activated; All needs within reach     The patient's AM-PAC Basic Mobility Inpatient Short Form Raw Score is 18. A Raw score of greater than 16 suggests the patient may benefit from discharge to home. Please also refer to the recommendation of the Physical Therapist for safe discharge planning.       Maren Razo, PTA

## 2023-08-03 NOTE — CASE MANAGEMENT
Support Center has received request for peer to peer. Request received for: SNF  Facility: [facility]    Pending auth #: [id number]  Peer to Peer Reason: at or near baseline functional status.     Peer to Peer phone#:  [phone]   Deadline: 1:30 PM EST 8/3  Care Manager notified: Anabell Acosta

## 2023-08-03 NOTE — PLAN OF CARE
Problem: SAFETY ADULT  Goal: Patient will remain free of falls  Description: INTERVENTIONS:  - Educate patient/family on patient safety including physical limitations  - Instruct patient to call for assistance with activity   - Consult OT/PT to assist with strengthening/mobility   - Keep Call bell within reach  - Keep bed low and locked with side rails adjusted as appropriate  - Keep care items and personal belongings within reach  - Initiate and maintain comfort rounds  - Make Fall Risk Sign visible to staff  - Offer Toileting every 2 Hours, in advance of need  - Initiate/Maintain bed alarm  - Obtain necessary fall risk management equipment: walker   - Apply yellow socks and bracelet for high fall risk patients  - Consider moving patient to room near nurses station  Outcome: Progressing     Problem: MOBILITY - ADULT  Goal: Maintain or return to baseline ADL function  Description: INTERVENTIONS:  -  Assess patient's ability to carry out ADLs; assess patient's baseline for ADL function and identify physical deficits which impact ability to perform ADLs (bathing, care of mouth/teeth, toileting, grooming, dressing, etc.)  - Assess/evaluate cause of self-care deficits   - Assess range of motion  - Assess patient's mobility; develop plan if impaired  - Assess patient's need for assistive devices and provide as appropriate  - Encourage maximum independence but intervene and supervise when necessary  - Involve family in performance of ADLs  - Assess for home care needs following discharge   - Consider OT consult to assist with ADL evaluation and planning for discharge  - Provide patient education as appropriate  Outcome: Progressing

## 2023-08-04 NOTE — UTILIZATION REVIEW
NOTIFICATION OF ADMISSION DISCHARGE   This is a Notification of Discharge from [facility]. Please be advised that this patient has been discharge from our facility. Below you will find the admission and discharge date and time including the patient’s disposition. UTILIZATION REVIEW CONTACT:  Brenda Samson MA  Utilization   Network Utilization Review Department  Phone: [phone] x carefully listen to the prompts. All voicemails are confidential.  Email: [email or web address]     ADMISSION INFORMATION  PRESENTATION DATE: 7/29/2023  7:23 PM  OBERVATION ADMISSION DATE:   INPATIENT ADMISSION DATE: 7/29/23 10:52 PM   DISCHARGE DATE: 8/3/2023  3:08 PM   DISPOSITION:Non [facility] SNF/TCU/SNU    IMPORTANT INFORMATION:  Send all requests for admission clinical reviews, approved or denied determinations and any other requests to dedicated fax number below belonging to the campus where the patient is receiving treatment.  List of dedicated fax numbers:  [place] DENIALS (Administrative/Medical Necessity) [phone] [address] [facility] (Maternity/NICU/Pediatrics) [phone]   [facility] [phone]   [facility] [phone]   [address] [phone]   [address] [place] [address] [phone]   [facility] [phone]   [address] [facility] [phone]   [address] [place] [address] [phone]   [address] [facility] [phone]   [address] [facility] [address] [phone]

## 2023-08-09 ENCOUNTER — TELEPHONE (OUTPATIENT)
Dept: NEUROLOGY | Facility: CLINIC | Age: 88
End: 2023-08-09

## 2025-01-15 ENCOUNTER — NURSING HOME VISIT (OUTPATIENT)
Dept: GERIATRICS | Facility: OTHER | Age: OVER 89
End: 2025-01-15
Payer: COMMERCIAL

## 2025-01-15 VITALS
SYSTOLIC BLOOD PRESSURE: 140 MMHG | TEMPERATURE: 97.6 F | RESPIRATION RATE: 18 BRPM | HEART RATE: 79 BPM | BODY MASS INDEX: 21.42 KG/M2 | WEIGHT: 153 LBS | HEIGHT: 71 IN | DIASTOLIC BLOOD PRESSURE: 79 MMHG | OXYGEN SATURATION: 94 %

## 2025-01-15 DIAGNOSIS — I63.531 CEREBROVASCULAR ACCIDENT (CVA) DUE TO STENOSIS OF RIGHT POSTERIOR CEREBRAL ARTERY (HCC): ICD-10-CM

## 2025-01-15 DIAGNOSIS — R26.2 AMBULATORY DYSFUNCTION: ICD-10-CM

## 2025-01-15 DIAGNOSIS — H90.3 SENSORINEURAL HEARING LOSS (SNHL) OF BOTH EARS: ICD-10-CM

## 2025-01-15 DIAGNOSIS — Z85.46 HISTORY OF PROSTATE CANCER: ICD-10-CM

## 2025-01-15 DIAGNOSIS — N39.41 URGE INCONTINENCE OF URINE: ICD-10-CM

## 2025-01-15 DIAGNOSIS — R63.4 WEIGHT LOSS: ICD-10-CM

## 2025-01-15 DIAGNOSIS — F02.B3 MODERATE LATE ONSET ALZHEIMER'S DEMENTIA WITH MOOD DISTURBANCE (HCC): ICD-10-CM

## 2025-01-15 DIAGNOSIS — G30.1 MODERATE LATE ONSET ALZHEIMER'S DEMENTIA WITH MOOD DISTURBANCE (HCC): ICD-10-CM

## 2025-01-15 DIAGNOSIS — N18.30 STAGE 3 CHRONIC KIDNEY DISEASE, UNSPECIFIED WHETHER STAGE 3A OR 3B CKD (HCC): ICD-10-CM

## 2025-01-15 DIAGNOSIS — H35.3132 INTERMEDIATE STAGE NONEXUDATIVE AGE-RELATED MACULAR DEGENERATION OF BOTH EYES: ICD-10-CM

## 2025-01-15 DIAGNOSIS — I10 ESSENTIAL HYPERTENSION: ICD-10-CM

## 2025-01-15 DIAGNOSIS — U07.1 COVID: Primary | ICD-10-CM

## 2025-01-15 DIAGNOSIS — G93.41 ACUTE METABOLIC ENCEPHALOPATHY: ICD-10-CM

## 2025-01-15 PROCEDURE — 99306 1ST NF CARE HIGH MDM 50: CPT | Performed by: INTERNAL MEDICINE

## 2025-01-15 NOTE — ASSESSMENT & PLAN NOTE
Patient did have history of ambulatory dysfunction was using a rollator to ambulate when he was at [facility].  He has lost weight and strength with his COVID infection.  Not ambulating at present

## 2025-01-15 NOTE — ASSESSMENT & PLAN NOTE
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

## 2025-01-15 NOTE — ASSESSMENT & PLAN NOTE
Patient with history of hypertension had been on lisinopril 10 mg orally daily in the past I also see that he had been on amlodipine but that medication was discontinued.

## 2025-01-15 NOTE — ASSESSMENT & PLAN NOTE
Patient does wear pull-ups apparently he has had some issues with micturition because of his inability to get to the bathroom on time.

## 2025-01-15 NOTE — ASSESSMENT & PLAN NOTE
Patient with history of hearing loss he does have hearing aids but apparently is not wearing them.  Daughter-in-law will go to [facility] to obtain his hearing aids.

## 2025-01-15 NOTE — ASSESSMENT & PLAN NOTE
Patient has had a 30 pound weight loss since last August.  Patient was weighing 183 pounds at present he is weighing 153 pounds.

## 2025-01-15 NOTE — ASSESSMENT & PLAN NOTE
With history of previous stroke back in 2023 family states that they did not notice any marked weakness after that event.

## 2025-01-15 NOTE — ASSESSMENT & PLAN NOTE
Previous history of acute metabolic encephalopathy most likely secondary to baseline underlying dementia.

## 2025-01-15 NOTE — PROGRESS NOTES
[facility]  Melecio Simmons MD [facility]  History and physical dictated [facility] on January 15, 2025-POS 31  Time spent on admission 90 minutes 20 minutes were spent speaking with patient's daughter-in-law Mrs. Torres.  Phone number [phone].  She was able to provide valuable information.  20 minutes were spent reviewing records.  50 minutes was spent talking to patient evaluating patient and writing note.    NAME: Alf Carmona  AGE: 89 y.o. SEX: male [id number]    DATE OF ENCOUNTER: 1/15/2025    Assessment and Plan     Problem List Items Addressed This Visit          Cardiovascular and Mediastinum    Essential hypertension    Patient with history of hypertension had been on lisinopril 10 mg orally daily in the past I also see that he had been on amlodipine but that medication was discontinued.         Stroke (HCC)    With history of previous stroke back in 2023 family states that they did not notice any marked weakness after that event.            Nervous and Auditory    Acute metabolic encephalopathy    Previous history of acute metabolic encephalopathy most likely secondary to baseline underlying dementia.         Sensorineural hearing loss (SNHL) of both ears    Patient with history of hearing loss he does have hearing aids but apparently is not wearing them.  Daughter-in-law will go to [facility] to obtain his hearing aids.         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            Genitourinary    CKD (chronic kidney disease) stage 3, GFR 30-59 ml/min ([facility])    Lab Results   Component Value Date    EGFR 49 07/31/2023    EGFR 45 07/30/2023    EGFR 39 07/29/2023    CREATININE 1.26 07/31/2023    CREATININE 1.36 (H) 07/30/2023    CREATININE 1.52 (H) 07/29/2023   Patient with history of chronic renal insufficiency stage III  fluctuates between a and B            Urinary    Urge incontinence of urine    Patient does wear pull-ups apparently he has had some issues with micturition because of his inability to get to the bathroom on time.            Care Coordination    Ambulatory dysfunction    Patient did have history of ambulatory dysfunction was using a rollator to ambulate when he was at [facility].  He has lost weight and strength with his COVID infection.  Not ambulating at present            Eye    Intermediate stage nonexudative age-related macular degeneration of both eyes    Patient with decreased visual acuity.            Oncology    History of prostate cancer    Patient with previous history of prostate cancer.            Other    COVID - Primary    Patient with recent COVID infection.  Patient will need physical and Occupational Therapy he was ambulating prior to his illness.         Weight loss    Patient has had a 30 pound weight loss since last August.  Patient was weighing 183 pounds at present he is weighing 153 pounds.        Recommendations    1.-PT-OT evaluate and treat    2.-Patient was ambulatory prior to his COVID infection he was utilizing a 4 wheeled walker    3.-Patient will need nutritional supplementation    4.-Patient will be at high risk of delirium.        All medications and routine orders were reviewed and updated as needed.    Plan discussed with: Family member    Chief Complaint     No chief complaint on file.  Acute viral infection-increased weakness-ambulatory dysfunction    History of Present Illness     Patient is a 89-year-old male who has a past medical history relevant for previous CVA in June 2023, history of prostate cancer, hypertension, chronic kidney disease stage IIIb, macular degeneration, cognitive decline.  The patient apparently was at [facility] assisted living and developed COVID infection and was transferred to [facility] where he received 10 days of  steroid therapy and was treated for urinary tract infection.  Patient was sent to [facility] for rehab however family was not happy with the environment there and has switched him to [facility].  Prior to him getting COVID patient was able to ambulate with a walker since his infection patient has been bedbound and is unable to ambulate at present.  Patient does have decreased cognition recognizes family and is able to carry out conversations with them.  He does have macular degeneration which has impaired his vision he also has had history of hearing loss does have hearing aids but apparently does not wear them.  Patient does have some urinary incontinence mainly because he cannot get to the bathroom on time he has been wearing pull-ups for the last 12 months.  Family has noted that the patient has lost weight since his illness back in August and his weight was 183 pounds currently is weighing 153 pounds.  Patient does need help with his basic actives of daily living when he was [facility] he needed help especially during his bathing and showering.  Patient is DNR DNI.  I was able to speak with son Alex Ornelas his wife wife  gave me an excellent history.  There hopes is that the patient will get back to his previous baseline and be able to ambulate.          HISTORY:  History reviewed. No pertinent surgical history.   Past Medical History:   Diagnosis Date    CKD (chronic kidney disease)     HL (hearing loss)     HTN (hypertension)     Hypertension     Hyponatremia     Prostate CA (HCC)      History reviewed. No pertinent family history.  Social History     Socioeconomic History    Marital status: Single     Spouse name: None    Number of children: None    Years of education: None    Highest education level: None   Occupational History    None   Tobacco Use    Smoking status: None    Smokeless tobacco: Never   Vaping Use    Vaping status: Never Used   Substance and Sexual Activity    Alcohol use: Never     Drug use: Never    Sexual activity: None   Other Topics Concern    None   Social History Narrative    ** Merged History Encounter **         ** Merged History Encounter **          Social Drivers of Health     Financial Resource Strain: Not on file   Food Insecurity: No Food Insecurity (7/31/2023)    Hunger Vital Sign     Worried About Running Out of Food in the Last Year: Never true     Ran Out of Food in the Last Year: Never true   Transportation Needs: No Transportation Needs (7/31/2023)    PRAPARE - Transportation     Lack of Transportation (Medical): No     Lack of Transportation (Non-Medical): No   Physical Activity: Not on file   Stress: Not on file   Social Connections: Not on file   Intimate Partner Violence: Not on file   Housing Stability: Unknown (7/31/2023)    Housing Stability Vital Sign     Unable to Pay for Housing in the Last Year: No     Number of Times Moved in the Last Year: Not on file     Homeless in the Last Year: No       Allergies:  No Known Allergies    Review of Systems     Review of Systems   Constitutional:  Positive for unexpected weight change.   HENT:  Positive for hearing loss.    Eyes:  Positive for visual disturbance.   Respiratory: Negative.     Cardiovascular: Negative.    Gastrointestinal: Negative.    Endocrine: Negative.    Genitourinary: Negative.    Musculoskeletal:  Positive for arthralgias and gait problem.   Skin: Negative.    Neurological:  Positive for weakness.   Psychiatric/Behavioral:  Positive for confusion and decreased concentration.        PHQ-2/9 Depression Screening             Medications and orders       Current Outpatient Medications:     acetaminophen (TYLENOL) 325 mg tablet, Take 650 mg by mouth every 6 (six) hours as needed for mild pain or fever, Disp: , Rfl:     aspirin (ECOTRIN LOW STRENGTH) 81 mg EC tablet, Take 1 tablet (81 mg total) by mouth daily, Disp: 30 tablet, Rfl: 0    atorvastatin (LIPITOR) 40 mg tablet, Take 1 tablet (40 mg total) by  "mouth every evening, Disp: 30 tablet, Rfl: 0    carvedilol (COREG) 6.25 mg tablet, Take 6.25 mg by mouth 2 (two) times a day with meals, Disp: , Rfl:     lisinopril (ZESTRIL) 10 mg tablet, Take 10 mg by mouth daily, Disp: , Rfl:     senna (SENOKOT) 8.6 MG tablet, Take 1 tablet by mouth daily as needed, Disp: , Rfl:        Objective     Vitals:   Vitals:    01/15/25 1452   BP: 140/79   Pulse: 79   Resp: 18   Temp: 97.6 °F (36.4 °C)   SpO2: 94%   Weight: 69.4 kg (153 lb)   Height: 5' 11\" (1.803 m)       Physical Exam  Constitutional:       Appearance: He is ill-appearing.   HENT:      Head: Atraumatic.      Right Ear: External ear normal.      Left Ear: External ear normal.      Nose: Nose normal.      Mouth/Throat:      Pharynx: Oropharynx is clear.   Eyes:      Pupils: Pupils are equal, round, and reactive to light.   Cardiovascular:      Rate and Rhythm: Normal rate and regular rhythm.      Pulses: Normal pulses.      Heart sounds: Normal heart sounds.   Pulmonary:      Effort: Pulmonary effort is normal.      Breath sounds: Normal breath sounds.   Abdominal:      General: Bowel sounds are normal.      Palpations: Abdomen is soft.   Musculoskeletal:         General: Normal range of motion.      Cervical back: Neck supple.   Skin:     General: Skin is dry.   Neurological:      Mental Status: Mental status is at baseline. He is disoriented.         Pertinent Laboratory/Diagnostic Studies:   The following labs/studies were reviewed please see facility chart for details.              "

## 2025-01-15 NOTE — ASSESSMENT & PLAN NOTE
Lab Results   Component Value Date    EGFR 49 07/31/2023    EGFR 45 07/30/2023    EGFR 39 07/29/2023    CREATININE 1.26 07/31/2023    CREATININE 1.36 (H) 07/30/2023    CREATININE 1.52 (H) 07/29/2023   Patient with history of chronic renal insufficiency stage III fluctuates between a and B

## 2025-01-15 NOTE — ASSESSMENT & PLAN NOTE
Patient with recent COVID infection.  Patient will need physical and Occupational Therapy he was ambulating prior to his illness.

## 2025-01-17 ENCOUNTER — NURSING HOME VISIT (OUTPATIENT)
Dept: GERIATRICS | Facility: OTHER | Age: OVER 89
End: 2025-01-17
Payer: COMMERCIAL

## 2025-01-17 VITALS
WEIGHT: 153.6 LBS | OXYGEN SATURATION: 97 % | DIASTOLIC BLOOD PRESSURE: 75 MMHG | HEART RATE: 76 BPM | BODY MASS INDEX: 21.42 KG/M2 | SYSTOLIC BLOOD PRESSURE: 143 MMHG | RESPIRATION RATE: 18 BRPM | TEMPERATURE: 98 F

## 2025-01-17 DIAGNOSIS — N18.30 STAGE 3 CHRONIC KIDNEY DISEASE, UNSPECIFIED WHETHER STAGE 3A OR 3B CKD (HCC): ICD-10-CM

## 2025-01-17 DIAGNOSIS — G30.1 MODERATE LATE ONSET ALZHEIMER'S DEMENTIA WITH MOOD DISTURBANCE (HCC): ICD-10-CM

## 2025-01-17 DIAGNOSIS — R26.2 AMBULATORY DYSFUNCTION: Primary | ICD-10-CM

## 2025-01-17 DIAGNOSIS — F02.B3 MODERATE LATE ONSET ALZHEIMER'S DEMENTIA WITH MOOD DISTURBANCE (HCC): ICD-10-CM

## 2025-01-17 DIAGNOSIS — I10 PRIMARY HYPERTENSION: Chronic | ICD-10-CM

## 2025-01-17 PROCEDURE — 99309 SBSQ NF CARE MODERATE MDM 30: CPT

## 2025-01-17 NOTE — PROGRESS NOTES
[facility]  [address] [address]  [phone]  [facility]  Code 31 (STR)          NAME: Alf Carmona  AGE: 89 y.o. SEX: male CODE STATUS: No CPR    DATE OF ENCOUNTER: 1/17/2025    Assessment and Plan     1. Ambulatory dysfunction  Assessment & Plan:  Multifactorial in the setting of advanced age, dementia, acute/chronic medical conditions  Continue PT/OT  Fall Precautions  Ensure adequate nutrition/hydration   Monitor CBC/BMP    following for d/c planning    2. Primary hypertension  Assessment & Plan:  BP stable, 143/75  Continue to monitor BP  Continue carvedilol 6.25 mg twice daily  Continue lisinopril 10 mg daily  3. 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 Stage 3 chronic kidney disease, unspecified whether stage 3a or 3b CKD (HCC)  Assessment & Plan:  Lab Results   Component Value Date    EGFR 49 07/31/2023    EGFR 45 07/30/2023    EGFR 39 07/29/2023    CREATININE 1.26 07/31/2023    CREATININE 1.36 (H) 07/30/2023    CREATININE 1.52 (H) 07/29/2023   Avoid nephrotoxins  Encourage adequate hydration  Monitor kidney functions       All medications and routine orders were reviewed and updated as needed.    Chief Complaint     STR follow up visit  Patient's care was coordinated with nursing facility staff. Recent vitals, labs, and updated medications were review on [facility] system in facility.  Past Medical and Surgical History      Past Medical History:   Diagnosis Date    CKD (chronic kidney disease)     HL (hearing loss)     HTN (hypertension)     Hypertension      Hyponatremia     Prostate CA (HCC)      History reviewed. No pertinent surgical history.  No Known Allergies       History of Present Illness     HPI  Alf Carmona is a 89 year old male, he is a STR patient of [facility] since 1/14/25. Past Medical Hx including but not limited to Alzheimer's dementia, CVA, HTN, CKD, FTT, prostate cancer.  He was seen in collaboration with nursing for medical mgmt and STR follow up.     Hospital course  Patient was a patient at [facility] and developed COVID infection, he was then [facility].  He received 10 days of steroid therapy was treated for urinary tract infection.  Patient was discharged to [facility] for rehab however family was not happy for the facility and asked him to be switched to [facility].  At baseline before COVID-patient was able to ambulate with walker, but since infection has been bedbound and unable to ambulate.  Patient's family reports he has lost approximately 30 pounds since August when he initially became ill.    Rehab course  Alf was seen and examined at bedside today. He is awake and alert. On exam patient is resting in bed, family is at bedside. He family reports he having a headache. Patient was given tylenol by nursing. He is [place], ROS difficult to complete. Family reports appetite is decreased today. He will continue with therapy.  Denies CP/SOB/N/V/D. Denies lightheadedness, dizziness, headaches, vision changes. Patient states they are eating well and staying hydrated. Denies any bowel or bladder issues. Per review of SNF records, Patient is eating 3 meals per day, consuming 50 %. Last documented BM 1/16/25. No concerns from nursing at this time.    The patient's allergies, past medical, surgical, social and family history were reviewed and unchanged.    Review of Systems     Review of Systems   Unable to perform ROS: Dementia         Objective     Vitals:   Vitals:     "01/17/25 1307   BP: 143/75   Pulse: 76   Resp: 18   Temp: 98 °F (36.7 °C)   SpO2: 97%         Physical Exam  Vitals and nursing note reviewed.   Constitutional:       General: He is not in acute distress.     Appearance: Normal appearance.   HENT:      Head: Normocephalic and atraumatic.   Eyes:      Conjunctiva/sclera: Conjunctivae normal.   Cardiovascular:      Heart sounds: Normal heart sounds.   Pulmonary:      Effort: Pulmonary effort is normal. No respiratory distress.      Breath sounds: Normal breath sounds. No wheezing.   Abdominal:      General: Bowel sounds are normal. There is no distension.      Palpations: Abdomen is soft.      Tenderness: There is no abdominal tenderness.   Neurological:      Mental Status: He is alert.      Motor: Weakness present.      Gait: Gait abnormal.   Psychiatric:         Mood and Affect: Mood normal.         Behavior: Behavior normal.         Pertinent Laboratory/Diagnostic Studies:   Reviewed in facility chart-stable      Current Medications   Medications reviewed and updated see facility MAR for details.      Current Outpatient Medications:     acetaminophen (TYLENOL) 325 mg tablet, Take 650 mg by mouth every 6 (six) hours as needed for mild pain or fever, Disp: , Rfl:     aspirin (ECOTRIN LOW STRENGTH) 81 mg EC tablet, Take 1 tablet (81 mg total) by mouth daily, Disp: 30 tablet, Rfl: 0    atorvastatin (LIPITOR) 40 mg tablet, Take 1 tablet (40 mg total) by mouth every evening, Disp: 30 tablet, Rfl: 0    carvedilol (COREG) 6.25 mg tablet, Take 6.25 mg by mouth 2 (two) times a day with meals, Disp: , Rfl:     lisinopril (ZESTRIL) 10 mg tablet, Take 10 mg by mouth daily, Disp: , Rfl:     senna (SENOKOT) 8.6 MG tablet, Take 1 tablet by mouth daily as needed, Disp: , Rfl:      Please note:  Voice-recognition software may have been used in the preparation of this document.  Occasional wrong word or \"sound-alike\" substitutions may have occurred due to the inherent limitations of " voice recognition software.  Interpretation should be guided by context.         ELLIE Lundy  1/17/2025  2:36 PM

## 2025-01-17 NOTE — ASSESSMENT & PLAN NOTE
Lab Results   Component Value Date    EGFR 49 07/31/2023    EGFR 45 07/30/2023    EGFR 39 07/29/2023    CREATININE 1.26 07/31/2023    CREATININE 1.36 (H) 07/30/2023    CREATININE 1.52 (H) 07/29/2023   Avoid nephrotoxins  Encourage adequate hydration  Monitor kidney functions

## 2025-01-17 NOTE — ASSESSMENT & PLAN NOTE
BP stable, 143/75  Continue to monitor BP  Continue carvedilol 6.25 mg twice daily  Continue lisinopril 10 mg daily

## 2025-01-17 NOTE — ASSESSMENT & PLAN NOTE
Multifactorial in the setting of advanced age, dementia, acute/chronic medical conditions  Continue PT/OT  Fall Precautions  Ensure adequate nutrition/hydration   Monitor CBC/BMP    following for d/c planning

## 2025-01-22 ENCOUNTER — NURSING HOME VISIT (OUTPATIENT)
Dept: GERIATRICS | Facility: OTHER | Age: OVER 89
End: 2025-01-22
Payer: COMMERCIAL

## 2025-01-22 VITALS
TEMPERATURE: 97 F | OXYGEN SATURATION: 96 % | SYSTOLIC BLOOD PRESSURE: 138 MMHG | HEART RATE: 73 BPM | DIASTOLIC BLOOD PRESSURE: 69 MMHG | RESPIRATION RATE: 18 BRPM

## 2025-01-22 DIAGNOSIS — N18.30 STAGE 3 CHRONIC KIDNEY DISEASE, UNSPECIFIED WHETHER STAGE 3A OR 3B CKD (HCC): ICD-10-CM

## 2025-01-22 DIAGNOSIS — I10 PRIMARY HYPERTENSION: Primary | Chronic | ICD-10-CM

## 2025-01-22 DIAGNOSIS — F02.B3 MODERATE LATE ONSET ALZHEIMER'S DEMENTIA WITH MOOD DISTURBANCE (HCC): ICD-10-CM

## 2025-01-22 DIAGNOSIS — G30.1 MODERATE LATE ONSET ALZHEIMER'S DEMENTIA WITH MOOD DISTURBANCE (HCC): ICD-10-CM

## 2025-01-22 DIAGNOSIS — R26.2 AMBULATORY DYSFUNCTION: ICD-10-CM

## 2025-01-22 PROCEDURE — 99309 SBSQ NF CARE MODERATE MDM 30: CPT

## 2025-01-22 NOTE — ASSESSMENT & PLAN NOTE
BP stable, 138/69  Continue to monitor BP  Continue carvedilol 6.25 mg twice daily  Continue lisinopril 10 mg daily

## 2025-01-22 NOTE — PROGRESS NOTES
[facility]  [address] [address]  [phone]  [place] [facility]  Code 31 (STR)          NAME: Alf Carmona  AGE: 89 y.o. SEX: male CODE STATUS: No CPR    DATE OF ENCOUNTER: 1/22/2025    Assessment and Plan     1. Primary hypertension  Assessment & Plan:  BP stable, 138/69  Continue to monitor BP  Continue carvedilol 6.25 mg twice daily  Continue lisinopril 10 mg daily  2. 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 Stage 3 chronic kidney disease, unspecified whether stage 3a or 3b CKD (HCC)  Assessment & Plan:  Lab Results   Component Value Date    EGFR 49 07/31/2023    EGFR 45 07/30/2023    EGFR 39 07/29/2023    CREATININE 1.26 07/31/2023    CREATININE 1.36 (H) 07/30/2023    CREATININE 1.52 (H) 07/29/2023   Avoid nephrotoxins  Encourage adequate hydration  Monitor kidney functions  4. Ambulatory dysfunction  Assessment & Plan:  Multifactorial in the setting of advanced age, dementia, acute/chronic medical conditions  Continue PT/OT  Fall Precautions  Ensure adequate nutrition/hydration   Monitor CBC/BMP    following for d/c planning         All medications and routine orders were reviewed and updated as needed.    Chief Complaint     STR follow up visit  Patient's care was coordinated with nursing facility staff. Recent vitals, labs, and updated medications were review on [facility] system in facility.  Past Medical and Surgical History      Past Medical History:   Diagnosis Date    CKD (chronic kidney disease)     HL (hearing loss)     HTN (hypertension)     Hypertension      Hyponatremia     Prostate CA (HCC)      No past surgical history on file.  No Known Allergies       History of Present Illness     HPI  Alf Carmona is a 89 year old male, he is a STR patient of [facility] since 1/14/25. Past Medical Hx including but not limited to Alzheimer's dementia, CVA, HTN, CKD, FTT, prostate cancer.  He was seen in collaboration with nursing for medical mgmt and STR follow up.     Hospital course  Patient was a patient at [facility] and developed COVID infection, he was then transferred to [facility].  He received 10 days of steroid therapy was treated for urinary tract infection.  Patient was discharged to [facility] for rehab however family was not happy for the facility and asked [facility]  At baseline before COVID-patient was able to ambulate with walker, but since infection has been bedbound and unable to ambulate.  Patient's family reports he has lost approximately 30 pounds since August when he initially became ill.    Rehab course  Alf was seen and examined at bedside today. He is awake and alert. [place]. On exam patient is resting in bed, appears comfortable. He denies pain, ROS difficult to complete. He says he is eating ok and sleeping ok. He continues with therapy.  Denies CP/SOB/N/V/D. Denies any bowel or bladder issues. Per review of SNF records, Patient is eating 3 meals per day, consuming 50 %. Last documented BM 1/21/25. No concerns from nursing at this time.    The patient's allergies, past medical, surgical, social and family history were reviewed and unchanged.    Review of Systems     Review of Systems   Unable to perform ROS: Dementia         Objective     Vitals:   Vitals:    01/22/25 0930   BP: 138/69   Pulse: 73   Resp: 18   Temp: (!) 97 °F (36.1 °C)   SpO2: 96%         Physical Exam  Vitals and nursing note reviewed.   Constitutional:       General: He is not in acute distress.      "Appearance: Normal appearance.   HENT:      Head: Normocephalic and atraumatic.   Eyes:      Conjunctiva/sclera: Conjunctivae normal.   Cardiovascular:      Heart sounds: Normal heart sounds.   Pulmonary:      Effort: Pulmonary effort is normal. No respiratory distress.      Breath sounds: Normal breath sounds. No wheezing.   Abdominal:      General: Bowel sounds are normal. There is no distension.      Palpations: Abdomen is soft.      Tenderness: There is no abdominal tenderness.   Neurological:      Mental Status: He is alert.      Motor: Weakness present.      Gait: Gait abnormal.   Psychiatric:         Mood and Affect: Mood normal.         Behavior: Behavior normal.         Pertinent Laboratory/Diagnostic Studies:   Reviewed in facility chart-stable      Current Medications   Medications reviewed and updated see facility MAR for details.      Current Outpatient Medications:     acetaminophen (TYLENOL) 325 mg tablet, Take 650 mg by mouth every 6 (six) hours as needed for mild pain or fever, Disp: , Rfl:     aspirin (ECOTRIN LOW STRENGTH) 81 mg EC tablet, Take 1 tablet (81 mg total) by mouth daily, Disp: 30 tablet, Rfl: 0    atorvastatin (LIPITOR) 40 mg tablet, Take 1 tablet (40 mg total) by mouth every evening, Disp: 30 tablet, Rfl: 0    carvedilol (COREG) 6.25 mg tablet, Take 6.25 mg by mouth 2 (two) times a day with meals, Disp: , Rfl:     lisinopril (ZESTRIL) 10 mg tablet, Take 10 mg by mouth daily, Disp: , Rfl:     senna (SENOKOT) 8.6 MG tablet, Take 1 tablet by mouth daily as needed, Disp: , Rfl:      Please note:  Voice-recognition software may have been used in the preparation of this document.  Occasional wrong word or \"sound-alike\" substitutions may have occurred due to the inherent limitations of voice recognition software.  Interpretation should be guided by context.         ELLIE Lundy  1/22/2025  12:30 PM    "

## 2025-01-25 ENCOUNTER — TELEPHONE (OUTPATIENT)
Dept: OTHER | Facility: OTHER | Age: OVER 89
End: 2025-01-25

## 2025-01-29 ENCOUNTER — NURSING HOME VISIT (OUTPATIENT)
Dept: GERIATRICS | Facility: OTHER | Age: OVER 89
End: 2025-01-29
Payer: COMMERCIAL

## 2025-01-29 VITALS
OXYGEN SATURATION: 94 % | SYSTOLIC BLOOD PRESSURE: 110 MMHG | WEIGHT: 159.2 LBS | DIASTOLIC BLOOD PRESSURE: 66 MMHG | RESPIRATION RATE: 18 BRPM | HEART RATE: 70 BPM | TEMPERATURE: 97.9 F | BODY MASS INDEX: 22.2 KG/M2

## 2025-01-29 DIAGNOSIS — R26.2 AMBULATORY DYSFUNCTION: ICD-10-CM

## 2025-01-29 DIAGNOSIS — I10 PRIMARY HYPERTENSION: Primary | Chronic | ICD-10-CM

## 2025-01-29 DIAGNOSIS — N18.30 STAGE 3 CHRONIC KIDNEY DISEASE, UNSPECIFIED WHETHER STAGE 3A OR 3B CKD (HCC): ICD-10-CM

## 2025-01-29 DIAGNOSIS — F02.B3 MODERATE LATE ONSET ALZHEIMER'S DEMENTIA WITH MOOD DISTURBANCE (HCC): ICD-10-CM

## 2025-01-29 DIAGNOSIS — G30.1 MODERATE LATE ONSET ALZHEIMER'S DEMENTIA WITH MOOD DISTURBANCE (HCC): ICD-10-CM

## 2025-01-29 PROCEDURE — 99309 SBSQ NF CARE MODERATE MDM 30: CPT

## 2025-01-29 NOTE — ASSESSMENT & PLAN NOTE
BP stable, 110/66  Continue to monitor BP  Continue carvedilol 6.25 mg twice daily  Continue lisinopril 10 mg daily

## 2025-01-29 NOTE — PROGRESS NOTES
[facility]  [address] [address]  [phone]  [place] [facility]  Code 31 (STR)          NAME: Alf Carmona  AGE: 89 y.o. SEX: male CODE STATUS: No CPR    DATE OF ENCOUNTER: 1/29/2025    Assessment and Plan     1. Primary hypertension  Assessment & Plan:  BP stable, 110/66  Continue to monitor BP  Continue carvedilol 6.25 mg twice daily  Continue lisinopril 10 mg daily  2. 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 Stage 3 chronic kidney disease, unspecified whether stage 3a or 3b CKD (HCC)  Assessment & Plan:  Lab Results   Component Value Date    EGFR 51 (L) 01/09/2025    EGFR 49 (L) 01/08/2025    EGFR 56 (L) 01/07/2025    CREATININE 1.3 01/09/2025    CREATININE 1.36 (H) 01/08/2025    CREATININE 1.2 01/07/2025   Avoid nephrotoxins  1/21/25 Cr 1.2/BUN 26/ GFR 57  Encourage adequate hydration  Monitor kidney functions  4. Ambulatory dysfunction  Assessment & Plan:  Multifactorial in the setting of advanced age, dementia, acute/chronic medical conditions  Continue PT/OT  Fall Precautions  Ensure adequate nutrition/hydration   Monitor CBC/BMP    following for d/c planning         All medications and routine orders were reviewed and updated as needed.    Chief Complaint     STR follow up visit  Patient's care was coordinated with nursing facility staff. Recent vitals, labs, and updated medications were review on [facility] system in facility.  Past Medical and Surgical History      Past Medical History:   Diagnosis Date    CKD (chronic kidney disease)     HL (hearing loss)      HTN (hypertension)     Hypertension     Hyponatremia     Prostate CA (HCC)      History reviewed. No pertinent surgical history.  No Known Allergies       History of Present Illness     HPI  Alf Carmona is a 89 year old male, he is a STR patient of [facility] since 1/14/25. Past Medical Hx including but not limited to Alzheimer's dementia, CVA, HTN, CKD, FTT, prostate cancer.  He was seen in collaboration with nursing for medical mgmt and STR follow up.     Hospital course  Patient was a patient at [facility] and developed COVID infection, he was then transferred to [facility].  He received 10 days of steroid therapy was treated for urinary tract infection.  Patient was discharged to [facility] for rehab however family was not happy for the facility and asked him to be switched to [facility].  At baseline before COVID-patient was able to ambulate with walker, but since infection has been bedbound and unable to ambulate.  Patient's family reports he has lost approximately 30 pounds since August when he initially became ill.    Rehab course  Alf was seen and examined at bedside today. He is awake and alert. [place]. On exam patient is resting in bed, appears comfortable. ROS difficult to complete. He denies pain, difficulty sleeping, and has a good appetite. He continues with therapy.  Denies CP/SOB/N/V/D. Denies any bowel or bladder issues. Per review of SNF records, Patient is eating 3 meals per day, consuming 50 %. Last documented BM 1/29/25. No concerns from nursing at this time.    The patient's allergies, past medical, surgical, social and family history were reviewed and unchanged.    Review of Systems     Review of Systems   Unable to perform ROS: Dementia         Objective     Vitals:   Vitals:    01/29/25 1354   BP: 110/66   Pulse: 70   Resp: 18   Temp: 97.9 °F (36.6 °C)   SpO2: 94%           Physical Exam  Vitals and nursing note reviewed.  "  Constitutional:       General: He is not in acute distress.     Appearance: Normal appearance.   HENT:      Head: Normocephalic and atraumatic.   Eyes:      Conjunctiva/sclera: Conjunctivae normal.   Cardiovascular:      Heart sounds: Normal heart sounds.   Pulmonary:      Effort: Pulmonary effort is normal. No respiratory distress.      Breath sounds: Normal breath sounds. No wheezing.   Abdominal:      General: Bowel sounds are normal. There is no distension.      Palpations: Abdomen is soft.      Tenderness: There is no abdominal tenderness.   Neurological:      Mental Status: He is alert.      Motor: Weakness present.      Gait: Gait abnormal.   Psychiatric:         Mood and Affect: Mood normal.         Behavior: Behavior normal.         Pertinent Laboratory/Diagnostic Studies:   Reviewed in facility chart-stable      Current Medications   Medications reviewed and updated see facility MAR for details.      Current Outpatient Medications:     acetaminophen (TYLENOL) 325 mg tablet, Take 650 mg by mouth every 6 (six) hours as needed for mild pain or fever, Disp: , Rfl:     aspirin (ECOTRIN LOW STRENGTH) 81 mg EC tablet, Take 1 tablet (81 mg total) by mouth daily, Disp: 30 tablet, Rfl: 0    atorvastatin (LIPITOR) 40 mg tablet, Take 1 tablet (40 mg total) by mouth every evening, Disp: 30 tablet, Rfl: 0    carvedilol (COREG) 6.25 mg tablet, Take 6.25 mg by mouth 2 (two) times a day with meals, Disp: , Rfl:     lisinopril (ZESTRIL) 10 mg tablet, Take 10 mg by mouth daily, Disp: , Rfl:     senna (SENOKOT) 8.6 MG tablet, Take 1 tablet by mouth daily as needed, Disp: , Rfl:      Please note:  Voice-recognition software may have been used in the preparation of this document.  Occasional wrong word or \"sound-alike\" substitutions may have occurred due to the inherent limitations of voice recognition software.  Interpretation should be guided by context.         ELLIE Lundy  1/29/2025  2:00 PM    "

## 2025-01-29 NOTE — ASSESSMENT & PLAN NOTE
Lab Results   Component Value Date    EGFR 51 (L) 01/09/2025    EGFR 49 (L) 01/08/2025    EGFR 56 (L) 01/07/2025    CREATININE 1.3 01/09/2025    CREATININE 1.36 (H) 01/08/2025    CREATININE 1.2 01/07/2025   Avoid nephrotoxins  1/21/25 Cr 1.2/BUN 26/ GFR 57  Encourage adequate hydration  Monitor kidney functions

## 2025-01-31 ENCOUNTER — NURSING HOME VISIT (OUTPATIENT)
Dept: GERIATRICS | Facility: OTHER | Age: OVER 89
End: 2025-01-31
Payer: COMMERCIAL

## 2025-01-31 VITALS
DIASTOLIC BLOOD PRESSURE: 61 MMHG | HEART RATE: 70 BPM | OXYGEN SATURATION: 94 % | RESPIRATION RATE: 18 BRPM | WEIGHT: 159.2 LBS | SYSTOLIC BLOOD PRESSURE: 110 MMHG | TEMPERATURE: 97.9 F | BODY MASS INDEX: 22.2 KG/M2

## 2025-01-31 DIAGNOSIS — R26.2 AMBULATORY DYSFUNCTION: ICD-10-CM

## 2025-01-31 DIAGNOSIS — G30.1 MODERATE LATE ONSET ALZHEIMER'S DEMENTIA WITH MOOD DISTURBANCE (HCC): ICD-10-CM

## 2025-01-31 DIAGNOSIS — F02.B3 MODERATE LATE ONSET ALZHEIMER'S DEMENTIA WITH MOOD DISTURBANCE (HCC): ICD-10-CM

## 2025-01-31 DIAGNOSIS — I10 PRIMARY HYPERTENSION: Primary | Chronic | ICD-10-CM

## 2025-01-31 DIAGNOSIS — H90.3 SENSORINEURAL HEARING LOSS (SNHL) OF BOTH EARS: ICD-10-CM

## 2025-01-31 DIAGNOSIS — N18.30 STAGE 3 CHRONIC KIDNEY DISEASE, UNSPECIFIED WHETHER STAGE 3A OR 3B CKD (HCC): ICD-10-CM

## 2025-01-31 PROCEDURE — 99309 SBSQ NF CARE MODERATE MDM 30: CPT

## 2025-01-31 NOTE — ASSESSMENT & PLAN NOTE
BP stable, 110/61  Continue to monitor BP  Continue carvedilol 6.25 mg twice daily  Continue lisinopril 10 mg daily

## 2025-01-31 NOTE — ASSESSMENT & PLAN NOTE
Mood stable  Provide redirection, reorientation, distraction techniques  Fall Precautions  Assist with ADLs/IADLs  Avoid deliriogenic medications such as tramadol, benzodiazepines, anticholinergics, benadryl  Encourage Hydration/ Nutrition  Implement sleep hygiene, limit night time interuptions   Encourage participation in group activities when appropriate

## 2025-01-31 NOTE — ASSESSMENT & PLAN NOTE
Lab Results   Component Value Date    EGFR 51 (L) 01/09/2025    EGFR 49 (L) 01/08/2025    EGFR 56 (L) 01/07/2025    CREATININE 1.3 01/09/2025    CREATININE 1.36 (H) 01/08/2025    CREATININE 1.2 01/07/2025   Avoid nephrotoxins  Baseline Cr 1.3-1.4  1/21/25 Cr 1.2/BUN 26/ GFR 57  Encourage adequate hydration  Monitor kidney functions

## 2025-01-31 NOTE — PROGRESS NOTES
[facility]  [address] [address]  [phone]  [place] [facility]  Code 31 (STR)          NAME: Alf Carmona  AGE: 89 y.o. SEX: male CODE STATUS: No CPR    DATE OF ENCOUNTER: 1/31/2025    Assessment and Plan     1. Primary hypertension  Assessment & Plan:  BP stable, 110/61  Continue to monitor BP  Continue carvedilol 6.25 mg twice daily  Continue lisinopril 10 mg daily  2. 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 Sensorineural hearing loss (SNHL) of both ears  Assessment & Plan:  [place], able to make his needs known  Encourage use of hearing aides  4. Ambulatory dysfunction  Assessment & Plan:  Multifactorial in the setting of advanced age, dementia, acute/chronic medical conditions  Continue PT/OT  Fall Precautions  Ensure adequate nutrition/hydration   Monitor CBC/BMP    following for d/c planning    5. Stage 3 chronic kidney disease, unspecified whether stage 3a or 3b CKD ([facility])  Assessment & Plan:  Lab Results   Component Value Date    EGFR 51 (L) 01/09/2025    EGFR 49 (L) 01/08/2025    EGFR 56 (L) 01/07/2025    CREATININE 1.3 01/09/2025    CREATININE 1.36 (H) 01/08/2025    CREATININE 1.2 01/07/2025   Avoid nephrotoxins  Baseline Cr 1.3-1.4  1/21/25 Cr 1.2/BUN 26/ GFR 57  Encourage adequate hydration  Monitor kidney functions       All medications and routine orders were reviewed and updated as needed.    Chief Complaint     STR follow up visit  Patient's care was coordinated with nursing facility staff. Recent vitals, labs, and updated medications were review on  [facility] system in facility.  Past Medical and Surgical History      Past Medical History:   Diagnosis Date    CKD (chronic kidney disease)     HL (hearing loss)     HTN (hypertension)     Hypertension     Hyponatremia     Prostate CA (HCC)      No past surgical history on file.  No Known Allergies       History of Present Illness     HPI  Alf Carmona is a 89 year old male, he is a STR patient of [facility] since 1/14/25. Past Medical Hx including but not limited to Alzheimer's dementia, CVA, HTN, CKD, FTT, prostate cancer.  He was seen in collaboration with nursing for medical mgmt and STR follow up.     Hospital course  Patient was a patient at [facility] and developed COVID infection, he was then transferred to [facility].  He received 10 days of steroid therapy was treated for urinary tract infection.  Patient was discharged to [facility] for rehab however family was not happy for the facility and asked him to be switched to [facility].  At baseline before COVID-patient was able to ambulate with walker, but since infection has been bedbound and unable to ambulate.  Patient's family reports he has lost approximately 30 pounds since August when he initially became ill.    Rehab course  Alf was seen and examined at bedside today. He is awake and alert, [place] appears to be doing ok. On exam patient is resting in bed, appears comfortable. ROS difficult to complete. He denies pain, difficulty sleeping, and has a fair appetite.   Denies CP/SOB/N/V/D. Denies any bowel or bladder issues. Per review of SNF records, Patient is eating 3 meals per day, consuming 50 %. Last documented BM 1/30/25. No concerns from nursing at this time.    The patient's allergies, past medical, surgical, social and family history were reviewed and unchanged.    Review of Systems     Review of Systems   Unable to perform ROS: Dementia         Objective     Vitals:   Vitals:  "   01/31/25 1347   BP: 110/61   Pulse: 70   Resp: 18   Temp: 97.9 °F (36.6 °C)   SpO2: 94%           Physical Exam  Vitals and nursing note reviewed.   Constitutional:       General: He is not in acute distress.     Appearance: Normal appearance.   HENT:      Head: Normocephalic and atraumatic.   Eyes:      Conjunctiva/sclera: Conjunctivae normal.   Cardiovascular:      Heart sounds: Normal heart sounds.   Pulmonary:      Effort: Pulmonary effort is normal. No respiratory distress.      Breath sounds: Normal breath sounds. No wheezing.   Abdominal:      General: Bowel sounds are normal. There is no distension.      Palpations: Abdomen is soft.      Tenderness: There is no abdominal tenderness.   Neurological:      Mental Status: He is alert.      Motor: Weakness present.      Gait: Gait abnormal.   Psychiatric:         Mood and Affect: Mood normal.         Behavior: Behavior normal.         Pertinent Laboratory/Diagnostic Studies:   Reviewed in facility chart-stable      Current Medications   Medications reviewed and updated see facility MAR for details.      Current Outpatient Medications:     acetaminophen (TYLENOL) 325 mg tablet, Take 650 mg by mouth every 6 (six) hours as needed for mild pain or fever, Disp: , Rfl:     aspirin (ECOTRIN LOW STRENGTH) 81 mg EC tablet, Take 1 tablet (81 mg total) by mouth daily, Disp: 30 tablet, Rfl: 0    atorvastatin (LIPITOR) 40 mg tablet, Take 1 tablet (40 mg total) by mouth every evening, Disp: 30 tablet, Rfl: 0    carvedilol (COREG) 6.25 mg tablet, Take 6.25 mg by mouth 2 (two) times a day with meals, Disp: , Rfl:     lisinopril (ZESTRIL) 10 mg tablet, Take 10 mg by mouth daily, Disp: , Rfl:     senna (SENOKOT) 8.6 MG tablet, Take 1 tablet by mouth daily as needed, Disp: , Rfl:      Please note:  Voice-recognition software may have been used in the preparation of this document.  Occasional wrong word or \"sound-alike\" substitutions may have occurred due to the inherent " limitations of voice recognition software.  Interpretation should be guided by context.         ELLIE Lundy  1/31/2025  2:02 PM

## 2025-02-04 ENCOUNTER — NURSING HOME VISIT (OUTPATIENT)
Dept: GERIATRICS | Facility: OTHER | Age: OVER 89
End: 2025-02-04
Payer: COMMERCIAL

## 2025-02-04 VITALS
OXYGEN SATURATION: 96 % | HEART RATE: 84 BPM | WEIGHT: 159.2 LBS | TEMPERATURE: 97.5 F | SYSTOLIC BLOOD PRESSURE: 133 MMHG | RESPIRATION RATE: 18 BRPM | DIASTOLIC BLOOD PRESSURE: 61 MMHG | BODY MASS INDEX: 22.2 KG/M2

## 2025-02-04 DIAGNOSIS — I10 HYPERTENSION, UNSPECIFIED TYPE: Chronic | ICD-10-CM

## 2025-02-04 DIAGNOSIS — F02.B3 MODERATE LATE ONSET ALZHEIMER'S DEMENTIA WITH MOOD DISTURBANCE (HCC): ICD-10-CM

## 2025-02-04 DIAGNOSIS — G30.1 MODERATE LATE ONSET ALZHEIMER'S DEMENTIA WITH MOOD DISTURBANCE (HCC): ICD-10-CM

## 2025-02-04 DIAGNOSIS — I10 PRIMARY HYPERTENSION: Primary | Chronic | ICD-10-CM

## 2025-02-04 DIAGNOSIS — N18.30 STAGE 3 CHRONIC KIDNEY DISEASE, UNSPECIFIED WHETHER STAGE 3A OR 3B CKD (HCC): ICD-10-CM

## 2025-02-04 DIAGNOSIS — R26.2 AMBULATORY DYSFUNCTION: ICD-10-CM

## 2025-02-04 PROCEDURE — 99316 NF DSCHRG MGMT 30 MIN+: CPT

## 2025-02-04 RX ORDER — BISACODYL 10 MG
10 SUPPOSITORY, RECTAL RECTAL DAILY PRN
COMMUNITY

## 2025-02-04 RX ORDER — CALCIUM CARBONATE 500 MG/1
1 TABLET, CHEWABLE ORAL 3 TIMES DAILY PRN
COMMUNITY

## 2025-02-04 RX ORDER — AMLODIPINE BESYLATE 2.5 MG/1
2.5 TABLET ORAL DAILY
Start: 2025-02-04

## 2025-02-04 NOTE — ASSESSMENT & PLAN NOTE
BP stable, 133/61  Continue to monitor BP  Continue carvedilol 6.25 mg twice daily  Continue lisinopril 10 mg daily

## 2025-02-04 NOTE — PROGRESS NOTES
[facility]  [address] [place] [address]  [phone]  DISCHARGE SUMMARY  Facility: [facility]  Code 31    NAME: Alf Carmona  AGE: 89 y.o. SEX: male   CODE STATUS: No CPR    DATE OF ADMISSION: 1/14/25   DATE OF DISCHARGE: 2/5/25   DISCHARGE DISPOSITION: Stable for discharge to [facility]  Reason for Admission: Patient was admitted to [facility] for rehabilitation after hospitalization for COVID.    Past Medical and Surgical History:   Past Medical History:   Diagnosis Date    CKD (chronic kidney disease)     HL (hearing loss)     HTN (hypertension)     Hypertension     Hyponatremia     Prostate CA (HCC)       History reviewed. No pertinent surgical history.    Course of stay:   HPI  Alf Carmona is a 89 year old male, he is a STR patient of [facility] since 1/14/25. Past Medical Hx including but not limited to Alzheimer's dementia, CVA, HTN, CKD, FTT, prostate cancer.  He was seen in collaboration with nursing for medical mgmt and STR follow up.     Hospital course  Patient was a patient at [facility] and developed COVID infection, he was then transferred to [facility].  He received 10 days of steroid therapy was treated for urinary tract infection.  Patient was discharged to [facility] for rehab however family was not happy for the facility and asked him to be switched to [facility].  At baseline before COVID-patient was able to ambulate with walker, but since infection has been bedbound and unable to ambulate.  Patient's family reports he has lost approximately 30 pounds since August when he initially became ill.    Rehab course  Alf was seen and examined today. He is awake and alert, [facility] appears to be doing ok. On exam patient is ambulating with his wheelchair in the [place]. He denies pain, difficulty sleeping, and has a fair appetite. He says he is feeling good today. He continues with therapy.  Denies CP/SOB/N/V/D.  Denies any bowel or bladder issues. Per review of SNF records, Patient is eating 3 meals per day, consuming 50 %. Last documented BM 2/3/25. No concerns from nursing at this time.      During the patients stay at [facility], he received skilled nursing care, PT, OT, social service support, dietician support, and medical management.  Pt scheduled to be discharged on 2/5/25.  ROS:  Review of Systems   Unable to perform ROS: Dementia       PHYSICAL EXAM:  VITALS:   Vitals:    02/04/25 1215   BP: 133/61   Pulse: 84   Resp: 18   Temp: 97.5 °F (36.4 °C)   SpO2: 96%        Physical Exam  Vitals and nursing note reviewed.   Constitutional:       General: He is not in acute distress.     Appearance: Normal appearance.   HENT:      Head: Normocephalic and atraumatic.   Eyes:      Conjunctiva/sclera: Conjunctivae normal.   Cardiovascular:      Heart sounds: Normal heart sounds.   Pulmonary:      Effort: Pulmonary effort is normal. No respiratory distress.      Breath sounds: Normal breath sounds. No wheezing.   Abdominal:      General: Bowel sounds are normal. There is no distension.      Palpations: Abdomen is soft.      Tenderness: There is no abdominal tenderness.   Neurological:      Mental Status: He is alert.      Motor: Weakness present.      Gait: Gait abnormal.   Psychiatric:         Mood and Affect: Mood normal.         Behavior: Behavior normal.         Admission Diagnoses:   1. Primary hypertension  Assessment & Plan:  BP stable, 133/61  Continue to monitor BP  Continue carvedilol 6.25 mg twice daily  Continue lisinopril 10 mg daily  2. 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 Stage 3 chronic kidney disease, unspecified whether stage 3a or 3b CKD ([facility])  Assessment & Plan:  Lab Results   Component Value Date    EGFR 51 (L) 01/09/2025    EGFR 49 (L) 01/08/2025    EGFR 56 (L) 01/07/2025    CREATININE 1.3 01/09/2025    CREATININE 1.36 (H) 01/08/2025    CREATININE 1.2 01/07/2025   Avoid nephrotoxins  Baseline Cr 1.3-1.4  1/21/25 Cr 1.2/BUN 26/ GFR 57  Encourage adequate hydration  Monitor kidney functions  4. Ambulatory dysfunction  Assessment & Plan:  Multifactorial in the setting of advanced age, dementia, acute/chronic medical conditions  Continue PT/OT  Fall Precautions  Ensure adequate nutrition/hydration   Monitor CBC/BMP    following for d/c planning    5. Hypertension, unspecified type  Assessment & Plan:  BP stable, 133/61  Continue to monitor BP  Continue carvedilol 6.25 mg twice daily  Continue lisinopril 10 mg daily  Orders:  -     amLODIPine (NORVASC) 2.5 mg tablet; Take 1 tablet (2.5 mg total) by mouth daily       Follow-up Recommendations:    Outpatient Follow up with PCP in the next 2 weeks    Labs and testing performed during stay:   WBC COUNT, AUTOMATED 11.5 K/CU.MM 3.5-11.0 H Final              RBC COUNT 4.7 M/CU.MM 3.6-5.6  Final             HEMOGLOBIN 13.9 g/dL 12.1-17.1  Final             HEMATOCRIT 44 PERCENT 36-51  Final             MCH 29 pg 25-32  Final             MCV 93 fL   Final             MCHC 32 g/dL 31-36  Final             RDW 14.5 % 11.6-14.4 H Final             PLATELETS, AUTOMATED 296 K/CU.-400  Final             MPV 11.0 fL 9.4-12.4  Final       Discharge Medications: See discharge medication list which was reviewed and signed.      Current Outpatient Medications:     acetaminophen (TYLENOL) 325 mg tablet, Take 650 mg by mouth every 6 (six) hours as needed for mild pain or fever, Disp: , Rfl:     amLODIPine (NORVASC) 2.5 mg tablet, Take 1 tablet (2.5 mg total)  "by mouth daily, Disp: , Rfl:     aspirin (ECOTRIN LOW STRENGTH) 81 mg EC tablet, Take 1 tablet (81 mg total) by mouth daily, Disp: 30 tablet, Rfl: 0    atorvastatin (LIPITOR) 40 mg tablet, Take 1 tablet (40 mg total) by mouth every evening, Disp: 30 tablet, Rfl: 0    bisacodyl (DULCOLAX) 10 mg suppository, Insert 10 mg into the rectum daily as needed for constipation, Disp: , Rfl:     calcium carbonate (TUMS) 500 mg chewable tablet, Chew 1 tablet 3 (three) times a day as needed for indigestion or heartburn, Disp: , Rfl:     carvedilol (COREG) 6.25 mg tablet, Take 6.25 mg by mouth 2 (two) times a day with meals, Disp: , Rfl:     lisinopril (ZESTRIL) 10 mg tablet, Take 10 mg by mouth daily, Disp: , Rfl:      Discussion with patient/family and further instructions:  -Fall precautions  -Aspiration precautions  -Bleeding precautions  -Monitor for signs/symptoms of infection  -Medication list was reviewed and signed  -DME form was completed    Status at time of discharge: Stable     Billing based on time. Time spent on unit, 40 minutes. Time spent counseling pt on debility/condition, 30 minutes.    Please note:  Voice-recognition software may have been used in the preparation of this document.  Occasional wrong word or \"sound-alike\" substitutions may have occurred due to the inherent limitations of voice recognition software.  Interpretation should be guided by context.        ELLIE Lundy  2/4/2025   "

## 2025-02-04 NOTE — ASSESSMENT & PLAN NOTE
Lab Results   Component Value Date    EGFR 37 08/30/2021    CREATININE 1 64 (H) 08/30/2021   · Cr is at baseline  · Continue to monitor w/ IVF Patient asked for a return call. Didn't state why or what was needed. Please triage